# Patient Record
Sex: FEMALE | Race: WHITE | Employment: OTHER | URBAN - METROPOLITAN AREA
[De-identification: names, ages, dates, MRNs, and addresses within clinical notes are randomized per-mention and may not be internally consistent; named-entity substitution may affect disease eponyms.]

---

## 2017-01-12 ENCOUNTER — TRANSFERRED RECORDS (OUTPATIENT)
Dept: HEALTH INFORMATION MANAGEMENT | Facility: CLINIC | Age: 72
End: 2017-01-12

## 2017-01-13 ENCOUNTER — OFFICE VISIT (OUTPATIENT)
Dept: PEDIATRICS | Facility: CLINIC | Age: 72
End: 2017-01-13
Payer: COMMERCIAL

## 2017-01-13 VITALS
SYSTOLIC BLOOD PRESSURE: 132 MMHG | WEIGHT: 129.2 LBS | TEMPERATURE: 97.7 F | DIASTOLIC BLOOD PRESSURE: 78 MMHG | RESPIRATION RATE: 14 BRPM | BODY MASS INDEX: 22.17 KG/M2 | HEART RATE: 62 BPM

## 2017-01-13 DIAGNOSIS — M25.532 LEFT WRIST PAIN: ICD-10-CM

## 2017-01-13 DIAGNOSIS — M25.532 ARTHRALGIA OF WRIST, LEFT: ICD-10-CM

## 2017-01-13 DIAGNOSIS — S43.085D CLOSED DISLOCATION OF GLENOHUMERAL JOINT, LEFT, SUBSEQUENT ENCOUNTER: Primary | ICD-10-CM

## 2017-01-13 PROCEDURE — 99214 OFFICE O/P EST MOD 30 MIN: CPT | Performed by: INTERNAL MEDICINE

## 2017-01-13 NOTE — MR AVS SNAPSHOT
After Visit Summary   1/13/2017    Brionna Rollins    MRN: [id number]           Patient Information     Date Of Birth          1945        Visit Information        Provider Department      1/13/2017 2:40 PM Roe Connolly MD [facility]        Today's Diagnoses     Closed dislocation of glenohumeral joint, left, subsequent encounter    -  1     Left wrist pain         Arthralgia of wrist, left            Follow-ups after your visit        Who to contact     If you have questions or need follow up information about today's clinic visit or your schedule please contact [facility] directly at [phone].  Normal or non-critical lab and imaging results will be communicated to you by Space Monkeyhart, letter or phone within 4 business days after the clinic has received the results. If you do not hear from us within 7 days, please contact the clinic through Space Monkeyhart or phone. If you have a critical or abnormal lab result, we will notify you by phone as soon as possible.  Submit refill requests through [employer] or call your pharmacy and they will forward the refill request to us. Please allow 3 business days for your refill to be completed.          Additional Information About Your Visit        MyChart Information     LocalSort gives you secure access to your electronic health record. If you see a primary care provider, you can also send messages to your care team and make appointments. If you have questions, please call your primary care clinic.  If you do not have a primary care provider, please call [phone] and they will assist you.        Care EveryWhere ID     This is your Care EveryWhere ID. This could be used by other organizations to access your [facility] medical records  [id number]        Your Vitals Were     Pulse Temperature Respirations             62 97.7  F (36.5  C) (Oral) 14          Blood Pressure from Last 3 Encounters:   01/13/17 132/78   10/19/16 136/82    10/18/16 136/82    Weight from Last 3 Encounters:   01/13/17 129 lb 3.2 oz (58.605 kg)   10/19/16 128 lb (58.06 kg)   10/18/16 129 lb 11.2 oz (58.832 kg)              Today, you had the following     No orders found for display         Today's Medication Changes          These changes are accurate as of: 1/13/17  3:30 PM.  If you have any questions, ask your nurse or doctor.               These medicines have changed or have updated prescriptions.        Dose/Directions    acetaminophen-codeine 300-30 MG per tablet   Commonly known as:  TYLENOL #3   This may have changed:  when to take this   Used for:  Left wrist pain, Arthralgia of wrist, left   Changed by:  Roe Connolly MD        Dose:  1-2 tablet   Take 1-2 tablets by mouth 3 times daily as needed for moderate pain Take 1-2 tablets by mouth 2 times daily if needed for pain.   Quantity:  60 tablet   Refills:  0            Where to get your medicines      Some of these will need a paper prescription and others can be bought over the counter.  Ask your nurse if you have questions.     Bring a paper prescription for each of these medications    - acetaminophen-codeine 300-30 MG per tablet             Primary Care Provider Office Phone # Fax #    Roe Connolly [phone]       [facility] [address] [place] [address]        Thank you!     Thank you for choosing [facility]  for your care. Our goal is always to provide you with excellent care. Hearing back from our patients is one way we can continue to improve our services. Please take a few minutes to complete the written survey that you may receive in the mail after your visit with us. Thank you!             Your Updated Medication List - Protect others around you: Learn how to safely use, store and throw away your medicines at www.disposemymeds.org.          This list is accurate as of: 1/13/17  3:30 PM.  Always use your most recent med list.                    Brand Name Dispense Instructions for use    acetaminophen-codeine 300-30 MG per tablet    TYLENOL #3    60 tablet    Take 1-2 tablets by mouth 3 times daily as needed for moderate pain Take 1-2 tablets by mouth 2 times daily if needed for pain.       colchicine 0.6 MG tablet    COLCRYS    30 tablet    2 tabs at the onset of flare, then 1 tab every 2 hrs until pain is better or diarrhea occurs, up to 10 doses. Wait 3 days until taking again       diclofenac 1 % Gel topical gel    VOLTAREN    100 g    Apply 1-2 grams to wrist QID as needed .       fluticasone 50 MCG/ACT spray    FLONASE    16 g    Spray 1-2 sprays into both nostrils daily       IBUPROFEN PO      Take 600 mg by mouth every 6 hours as needed for moderate pain       LACTOBACILLUS RHAMNOSUS (GG) PO      Take 2 capsules by mouth 3 times daily (with meals)       MAPAP 325 MG tablet   Generic drug:  acetaminophen      Take 325-650 mg by mouth every 6 hours as needed for mild pain       naproxen 500 MG tablet    NAPROSYN    60 tablet    Take 1 tablet (500 mg) by mouth 2 times daily as needed for moderate pain

## 2017-01-13 NOTE — PROGRESS NOTES
SUBJECTIVE:                                                    Brionna Rollins is a 71 year old female who presents to clinic today for the following health issues:      ED/UC Followup:    Facility:  [facility]  Date of visit: 01/12/2017  Reason for visit: Lt shoulder spearation  Current Status: non painful     patient here for ER follow-up, doing well. had reduction in the ER under sedation and this went well. is requesting gAPAP3 refill as she and her sig other [facility] [place] then to [place] and wants to have them if flares form the activity, etc. in a sling and this is working well. patient continues to have ongoing wrist pain at times from previous fx, but this is very manageable with ice and NSAIDS. only vey rarely does she take an APAP3 for this after sig overuse.   Problem list and histories reviewed & adjusted, as indicated.  Additional history: as documented    Patient Active Problem List    Diagnosis Date Noted     Atopic rhinitis 02/05/2016     Priority: Medium     Hypertension goal BP (blood pressure) < 140/90 01/07/2016     Priority: Medium     Osteoarthritis of knee 12/18/2015     Priority: Medium     Fracture of distal end of radius 10/22/2015     Priority: Medium     Surgical follow-up care 06/01/2015     Priority: Medium     Overview:   inactive icd-9 diagnosis auto replaced with icd-10, display name retained//marycarmen       Acute posthemorrhagic anemia 04/29/2015     Priority: Medium     Osteoarthritis of hip 04/07/2015     Priority: Medium     Allergy to anti-infective agent 03/26/2014     Priority: Medium     Enteritis 03/26/2014     Priority: Medium     Diverticulitis of sigmoid colon 03/26/2014     Priority: Medium     Advanced directives, counseling/discussion 06/06/2011     Priority: Medium     Advance Directive Problem List Overview:   Name Relationship Phone    Primary Health Care Agent Lisa Rollins daughter [phone]         Alternative Health  "Care Agent Enmanuel Rollins son [phone]       Patient states has Advance Directive and will bring in a copy to clinic. 6/6/2011   Pt document is in epic, dated 1/12/05.  Discussed with pt and she may plan to update this and verbally gave the health care agent information listed. Leigh Naranjo RN           Mixed incontinence urge and stress 06/06/2011     Priority: Medium     CARDIOVASCULAR SCREENING; LDL GOAL LESS THAN 160 02/10/2010     Priority: Medium     Microscopic hematuria 11/21/2008     Priority: Medium     Colon Polyps      Priority: Medium     Estrogen replacement therapy 09/07/2005     Priority: Medium     Internal derangement of knee 04/30/2005     Priority: Medium     L knee x 4-5 wks, no known injury but \"runs through it\" regularly  Problem list name updated by automated process. Provider to review       Osteoporosis 04/18/2003     Priority: Medium     Problem list name updated by automated process. Provider to review       Social History   Substance Use Topics     Smoking status: Never Smoker      Smokeless tobacco: Never Used     Alcohol Use: Yes      Comment: occ with dinner q night     Family History   Problem Relation Age of Onset     Arthritis Father      Rheumatoid     Eye Disorder Father      mac. degeneration femp op bypass     GASTROINTESTINAL DISEASE Mother      colon polyps     Cancer - colorectal Maternal Grandmother      C.A.D. No family hx of      DIABETES Mother      Breast Cancer No family hx of        ROS:  A complete 10 point review of systems was taken and negative except for those noted in the subjective/HPI section(s) above     Problem list, Medication list, Allergies, and Medical/Social/Surgical histories reviewed in [facility] and updated as appropriate.    OBJECTIVE:                                                    /78 mmHg  Pulse 62  Temp(Src) 97.7  F (36.5  C) (Oral)  Resp 14  Wt 129 lb 3.2 oz (58.605 kg)   Constitutional: healthy, alert and no distress  HEENT: " "normocephalic and atrumatic, mucous membranes moist, op clear, mucous membranes moist, neck supple   Musculoskeletal: L arm in sling.  normal muscle tone  Skin: no suspicious lesions or rashes  Psychiatric: mentation appears normal and affect normal/bright           ASSESSMENT/PLAN:                                                    (S42.489D) Closed dislocation of glenohumeral joint, left, subsequent encounter  (primary encounter diagnosis)  Comment: discussed with patient (or patient's parents/caregiver) pathophysiology of condition and treatment options.  successful reduction inER last night and patient has ortho follow-up scheduled. agree to refill APAP3. Medication(s) indication(s), adverse effects, risks and benefits discussed. reviwed ER notes, etc as well today. Also reviewed need to keep medication secure, no early refills if lost, stolen, or misplaced. This medication can be abused and lead to addiction, etc. Patient verbalized understanding and is agreeable to this plan.      (M25.532) Left wrist pain  Comment: Well controlled on current medication(s). reviewed narcotic si fro severe breakthrough pain, not long term management and patient agrees. continue follow-up with ortho  Plan: acetaminophen-codeine (TYLENOL #3) 300-30 MG         per tablet          (M25.532) Arthralgia of wrist, left  Comment: as above  Plan: acetaminophen-codeine (TYLENOL #3) 300-30 MG         per tablet            Estimated body mass index is 22.17 kg/(m^2) as calculated from the following:    Height as of 10/19/16: 5' 4\" (1.626 m).    Weight as of this encounter: 129 lb 3.2 oz (58.605 kg).      Return to clinic as needed or if symptoms persist, change, worsen or if any new symptoms develop.    Total time spent with patient was 30 min, of which greater than 25 minutes of face to face time and/or coordination of care discussing the above issue(s).     Roe Connolly M.D.  Internal Medicine-Pediatrics                "

## 2017-01-13 NOTE — NURSING NOTE
"Chief Complaint   Patient presents with     RECHECK       Initial /78 mmHg  Pulse 76  Temp(Src) 97.7  F (36.5  C) (Oral)  Resp 14  Wt 129 lb 3.2 oz (58.605 kg) Estimated body mass index is 22.17 kg/(m^2) as calculated from the following:    Height as of 10/19/16: 5' 4\" (1.626 m).    Weight as of this encounter: 129 lb 3.2 oz (58.605 kg).  BP completed using cuff size: regular Rt arm  Tia JARRELL, OSMAN,AAMA      "

## 2017-03-27 ENCOUNTER — TRANSFERRED RECORDS (OUTPATIENT)
Dept: HEALTH INFORMATION MANAGEMENT | Facility: CLINIC | Age: 72
End: 2017-03-27

## 2017-04-01 ENCOUNTER — TRANSFERRED RECORDS (OUTPATIENT)
Dept: HEALTH INFORMATION MANAGEMENT | Facility: CLINIC | Age: 72
End: 2017-04-01

## 2017-05-03 ENCOUNTER — TRANSFERRED RECORDS (OUTPATIENT)
Dept: HEALTH INFORMATION MANAGEMENT | Facility: CLINIC | Age: 72
End: 2017-05-03

## 2017-05-12 ENCOUNTER — TRANSFERRED RECORDS (OUTPATIENT)
Dept: HEALTH INFORMATION MANAGEMENT | Facility: CLINIC | Age: 72
End: 2017-05-12

## 2017-06-07 ENCOUNTER — TRANSFERRED RECORDS (OUTPATIENT)
Dept: HEALTH INFORMATION MANAGEMENT | Facility: CLINIC | Age: 72
End: 2017-06-07

## 2017-06-09 ENCOUNTER — TRANSFERRED RECORDS (OUTPATIENT)
Dept: HEALTH INFORMATION MANAGEMENT | Facility: CLINIC | Age: 72
End: 2017-06-09

## 2017-07-01 ENCOUNTER — HEALTH MAINTENANCE LETTER (OUTPATIENT)
Age: 72
End: 2017-07-01

## 2017-07-07 ENCOUNTER — OFFICE VISIT (OUTPATIENT)
Dept: PEDIATRICS | Facility: CLINIC | Age: 72
End: 2017-07-07
Payer: COMMERCIAL

## 2017-07-07 VITALS
SYSTOLIC BLOOD PRESSURE: 134 MMHG | BODY MASS INDEX: 23.07 KG/M2 | HEART RATE: 61 BPM | TEMPERATURE: 97.8 F | WEIGHT: 134.4 LBS | DIASTOLIC BLOOD PRESSURE: 77 MMHG

## 2017-07-07 DIAGNOSIS — M25.532 LEFT WRIST PAIN: ICD-10-CM

## 2017-07-07 DIAGNOSIS — M25.532 ARTHRALGIA OF WRIST, LEFT: ICD-10-CM

## 2017-07-07 DIAGNOSIS — M19.049 ARTHRITIS OF CARPOMETACARPAL JOINT: ICD-10-CM

## 2017-07-07 DIAGNOSIS — L30.4 INTERTRIGO: Primary | ICD-10-CM

## 2017-07-07 PROCEDURE — 99213 OFFICE O/P EST LOW 20 MIN: CPT | Performed by: NURSE PRACTITIONER

## 2017-07-07 RX ORDER — NAPROXEN 500 MG/1
500 TABLET ORAL 2 TIMES DAILY PRN
Qty: 60 TABLET | Refills: 1 | Status: SHIPPED | OUTPATIENT
Start: 2017-07-07 | End: 2018-10-30

## 2017-07-07 RX ORDER — CLOTRIMAZOLE 1 G/ML
SOLUTION TOPICAL 2 TIMES DAILY
Qty: 60 ML | Refills: 3 | Status: SHIPPED | OUTPATIENT
Start: 2017-07-07 | End: 2018-01-16

## 2017-07-07 NOTE — NURSING NOTE
"Chief Complaint   Patient presents with     Rash       Initial /77  Pulse 61  Temp 97.8  F (36.6  C) (Tympanic)  Wt 134 lb 6.4 oz (61 kg)  BMI 23.07 kg/m2 Estimated body mass index is 23.07 kg/(m^2) as calculated from the following:    Height as of 10/19/16: 5' 4\" (1.626 m).    Weight as of this encounter: 134 lb 6.4 oz (61 kg).  Medication Reconciliation: complete    "

## 2017-07-07 NOTE — PROGRESS NOTES
SUBJECTIVE:                                                    Brionna Rollins is a 72 year old female who presents to clinic today for the following health issues    Rash      Duration: one week or so    Description  Location: upper inner thighs  Itching: no    Intensity:  mild    Accompanying signs and symptoms: None    History (similar episodes/previous evaluation): None    Precipitating or alleviating factors:  New exposures:  None  Recent travel: YES- [place] with family     Therapies tried and outcome: hydrocortisone cream -  not effective and Bactracin- not effective    Not itchy or painful. No new soaps or detergents. No new underwear.    ROS: const/derm otherwise negative     OBJECTIVE:  /77  Pulse 61  Temp 97.8  F (36.6  C) (Tympanic)  Wt 134 lb 6.4 oz (61 kg)  BMI 23.07 kg/m2  CONSTITUTIONAL: Alert, well-nourished, well-groomed, NAD  RESP: Lungs CTA. No wheeze, rhonchi, rales.  CV: HRRR S1 S2 No MRG. No peripheral edema  DERM: Several dull pink macules, some slightly raised, all about 4mm in size in groin area and uppermost thighs. No skin breakdown.     ASSESSMENT/PLAN:  (L30.4) Intertrigo  (primary encounter diagnosis)  Comment: Likely candida vs tinea cruris. Clotrimazole should cover both.   Plan: clotrimazole (LOTRIMIN) 1 % solution        Keep area clean and dry. Loose, cotton clothing. Apply on affected area and at least an inch around area. Call if no improvement in 2 weeks.     (M19.049) Arthritis of carpometacarpal joint  Comment: left  Plan: naproxen (NAPROSYN) 500 MG tablet        Refilled Tylenol #3 today, only 10 tabs. Patient sees PCP in 10 days.     (M25.532) Left wrist pain  Plan: acetaminophen-codeine (TYLENOL #3) 300-30 MG         per tablet            (M25.532) Arthralgia of wrist, left  Plan: acetaminophen-codeine (TYLENOL #3) 300-30 MG         per tablet              ANKUR Holley-BROOK.

## 2017-07-07 NOTE — Clinical Note
Please abstract the following data from this visit with this patient into the appropriate field in Epic:  Mammogram done on this date: April 2017 (approximately), by this group: [facility], results were negative for malignancy.

## 2017-07-07 NOTE — MR AVS SNAPSHOT
"              After Visit Summary   7/7/2017    Brionna Rollins    MRN: [id number]           Patient Information     Date Of Birth          1945        Visit Information        Provider Department      7/7/2017 11:20 AM Brianna Draper APRN CNP [facility]        Today's Diagnoses     Intertrigo    -  1    Arthritis of carpometacarpal joint        Left wrist pain        Arthralgia of wrist, left           Follow-ups after your visit        Your next 10 appointments already scheduled     Jul 17, 2017  2:40 PM CDT   [facility] Visit [facility]oe [facility]olly MD   [facility]    [address] [address] [phone]           Bring a current list of meds and any records pertaining to this visit.  For Physicals, please bring immunization records and any forms needing to be filled out.  Please arrive 10 minutes early to complete paperwork.              Who to contact     If you have questions or need follow up information about today's clinic visit or your schedule please contact [facility] directly at [phone].  Normal or non-critical lab and imaging results will be communicated to you by Benhauerhart, letter or phone within 4 business days after the clinic has received the results. If you do not hear from us within 7 days, please contact the clinic through Benhauerhart or phone. If you have a critical or abnormal lab result, we will notify you by phone as soon as possible.  Submit refill requests through [employer] or call your pharmacy and they will forward the refill request to us. Please allow 3 business days for your refill to be completed.          Additional Information About Your Visit        MyChart Information     [employer] lets you send messages to your doctor, view your test results, renew your prescriptions, schedule appointments and more. To sign up, go to [email or web address] . Click on \"Log in\" on the left side of " "the screen, which will take you to the Welcome page. Then click on \"Sign up Now\" on the right side of the page.     You will be asked to enter the access code listed below, as well as some personal information. Please follow the directions to create your username and password.     Your access code is: [email or web address]  Expires: 2017  8:41 AM     Your access code will  in 90 days. If you need help or a new code, please call your [facility] clinic or [phone].        Care EveryWhere ID     This is your Care EveryWhere ID. This could be used by other organizations to access your [facility] medical records  [id number]        Your Vitals Were     Pulse Temperature BMI (Body Mass Index)             61 97.8  F (36.6  C) (Tympanic) 23.07 kg/m2          Blood Pressure from Last 3 Encounters:   17 134/77   17 132/78   10/19/16 136/82    Weight from Last 3 Encounters:   17 134 lb 6.4 oz (61 kg)   17 129 lb 3.2 oz (58.6 kg)   10/19/16 128 lb (58.1 kg)              Today, you had the following     No orders found for display         Today's Medication Changes          These changes are accurate as of: 17 11:49 AM.  If you have any questions, ask your nurse or doctor.               Start taking these medicines.        Dose/Directions    clotrimazole 1 % solution   Commonly known as:  LOTRIMIN   Used for:  Intertrigo   Started by:  Brianna Draper APRN CNP        Apply topically 2 times daily   Quantity:  60 mL   Refills:  3            Where to get your medicines      These medications were sent to [facility] - [address]   [address], [place] [address]     Phone:  [phone]     clotrimazole 1 % solution    naproxen 500 MG tablet         Some of these will need a paper prescription and others can be bought over the counter.  Ask your nurse if you have questions.     Bring a paper prescription for each of these " medications     acetaminophen-codeine 300-30 MG per tablet                Primary Care Provider Office Phone # Fax #    Roe Connolly [phone]       [facility] [address] [place] [address]        Equal Access to Services     ZAINA GOLDSMITHFARIBA : Hadii aad ku hadasho Soomaali, waaxda luqadaha, qaybta kaalmada adeegyada, waxay idiin hayaan adedaly willingham laminechapo amarjit. So [facility] [phone].    ATENCIÓN: Si habla español, tiene a walton disposición servicios gratuitos de asistencia lingüística. Llame al [phone].    We comply with applicable federal civil rights laws and [place] laws. We do not discriminate on the basis of race, color, national origin, age, disability sex, sexual orientation or gender identity.            Thank you!     Thank you for choosing [facility]  for your care. Our goal is always to provide you with excellent care. Hearing back from our patients is one way we can continue to improve our services. Please take a few minutes to complete the written survey that you may receive in the mail after your visit with us. Thank you!             Your Updated Medication List - Protect others around you: Learn how to safely use, store and throw away your medicines at www.disposemymeds.org.          This list is accurate as of: 7/7/17 11:49 AM.  Always use your most recent med list.                   Brand Name Dispense Instructions for use Diagnosis    acetaminophen-codeine 300-30 MG per tablet    TYLENOL #3    10 tablet    Take 1-2 tablets by mouth 3 times daily as needed for moderate pain Take 1-2 tablets by mouth 2 times daily if needed for pain.    Left wrist pain, Arthralgia of wrist, left       clotrimazole 1 % solution    LOTRIMIN    60 mL    Apply topically 2 times daily    Intertrigo       diclofenac 1 % Gel topical gel    VOLTAREN    100 g    Apply 1-2 grams to wrist QID as needed .    Left wrist pain, Arthralgia of wrist, left, Arthritis of left  wrist       fluticasone 50 MCG/ACT spray    FLONASE    16 g    Spray 1-2 sprays into both nostrils daily    Dysfunction of eustachian tube, bilateral, Dizziness       IBUPROFEN PO      Take 600 mg by mouth every 6 hours as needed for moderate pain        LACTOBACILLUS RHAMNOSUS (GG) PO      Take 2 capsules by mouth 3 times daily (with meals)        MAPAP 325 MG tablet   Generic drug:  acetaminophen      Take 325-650 mg by mouth every 6 hours as needed for mild pain        naproxen 500 MG tablet    NAPROSYN    60 tablet    Take 1 tablet (500 mg) by mouth 2 times daily as needed for moderate pain    Arthritis of carpometacarpal joint

## 2017-07-17 ENCOUNTER — OFFICE VISIT (OUTPATIENT)
Dept: FAMILY MEDICINE | Facility: CLINIC | Age: 72
End: 2017-07-17
Payer: COMMERCIAL

## 2017-07-17 VITALS
RESPIRATION RATE: 14 BRPM | HEART RATE: 72 BPM | TEMPERATURE: 98.3 F | BODY MASS INDEX: 23.34 KG/M2 | WEIGHT: 136 LBS | DIASTOLIC BLOOD PRESSURE: 74 MMHG | SYSTOLIC BLOOD PRESSURE: 128 MMHG

## 2017-07-17 DIAGNOSIS — M54.12 RIGHT CERVICAL RADICULOPATHY: Primary | ICD-10-CM

## 2017-07-17 DIAGNOSIS — M25.532 LEFT WRIST PAIN: ICD-10-CM

## 2017-07-17 DIAGNOSIS — M25.532 ARTHRALGIA OF WRIST, LEFT: ICD-10-CM

## 2017-07-17 DIAGNOSIS — M79.641 PAIN OF RIGHT HAND: ICD-10-CM

## 2017-07-17 DIAGNOSIS — R21 RASH AND NONSPECIFIC SKIN ERUPTION: ICD-10-CM

## 2017-07-17 DIAGNOSIS — M79.621 PAIN OF RIGHT UPPER ARM: ICD-10-CM

## 2017-07-17 PROCEDURE — 99214 OFFICE O/P EST MOD 30 MIN: CPT | Performed by: INTERNAL MEDICINE

## 2017-07-17 RX ORDER — BENZOCAINE/MENTHOL 6 MG-10 MG
LOZENGE MUCOUS MEMBRANE 3 TIMES DAILY PRN
COMMUNITY
Start: 2017-07-17 | End: 2019-04-02

## 2017-07-17 NOTE — MR AVS SNAPSHOT
After Visit Summary   7/17/2017    Brionna Rollins    MRN: [id number]           Patient Information     Date Of Birth          1945        Visit Information        Provider Department      7/17/2017 2:40 PM Roe Connolly MD [facility]        Today's Diagnoses     Right cervical radiculopathy    -  1    Pain of right hand        Pain of right upper arm        Left wrist pain        Arthralgia of wrist, left        Rash and nonspecific skin eruption           Follow-ups after your visit        Additional Services     ORTHO  REFERRAL       [facility] is referring you to the Orthopedic  Services at [facility].       The  Representative will assist you in the coordination of your Orthopedic and Musculoskeletal Care as prescribed by your physician.    The  Representative will call you within 1 business day to help schedule your appointment, or you may contact the  Representative at:    All areas ~ [phone]     Type of Referral : Non Surgical       Timeframe requested: Routine - patient would like to schedule the appointment anytime after 8/10, please schedule with Dr. Vergara in [place]    Coverage of these services is subject to the terms and limitations of your health insurance plan.  Please call member services at your health plan with any benefit or coverage questions.      If X-rays, CT or MRI's have been performed, please contact the facility where they were done to arrange for , prior to your scheduled appointment.  Please bring this referral request to your appointment and present it to your specialist.                  Your next 10 appointments already scheduled     Aug 14, 2017  6:20 PM CDT   New Visit with Billy Nunez DO   [facility] ([facility] [place])    [address] [address] [address]   [phone]          "   Sep 11, 2017  2:40 PM CDT   [facility]    [address] [address] [phone]           Bring a current list of meds and any records pertaining to this visit. For Physicals, please bring immunization records and any forms needing to be filled out. Please arrive 10 minutes early to complete paperwork.            Sep 12, 2017  1:00 PM CDT   [facility] Visit with Ke[facility]illo MD   [facility]    [address]  [address]  [facility] [address] [phone]              Who to contact     If you have questions or need follow up information about today's clinic visit or your schedule please contact [facility] directly at [phone].  Normal or non-critical lab and imaging results will be communicated to you by MyChart, letter or phone within 4 business days after the clinic has received the results. If you do not hear from us within 7 days, please contact the clinic through [employer] or phone. If you have a critical or abnormal lab result, we will notify you by phone as soon as possible.  Submit refill requests through Collections or call your pharmacy and they will forward the refill request to us. Please allow 3 business days for your refill to be completed.          Additional Information About Your Visit        MyChart Information     Collections lets you send messages to your doctor, view your test results, renew your prescriptions, schedule appointments and more. To sign up, go to [email or web address] . Click on \"Log in\" on the left side of the screen, which will take you to the Welcome page. Then click on \"Sign up Now\" on the right side of the page.     You will be asked to enter the access code listed below, as well as some personal information. Please follow the directions to create your username and password.     Your access code is: " [email or web address]  Expires: 2017  8:41 AM     Your access code will  in 90 days. If you need help or a new code, please call your [place] clinic or [phone].        Care EveryWhere ID     This is your Care EveryWhere ID. This could be used by other organizations to access your [place] medical records  [id number]        Your Vitals Were     Pulse Temperature Respirations BMI (Body Mass Index)          72 98.3  F (36.8  C) (Oral) 14 23.34 kg/m2         Blood Pressure from Last 3 Encounters:   17 128/74   17 134/77   17 132/78    Weight from Last 3 Encounters:   17 136 lb (61.7 kg)   17 134 lb 6.4 oz (61 kg)   17 129 lb 3.2 oz (58.6 kg)              We Performed the Following     ORTHO  REFERRAL          Today's Medication Changes          These changes are accurate as of: 17 11:59 PM.  If you have any questions, ask your nurse or doctor.               Start taking these medicines.        Dose/Directions    hydrocortisone 1 % cream   Commonly known as:  CORTAID   Used for:  Rash and nonspecific skin eruption   Started by:  Roe Connolly MD        Apply topically 3 times daily as needed for rash   Refills:  0         These medicines have changed or have updated prescriptions.        Dose/Directions    acetaminophen-codeine 300-30 MG per tablet   Commonly known as:  TYLENOL #3   This may have changed:  when to take this   Used for:  Left wrist pain, Arthralgia of wrist, left   Changed by:  Roe Connolly MD        Dose:  1-2 tablet   Take 1-2 tablets by mouth nightly as needed for moderate pain Take 1-2 tablets by mouth 2 times daily if needed for pain.   Quantity:  30 tablet   Refills:  0            Where to get your medicines      Some of these will need a paper prescription and others can be bought over the counter.  Ask your nurse if you have questions.     Bring a paper prescription for each of these medications     acetaminophen-codeine 300-30  MG per tablet       You don't need a prescription for these medications     hydrocortisone 1 % cream                Primary Care Provider Office Phone # Fax #    Roe Connolly [phone]       [facility] [address] [place] [address]        Equal Access to Services     [facility] AD : Hadii aad ku hadasho Soomaali, waaxda luqadaha, qaybta kaalmada adeegyada, waxay donnain haypachecon zora sussybrendan ocasio. So [facility] [phone].    ATENCIÓN: Si habla español, tiene a walton disposición servicios gratuitos de asistencia lingüística. Llame al [phone].    We comply with applicable federal civil rights laws and [place] laws. We do not discriminate on the basis of race, color, national origin, age, disability sex, sexual orientation or gender identity.            Thank you!     Thank you for choosing [facility]  for your care. Our goal is always to provide you with excellent care. Hearing back from our patients is one way we can continue to improve our services. Please take a few minutes to complete the written survey that you may receive in the mail after your visit with us. Thank you!             Your Updated Medication List - Protect others around you: Learn how to safely use, store and throw away your medicines at www.disposemymeds.org.          This list is accurate as of: 7/17/17 11:59 PM.  Always use your most recent med list.                   Brand Name Dispense Instructions for use Diagnosis    acetaminophen-codeine 300-30 MG per tablet    TYLENOL #3    30 tablet    Take 1-2 tablets by mouth nightly as needed for moderate pain Take 1-2 tablets by mouth 2 times daily if needed for pain.    Left wrist pain, Arthralgia of wrist, left       clotrimazole 1 % solution    LOTRIMIN    60 mL    Apply topically 2 times daily    Intertrigo       diclofenac 1 % Gel topical gel    VOLTAREN    100 g    Apply 1-2 grams to wrist QID as needed .    Left wrist pain,  Arthralgia of wrist, left, Arthritis of left wrist       fluticasone 50 MCG/ACT spray    FLONASE    16 g    Spray 1-2 sprays into both nostrils daily    Dysfunction of eustachian tube, bilateral, Dizziness       hydrocortisone 1 % cream    CORTAID     Apply topically 3 times daily as needed for rash    Rash and nonspecific skin eruption       IBUPROFEN PO      Take 600 mg by mouth every 6 hours as needed for moderate pain        LACTOBACILLUS RHAMNOSUS (GG) PO      Take 2 capsules by mouth 3 times daily (with meals)        MAPAP 325 MG tablet   Generic drug:  acetaminophen      Take 325-650 mg by mouth every 6 hours as needed for mild pain        naproxen 500 MG tablet    NAPROSYN    60 tablet    Take 1 tablet (500 mg) by mouth 2 times daily as needed for moderate pain    Arthritis of carpometacarpal joint

## 2017-07-17 NOTE — NURSING NOTE
"Chief Complaint   Patient presents with     Refill Request     RECHECK       Initial /74  Pulse 72  Temp 98.3  F (36.8  C) (Oral)  Resp 14  Wt 136 lb (61.7 kg)  BMI 23.34 kg/m2 Estimated body mass index is 23.34 kg/(m^2) as calculated from the following:    Height as of 10/19/16: 5' 4\" (1.626 m).    Weight as of this encounter: 136 lb (61.7 kg).  Medication Reconciliation: complete   Tia JARRELL, OSMAN,AAMA      "

## 2017-07-17 NOTE — PROGRESS NOTES
SUBJECTIVE:                                                    Brionna Rollins is a 72 year old female who presents to clinic today for the following health issues:    Medication Followup of  Pain medication    Taking Medication as prescribed: yes    Side Effects:  None    Medication Helping Symptoms:  Yes    patient has history of ongoign pain on the R, usues apap3 and this does help. wants to discuss this ttoday. at times get tingling and numbness too, sometimes goes up into the shoulder and back too    Rash fu      Duration: 2 weeks     Description  Location: upper bilateral thighs  Itching: no    Intensity:  mild to moderate    Accompanying signs and symptoms: Just not going away    History (similar episodes/previous evaluation): Pt denies the use of new fragrance, travel and soap    Precipitating or alleviating factors:  New exposures:  None  Recent travel: no      Therapies tried and outcome: hydrocortisone cream -  effective    Other concerns to discuss in clinic today are as follows:  Pt would like to discuss pain in general, would liek to review options for ongoing pain med management. No other concerns or complaints today.     Problem list and histories reviewed & adjusted, as indicated.  Additional history: as documented    Patient Active Problem List    Diagnosis Date Noted     Atopic rhinitis 02/05/2016     Priority: Medium     Hypertension goal BP (blood pressure) < 140/90 01/07/2016     Priority: Medium     Osteoarthritis of knee 12/18/2015     Priority: Medium     Fracture of distal end of radius 10/22/2015     Priority: Medium     Surgical follow-up care 06/01/2015     Priority: Medium     Overview:   inactive icd-9 diagnosis auto replaced with icd-10, display name retained//jonnyrz       Acute posthemorrhagic anemia 04/29/2015     Priority: Medium     Osteoarthritis of hip 04/07/2015     Priority: Medium     Allergy to anti-infective agent 03/26/2014     Priority: Medium     Enteritis 03/26/2014     " Priority: Medium     Diverticulitis of sigmoid colon 03/26/2014     Priority: Medium     Advanced directives, counseling/discussion 06/06/2011     Advance Directive Problem List Overview:   Name Relationship Phone    Primary Health Care Agent Lisa Rollins daughter [phone]         Alternative Health Care Agent Enmanuel Rollins son [phone]       Patient states has Advance Directive and will bring in a copy to clinic. 6/6/2011   Pt document is in epic, dated 1/12/05.  Discussed with pt and she may plan to update this and verbally gave the health care agent information listed. Leigh Naranjo RN           Mixed incontinence urge and stress 06/06/2011     CARDIOVASCULAR SCREENING; LDL GOAL LESS THAN 160 02/10/2010     Microscopic hematuria 11/21/2008     Colon Polyps      Estrogen replacement therapy 09/07/2005     Internal derangement of knee 04/30/2005     L knee x 4-5 wks, no known injury but \"runs through it\" regularly  Problem list name updated by automated process. Provider to review       Osteoporosis 04/18/2003     Problem list name updated by automated process. Provider to review       Social History   Substance Use Topics     Smoking status: Never Smoker     Smokeless tobacco: Never Used     Alcohol use Yes      Comment: occ with dinner q night     Family History   Problem Relation Age of Onset     GASTROINTESTINAL DISEASE Mother      colon polyps     DIABETES Mother      Arthritis Father      Rheumatoid     Eye Disorder Father      mac. degeneration femp op bypass     Cancer - colorectal Maternal Grandmother      C.A.D. No family hx of      Breast Cancer No family hx of        ROS:  A complete 10 point review of systems was taken and negative except for those noted in the subjective/HPI section(s) above     BP Readings from Last 3 Encounters:   07/17/17 128/74   07/07/17 134/77   01/13/17 132/78    Wt Readings from Last 3 Encounters:   07/17/17 136 lb (61.7 kg)   07/07/17 134 lb 6.4 oz (61 kg) "   01/13/17 129 lb 3.2 oz (58.6 kg)                      OBJECTIVE:                                                    /74  Pulse 72  Temp 98.3  F (36.8  C) (Oral)  Resp 14  Wt 136 lb (61.7 kg)  BMI 23.34 kg/m2   Constitutional: healthy, alert and no distress  Head: Normocephalic. No masses, lesions, tenderness or abnormalities  Neck: Neck supple. No adenopathy. full range of motion   ENT: ENT exam normal, no neck nodes or sinus tenderness tympanic membranes within normal limits bilaterally, no tonsillar hypertrophy or exudates  Musculoskeletal: extremities normal- no gross deformities noted, gait normal and normal muscle tone; normal strength  Skin: + mild erythemtous rash on antiro legs, blanching and no vesciles or macules  Neurologic: Gait normal. Reflexes normal and symmetric. Sensation grossly WNL. strength intact and symmetric  Psychiatric: mentation appears normal and affect normal/bright           ASSESSMENT/PLAN:                                                    (M54.12) Right cervical radiculopathy  (primary encounter diagnosis)  Comment: discussed with patient (or patient's parents/caregiver) pathophysiology of condition and treatment options.  given history, will refer to [facility] for evaluation and consideration of imaging. Patient verbalized understanding and is agreeable to this plan.   Plan: ORTHO  REFERRAL      (M79.641) Pain of right hand  Comment: as above   Plan: ORTHO  REFERRAL          (M79.621) Pain of right upper arm  Comment: as abov  Plan: ORTHO  REFERRAL          (M25.532) Left wrist pain  Comment: discussed with patient (or patient's parents/caregiver) pathophysiology of condition and treatment options.  ad long talk with patient, pain s/p fx and patient takes at most 1 APAP3 QHS. Reviwed risks/benefits of chronic narcotics fro chronic pain. patient aware. agree to consider 30 tabs Qmonth and patient will try this,  "follow-up at next office visit and if working wodl sign CS agreement, etc. reviwed with patient would need pain clinic evaluation if would ever want increased sig or dose and she understands and is in agreement. New medication(s) indication(s), adverse effects, risks and benefits discussed. Medication(s) indication(s), adverse effects, risks and benefits discussed. Also reviewed need to keep medication secure, no early refills if lost, stolen, or misplaced. This medication can be abused and lead to addiction, etc. Patient verbalized understanding and is agreeable to this plan.    Plan: acetaminophen-codeine (TYLENOL #3) 300-30 MG         per tablet          (M25.532) Arthralgia of wrist, left  Comment: as above  Plan: acetaminophen-codeine (TYLENOL #3) 300-30 MG         per tablet          (R21) Rash and nonspecific skin eruption  Comment: Well controlled on current medication(s). ssuepct related to heat or contact deramtitis, not consistant with yeast at this time. if worse would refer to Dr. miranda for evaluation. Patient verbalized understanding and is agreeable to this plan.   Plan: hydrocortisone (CORTAID) 1 % cream              Estimated body mass index is 23.07 kg/(m^2) as calculated from the following:    Height as of 10/19/16: 5' 4\" (1.626 m).    Weight as of 7/7/17: 134 lb 6.4 oz (61 kg).        Return to clinic as needed or if symptoms persist, change, worsen or if any new symptoms develop.    Roe Connolly M.D.  Internal Medicine-Pediatrics            "

## 2017-08-16 DIAGNOSIS — M25.532 ARTHRALGIA OF WRIST, LEFT: ICD-10-CM

## 2017-08-16 DIAGNOSIS — M25.532 LEFT WRIST PAIN: ICD-10-CM

## 2017-08-16 NOTE — TELEPHONE ENCOUNTER
Acetaminophen-codeine 300-30mg      Last Written Prescription Date: 7/17/17  Last Fill Quantity: 30,  # refills: 0   Last Office Visit with [facility] or [facility] prescribing provider: 7/17/17                                         Next 5 appointments (look out 90 days)     Sep 11, 2017  2:40 PM CDT   Office Visit with Roe Connolly MD   [facility] ([address] [address]   [phone]                    [facility]

## 2017-08-17 NOTE — TELEPHONE ENCOUNTER
"Huddled with  - keep \"Take 1-2 tablets by mouth 2 times daily if needed for pain\", Dr. Connolly will be following up to update Problem List.     Called pharmacy to update. Also updated sig on medication list to prevent this on next refill.     "

## 2017-08-17 NOTE — TELEPHONE ENCOUNTER
Script printed, signed, and in station out basket.    FYI to PCP to consider adding to problem list

## 2017-08-17 NOTE — TELEPHONE ENCOUNTER
[facility] calling, there are 2 sig's on rx. Will need to clarify directions with Dr. De La Cruz then follow up with pharmacy.

## 2017-09-11 ENCOUNTER — OFFICE VISIT (OUTPATIENT)
Dept: FAMILY MEDICINE | Facility: CLINIC | Age: 72
End: 2017-09-11
Payer: COMMERCIAL

## 2017-09-11 VITALS
DIASTOLIC BLOOD PRESSURE: 74 MMHG | BODY MASS INDEX: 23.34 KG/M2 | WEIGHT: 136 LBS | TEMPERATURE: 99 F | SYSTOLIC BLOOD PRESSURE: 130 MMHG | HEART RATE: 80 BPM | RESPIRATION RATE: 20 BRPM

## 2017-09-11 DIAGNOSIS — G89.29 OTHER CHRONIC PAIN: Primary | ICD-10-CM

## 2017-09-11 DIAGNOSIS — M16.9 OSTEOARTHRITIS OF HIP, UNSPECIFIED LATERALITY, UNSPECIFIED OSTEOARTHRITIS TYPE: ICD-10-CM

## 2017-09-11 DIAGNOSIS — M17.9 OSTEOARTHRITIS OF KNEE, UNSPECIFIED LATERALITY, UNSPECIFIED OSTEOARTHRITIS TYPE: ICD-10-CM

## 2017-09-11 PROCEDURE — 99214 OFFICE O/P EST MOD 30 MIN: CPT | Performed by: INTERNAL MEDICINE

## 2017-09-11 NOTE — PROGRESS NOTES
Order for consent states left hip revision, diaphyseal femoral stem but case posting states left total hip arthroplasty conversion. Spoke to Narendra Grissom in [place] to see if the same equipment is used. Per Narendra Grissom it should be the same equipment.   DOS:  1/30/2017 SUBJECTIVE:                                                    Brionna Rollins is a 72 year old female who presents to clinic today for the following health issues:    Follow Up   Pt missed appt with pain clinic she would like to discuss her option    Duration: 4-5 years    Description (location/character/radiation): muscle pain    Intensity:  moderate    Accompanying signs and symptoms: better to worse at times than others    History (similar episodes/previous evaluation): Arthralagia and dislocated glenohumral    Precipitating or alleviating factors: P    Therapies tried and outcome: accupunture and chiropractor has been effective   patient here for follow-up pain, overall doing okay erika given has brother in law in town dying of cancer, but overal lshe is manage pain will with our previous plan (1 APAP 3 QHS as needed) and notes sh ehasn't needed more than that ever and tries to not take it unless needed. no side effects of medications noted. is wokring out, eating well, and going about her own life too. mood is good all things considered. No other concerns or complaints today.     Problem list and histories reviewed & adjusted, as indicated.  Additional history: as documented  Patient Active Problem List    Diagnosis Date Noted     Atopic rhinitis 02/05/2016     Priority: Medium     Hypertension goal BP (blood pressure) < 140/90 01/07/2016     Priority: Medium     Osteoarthritis of knee 12/18/2015     Priority: Medium     Fracture of distal end of radius 10/22/2015     Priority: Medium     Surgical follow-up care 06/01/2015     Priority: Medium     Overview:   inactive icd-9 diagnosis auto replaced with icd-10, display name retained//mporz       Acute posthemorrhagic anemia 04/29/2015     Priority: Medium     Osteoarthritis of hip 04/07/2015     Priority: Medium     Allergy to anti-infective agent 03/26/2014     Priority: Medium     Enteritis 03/26/2014     Priority: Medium     Diverticulitis of sigmoid colon  "03/26/2014     Priority: Medium     Advanced directives, counseling/discussion 06/06/2011     Priority: Medium     Advance Directive Problem List Overview:   Name Relationship Phone    Primary Health Care Agent Lisa Rollins daughter [phone]         Alternative Health Care Agent Enmanuel Rollins son [phone]       Patient states has Advance Directive and will bring in a copy to clinic. 6/6/2011   Pt document is in epic, dated 1/12/05.  Discussed with pt and she may plan to update this and verbally gave the health care agent information listed. Leigh Naranjo RN           Mixed incontinence urge and stress 06/06/2011     Priority: Medium     CARDIOVASCULAR SCREENING; LDL GOAL LESS THAN 160 02/10/2010     Priority: Medium     Microscopic hematuria 11/21/2008     Priority: Medium     Colon Polyps      Priority: Medium     Estrogen replacement therapy 09/07/2005     Priority: Medium     Internal derangement of knee 04/30/2005     Priority: Medium     L knee x 4-5 wks, no known injury but \"runs through it\" regularly  Problem list name updated by automated process. Provider to review       Osteoporosis 04/18/2003     Priority: Medium     Problem list name updated by automated process. Provider to review       Social History   Substance Use Topics     Smoking status: Never Smoker     Smokeless tobacco: Never Used     Alcohol use Yes      Comment: occ with dinner q night     Family History   Problem Relation Age of Onset     GASTROINTESTINAL DISEASE Mother      colon polyps     DIABETES Mother      Arthritis Father      Rheumatoid     Eye Disorder Father      mac. degeneration femp op bypass     Cancer - colorectal Maternal Grandmother      C.A.D. No family hx of      Breast Cancer No family hx of        ROS:  A complete 10 point review of systems was taken and negative except for those noted in the subjective/HPI section(s) above     BP Readings from Last 3 Encounters:   09/11/17 130/74   07/17/17 128/74   07/07/17 " 134/77    Wt Readings from Last 3 Encounters:   09/11/17 136 lb (61.7 kg)   07/17/17 136 lb (61.7 kg)   07/07/17 134 lb 6.4 oz (61 kg)                   OBJECTIVE:                                                    /74  Pulse 80  Temp 99  F (37.2  C) (Oral)  Resp 20  Wt 136 lb (61.7 kg)  BMI 23.34 kg/m2  Constitutional: healthy, alert and no distress  Psychiatric: mentation appears normal and affect normal/bright  Presentation- normal:Abstract reasoning  Attention and concentration  Coherency and relevance of thought  Dress, grooming, personal hygiene  Facial expression  Information  Judgment  Memory  Mood  Orientation  Perceptions  Posture and motor behavoir  Speech  Thought content  Vocabulary,abnormal:none    Results for orders placed or performed in visit on 04/01/17   Mammogram - HIM Scan    Narrative    Please abstract the following data from this visit with this patient into the appropriate field in Epic:     Mammogram done on this date: April 2017 (approximately), by this group: [facility], results were negative for malignancy.      No flowsheet data found.           ASSESSMENT/PLAN:                                                        ICD-10-CM    1. Other chronic pain G89.29    2. Osteoarthritis of hip, unspecified laterality, unspecified osteoarthritis type M16.9    3. Osteoarthritis of knee, unspecified laterality, unspecified osteoarthritis type M17.10    discussed with patient (or patient's parents/caregiver) pathophysiology of condition and treatment options.  reviwed our care plan, working well and patient and I signed controlled substance agreement today. plan to cotnieun 30 APAP 3 per month. New medication(s) indication(s), adverse effects, risks and benefits discussed. Medication(s) indication(s), adverse effects, risks and benefits discussed. Also reviewed need to keep medication secure, no early refills if lost, stolen, or misplaced. This medication can be abused and lead to addiction,  "etc. reviwed controlled substance agreement in detail today too. Patient verbalized understanding and is agreeable to this plan.        Estimated body mass index is 23.34 kg/(m^2) as calculated from the following:    Height as of 10/19/16: 5' 4\" (1.626 m).    Weight as of 7/17/17: 136 lb (61.7 kg).        Return to clinic as needed or if symptoms persist, change, worsen or if any new symptoms develop.    Total time spent with patient was 30 min, of which greater than 25 minutes of face to face time and/or coordination of care discussing the above issue(s).     Roe Connolly M.D.  Internal Medicine-Pediatrics            "

## 2017-09-11 NOTE — LETTER
[facility]    09/11/17    Patient: Brionna Rollins  YOB: 1945  Medical Record Number: [id number]                                                                  Controlled Substance Agreement  I understand that my care provider has prescribed controlled substances (narcotics, tranquilizers, and/or stimulants) to help manage my condition(s).  I am taking this medicine to help me function or work.  I know that this is strong medicine.  It could have serious side effects and even cause a dependency on the drug.  If I stop these medicines suddenly, I could have severe withdrawal symptoms.    The risks, benefits, and side effects of these medication(s) were explained to me.  I agree that:  1. I will take part in other treatments as advised by my provider.  This may be psychiatry or counseling, physical therapy, behavioral therapy, group treatment, or a referral to a pain clinic.  I will reduce or stop my medicine when my provider tells me to do so.   2. I will take my medicines as prescribed.  I will not change the dose or schedule unless my provider tells me to.  There will be no refills if I  run out early.   I may be contacted at any time without warning and asked to complete a drug test or pill count.   3. I will keep all my appointments at the clinic.  If I miss appointments or fail to follow instructions, my provider may stop my medicine.  4. I will not ask other providers to prescribe controlled substances. And I will not accept controlled substances from other people. If I need another prescribed controlled substance for a new reason, I will notify my provider within one business day.  5. If I enroll in the [place] Medical Marijuana program, I will tell my provider.  I will also sign an agreement to share my medical records with my provider.  6. I will use one pharmacy to fill all of my controlled substance prescriptions.  If my prescription is mailed to my pharmacy, it may take  5 to 7 days for my medicine to be ready.  7. I understand that my provider, clinic care team, and pharmacy can track controlled substance prescriptions from other providers through a central database (prescription monitoring program).  8. I will bring in my list of medications (or my medicine bottles) each time I come to the clinic.  REV- 04/2016                                                                                                                                            Page 1 of 2      [facility] [place]    09/11/17    Patient: Brionna Rollins  YOB: 1945  Medical Record Number: [id number]    9. Refills of controlled substances will be made only during office hours.  It is up to me to make sure that I do not run out of my medicines on weekends or holidays.    10. I am responsible for my prescriptions.  If the medicine is lost or stolen, it will not be replaced.   I also agree not to share these medicines with anyone.  11. I agree to not use ANY illegal or recreational drugs.  This includes marijuana, cocaine, bath salts or other drugs.  I agree not to use alcohol unless my provider says I may.  I agree to give urine samples whenever asked.  If I fail to give a urine sample, the provider may stop my medicine.     12. I will tell my nurse or provider right away if I become pregnant or have a new medical problem treated outside of [facility].  13. I understand that this medicine can affect my thinking and judgment.  It may be unsafe for me to drive, use machinery and do dangerous tasks.  I will not do any of these things until I know how the medicine affects me.  If my dose changes, I will wait to see how it affects me.  I will contact my provider if I have concerns about medicine side effects.  I understand that if I do not follow any of the conditions above, my prescriptions or treatment may be stopped.    I agree that my provider, clinic care team, and pharmacy may work with  any city, state or federal law enforcement agency that investigates the misuse, sale, or other diversion of my controlled medicine. I will allow my provider to discuss my care with or share a copy of this agreement with any other treating provider, pharmacy or emergency room where I receive care.  I agree to give up (waive) any right of privacy or confidentiality with respect to these authorizations.   I have read this agreement and have asked questions about anything I did not understand.   ___________________________________    ___________________________  Patient Signature                                                           Date and Time  ___________________________________     ____________________________  Witness                                                                            Date and Time  ___________________________________  Roe Connolly MD  REV-  04/2016                                                                                                                                                                 Page 2 of 2  Opioid Pain Medicines (also known as Narcotics)  What You Need to Know      What are opioids?   Opioids are pain medicines that must be prescribed by a doctor. Examples are:     morphine (MS Contin, Albertina)    oxycodone (Oxycontin)    oxycodone and acetaminophen (Percocet)    hydrocodone and acetaminophen (Vicodin, Norco)     fentanyl patch (Duragesic)     hydromorphone (Dilaudid)     methadone     What do opioids do well?   Opioids are best for short-term pain after a surgery or injury. They also work well for cancer pain. Unlike other pain medicines, they do not cause liver or kidney failure or ulcers. They may help some people with long-lasting (chronic) pain.     What do opioids NOT do well?   Opioids never get rid of pain entirely, and they do not work well for most patients with chronic pain. Opioids do not reduce swelling, one of the causes of pain. They also  don t work well for nerve pain.     Side effects  Talk to your doctor before you start or decide to keep taking one of these medicines. Side effects include:    Lowers your breathing rate enough that it could cause death    Death due to taking more than the prescribed dose    Serious lifelong opioid use      Dependence is not the same as addiction. Addiction is when people keep using a substance that harms their body, their mind or their relations with others. If you have a history of drug or alcohol abuse, taking opioids can cause a relapse.  Over time, opioids don t work as well. Most people will need higher and higher doses. The higher the dose, the more serious the side effects. We don t know the long-term effects of opioids.   People who have used opioids for a long time have a lower quality of life, worse depression, higher levels of pain and more visits to doctors.  Overdose from prescription drugs is the second leading cause of death in the [place] The risk of overdose rises when opioids are taken with other drugs such as:    Medicines used for anxiety and panic attacks (such as lorazepam, alprazolam, clonazepam    Other sedatives    Alcohol    Illegal drugs such as heroin  Never share your opioids with others. Be sure to store opioids in a secure place, locked if possible.Young children can easily swallow them and overdose.     Are there other ways to manage pain?  Ways to help reduce pain:    Exercise every day.    Treat health problems that may be causing pain.    Treat mental health problems like depression and anxiety.     Worse depression symptoms; Less pleasure in things you usually enjoy    Feeling tired or sluggish    Slower thoughts or cloudy thinking    Being more sensitive to pain over time; Pain is harder to control.    Trouble sleeping or restless sleep    Changes in hormone levels (for example, less testosterone).     Changes in sex drive or ability to have sex    Long lasting nausea and  constipation    Trouble breathing while asleep; This is worse with lung problems like COPD or sleep apnea.    Unsafe driving    Getting sick more often    Itching    Feeling dizzy    Dry mouth    Sweating    Trouble emptying the bladder (peeing). This is worse if you have an enlarged prostate or get urinary tract infections (UTIs).    What else should I know about opioids?  When someone takes opioids for too long or too often, they become dependent. This means that if you stop or reduce the medicine too quickly, you will have withdrawal symptoms.          Practice good sleep habits.  Try to go to bed and get up at the same time every day.    Stop smoking.  Tobacco use can make pain worse.    Do things that you enjoy.    Find a way to work through pain without drugs.  Try deep breathing, meditation, visual imagery and aromatherapy.    Ask your doctor to help you create a plan to manage your pain.

## 2017-09-11 NOTE — PATIENT INSTRUCTIONS
Copyright   2016 [facility]. All rights reserved. [employer] 892048 - Rev 08/16.  Opioid Pain Medicines (Narcotics)  What You Need to Know  What are opioids?   Opioids are pain medicines that must be prescribed by a doctor. They are also known as narcotics. Examples are:     morphine (MS Contin, Albertina)    oxycodone (Oxycontin)    oxycodone and acetaminophen (Percocet)    hydrocodone and acetaminophen (Vicodin, Norco)    fentanyl patch (Duragesic)    hydromorphone (Dilaudid)    methadone  What do opioids do well?   Opioids are best for short-term pain after a surgery or injury. They also work well for cancer pain. Unlike other pain medicines, they do not cause liver or kidney failure or ulcers. They may help some people with long-lasting (chronic) pain.   What do opioids NOT do well?   Opioids never get rid of pain entirely, and they do not work well for most patients with chronic pain. Opioids do not reduce swelling, one of the causes of pain. They also don't work well for nerve pain.   Side effects  Talk to your doctor before you start or decide to keep taking one of these medicines. Side effects include:    Lowering your breathing rate enough to cause death    Death due to taking more than the prescribed dose    Serious lifelong opioid use    Worse depression symptoms; Less pleasure in things you usually enjoy    Feeling tired or sluggish    Slower thoughts or cloudy thinking    Being more sensitive to pain over time; Pain is harder to control.    Trouble sleeping or restless sleep    Changes in hormone levels (for example, less testosterone).    Changes in sex drive or ability to have sex    Long lasting nausea and constipation    Trouble breathing while asleep; This is worse with lung problems like COPD or sleep apnea.    Unsafe driving    Getting sick more often    Itching    Feeling dizzy    Dry mouth    Sweating    Trouble emptying the bladder (peeing). This is worse if you have an enlarged  prostate or get urinary tract infections (UTIs).  What else should I know about opioids?  When someone takes opioids for too long or too often, they become dependent. This means that if you stop or reduce the medicine too quickly, you will have withdrawal symptoms.  Dependence is not the same as addiction. Addiction is when people keep using a substance that harms their body, their mind or their relations with others. If you have a history of drug or alcohol abuse, taking opioids can cause a relapse.  Over time, opioids don't work as well. Most people will need higher and higher doses. The higher the dose, the more serious the side effects. We don't know the long-term effects of opioids.   People who have used opioids for a long time have a lower quality of life, worse depression, higher levels of pain and more visits to doctors.  Overdose from prescription drugs is the second leading cause of death in the [place] The risk of overdose rises when opioids are taken with other drugs such as:    Medicines used for anxiety and panic attacks (such as lorazepam, alprazolam, clonazepam)    Other sedatives    Alcohol    Illegal drugs such as heroin.  Never share your opioids with others. Be sure to store opioids in a secure place, locked if possible.Young children can easily swallow them and overdose.   Are there other ways to manage pain?  Ways to help reduce pain:    Exercise every day.    Treat health problems that may be causing pain.    Treat mental health problems like depression and anxiety.    Practice good sleep habits. Try to go to bed and get up at the same time every day.    Stop smoking. Tobacco use can make pain worse.    Do things that you enjoy.    Find ways to work through pain without drugs: Try deep breathing, meditation, visual imagery and aromatherapy.    Meet with your doctor on a regular basis to look at other ways (drug and non-drug) to manage your pain.  For informational purposes only. Not to replace  the advice of your health care provider.     Chronic Pain  Pain serves an important role. It lets you know something is wrong that needs your attention. When the body heals, pain normally goes away.  When pain lasts longer than six months, it is called  chronic  pain. This is pain that is present even after the body has healed. Chronic pain can cause mood problems and get in the way of your relationships and your daily life.  A number of conditions can cause chronic pain. Some of the more common include:    Previous surgery    An old injury    Infection    Diseases such as diabetes    Nerve damage    Back injury    Arthritis    Migraine or other headaches    Fibromyalgia    Cancer  Depression and stress can make chronic pain symptoms worse. In some cases, a cause for the pain cannot be found.   Treatment  Treatment can greatly reduce pain. In many cases, pain can become less severe, occur less often, and interfere less with your daily life. Chronic pain is often treated with a combination of medicines, therapies, and lifestyle changes. You will work closely with your healthcare provider to find a treatment plan that works best for you.    Ask your healthcare provider for a referral to a pain management specialty center. These can provide the most recent and proven pain management strategies, along with emotional support and comprehensive services.    Several different types of medicines may be prescribed for chronic pain. Work with your healthcare provider to develop a medicine plan that helps manage your pain.    Physical therapy can be very effective in helping reduce certain types of chronic pain.    Occupational therapy teaches you how to do routine tasks of daily living in ways that lessen your discomfort.    Psychological therapy can help you cope better with stress and pain.    Other therapies such as meditation, yoga, biofeedback, massage, and acupuncture can also help manage chronic pain.    Changing  certain habits can help reduce chronic pain. They include:    Eating healthy    Developing an exercise routine    Getting enough sleep at night    Stopping smoking and limiting alcohol use    Losing excess weight  Follow-up care  Follow up with your healthcare provider as advised. Let your healthcare provider know if your current treatment plan is working or if changes are needed.  Resources  For more information, contact:    [place] for [employer] Society www.achenet.org    American Chronic Pain Association www.theacpa.org 343-246-5899  Date Last Reviewed: 7/26/2015 2000-2017 [employer]. [address] [place] [address]. All rights reserved. This information is not intended as a substitute for professional medical care. Always follow your healthcare professional's instructions.

## 2017-09-11 NOTE — NURSING NOTE
"Chief Complaint   Patient presents with     RECHECK       Initial /74  Pulse 80  Temp 99  F (37.2  C) (Oral)  Resp 20  Wt 136 lb (61.7 kg)  BMI 23.34 kg/m2 Estimated body mass index is 23.34 kg/(m^2) as calculated from the following:    Height as of 10/19/16: 5' 4\" (1.626 m).    Weight as of this encounter: 136 lb (61.7 kg).  Medication Reconciliation: complete   Tia JARRELL, OSMAN,AAMA      "

## 2017-09-11 NOTE — MR AVS SNAPSHOT
After Visit Summary   9/11/2017    Brionna Rollins    MRN: [id number]           Patient Information     Date Of Birth          1945        Visit Information        Provider Department      9/11/2017 2:40 PM Roe Connolly MD [facility]        Today's Diagnoses     Other chronic pain    -  1    Osteoarthritis of hip, unspecified laterality, unspecified osteoarthritis type        Osteoarthritis of knee, unspecified laterality, unspecified osteoarthritis type          Care Instructions    Copyright   2016 [facility]. All rights reserved. [employer] 806172 - Rev 08/16.  Opioid Pain Medicines (Narcotics)  What You Need to Know  What are opioids?   Opioids are pain medicines that must be prescribed by a doctor. They are also known as narcotics. Examples are:     morphine (MS Contin, Albertina)    oxycodone (Oxycontin)    oxycodone and acetaminophen (Percocet)    hydrocodone and acetaminophen (Vicodin, Norco)    fentanyl patch (Duragesic)    hydromorphone (Dilaudid)    methadone  What do opioids do well?   Opioids are best for short-term pain after a surgery or injury. They also work well for cancer pain. Unlike other pain medicines, they do not cause liver or kidney failure or ulcers. They may help some people with long-lasting (chronic) pain.   What do opioids NOT do well?   Opioids never get rid of pain entirely, and they do not work well for most patients with chronic pain. Opioids do not reduce swelling, one of the causes of pain. They also don't work well for nerve pain.   Side effects  Talk to your doctor before you start or decide to keep taking one of these medicines. Side effects include:    Lowering your breathing rate enough to cause death    Death due to taking more than the prescribed dose    Serious lifelong opioid use    Worse depression symptoms; Less pleasure in things you usually enjoy    Feeling tired or sluggish    Slower thoughts or cloudy  thinking    Being more sensitive to pain over time; Pain is harder to control.    Trouble sleeping or restless sleep    Changes in hormone levels (for example, less testosterone).    Changes in sex drive or ability to have sex    Long lasting nausea and constipation    Trouble breathing while asleep; This is worse with lung problems like COPD or sleep apnea.    Unsafe driving    Getting sick more often    Itching    Feeling dizzy    Dry mouth    Sweating    Trouble emptying the bladder (peeing). This is worse if you have an enlarged prostate or get urinary tract infections (UTIs).  What else should I know about opioids?  When someone takes opioids for too long or too often, they become dependent. This means that if you stop or reduce the medicine too quickly, you will have withdrawal symptoms.  Dependence is not the same as addiction. Addiction is when people keep using a substance that harms their body, their mind or their relations with others. If you have a history of drug or alcohol abuse, taking opioids can cause a relapse.  Over time, opioids don't work as well. Most people will need higher and higher doses. The higher the dose, the more serious the side effects. We don't know the long-term effects of opioids.   People who have used opioids for a long time have a lower quality of life, worse depression, higher levels of pain and more visits to doctors.  Overdose from prescription drugs is the second leading cause of death in the [place] The risk of overdose rises when opioids are taken with other drugs such as:    Medicines used for anxiety and panic attacks (such as lorazepam, alprazolam, clonazepam)    Other sedatives    Alcohol    Illegal drugs such as heroin.  Never share your opioids with others. Be sure to store opioids in a secure place, locked if possible.Young children can easily swallow them and overdose.   Are there other ways to manage pain?  Ways to help reduce pain:    Exercise every day.    Treat  health problems that may be causing pain.    Treat mental health problems like depression and anxiety.    Practice good sleep habits. Try to go to bed and get up at the same time every day.    Stop smoking. Tobacco use can make pain worse.    Do things that you enjoy.    Find ways to work through pain without drugs: Try deep breathing, meditation, visual imagery and aromatherapy.    Meet with your doctor on a regular basis to look at other ways (drug and non-drug) to manage your pain.  For informational purposes only. Not to replace the advice of your health care provider.     Chronic Pain  Pain serves an important role. It lets you know something is wrong that needs your attention. When the body heals, pain normally goes away.  When pain lasts longer than six months, it is called  chronic  pain. This is pain that is present even after the body has healed. Chronic pain can cause mood problems and get in the way of your relationships and your daily life.  A number of conditions can cause chronic pain. Some of the more common include:    Previous surgery    An old injury    Infection    Diseases such as diabetes    Nerve damage    Back injury    Arthritis    Migraine or other headaches    Fibromyalgia    Cancer  Depression and stress can make chronic pain symptoms worse. In some cases, a cause for the pain cannot be found.   Treatment  Treatment can greatly reduce pain. In many cases, pain can become less severe, occur less often, and interfere less with your daily life. Chronic pain is often treated with a combination of medicines, therapies, and lifestyle changes. You will work closely with your healthcare provider to find a treatment plan that works best for you.    Ask your healthcare provider for a referral to a pain management specialty center. These can provide the most recent and proven pain management strategies, along with emotional support and comprehensive services.    Several different types of medicines  may be prescribed for chronic pain. Work with your healthcare provider to develop a medicine plan that helps manage your pain.    Physical therapy can be very effective in helping reduce certain types of chronic pain.    Occupational therapy teaches you how to do routine tasks of daily living in ways that lessen your discomfort.    Psychological therapy can help you cope better with stress and pain.    Other therapies such as meditation, yoga, biofeedback, massage, and acupuncture can also help manage chronic pain.    Changing certain habits can help reduce chronic pain. They include:    Eating healthy    Developing an exercise routine    Getting enough sleep at night    Stopping smoking and limiting alcohol use    Losing excess weight  Follow-up care  Follow up with your healthcare provider as advised. Let your healthcare provider know if your current treatment plan is working or if changes are needed.  Resources  For more information, contact:    [employer] [place] for Head[employer] Society www.achenet.org    American Chronic Pain Association www.theacpa.org 418-678-3603  Date Last Reviewed: 7/26/2015 2000-2017 [employer]. [address] [place] [address]. All rights reserved. This information is not intended as a substitute for professional medical care. Always follow your healthcare professional's instructions.                Follow-ups after your visit        Your next 10 appointments already scheduled     Sep 12, 2017  1:00 PM CDT   New Visit with Keisha Carrillo MD   [facility] ([facility]    [address] [place] [address] [phone]              Who to contact     If you have questions or need follow up information about today's clinic visit or your schedule please contact [facility] directly at [phone].  Normal or non-critical lab and imaging results will be communicated to you by MyChart, letter or  "phone within 4 business days after the clinic has received the results. If you do not hear from us within 7 days, please contact the clinic through [employer] or phone. If you have a critical or abnormal lab result, we will notify you by phone as soon as possible.  Submit refill requests through [employer] or call your pharmacy and they will forward the refill request to us. Please allow 3 business days for your refill to be completed.          Additional Information About Your Visit        [employer] Information     [employer] lets you send messages to your doctor, view your test results, renew your prescriptions, schedule appointments and more. To sign up, go to [email or web address] . Click on \"Log in\" on the left side of the screen, which will take you to the Welcome page. Then click on \"Sign up Now\" on the right side of the page.     You will be asked to enter the access code listed below, as well as some personal information. Please follow the directions to create your username and password.     Your access code is: [email or web address]  Expires: 12/10/2017  3:20 PM     Your access code will  in 90 days. If you need help or a new code, please call your [place] clinic or [phone].        Care EveryWhere ID     This is your Care EveryWhere ID. This could be used by other organizations to access your [place] medical records  [id number]        Your Vitals Were     Pulse Temperature Respirations BMI (Body Mass Index)          80 99  F (37.2  C) (Oral) 20 23.34 kg/m2         Blood Pressure from Last 3 Encounters:   17 130/74   17 128/74   17 134/77    Weight from Last 3 Encounters:   17 136 lb (61.7 kg)   17 136 lb (61.7 kg)   17 134 lb 6.4 oz (61 kg)              Today, you had the following     No orders found for display       Primary Care Provider Office Phone # Fax #    Roe Connolly [phone] [address] [place] [address]        Equal " Access to Services     [facility]: Hadii ever valente marisol Somelina, waaxda luqadaha, qaybta kaalmada tariqzeldaagustina, waxvivian wilbert minormacybrendan ocasio. So [facility] [phone].    ATENCIÓN: Si alexla jeremie, tiene a walton disposición servicios gratuitos de asistencia lingüística. Llame al [phone].    We comply with applicable federal civil rights laws and [place] laws. We do not discriminate on the basis of race, color, national origin, age, disability sex, sexual orientation or gender identity.            Thank you!     Thank you for choosing [facility]  for your care. Our goal is always to provide you with excellent care. Hearing back from our patients is one way we can continue to improve our services. Please take a few minutes to complete the written survey that you may receive in the mail after your visit with us. Thank you!             Your Updated Medication List - Protect others around you: Learn how to safely use, store and throw away your medicines at www.disposemymeds.org.          This list is accurate as of: 9/11/17  3:20 PM.  Always use your most recent med list.                   Brand Name Dispense Instructions for use Diagnosis    acetaminophen-codeine 300-30 MG per tablet    TYLENOL #3    30 tablet    Take 1-2 tablets by mouth 2 times daily as needed for moderate pain or pain    Left wrist pain, Arthralgia of wrist, left       clotrimazole 1 % solution    LOTRIMIN    60 mL    Apply topically 2 times daily    Intertrigo       diclofenac 1 % Gel topical gel    VOLTAREN    100 g    Apply 1-2 grams to wrist QID as needed .    Left wrist pain, Arthralgia of wrist, left, Arthritis of left wrist       fluticasone 50 MCG/ACT spray    FLONASE    16 g    Spray 1-2 sprays into both nostrils daily    Dysfunction of eustachian tube, bilateral, Dizziness       hydrocortisone 1 % cream    CORTAID     Apply topically 3 times daily as needed for rash    Rash and nonspecific skin eruption        IBUPROFEN PO      Take 600 mg by mouth every 6 hours as needed for moderate pain        LACTOBACILLUS RHAMNOSUS (GG) PO      Take 2 capsules by mouth 3 times daily (with meals)        MAPAP 325 MG tablet   Generic drug:  acetaminophen      Take 325-650 mg by mouth every 6 hours as needed for mild pain        naproxen 500 MG tablet    NAPROSYN    60 tablet    Take 1 tablet (500 mg) by mouth 2 times daily as needed for moderate pain    Arthritis of carpometacarpal joint

## 2017-09-12 ENCOUNTER — OFFICE VISIT (OUTPATIENT)
Dept: DERMATOLOGY | Facility: CLINIC | Age: 72
End: 2017-09-12
Payer: COMMERCIAL

## 2017-09-12 DIAGNOSIS — D18.01 CHERRY ANGIOMA: ICD-10-CM

## 2017-09-12 DIAGNOSIS — L82.1 SK (SEBORRHEIC KERATOSIS): Primary | ICD-10-CM

## 2017-09-12 DIAGNOSIS — L82.0 INFLAMED SEBORRHEIC KERATOSIS: ICD-10-CM

## 2017-09-12 PROCEDURE — 99203 OFFICE O/P NEW LOW 30 MIN: CPT | Mod: 25 | Performed by: DERMATOLOGY

## 2017-09-12 PROCEDURE — 17110 DESTRUCTION B9 LES UP TO 14: CPT | Performed by: DERMATOLOGY

## 2017-09-12 NOTE — NURSING NOTE
"Chief Complaint   Patient presents with     Consult     new pt, ref by Dr. Connolly, skin check upper body, skin tags and brown spots along upper chest and neck area, along bra line and back        Initial There were no vitals taken for this visit. Estimated body mass index is 23.34 kg/(m^2) as calculated from the following:    Height as of 10/19/16: 5' 4\" (162.6 cm).    Weight as of 9/11/17: 136 lb (61.7 kg).  Medication Reconciliation: complete   Emma Coates MA      "

## 2017-09-12 NOTE — LETTER
9/12/2017      RE: Brionna Rollins  [address] [place] [place] [address]       DERMATOLOGY CLINIC VISIT NOTE      CHIEF COMPLAINT:  Irritated growths on skin.      DERMATOLOGY PROBLEM LIST:   1.  Seborrheic keratoses.   2.  Irritated seborrheic keratoses.   3.  Cherry angiomas.      HISTORY OF PRESENT ILLNESS:  Ms. Rollins is a 72-year-old female seen in Dermatology Clinic today at the request of Dr. Grant Connolly for initial evaluation of itching papules along the bra line.  The patient states that these are very bothersome.  Clothing rubs on them.  Occasionally, they will peel off, but more tend to grow back.  She has no history of skin cancer.  No bleeding areas.  No history of dysplastic nevi.      Patient Active Problem List   Diagnosis     Osteoporosis     Internal derangement of knee     Estrogen replacement therapy     Colon Polyps     Microscopic hematuria     CARDIOVASCULAR SCREENING; LDL GOAL LESS THAN 160     Advanced directives, counseling/discussion     Mixed incontinence urge and stress     Fracture of distal end of radius     Hypertension goal BP (blood pressure) < 140/90     Atopic rhinitis     Allergy to anti-infective agent     Enteritis     Osteoarthritis of hip     Osteoarthritis of knee     Acute posthemorrhagic anemia     Surgical follow-up care     Diverticulitis of sigmoid colon       Allergies   Allergen Reactions     Ciprofloxacin Swelling     Stiff and swollen neck     Amoxicillin-Pot Clavulanate [Augmentin]      Cefprozil Rash     Contrast Dye      gets cold and feels terrible     Doxycycline Hyclate Rash     Erythromycin Swelling     Flagyl [Metronidazole Hcl]      Meperidine Hcl      low blood pressure     Oxycodone      Prochlorperazine      agitated     Sulfa Drugs Rash     Tetracycline Rash         Current Outpatient Prescriptions   Medication     acetaminophen-codeine (TYLENOL #3) 300-30 MG per tablet     hydrocortisone (CORTAID) 1 % cream     naproxen (NAPROSYN) 500 MG tablet      diclofenac (VOLTAREN) 1 % GEL     fluticasone (FLONASE) 50 MCG/ACT nasal spray     LACTOBACILLUS RHAMNOSUS, GG, PO     acetaminophen (MAPAP) 325 MG tablet     IBUPROFEN PO     clotrimazole (LOTRIMIN) 1 % solution     No current facility-administered medications for this visit.         Family History   Problem Relation Age of Onset     GASTROINTESTINAL DISEASE Mother      colon polyps     DIABETES Mother      Arthritis Father      Rheumatoid     Eye Disorder Father      mac. degeneration femp op bypass     Cancer - colorectal Maternal Grandmother      C.A.D. No family hx of      Breast Cancer No family hx of           SOCIAL HISTORY:  The patient is .  She lives in [place].  She has a daughter in the area.      REVIEW OF SYSTEMS:  Feels well without additional skin concerns.      PHYSICAL EXAMINATION:   GENERAL:  The patient is a healthy-appearing, 72-year-old female in no distress.   HEENT:  Conjunctivae are clear.   PULMONARY:  Breathing comfortably on room air.   ABDOMEN:  No abdominal distention.   SKIN:  Exam today includes the face, neck, chest, abdomen, back, arms and hands.  Skin exam is normal except for as follows:   - Examination of the back shows extensive, greater than 100, slightly raised, tan papules and plaques in a blaschkoid pattern extending along the flanks.   - Similar waxy papules and plaques on the central chest and inferior to the bilateral breasts.   - Cherry-red papules on chest, abdomen and back.   - Waxy papule on right mid cheek.      ASSESSMENT AND PLAN:   1.  Seborrheic keratoses:  Benign hyperkeratotic papules.  No treatment advised.   2.  Irritated seborrheic keratoses along the bra line:  A total of 10 lesions were treated with 10 second freeze-thaw cycle with liquid nitrogen.  Wound care info provided.   3.  Cherry angiomas:  Benign vascular papules.  No treatment advised.      The patient is to return to Dermatology Clinic as needed.        Thank you for this consultation.      Keisha Carrillo MD  Dermatology Staff       cc:   Roe Connolly MD   [address] [address]            D: 2017 16:51   T: 2017 08:30   MT: tony      Name:     GABI BONILLA   MRN:      [id number]        Account:      [id number]   :      1945           Service Date: 2017      Document: [id number]

## 2017-09-13 NOTE — PROGRESS NOTES
DERMATOLOGY CLINIC VISIT NOTE      CHIEF COMPLAINT:  Irritated growths on skin.      DERMATOLOGY PROBLEM LIST:   1.  Seborrheic keratoses.   2.  Irritated seborrheic keratoses.   3.  Cherry angiomas.      HISTORY OF PRESENT ILLNESS:  Ms. Rollins is a 72-year-old female seen in Dermatology Clinic today at the request of Dr. Grant Connolly for initial evaluation of itching papules along the bra line.  The patient states that these are very bothersome.  Clothing rubs on them.  Occasionally, they will peel off, but more tend to grow back.  She has no history of skin cancer.  No bleeding areas.  No history of dysplastic nevi.      Patient Active Problem List   Diagnosis     Osteoporosis     Internal derangement of knee     Estrogen replacement therapy     Colon Polyps     Microscopic hematuria     CARDIOVASCULAR SCREENING; LDL GOAL LESS THAN 160     Advanced directives, counseling/discussion     Mixed incontinence urge and stress     Fracture of distal end of radius     Hypertension goal BP (blood pressure) < 140/90     Atopic rhinitis     Allergy to anti-infective agent     Enteritis     Osteoarthritis of hip     Osteoarthritis of knee     Acute posthemorrhagic anemia     Surgical follow-up care     Diverticulitis of sigmoid colon       Allergies   Allergen Reactions     Ciprofloxacin Swelling     Stiff and swollen neck     Amoxicillin-Pot Clavulanate [Augmentin]      Cefprozil Rash     Contrast Dye      gets cold and feels terrible     Doxycycline Hyclate Rash     Erythromycin Swelling     Flagyl [Metronidazole Hcl]      Meperidine Hcl      low blood pressure     Oxycodone      Prochlorperazine      agitated     Sulfa Drugs Rash     Tetracycline Rash         Current Outpatient Prescriptions   Medication     acetaminophen-codeine (TYLENOL #3) 300-30 MG per tablet     hydrocortisone (CORTAID) 1 % cream     naproxen (NAPROSYN) 500 MG tablet     diclofenac (VOLTAREN) 1 % GEL     fluticasone (FLONASE) 50 MCG/ACT nasal spray      LACTOBACILLUS RHAMNOSUS, GG, PO     acetaminophen (MAPAP) 325 MG tablet     IBUPROFEN PO     clotrimazole (LOTRIMIN) 1 % solution     No current facility-administered medications for this visit.         Family History   Problem Relation Age of Onset     GASTROINTESTINAL DISEASE Mother      colon polyps     DIABETES Mother      Arthritis Father      Rheumatoid     Eye Disorder Father      mac. degeneration femp op bypass     Cancer - colorectal Maternal Grandmother      C.A.D. No family hx of      Breast Cancer No family hx of           SOCIAL HISTORY:  The patient is .  She lives in [place].  She has a daughter in the area.      REVIEW OF SYSTEMS:  Feels well without additional skin concerns.      PHYSICAL EXAMINATION:   GENERAL:  The patient is a healthy-appearing, 72-year-old female in no distress.   HEENT:  Conjunctivae are clear.   PULMONARY:  Breathing comfortably on room air.   ABDOMEN:  No abdominal distention.   SKIN:  Exam today includes the face, neck, chest, abdomen, back, arms and hands.  Skin exam is normal except for as follows:   - Examination of the back shows extensive, greater than 100, slightly raised, tan papules and plaques in a blaschkoid pattern extending along the flanks.   - Similar waxy papules and plaques on the central chest and inferior to the bilateral breasts.   - Cherry-red papules on chest, abdomen and back.   - Waxy papule on right mid cheek.      ASSESSMENT AND PLAN:   1.  Seborrheic keratoses:  Benign hyperkeratotic papules.  No treatment advised.   2.  Irritated seborrheic keratoses along the bra line:  A total of 10 lesions were treated with 10 second freeze-thaw cycle with liquid nitrogen.  Wound care info provided.   3.  Cherry angiomas:  Benign vascular papules.  No treatment advised.      The patient is to return to Dermatology Clinic as needed.        Thank you for this consultation.     Keisha Carrillo MD  Dermatology Staff       cc:   Roe Connolly MD    [place] [id number] [place] [address]         JOCELYN ALEXANDER MD             D: 2017 16:51   T: 2017 08:30   MT: tony      Name:     GABI BONILLA   MRN:      [id number]        Account:      [id number]   :      1945           Service Date: 2017      Document: [id number]

## 2017-09-18 DIAGNOSIS — M25.532 ARTHRALGIA OF WRIST, LEFT: ICD-10-CM

## 2017-09-18 DIAGNOSIS — M25.532 LEFT WRIST PAIN: ICD-10-CM

## 2017-09-18 NOTE — TELEPHONE ENCOUNTER
Tylenol #3      Last Written Prescription Date:  8/17/2017  Last Fill Quantity: 30,   # refills: 0  Last Office Visit with [facility], [facility] or [facility] prescribing provider: 9/11/2017  Future Office visit:    Next 5 appointments (look out 90 days)     Oct 31, 2017  1:15 PM CDT   [facility]illo [facility]   [facility]    [address] [place] [id number] [phone]                   Routing refill request to provider for review/approval because:  Drug not on the [facility], [facility] or [facility] refill protocol or controlled substance    Thank You~  Rita Schneider CPhT  [facility]

## 2017-09-28 ENCOUNTER — OFFICE VISIT (OUTPATIENT)
Dept: PEDIATRICS | Facility: CLINIC | Age: 72
End: 2017-09-28
Payer: COMMERCIAL

## 2017-09-28 VITALS
WEIGHT: 135.6 LBS | HEART RATE: 63 BPM | BODY MASS INDEX: 23.28 KG/M2 | DIASTOLIC BLOOD PRESSURE: 70 MMHG | OXYGEN SATURATION: 99 % | SYSTOLIC BLOOD PRESSURE: 146 MMHG | TEMPERATURE: 98.1 F

## 2017-09-28 DIAGNOSIS — G89.29 CHRONIC LEFT-SIDED LOW BACK PAIN WITH LEFT-SIDED SCIATICA: Primary | ICD-10-CM

## 2017-09-28 DIAGNOSIS — K21.9 GASTROESOPHAGEAL REFLUX DISEASE WITHOUT ESOPHAGITIS: ICD-10-CM

## 2017-09-28 DIAGNOSIS — M54.42 CHRONIC LEFT-SIDED LOW BACK PAIN WITH LEFT-SIDED SCIATICA: Primary | ICD-10-CM

## 2017-09-28 LAB
BASOPHILS # BLD AUTO: 0 10E9/L (ref 0–0.2)
BASOPHILS NFR BLD AUTO: 0.2 %
DIFFERENTIAL METHOD BLD: ABNORMAL
EOSINOPHIL # BLD AUTO: 0.2 10E9/L (ref 0–0.7)
EOSINOPHIL NFR BLD AUTO: 1.6 %
ERYTHROCYTE [DISTWIDTH] IN BLOOD BY AUTOMATED COUNT: 14.1 % (ref 10–15)
HCT VFR BLD AUTO: 37.9 % (ref 35–47)
HGB BLD-MCNC: 13.1 G/DL (ref 11.7–15.7)
LYMPHOCYTES # BLD AUTO: 3.9 10E9/L (ref 0.8–5.3)
LYMPHOCYTES NFR BLD AUTO: 28.7 %
MCH RBC QN AUTO: 28.2 PG (ref 26.5–33)
MCHC RBC AUTO-ENTMCNC: 34.6 G/DL (ref 31.5–36.5)
MCV RBC AUTO: 82 FL (ref 78–100)
MONOCYTES # BLD AUTO: 1.2 10E9/L (ref 0–1.3)
MONOCYTES NFR BLD AUTO: 8.6 %
NEUTROPHILS # BLD AUTO: 8.3 10E9/L (ref 1.6–8.3)
NEUTROPHILS NFR BLD AUTO: 60.9 %
PLATELET # BLD AUTO: 263 10E9/L (ref 150–450)
RBC # BLD AUTO: 4.65 10E12/L (ref 3.8–5.2)
WBC # BLD AUTO: 13.7 10E9/L (ref 4–11)

## 2017-09-28 PROCEDURE — 85025 COMPLETE CBC W/AUTO DIFF WBC: CPT | Performed by: INTERNAL MEDICINE

## 2017-09-28 PROCEDURE — 36415 COLL VENOUS BLD VENIPUNCTURE: CPT | Performed by: INTERNAL MEDICINE

## 2017-09-28 PROCEDURE — 80053 COMPREHEN METABOLIC PANEL: CPT | Performed by: INTERNAL MEDICINE

## 2017-09-28 PROCEDURE — 99213 OFFICE O/P EST LOW 20 MIN: CPT | Mod: GE | Performed by: STUDENT IN AN ORGANIZED HEALTH CARE EDUCATION/TRAINING PROGRAM

## 2017-09-28 RX ORDER — CYCLOBENZAPRINE HCL 5 MG
2.5 TABLET ORAL
Qty: 14 TABLET | Refills: 0 | Status: SHIPPED | OUTPATIENT
Start: 2017-09-28 | End: 2018-10-30

## 2017-09-28 NOTE — NURSING NOTE
"Chief Complaint   Patient presents with     Musculoskeletal Problem     leg pain and swelling      Abdominal Pain       Initial /70 (BP Location: Right arm, Patient Position: Chair, Cuff Size: Adult Regular)  Pulse 63  Temp 98.1  F (36.7  C) (Oral)  Wt 135 lb 9.6 oz (61.5 kg)  SpO2 99%  BMI 23.28 kg/m2 Estimated body mass index is 23.28 kg/(m^2) as calculated from the following:    Height as of 10/19/16: 5' 4\" (1.626 m).    Weight as of this encounter: 135 lb 9.6 oz (61.5 kg).  Medication Reconciliation: complete   Emma Coates MA      "

## 2017-09-28 NOTE — PATIENT INSTRUCTIONS
- please continue doing ice pack and naproxen as you have; please take naproxen with food  - flexeril has been prescribed; please take 1/2 tab at night as needed  - change to taking tylenol only since flexeril is already sedating  - ranitidine has been prescribed as well for stomach protection; please take this twice daily  - follow-up with back specialist on Tuesday as previously scheduled  - will get some blood labs for abdominal pain; will call you with the results  - if abdominal pain not improved in 2-3 weeks or starts to notice matias red blood in stool or dark, tarry stool, please return to clinic for re-evaluation

## 2017-09-28 NOTE — PROGRESS NOTES
SUBJECTIVE:   Brionna Rollins is a 72 year old female who presents to clinic today for the following health issues:      Musculoskeletal problem/pain      Duration: off and on for 2 weeks     Description  Location: left leg    Intensity:  Between moderate and severe    Accompanying signs and symptoms: radiation of pain to behind the knee, swelling and pain    History  Previous similar problem: no   Previous evaluation:  none    Precipitating or alleviating factors:  Trauma or overuse: no   Aggravating factors include: none    Therapies tried and outcome: Chiropractor, ice, Tylenol 3 and Naproxen      Abdominal Pain      Duration: 1 week or so    Description (location/character/radiation): midepigastric       Associated flank pain: None    Intensity:  Moderate-severe    Accompanying signs and symptoms:        Fever/Chills: YES- doesn't know about fever but slight chills        Gas/Bloating: no        Nausea/vomitting: little nausea       Diarrhea: no        Dysuria or Hematuria: no     History (previous similar pain/trauma/previous testing): no    Precipitating or alleviating factors:       Pain worse with eating/BM/urination: little relief with eating        Pain relieved by BM: no     Therapies tried and outcome: Tums     LMP:  not applicable    Ms. Rollins is a 71 yo F w/ hx of osteoporosis and diverticulitis s/p colonic resection who presents to clinic today with low back and left leg pain as well as abdominal pain. For low back/leg pain, she has had this issue chronically but worsened past 2 weeks. Pain comes intermittently and has tried chiropractor, ice pack, tylenol 3, and naproxen with some success. Denies numbness, weakness, fever, weight loss, urinary or stool incontinence. For her abdominal pain, she describes it as dull pain located in midepigastrium with nausea. Has not had vomiting. Also denies vomiting, diarrhea, melena, hematochezia, or hematuria. Abdominal pain is better with food. No previous  episode like this. +sick contact with granddaughter who had nausea, vomiting, and abdominal pain; however, her pain started before her granddaughter.     Problem list and histories reviewed & adjusted, as indicated.  Additional history: as documented    Patient Active Problem List   Diagnosis     Osteoporosis     Internal derangement of knee     Estrogen replacement therapy     Colon Polyps     Microscopic hematuria     CARDIOVASCULAR SCREENING; LDL GOAL LESS THAN 160     Advanced directives, counseling/discussion     Mixed incontinence urge and stress     Fracture of distal end of radius     Hypertension goal BP (blood pressure) < 140/90     Atopic rhinitis     Allergy to anti-infective agent     Enteritis     Osteoarthritis of hip     Osteoarthritis of knee     Acute posthemorrhagic anemia     Surgical follow-up care     Diverticulitis of sigmoid colon     Past Surgical History:   Procedure Laterality Date     C ROTATOR CUFF STRAP       COLONOSCOPY  11/26/2013    Dr. Cardenas [facility]     COLONOSCOPY  11/26/2013    Procedure: COMBINED COLONOSCOPY, SINGLE BIOPSY/POLYPECTOMY BY BIOPSY;;  Surgeon: Jovanni Cardenas MD;  Location: [facility]     HYSTERECTOMY, [facility]  1986       Social History   Substance Use Topics     Smoking status: Never Smoker     Smokeless tobacco: Never Used     Alcohol use Yes      Comment: occ with dinner q night     Family History   Problem Relation Age of Onset     GASTROINTESTINAL DISEASE Mother      colon polyps     DIABETES Mother      Arthritis Father      Rheumatoid     Eye Disorder Father      mac. degeneration femp op bypass     Cancer - colorectal Maternal Grandmother      C.A.D. No family hx of      Breast Cancer No family hx of          Current Outpatient Prescriptions   Medication Sig Dispense Refill     cyclobenzaprine (FLEXERIL) 5 MG tablet Take 0.5 tablets (2.5 mg) by mouth nightly as needed for muscle spasms 14 tablet 0     ranitidine (ZANTAC) 150 MG tablet Take 1 tablet (150 mg) by mouth  2 times daily 60 tablet 1     acetaminophen-codeine (TYLENOL #3) 300-30 MG per tablet Take 1-2 tablets by mouth 2 times daily as needed for moderate pain or pain 30 tablet 0     hydrocortisone (CORTAID) 1 % cream Apply topically 3 times daily as needed for rash       naproxen (NAPROSYN) 500 MG tablet Take 1 tablet (500 mg) by mouth 2 times daily as needed for moderate pain 60 tablet 1     clotrimazole (LOTRIMIN) 1 % solution Apply topically 2 times daily 60 mL 3     diclofenac (VOLTAREN) 1 % GEL Apply 1-2 grams to wrist QID as needed . 100 g 1     fluticasone (FLONASE) 50 MCG/ACT nasal spray Spray 1-2 sprays into both nostrils daily 16 g 11     LACTOBACILLUS RHAMNOSUS, GG, PO Take 2 capsules by mouth 3 times daily (with meals)       acetaminophen (MAPAP) 325 MG tablet Take 325-650 mg by mouth every 6 hours as needed for mild pain       IBUPROFEN PO Take 600 mg by mouth every 6 hours as needed for moderate pain       Allergies   Allergen Reactions     Ciprofloxacin Swelling     Stiff and swollen neck     Amoxicillin-Pot Clavulanate [Augmentin]      Cefprozil Rash     Contrast Dye      gets cold and feels terrible     Doxycycline Hyclate Rash     Erythromycin Swelling     Flagyl [Metronidazole Hcl]      Meperidine Hcl      low blood pressure     Oxycodone      Prochlorperazine      agitated     Sulfa Drugs Rash     Tetracycline Rash     Reviewed and updated as needed this visit by clinical staffTobacco  Allergies  Meds  Problems  Med Hx  Surg Hx  Fam Hx  Soc Hx        Reviewed and updated as needed this visit by Provider  Allergies  Meds  Problems         ROS:  - please refer to HPI for complete ROS    OBJECTIVE:     /70 (BP Location: Right arm, Patient Position: Chair, Cuff Size: Adult Regular)  Pulse 63  Temp 98.1  F (36.7  C) (Oral)  Wt 135 lb 9.6 oz (61.5 kg)  SpO2 99%  BMI 23.28 kg/m2  Body mass index is 23.28 kg/(m^2).     GENERAL: healthy, alert and no distress  EYES: Eyes grossly normal to  inspection, conjunctivae and sclerae normal  HENT: nose and mouth without ulcers or lesions  NECK: no adenopathy, no asymmetry, masses  RESP: lungs clear to auscultation - no rales, rhonchi or wheezes  CV: regular rate and rhythm, normal S1 S2, no S3 or S4, no murmur, click or rub, no peripheral edema and peripheral pulses strong  ABDOMEN: soft, nontender, no hepatosplenomegaly, no masses and bowel sounds normal  MS: no spinal or paraspinal tenderness upon palpation, +straight leg rise test on left, no gross musculoskeletal defects noted, no edema  SKIN: no suspicious lesions or rashes  NEURO: Alert, normal sensation, 4/5 strength in left proximal lower extremity and 5/5 in all other extremities, mentation intact and speech normal    Diagnostic Test Results:  CBC: pending  CMP: pending    ASSESSMENT/PLAN:   1. Chronic left-sided low back pain with left-sided sciatica  Likely related to her low back pain and reported hx of lumbar disc issue. Has seen back specialist in the past and has an appointment for next Tuesday. Physical exam is positive for straight leg rise test on left but this is a non-speicifc finding. Also, decreased strength in left proximal lower extremity. Other differentials include osteoarthritis of left hip, gluteal muscle weakness, paraspinal muscle strain, etc. She is already on NSAIDs and using ice pack which would be beneficial for decreasing inflammation. Combination of NSAIDs and muscle relaxant could be beneficial at this point. Since she has a close follow-up with specialist, imaging can be done at that visit rather than setting up now  - continue with naproxen and ice pack  - cyclobenzaprine (FLEXERIL) 5 MG tablet; Take 0.5 tablets (2.5 mg) by mouth nightly as needed for muscle spasms  Dispense: 14 tablet; Refill: 0  - change from tylenol 3 to tylenol only  - follow-up with orthopedics as previously scheduled    2. GERD  Epigastric pain that is better with food. Associated nausea but no  vomiting, diarrhea, hematochezia, or melena. Physical exam is unremarkable. Differentials include NSAIDs induced gastritis, PUD, gastroenteritis, pancreatitis, cholelithiasis, appendicitis, tumor, etc. No alarming features so unlikely tumor. No infante sign and physical exam/history consistent with gallbladder etiology. Also not consistent with appendicitis and not complaining of fever or other systemic symptoms. PUD cannot be ruled out but is usually diagnosed with direct visualization or biopsy. With consistent use of naproxen BID for ~1 month, it is likely gastritis from medication. Not on H2 blocker or PPI which would be beneficial since she will continue to take naproxen for back pain  - ranitidine (ZANTAC) 150 MG tablet; Take 1 tablet (150 mg) by mouth 2 times daily  Dispense: 60 tablet; Refill: 1  - CBC with platelets and differential  - Comprehensive metabolic panel (BMP + Alb, Alk Phos, ALT, AST, Total. Bili, TP)    FUTURE APPOINTMENTS:       - Follow-up visit in 2-3 weeks if not improving or new hematochezia or melena    Patient was discussed with Dr. Muñoz, who agrees with the plan above    Paul Saba MD  [facility] [place]    ===========  STAFF NOTE:  Patient discussed with resident physician today.  We discussed artaega portions of the visit and I participated in the evaluation and management of the patient today.     Sd Muñoz MD

## 2017-09-28 NOTE — MR AVS SNAPSHOT
After Visit Summary   9/28/2017    Brionna Rollins    MRN: [id number]           Patient Information     Date Of Birth          1945        Visit Information        Provider Department      9/28/2017 10:45 AM Paul Saba MD [facility] [place]        Today's Diagnoses     Chronic left-sided low back pain with left-sided sciatica    -  1    Other acute gastritis          Care Instructions    - please continue doing ice pack and naproxen as you have; please take naproxen with food  - flexeril has been prescribed; please take 1/2 tab at night as needed  - change to taking tylenol only since flexeril is already sedating  - ranitidine has been prescribed as well for stomach protection; please take this twice daily  - follow-up with back specialist on Tuesday as previously scheduled  - will get some blood labs for abdominal pain; will call you with the results  - if abdominal pain not improved in 2-3 weeks or starts to notice matias red blood in stool or dark, tarry stool, please return to clinic for re-evaluation          Follow-ups after your visit        Follow-up notes from your care team     Return in about 3 weeks (around 10/19/2017), or if symptoms worsen or fail to improve.      Your next 10 appointments already scheduled     Oct 09, 2017 11:40 AM CDT   [facility] MD   [facility]    [address] [address] [phone]           Bring a current list of meds and any records pertaining to this visit. For Physicals, please bring immunization records and any forms needing to be filled out. Please arrive 10 minutes early to complete paperwork.            Oct 31, 2017  1:15 PM CDT   Return Visit with Keisha Carrillo MD   [facility] [place] ([facility]    [address]  [address]  [place] [address] [phone]              Who to contact     If you have  "questions or need follow up information about today's clinic visit or your schedule please contact [facility] [place] directly at [phone].  Normal or non-critical lab and imaging results will be communicated to you by MyChart, letter or phone within 4 business days after the clinic has received the results. If you do not hear from us within 7 days, please contact the clinic through [employer] or phone. If you have a critical or abnormal lab result, we will notify you by phone as soon as possible.  Submit refill requests through [employer] or call your pharmacy and they will forward the refill request to us. Please allow 3 business days for your refill to be completed.          Additional Information About Your Visit        [employer]ill Information     [employer] lets you send messages to your doctor, view your test results, renew your prescriptions, schedule appointments and more. To sign up, go to [email or web address] . Click on \"Log in\" on the left side of the screen, which will take you to the Welcome page. Then click on \"Sign up Now\" on the right side of the page.     You will be asked to enter the access code listed below, as well as some personal information. Please follow the directions to create your username and password.     Your access code is: [email or web address]  Expires: 12/10/2017  3:20 PM     Your access code will  in 90 days. If you need help or a new code, please call your [facility] clinic or [phone].        Care EveryWhere ID     This is your Care EveryWhere ID. This could be used by other organizations to access your [facility] medical records  [id number]        Your Vitals Were     Pulse Temperature Pulse Oximetry BMI (Body Mass Index)          63 98.1  F (36.7  C) (Oral) 99% 23.28 kg/m2         Blood Pressure from Last 3 Encounters:   17 146/70   17 130/74   17 128/74    Weight from Last 3 Encounters:   17 135 lb 9.6 oz (61.5 kg)   17 136 lb (61.7 kg)   17 " 136 lb (61.7 kg)              We Performed the Following     CBC with platelets and differential     Comprehensive metabolic panel (BMP + Alb, Alk Phos, ALT, AST, Total. Bili, TP)          Today's Medication Changes          These changes are accurate as of: 9/28/17 11:37 AM.  If you have any questions, ask your nurse or doctor.               Start taking these medicines.        Dose/Directions    cyclobenzaprine 5 MG tablet   Commonly known as:  FLEXERIL   Used for:  Chronic left-sided low back pain with left-sided sciatica   Started by:  Paul Saba MD        Dose:  2.5 mg   Take 0.5 tablets (2.5 mg) by mouth nightly as needed for muscle spasms   Quantity:  14 tablet   Refills:  0       ranitidine 150 MG tablet   Commonly known as:  ZANTAC   Used for:  Other acute gastritis   Started by:  Paul Saba MD        Dose:  150 mg   Take 1 tablet (150 mg) by mouth 2 times daily   Quantity:  60 tablet   Refills:  1            Where to get your medicines      These medications were sent to [facility] - [address]   [address], [place] [address]     Phone:  [phone]     cyclobenzaprine 5 MG tablet    ranitidine 150 MG tablet                Primary Care Provider Office Phone # Fax #    Roe Connolly [phone]       [facility] [place] [address]        Equal Access to Services     [facility] AH: Hadii aad ku hadasho Soomaali, waaxda luqadaha, qaybta kaalmada adeegyada, ca ocasio. So [facility] [phone].    ATENCIÓN: Si habla español, tiene a walton disposición servicios gratuitos de asistencia lingüística. Llame al [phone].    We comply with applicable federal civil rights laws and [place] laws. We do not discriminate on the basis of race, color, national origin, age, disability sex, sexual orientation or gender identity.            Thank you!     Thank you for choosing  [facility] [place]  for your care. Our goal is always to provide you with excellent care. Hearing back from our patients is one way we can continue to improve our services. Please take a few minutes to complete the written survey that you may receive in the mail after your visit with us. Thank you!             Your Updated Medication List - Protect others around you: Learn how to safely use, store and throw away your medicines at www.disposemymeds.org.          This list is accurate as of: 9/28/17 11:37 AM.  Always use your most recent med list.                   Brand Name Dispense Instructions for use Diagnosis    acetaminophen-codeine 300-30 MG per tablet    TYLENOL #3    30 tablet    Take 1-2 tablets by mouth 2 times daily as needed for moderate pain or pain    Left wrist pain, Arthralgia of wrist, left       clotrimazole 1 % solution    LOTRIMIN    60 mL    Apply topically 2 times daily    Intertrigo       cyclobenzaprine 5 MG tablet    FLEXERIL    14 tablet    Take 0.5 tablets (2.5 mg) by mouth nightly as needed for muscle spasms    Chronic left-sided low back pain with left-sided sciatica       diclofenac 1 % Gel topical gel    VOLTAREN    100 g    Apply 1-2 grams to wrist QID as needed .    Left wrist pain, Arthralgia of wrist, left, Arthritis of left wrist       fluticasone 50 MCG/ACT spray    FLONASE    16 g    Spray 1-2 sprays into both nostrils daily    Dysfunction of eustachian tube, bilateral, Dizziness       hydrocortisone 1 % cream    CORTAID     Apply topically 3 times daily as needed for rash    Rash and nonspecific skin eruption       IBUPROFEN PO      Take 600 mg by mouth every 6 hours as needed for moderate pain        LACTOBACILLUS RHAMNOSUS (GG) PO      Take 2 capsules by mouth 3 times daily (with meals)        MAPAP 325 MG tablet   Generic drug:  acetaminophen      Take 325-650 mg by mouth every 6 hours as needed for mild pain        naproxen 500 MG tablet    NAPROSYN    60 tablet    Take  1 tablet (500 mg) by mouth 2 times daily as needed for moderate pain    Arthritis of carpometacarpal joint       ranitidine 150 MG tablet    ZANTAC    60 tablet    Take 1 tablet (150 mg) by mouth 2 times daily    Other acute gastritis

## 2017-09-29 LAB
ALBUMIN SERPL-MCNC: 4.1 G/DL (ref 3.4–5)
ALP SERPL-CCNC: 69 U/L (ref 40–150)
ALT SERPL W P-5'-P-CCNC: 27 U/L (ref 0–50)
ANION GAP SERPL CALCULATED.3IONS-SCNC: 6 MMOL/L (ref 3–14)
AST SERPL W P-5'-P-CCNC: 21 U/L (ref 0–45)
BILIRUB SERPL-MCNC: 0.8 MG/DL (ref 0.2–1.3)
BUN SERPL-MCNC: 16 MG/DL (ref 7–30)
CALCIUM SERPL-MCNC: 9.1 MG/DL (ref 8.5–10.1)
CHLORIDE SERPL-SCNC: 106 MMOL/L (ref 94–109)
CO2 SERPL-SCNC: 29 MMOL/L (ref 20–32)
CREAT SERPL-MCNC: 0.65 MG/DL (ref 0.52–1.04)
GFR SERPL CREATININE-BSD FRML MDRD: 90 ML/MIN/1.7M2
GLUCOSE SERPL-MCNC: 86 MG/DL (ref 70–99)
POTASSIUM SERPL-SCNC: 4 MMOL/L (ref 3.4–5.3)
PROT SERPL-MCNC: 7.1 G/DL (ref 6.8–8.8)
SODIUM SERPL-SCNC: 141 MMOL/L (ref 133–144)

## 2017-10-04 ENCOUNTER — TELEPHONE (OUTPATIENT)
Dept: PEDIATRICS | Facility: CLINIC | Age: 72
End: 2017-10-04

## 2017-10-04 NOTE — TELEPHONE ENCOUNTER
I talked with Brionna about CBC and CMP results. CMP was normal. CBC showed mildly elevated WBC; however, since she does not have any infectious symptoms, it likely could be from non-specific inflammation or stress response. Her abdominal pain is also improving with ranitidine which also would go against infection. Informed her to contact clinic in 2-3 weeks if abdominal pain not improving.    Paul Saba  Med-Peds PGY2  Pager #: [phone]

## 2017-10-09 ENCOUNTER — OFFICE VISIT (OUTPATIENT)
Dept: FAMILY MEDICINE | Facility: CLINIC | Age: 72
End: 2017-10-09
Payer: COMMERCIAL

## 2017-10-09 VITALS
HEART RATE: 68 BPM | BODY MASS INDEX: 23.82 KG/M2 | RESPIRATION RATE: 16 BRPM | DIASTOLIC BLOOD PRESSURE: 74 MMHG | WEIGHT: 138.8 LBS | SYSTOLIC BLOOD PRESSURE: 126 MMHG | TEMPERATURE: 98.2 F

## 2017-10-09 DIAGNOSIS — M25.532 LEFT WRIST PAIN: ICD-10-CM

## 2017-10-09 DIAGNOSIS — R10.32 LEFT LOWER QUADRANT PAIN: Primary | ICD-10-CM

## 2017-10-09 DIAGNOSIS — R10.32 GROIN PAIN, LEFT: ICD-10-CM

## 2017-10-09 DIAGNOSIS — Z23 NEED FOR PROPHYLACTIC VACCINATION AND INOCULATION AGAINST INFLUENZA: ICD-10-CM

## 2017-10-09 DIAGNOSIS — M25.532 ARTHRALGIA OF WRIST, LEFT: ICD-10-CM

## 2017-10-09 PROCEDURE — G0008 ADMIN INFLUENZA VIRUS VAC: HCPCS | Performed by: INTERNAL MEDICINE

## 2017-10-09 PROCEDURE — 99214 OFFICE O/P EST MOD 30 MIN: CPT | Mod: 25 | Performed by: INTERNAL MEDICINE

## 2017-10-09 PROCEDURE — 90662 IIV NO PRSV INCREASED AG IM: CPT | Performed by: INTERNAL MEDICINE

## 2017-10-09 NOTE — NURSING NOTE
"Chief Complaint   Patient presents with     RECHECK       Initial /72  Pulse 68  Temp 98.2  F (36.8  C) (Oral)  Resp 16  Wt 138 lb 12.8 oz (63 kg)  BMI 23.82 kg/m2 Estimated body mass index is 23.82 kg/(m^2) as calculated from the following:    Height as of 10/19/16: 5' 4\" (1.626 m).    Weight as of this encounter: 138 lb 12.8 oz (63 kg).  Medication Reconciliation: complete   Tia JARRELL, CMA,AAMA      "

## 2017-10-09 NOTE — PROGRESS NOTES
SUBJECTIVE:                                                    Brionna Rollins is a 72 year old female who presents to clinic today for the following health issues:    Follow Up      Duration: 09/28/2017    Description (location/character/radiation): upper Lt leg pain    Intensity:  moderate, 6/10    Accompanying signs and symptoms: moves down leg when walking sometimes    History (similar episodes/previous evaluation):No    Precipitating or alleviating factors: None    Therapies tried and outcome: Naproxen not effective, tylenol #3 effective for hs     patient has appointment tomorrow for an MRI ([facility], [facility]) patient is now ready to do MRI as is sick of doing the injections. patient hoping this will help sdelinite what is causing her groin and back pain, patient also heard that one of her cousins has an autoimmune issue and also some cancer. is takign a few asa a day and this really helps the pain. was outside yestrd[place], trying to stay active. has colonoscopy scheduled for later in the week. no fevers. no leg pain or swelling. no trauma. No other concerns or complaints today.         Problem list and histories reviewed & adjusted, as indicated.  Additional history: as documented  Patient Active Problem List    Diagnosis Date Noted     Atopic rhinitis 02/05/2016     Priority: Medium     Hypertension goal BP (blood pressure) < 140/90 01/07/2016     Priority: Medium     Osteoarthritis of knee 12/18/2015     Priority: Medium     Fracture of distal end of radius 10/22/2015     Priority: Medium     Surgical follow-up care 06/01/2015     Priority: Medium     Overview:   inactive icd-9 diagnosis auto replaced with icd-10, display name retained//mporz       Acute posthemorrhagic anemia 04/29/2015     Priority: Medium     Osteoarthritis of hip 04/07/2015     Priority: Medium     Allergy to anti-infective agent 03/26/2014     Priority: Medium     Enteritis 03/26/2014     Priority: Medium     Diverticulitis  "of sigmoid colon 03/26/2014     Priority: Medium     Advanced directives, counseling/discussion 06/06/2011     Priority: Medium     Advance Directive Problem List Overview:   Name Relationship Phone    Primary Health Care Agent Lisa Rollins daughter [phone]         Alternative Health Care Agent Enmanuel Rollins son [phone]       Patient states has Advance Directive and will bring in a copy to clinic. 6/6/2011   Pt document is in epic, dated 1/12/05.  Discussed with pt and she may plan to update this and verbally gave the health care agent information listed. Leigh Naranjo RN           Mixed incontinence urge and stress 06/06/2011     Priority: Medium     CARDIOVASCULAR SCREENING; LDL GOAL LESS THAN 160 02/10/2010     Priority: Medium     Microscopic hematuria 11/21/2008     Priority: Medium     Colon Polyps      Priority: Medium     Estrogen replacement therapy 09/07/2005     Priority: Medium     Internal derangement of knee 04/30/2005     Priority: Medium     L knee x 4-5 wks, no known injury but \"runs through it\" regularly  Problem list name updated by automated process. Provider to review       Osteoporosis 04/18/2003     Priority: Medium     Problem list name updated by automated process. Provider to review       Social History   Substance Use Topics     Smoking status: Never Smoker     Smokeless tobacco: Never Used     Alcohol use Yes      Comment: occ with dinner q night     Family History   Problem Relation Age of Onset     GASTROINTESTINAL DISEASE Mother      colon polyps     DIABETES Mother      Arthritis Father      Rheumatoid     Eye Disorder Father      mac. degeneration femp op bypass     Cancer - colorectal Maternal Grandmother      C.A.D. No family hx of      Breast Cancer No family hx of        ROS:  A complete 10 point review of systems was taken and negative except for those noted in the subjective/HPI section(s) above     BP Readings from Last 3 Encounters:   10/09/17 126/74   09/28/17 " 146/70   09/11/17 130/74    Wt Readings from Last 3 Encounters:   10/09/17 138 lb 12.8 oz (63 kg)   09/28/17 135 lb 9.6 oz (61.5 kg)   09/11/17 136 lb (61.7 kg)                   OBJECTIVE:                                                    /74  Pulse 68  Temp 98.2  F (36.8  C) (Oral)  Resp 16  Wt 138 lb 12.8 oz (63 kg)  BMI 23.82 kg/m2   Constitutional: healthy, alert and no distress  HEENT: normocephalic and atrumatic, mucous membranes moist, op clear, mucous membranes moist, neck supple   Cardiovascular: regular rate and rhythm no rubs, gallops or murmurs normal S1/S2; no S3 or S4  Respiratory: clear to auscultation bilaterally in all lung fields, normal insp/exp effort. Good air movement  Gastrointestinal: Abdomen soft,  BS normal. No masses, organomegaly; no masses. + mild discomfort with deep palpation of both lower quadrants otherwise nontender  Musculoskeletal: extremities normal- no gross deformities noted, gait normal and normal muscle tone  Skin: no suspicious lesions or rashes  Neurologic: Gait normal. Reflexes normal and symmetric. Sensation grossly WNL.  Psychiatric: mentation appears normal and affect normal/bright           ASSESSMENT/PLAN:                                                    (R10.32) Left lower quadrant pain  (primary encounter diagnosis)  Comment: discussed with patient (or patient's parents/caregiver) pathophysiology of condition and treatment options.  history makes other causes apart from spine and low bck a possibility, and before we get lumbar MRI and further spine/neurosurg work-up prefer to get imagine gof LLQ and pelvis to evaluation for other potential causes and patient agrees. plan for follow-up pending results. Patient verbalized understanding and is agreeable to this plan.   Plan: CT Abdomen Pelvis w Contrast          (R10.32) Groin pain, left  Comment: as above.   Plan: CT Abdomen Pelvis w Contrast          (M25.532) Left wrist pain  Comment: Well controlled  "on current medication(s). Medication(s) indication(s), adverse effects, risks and benefits discussed. reviwed risk of constipation. Also reviewed need to keep medication secure, no early refills if lost, stolen, or misplaced. This medication can be abused and lead to addiction, etc. Patient verbalized understanding and is agreeable to this plan.    Plan: acetaminophen-codeine (TYLENOL #3) 300-30 MG         per tablet          (M25.532) Arthralgia of wrist, left  Comment: as above  Plan: acetaminophen-codeine (TYLENOL #3) 300-30 MG         per tablet          (Z23) Need for prophylactic vaccination and inoculation against influenza  Plan: FLU VACCINE, INCREASED ANTIGEN, PRESV FREE, AGE        65+ [68889], Vaccine Administration, Initial         [31077]          Estimated body mass index is 23.28 kg/(m^2) as calculated from the following:    Height as of 10/19/16: 5' 4\" (1.626 m).    Weight as of 9/28/17: 135 lb 9.6 oz (61.5 kg).        Return to clinic as needed or if symptoms persist, change, worsen or if any new symptoms develop.      Reo Connolly M.D.  Internal Medicine-Pediatrics            "

## 2017-10-10 ENCOUNTER — TRANSFERRED RECORDS (OUTPATIENT)
Dept: HEALTH INFORMATION MANAGEMENT | Facility: CLINIC | Age: 72
End: 2017-10-10

## 2017-10-11 ENCOUNTER — HOSPITAL ENCOUNTER (OUTPATIENT)
Dept: CT IMAGING | Facility: CLINIC | Age: 72
Discharge: HOME | End: 2017-10-11
Attending: INTERNAL MEDICINE | Admitting: INTERNAL MEDICINE
Payer: MEDICARE

## 2017-10-11 DIAGNOSIS — R10.32 GROIN PAIN, LEFT: ICD-10-CM

## 2017-10-11 DIAGNOSIS — R10.32 LEFT LOWER QUADRANT PAIN: ICD-10-CM

## 2017-10-11 PROCEDURE — 25000128 H RX IP 250 OP 636: Performed by: RADIOLOGY

## 2017-10-11 PROCEDURE — 74177 CT ABD & PELVIS W/CONTRAST: CPT

## 2017-10-11 RX ORDER — IOPAMIDOL 755 MG/ML
500 INJECTION, SOLUTION INTRAVASCULAR ONCE
Status: COMPLETED | OUTPATIENT
Start: 2017-10-11 | End: 2017-10-11

## 2017-10-11 RX ADMIN — SODIUM CHLORIDE 47 ML: 9 INJECTION, SOLUTION INTRAVENOUS at 08:51

## 2017-10-11 RX ADMIN — IOPAMIDOL 70 ML: 755 INJECTION, SOLUTION INTRAVENOUS at 08:51

## 2017-10-12 DIAGNOSIS — I87.1 NUTCRACKER PHENOMENON OF RENAL VEIN: ICD-10-CM

## 2017-10-12 DIAGNOSIS — Q27.9 VASCULAR ANOMALY: Primary | ICD-10-CM

## 2017-10-13 ENCOUNTER — TRANSFERRED RECORDS (OUTPATIENT)
Dept: HEALTH INFORMATION MANAGEMENT | Facility: CLINIC | Age: 72
End: 2017-10-13

## 2017-10-13 ENCOUNTER — TELEPHONE (OUTPATIENT)
Dept: OTHER | Facility: CLINIC | Age: 72
End: 2017-10-13

## 2017-10-13 NOTE — TELEPHONE ENCOUNTER
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     CT ABDOMEN AND PELVIS WITH CONTRAST  10/11/2017 Dr. Condon notes:  \"HISTORY: Left lower quadrant pain. Note, patient has distant contrast  \"allergy\".     TECHNIQUE:  70 mL Isovue-370. CT images of the abdomen and pelvis  following oral and nonionic intravenous contrast. Radiation dose for  this scan was reduced using automated exposure control, adjustment of  the mA and/or kV according to patient size, or iterative  reconstruction technique.     COMPARISON: None.     FINDINGS: The liver, gallbladder, spleen, pancreas, and adrenal glands  are unremarkable. There is no hydronephrosis or solid renal mass.  There is mild aortic atherosclerosis but no abdominal aortic aneurysm.  No bowel obstruction, free air, or ascites. No evidence of abdominal  abscess. No abdominal, retroperitoneal, or pelvic adenopathy.  Prominent collateral veins are noted along the left side of the aorta,  and there are cross pelvic venous collaterals anterior to the sacrum.  This could be related to compression of the left renal vein between  the aorta and SMA and could also be related to compression of the left  common iliac vein between the right common iliac artery and the spine.  Patient has a right hip replacement.      IMPRESSION:  1. Prominent venous collaterals in the left retroperitoneum and in the  pelvis as described above. This could be related to nutcracker  phenomenon or May Thurner syndrome.  2. No other cause for abdominal pain demonstrated.\"    Discussed with Dr. Ortiz.      Pt needs to be scheduled for consult with vascular surgery.  Will route to scheduling to coordinate an appointment at next available.    Sherin Nolasco RN BSN  "

## 2017-10-24 ENCOUNTER — OFFICE VISIT (OUTPATIENT)
Dept: OTHER | Facility: CLINIC | Age: 72
End: 2017-10-24
Attending: SURGERY
Payer: COMMERCIAL

## 2017-10-24 VITALS — DIASTOLIC BLOOD PRESSURE: 79 MMHG | HEART RATE: 64 BPM | SYSTOLIC BLOOD PRESSURE: 142 MMHG

## 2017-10-24 DIAGNOSIS — S76.912A MUSCLE STRAIN OF THIGH, LEFT, INITIAL ENCOUNTER: Primary | ICD-10-CM

## 2017-10-24 PROCEDURE — 99203 OFFICE O/P NEW LOW 30 MIN: CPT | Mod: ZP | Performed by: SURGERY

## 2017-10-24 PROCEDURE — 99211 OFF/OP EST MAY X REQ PHY/QHP: CPT

## 2017-10-24 NOTE — MR AVS SNAPSHOT
"              After Visit Summary   10/24/2017    Brionna Rollins    MRN: [id number]           Patient Information     Date Of Birth          1945        Visit Information        Provider Department      10/24/2017 2:30 PM Kerwin Mcmahon MD [facility]      Today's Diagnoses     Muscle strain of thigh, left, initial encounter    -  1       Follow-ups after your visit        Follow-up notes from your care team     Return if symptoms worsen or fail to improve.      Who to contact     If you have questions or need follow up information about today's clinic visit or your schedule please contact [facility] directly at [phone].  Normal or non-critical lab and imaging results will be communicated to you by MyChart, letter or phone within 4 business days after the clinic has received the results. If you do not hear from us within 7 days, please contact the clinic through MyChart or phone. If you have a critical or abnormal lab result, we will notify you by phone as soon as possible.  Submit refill requests through [employer] or call your pharmacy and they will forward the refill request to us. Please allow 3 business days for your refill to be completed.          Additional Information About Your Visit        MyChart Information     [employer] lets you send messages to your doctor, view your test results, renew your prescriptions, schedule appointments and more. To sign up, go to [email or web address] . Click on \"Log in\" on the left side of the screen, which will take you to the Welcome page. Then click on \"Sign up Now\" on the right side of the page.     You will be asked to enter the access code listed below, as well as some personal information. Please follow the directions to create your username and password.     Your access code is: [email or web address]  Expires: 3/14/2018  3:24 PM     Your access code will  in 90 days. " If you need help or a new code, please call your [facility] clinic or [phone].        Care EveryWhere ID     This is your Care EveryWhere ID. This could be used by other organizations to access your [facility] medical records  [id number]        Your Vitals Were     Pulse Breastfeeding?                64 No           Blood Pressure from Last 3 Encounters:   10/31/17 132/74   10/24/17 142/79   10/09/17 126/74    Weight from Last 3 Encounters:   10/09/17 138 lb 12.8 oz (63 kg)   09/28/17 135 lb 9.6 oz (61.5 kg)   09/11/17 136 lb (61.7 kg)              Today, you had the following     No orders found for display       Primary Care Provider Office Phone # Fax #    Roe Connolly [phone] [address] [place] [address]        Equal Access to Services     [facility]: Hadii aad ku hadasho Soomaali, waaxda luqadaha, qaybta kaalmada adeegyada, waxay donnain hayaan zora chiu . So [facility] [phone].    ATENCIÓN: Si habla español, tiene a walton disposición servicios gratuitos de asistencia lingüística. Llame al [phone].    We comply with applicable federal civil rights laws and [place] laws. We do not discriminate on the basis of race, color, national origin, age, disability, sex, sexual orientation, or gender identity.            Thank you!     Thank you for choosing [facility] [place]  for your care. Our goal is always to provide you with excellent care. Hearing back from our patients is one way we can continue to improve our services. Please take a few minutes to complete the written survey that you may receive in the mail after your visit with us. Thank you!             Your Updated Medication List - Protect others around you: Learn how to safely use, store and throw away your medicines at www.disposemymeds.org.          This list is accurate as of: 10/24/17 11:59 PM.  Always use your most recent med list.                   Brand Name Dispense  Instructions for use Diagnosis    ASPIRIN PO      Take by mouth 2 times daily as needed for moderate pain        clotrimazole 1 % solution    LOTRIMIN    60 mL    Apply topically 2 times daily    Intertrigo       cyclobenzaprine 5 MG tablet    FLEXERIL    14 tablet    Take 0.5 tablets (2.5 mg) by mouth nightly as needed for muscle spasms    Chronic left-sided low back pain with left-sided sciatica       diclofenac 1 % Gel topical gel    VOLTAREN    100 g    Apply 1-2 grams to wrist QID as needed .    Left wrist pain, Arthralgia of wrist, left, Arthritis of left wrist       fluticasone 50 MCG/ACT spray    FLONASE    16 g    Spray 1-2 sprays into both nostrils daily    Dysfunction of Eustachian tube, bilateral, Dizziness       hydrocortisone 1 % cream    CORTAID     Apply topically 3 times daily as needed for rash    Rash and nonspecific skin eruption       IBUPROFEN PO      Take 600 mg by mouth every 6 hours as needed for moderate pain        LACTOBACILLUS RHAMNOSUS (GG) PO      Take 2 capsules by mouth 3 times daily (with meals)        MAPAP 325 MG tablet   Generic drug:  acetaminophen      Take 325-650 mg by mouth every 6 hours as needed for mild pain        naproxen 500 MG tablet    NAPROSYN    60 tablet    Take 1 tablet (500 mg) by mouth 2 times daily as needed for moderate pain    Arthritis of carpometacarpal joint       ranitidine 150 MG tablet    ZANTAC    60 tablet    Take 1 tablet (150 mg) by mouth 2 times daily    Gastroesophageal reflux disease without esophagitis

## 2017-10-24 NOTE — LETTER
"[facility]  [address]  [place] [address]  Phone: [phone]  Fax: [phone]    2017    Re: Brionna Rollins, : 1945    HPI :  Brionna Rollins is a very healthy 72-year-old female who developed left groin and upper thigh pain approximately 6 weeks ago.  There was no clear-cut initiating event.  Her symptoms have persisted but are perhaps slowly improving.  Her pain seems most related to activities such as hip flexion.  She underwent a CT scan of the chest and abdomen as part of her workup.  That study performed on 10/11/17 showed some compression of the left renal vein and some prominent venous collaterals in the left retroperitoneum and in the pelvis anterior to the sacrum.  She is referred to me today to discuss whether her symptoms could be related to a left renal vein compression or so-called \"nutcracker syndrome\"  She denies hematuria, flank pain, or pelvic congestion symptoms (pelvic pain, dyspareunia, labial varices etc).  She is status post a right hip replacement.  Past medical history is also notable for a prior colon resection secondary to diverticulitis performed at the [facility] in .  Since then she has had a normal colonoscopy.     Review of Systems   Constitutional: Negative.    HENT: Negative.    Eyes: Negative.    Respiratory: Negative.    Cardiovascular: Negative.    Gastrointestinal: Negative.    Genitourinary: Negative for dysuria and hematuria.   Musculoskeletal: Positive for joint pain and myalgias.   Skin: Negative.    Neurological: Negative.    Endo/Heme/Allergies: Negative.    Psychiatric/Behavioral: Negative.       Physical Exam   Nursing note and vitals reviewed.  Constitutional: She appears well-developed and well-nourished.   Eyes: EOM are normal. Pupils are equal, round, and reactive to light. No scleral icterus.   HENT:   Head: Normocephalic and atraumatic.   Neck: Normal range of motion. No JVD present. " "  Cardiovascular: Intact distal pulses.    Abdominal: Soft. She exhibits no mass. There is no tenderness.   Musculoskeletal: Normal range of motion. She exhibits no edema.   Neurological: She is alert and oriented to person, place, and time.   Skin: Skin is warm and dry.   Psychiatric/Behavioral: mood and affect normal, normal behavior, normal thought content and normal judgment      Imaging:     CT ABDOMEN AND PELVIS WITH CONTRAST  10/11/2017 8:59 AM       HISTORY: Left lower quadrant pain. Note, patient has distant contrast  \"allergy\".      TECHNIQUE:  70 mL Isovue-370. CT images of the abdomen and pelvis  following oral and nonionic intravenous contrast. Radiation dose for  this scan was reduced using automated exposure control, adjustment of  the mA and/or kV according to patient size, or iterative  reconstruction technique.      COMPARISON: None.      FINDINGS: The liver, gallbladder, spleen, pancreas, and adrenal glands  are unremarkable. There is no hydronephrosis or solid renal mass.  There is mild aortic atherosclerosis but no abdominal aortic aneurysm.  No bowel obstruction, free air, or ascites. No evidence of abdominal  abscess. No abdominal, retroperitoneal, or pelvic adenopathy.  Prominent collateral veins are noted along the left side of the aorta,  and there are cross pelvic venous collaterals anterior to the sacrum.  This could be related to compression of the left renal vein between  the aorta and SMA and could also be related to compression of the left  common iliac vein between the right common iliac artery and the spine.  Patient has a right hip replacement.      IMPRESSION:  1. Prominent venous collaterals in the left retroperitoneum and in the  pelvis as described above. This could be related to nutcracker  phenomenon or May Thurner syndrome.  2. No other cause for abdominal pain demonstrated.      ESTHELA RIVAS MD     ASSESSMENT:   1.  Left groin and upper thigh pain likely secondary " either to arthritic changes of the left hip or a left thigh muscle strain.     2.  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  She does have retroperitoneal and pelvic venous collaterals but I do not believe this is associated with her left leg complaints.     RECOMMENDATION:  I had a lengthy discussion with Brionna regarding all the above.  At least 50% of the general population has some degree of left renal vein compression.  Her constellation of symptoms and her radiographic findings in my opinion do not support the diagnosis of a nutcracker syndrome. If she were to develop more classic findings of hematuria, flank pain or pelvic congestion the next logical step would be a left renal venogram with measurement of pressure gradients. Presently I do not feel this is indicated.     Brionna was very reassured by our discussion.  She has already had a right hip replacement. She will likely speak with her primary care physician about pursuing additional orthopedic workup for her left leg complaints.  Follow-up can be with me on an as-needed basis.      Manuel Mcmahon M.D.

## 2017-10-31 ENCOUNTER — OFFICE VISIT (OUTPATIENT)
Dept: DERMATOLOGY | Facility: CLINIC | Age: 72
End: 2017-10-31
Payer: COMMERCIAL

## 2017-10-31 ENCOUNTER — OFFICE VISIT (OUTPATIENT)
Dept: PEDIATRICS | Facility: CLINIC | Age: 72
End: 2017-10-31
Payer: COMMERCIAL

## 2017-10-31 VITALS
OXYGEN SATURATION: 100 % | RESPIRATION RATE: 20 BRPM | HEART RATE: 80 BPM | DIASTOLIC BLOOD PRESSURE: 74 MMHG | TEMPERATURE: 97.7 F | SYSTOLIC BLOOD PRESSURE: 132 MMHG

## 2017-10-31 DIAGNOSIS — R10.2 PELVIC PAIN IN FEMALE: Primary | ICD-10-CM

## 2017-10-31 DIAGNOSIS — L82.0 INFLAMED SEBORRHEIC KERATOSIS: Primary | ICD-10-CM

## 2017-10-31 DIAGNOSIS — D18.01 CHERRY ANGIOMA: ICD-10-CM

## 2017-10-31 DIAGNOSIS — M16.10 ARTHRITIS OF HIP: ICD-10-CM

## 2017-10-31 DIAGNOSIS — S76.012D MUSCLE STRAIN OF LEFT HIP, SUBSEQUENT ENCOUNTER: ICD-10-CM

## 2017-10-31 DIAGNOSIS — L82.1 SK (SEBORRHEIC KERATOSIS): ICD-10-CM

## 2017-10-31 PROCEDURE — 17110 DESTRUCTION B9 LES UP TO 14: CPT | Performed by: DERMATOLOGY

## 2017-10-31 PROCEDURE — 99214 OFFICE O/P EST MOD 30 MIN: CPT | Performed by: INTERNAL MEDICINE

## 2017-10-31 PROCEDURE — 99212 OFFICE O/P EST SF 10 MIN: CPT | Mod: 25 | Performed by: DERMATOLOGY

## 2017-10-31 NOTE — LETTER
10/31/2017      RE: Brionna Rollins  [address] [address]           DERMATOLOGY PROBLEM LIST:   1.  Seborrheic keratoses.   2.  Irritated seborrheic keratoses.   3.  Cherry angiomas.     DERMATOLOGY CLINIC VISIT NOTE        DERMATOLOGY PROBLEM LIST:   1.  Seborrheic keratoses.   2.  Irritated seborrheic keratoses.   3.  Cherry angiomas.     CHIEF COMPLAINT:  Followup irritated seborrheic keratoses.      HISTORY OF PRESENT ILLNESS:  Ms. Rollins a 72-year-old female returning to Dermatology Clinic for ongoing evaluation of irritated seborrheic keratoses.  The patient has history of extensive, hundreds, seborrheic keratoses.  Occasionally, several of them become irritated and require treatment.  She was last seen in clinic on 09/12/2017.      Patient Active Problem List   Diagnosis     Osteoporosis     Internal derangement of knee     Estrogen replacement therapy     Colon Polyps     Microscopic hematuria     CARDIOVASCULAR SCREENING; LDL GOAL LESS THAN 160     Advanced directives, counseling/discussion     Mixed incontinence urge and stress     Fracture of distal end of radius     Hypertension goal BP (blood pressure) < 140/90     Atopic rhinitis     Allergy to anti-infective agent     Enteritis     Osteoarthritis of hip     Osteoarthritis of knee     Acute posthemorrhagic anemia     Surgical follow-up care     Diverticulitis of sigmoid colon       Allergies   Allergen Reactions     Ciprofloxacin Swelling     Stiff and swollen neck     Amoxicillin-Pot Clavulanate [Augmentin]      Cefprozil Rash     Contrast Dye      gets cold and feels terrible  Pt was ok with Isovue-370 and no pre-meds     Doxycycline Hyclate Rash     Erythromycin Swelling     Flagyl [Metronidazole Hcl]      Meperidine Hcl      low blood pressure     Oxycodone      Prochlorperazine      agitated     Sulfa Drugs Rash     Tetracycline Rash         Current Outpatient Prescriptions   Medication     ASPIRIN PO     acetaminophen-codeine  (TYLENOL #3) 300-30 MG per tablet     cyclobenzaprine (FLEXERIL) 5 MG tablet     ranitidine (ZANTAC) 150 MG tablet     hydrocortisone (CORTAID) 1 % cream     naproxen (NAPROSYN) 500 MG tablet     clotrimazole (LOTRIMIN) 1 % solution     diclofenac (VOLTAREN) 1 % GEL     fluticasone (FLONASE) 50 MCG/ACT nasal spray     LACTOBACILLUS RHAMNOSUS, GG, PO     acetaminophen (MAPAP) 325 MG tablet     IBUPROFEN PO     No current facility-administered medications for this visit.           SOCIAL HISTORY:  The patient is .  She lives in [place].  She has a daughter in the area.      REVIEW OF SYSTEMS:  Feels well without additional skin concerns.      PHYSICAL EXAMINATION:   GENERAL:  The patient is a healthy-appearing 72-year-old female in no distress.   HEENT:  Conjunctivae are clear.   PULMONARY:  Breathing comfortably on room air.   ABDOMEN:  No abdominal distention.   SKIN:  Examination today included the face and neck.  Skin exam was normal except for as follows:   - Examination of the forehead, lateral cheeks and infraorbital rims with scattered-appearing waxy, 2-3 mm papules.      ASSESSMENT AND PLAN:   1.  Seborrheic keratoses; benign keratotic papules.  No treatment advised.   2.  Irritated seborrheic keratoses around the eyes.  A total of 8 lesions were treated with 10-second freeze-thaw cycle with liquid nitrogen via a cotton-tipped applicator.  Wound care instruction provided.      The patient to return to Dermatology Clinic as needed.     Keisha Carrillo MD  Dermatology Staff       cc:   [place] [address]             D: 10/31/2017 16:56   T: 10/31/2017 20:13   MT: sacha      Name:     GABI BONILLA   MRN:      [id number]        Account:      [id number]   :      1945           Service Date: 10/31/2017      Document: [id number]

## 2017-10-31 NOTE — PROGRESS NOTES
DERMATOLOGY PROBLEM LIST:   1.  Seborrheic keratoses.   2.  Irritated seborrheic keratoses.   3.  Cherry angiomas.

## 2017-10-31 NOTE — MR AVS SNAPSHOT
"              After Visit Summary   10/31/2017    Brionna Rollins    MRN: [id number]           Patient Information     Date Of Birth          1945        Visit Information        Provider Department      10/31/2017 6:40 PM Roe Connolly MD [facility] [place]        Today's Diagnoses     Pelvic pain in female    -  1    Arthritis of hip        Muscle strain of left hip, subsequent encounter           Follow-ups after your visit        Who to contact     If you have questions or need follow up information about today's clinic visit or your schedule please contact [facility] directly at [phone].  Normal or non-critical lab and imaging results will be communicated to you by Fanwardshart, letter or phone within 4 business days after the clinic has received the results. If you do not hear from us within 7 days, please contact the clinic through Fanwardshart or phone. If you have a critical or abnormal lab result, we will notify you by phone as soon as possible.  Submit refill requests through "[employer] or call your pharmacy and they will forward the refill request to us. Please allow 3 business days for your refill to be completed.          Additional Information About Your Visit        MyChart Information     "[employer] lets you send messages to your doctor, view your test results, renew your prescriptions, schedule appointments and more. To sign up, go to [email or web address] . Click on \"Log in\" on the left side of the screen, which will take you to the Welcome page. Then click on \"Sign up Now\" on the right side of the page.     You will be asked to enter the access code listed below, as well as some personal information. Please follow the directions to create your username and password.     Your access code is: [email or web address]  Expires: 12/10/2017  3:20 PM     Your access code will  in 90 days. If you need help or a new code, please call your [facility] or [phone].        Care " EveryWhere ID     This is your Care EveryWhere ID. This could be used by other organizations to access your [facility] medical records  [id number]        Your Vitals Were     Pulse Temperature Respirations Pulse Oximetry          80 97.7  F (36.5  C) (Oral) 20 100%         Blood Pressure from Last 3 Encounters:   10/31/17 132/74   10/24/17 142/79   10/09/17 126/74    Weight from Last 3 Encounters:   10/09/17 138 lb 12.8 oz (63 kg)   09/28/17 135 lb 9.6 oz (61.5 kg)   09/11/17 136 lb (61.7 kg)              Today, you had the following     No orders found for display       Primary Care Provider Office Phone # Fax #    Roe Connolly [phone] [address] [place] [address]        Equal Access to Services     [facility]: Hadii aad ku hadasho Soomaali, waaxda luqadaha, qaybta kaalmada adeegyada, ca thompsonin hayaan zora chiu . So [facility] [phone].    ATENCIÓN: Si habla español, tiene a walton disposición servicios gratuitos de asistencia lingüística. Llame al [phone].    We comply with applicable federal civil rights laws and [place] laws. We do not discriminate on the basis of race, color, national origin, age, disability, sex, sexual orientation, or gender identity.            Thank you!     Thank you for choosing [facility]  for your care. Our goal is always to provide you with excellent care. Hearing back from our patients is one way we can continue to improve our services. Please take a few minutes to complete the written survey that you may receive in the mail after your visit with us. Thank you!             Your Updated Medication List - Protect others around you: Learn how to safely use, store and throw away your medicines at www.disposemymeds.org.          This list is accurate as of: 10/31/17  6:52 PM.  Always use your most recent med list.                   Brand Name Dispense Instructions for use Diagnosis    acetaminophen-codeine 300-30  MG per tablet    TYLENOL #3    30 tablet    Take 1-2 tablets by mouth 2 times daily as needed for moderate pain or pain    Left wrist pain, Arthralgia of wrist, left       ASPIRIN PO      Take by mouth 2 times daily as needed for moderate pain        clotrimazole 1 % solution    LOTRIMIN    60 mL    Apply topically 2 times daily    Intertrigo       cyclobenzaprine 5 MG tablet    FLEXERIL    14 tablet    Take 0.5 tablets (2.5 mg) by mouth nightly as needed for muscle spasms    Chronic left-sided low back pain with left-sided sciatica       diclofenac 1 % Gel topical gel    VOLTAREN    100 g    Apply 1-2 grams to wrist QID as needed .    Left wrist pain, Arthralgia of wrist, left, Arthritis of left wrist       fluticasone 50 MCG/ACT spray    FLONASE    16 g    Spray 1-2 sprays into both nostrils daily    Dysfunction of Eustachian tube, bilateral, Dizziness       hydrocortisone 1 % cream    CORTAID     Apply topically 3 times daily as needed for rash    Rash and nonspecific skin eruption       IBUPROFEN PO      Take 600 mg by mouth every 6 hours as needed for moderate pain        LACTOBACILLUS RHAMNOSUS (GG) PO      Take 2 capsules by mouth 3 times daily (with meals)        MAPAP 325 MG tablet   Generic drug:  acetaminophen      Take 325-650 mg by mouth every 6 hours as needed for mild pain        naproxen 500 MG tablet    NAPROSYN    60 tablet    Take 1 tablet (500 mg) by mouth 2 times daily as needed for moderate pain    Arthritis of carpometacarpal joint       ranitidine 150 MG tablet    ZANTAC    60 tablet    Take 1 tablet (150 mg) by mouth 2 times daily    Gastroesophageal reflux disease without esophagitis

## 2017-10-31 NOTE — MR AVS SNAPSHOT
"              After Visit Summary   10/31/2017    Brionna Rollins    MRN: [id number]           Patient Information     Date Of Birth          1945        Visit Information        Provider Department      10/31/2017 1:15 PM Keisha Carrillo MD [facility] [place]        Today's Diagnoses     Inflamed seborrheic keratosis    -  1    SK (seborrheic keratosis)        Cherry angioma           Follow-ups after your visit        Who to contact     If you have questions or need follow up information about today's clinic visit or your schedule please contact [facility] directly at [phone].  Normal or non-critical lab and imaging results will be communicated to you by Roadmunkhart, letter or phone within 4 business days after the clinic has received the results. If you do not hear from us within 7 days, please contact the clinic through [employer] or phone. If you have a critical or abnormal lab result, we will notify you by phone as soon as possible.  Submit refill requests through [employer] or call your pharmacy and they will forward the refill request to us. Please allow 3 business days for your refill to be completed.          Additional Information About Your Visit        MyChart Information     [employer] lets you send messages to your doctor, view your test results, renew your prescriptions, schedule appointments and more. To sign up, go to [email or web address] . Click on \"Log in\" on the left side of the screen, which will take you to the Welcome page. Then click on \"Sign up Now\" on the right side of the page.     You will be asked to enter the access code listed below, as well as some personal information. Please follow the directions to create your username and password.     Your access code is: [email or web address]  Expires: 12/10/2017  3:20 PM     Your access code will  in 90 days. If you need help or a new code, please call your [facility] or [phone].        Care EveryWhere ID     " This is your Care EveryWhere ID. This could be used by other organizations to access your [facility] medical records  [id number]         Blood Pressure from Last 3 Encounters:   10/31/17 132/74   10/24/17 142/79   10/09/17 126/74    Weight from Last 3 Encounters:   10/09/17 63 kg (138 lb 12.8 oz)   09/28/17 61.5 kg (135 lb 9.6 oz)   09/11/17 61.7 kg (136 lb)              We Performed the Following     DESTRUCT BENIGN LESION, UP TO 14        Primary Care Provider Office Phone # Fax #    Roe Connolly [phone] [address] [place] [address]        Equal Access to Services     [facility]: Hadii aad ku hadasho Soomaali, waaxda luqadaha, qaybta kaalmada adeegyada, waxvivian chiu . So [facility] [phone].    ATENCIÓN: Si habla español, tiene a walton disposición servicios gratuitos de asistencia lingüística. [facility] [phone].    We comply with applicable federal civil rights laws and [place] laws. We do not discriminate on the basis of race, color, national origin, age, disability, sex, sexual orientation, or gender identity.            Thank you!     Thank you for choosing [facility]  for your care. Our goal is always to provide you with excellent care. Hearing back from our patients is one way we can continue to improve our services. Please take a few minutes to complete the written survey that you may receive in the mail after your visit with us. Thank you!             Your Updated Medication List - Protect others around you: Learn how to safely use, store and throw away your medicines at www.disposemymeds.org.          This list is accurate as of: 10/31/17 11:59 PM.  Always use your most recent med list.                   Brand Name Dispense Instructions for use Diagnosis    acetaminophen-codeine 300-30 MG per tablet    TYLENOL #3    30 tablet    Take 1-2 tablets by mouth 2 times daily as needed for moderate pain or pain    Left  wrist pain, Arthralgia of wrist, left       ASPIRIN PO      Take by mouth 2 times daily as needed for moderate pain        clotrimazole 1 % solution    LOTRIMIN    60 mL    Apply topically 2 times daily    Intertrigo       cyclobenzaprine 5 MG tablet    FLEXERIL    14 tablet    Take 0.5 tablets (2.5 mg) by mouth nightly as needed for muscle spasms    Chronic left-sided low back pain with left-sided sciatica       diclofenac 1 % Gel topical gel    VOLTAREN    100 g    Apply 1-2 grams to wrist QID as needed .    Left wrist pain, Arthralgia of wrist, left, Arthritis of left wrist       fluticasone 50 MCG/ACT spray    FLONASE    16 g    Spray 1-2 sprays into both nostrils daily    Dysfunction of Eustachian tube, bilateral, Dizziness       hydrocortisone 1 % cream    CORTAID     Apply topically 3 times daily as needed for rash    Rash and nonspecific skin eruption       IBUPROFEN PO      Take 600 mg by mouth every 6 hours as needed for moderate pain        LACTOBACILLUS RHAMNOSUS (GG) PO      Take 2 capsules by mouth 3 times daily (with meals)        MAPAP 325 MG tablet   Generic drug:  acetaminophen      Take 325-650 mg by mouth every 6 hours as needed for mild pain        naproxen 500 MG tablet    NAPROSYN    60 tablet    Take 1 tablet (500 mg) by mouth 2 times daily as needed for moderate pain    Arthritis of carpometacarpal joint       ranitidine 150 MG tablet    ZANTAC    60 tablet    Take 1 tablet (150 mg) by mouth 2 times daily    Gastroesophageal reflux disease without esophagitis

## 2017-10-31 NOTE — PROGRESS NOTES
SUBJECTIVE:                                                    Brionna Rollins is a 72 year old female who presents to clinic today for the following health issues:    [facility] vascular      Duration: 10/24/2017    Description (location/character/radiation): Discuss visit     Intensity:  mild    Accompanying signs and symptoms: CT results    History (similar episodes/previous evaluation): chronic pain    Precipitating or alleviating factors: None    Therapies tried and outcome:  See medication list     patient here for follow-up vascular visit, is doing well, happy that the vascular team does NOT think the ct scan findings are significant nor is any intervention needed. had appointment with dr miranda that went well today too, pain is well managed with our current plan.     Problem list and histories reviewed & adjusted, as indicated.  Additional history: as documented  Patient Active Problem List    Diagnosis Date Noted     Atopic rhinitis 02/05/2016     Priority: Medium     Hypertension goal BP (blood pressure) < 140/90 01/07/2016     Priority: Medium     Osteoarthritis of knee 12/18/2015     Priority: Medium     Fracture of distal end of radius 10/22/2015     Priority: Medium     Surgical follow-up care 06/01/2015     Priority: Medium     Overview:   inactive icd-9 diagnosis auto replaced with icd-10, display name retained//mporz       Acute posthemorrhagic anemia 04/29/2015     Priority: Medium     Osteoarthritis of hip 04/07/2015     Priority: Medium     Allergy to anti-infective agent 03/26/2014     Priority: Medium     Enteritis 03/26/2014     Priority: Medium     Diverticulitis of sigmoid colon 03/26/2014     Priority: Medium     Advanced directives, counseling/discussion 06/06/2011     Priority: Medium     Advance Directive Problem List Overview:   Name Relationship Phone    Primary Health Care Agent Lisa Rollins daughter [phone]         Alternative Health Care Agent Enmanuel Rollins son [phone]  "      Patient states has Advance Directive and will bring in a copy to clinic. 6/6/2011   Pt document is in epic, dated 1/12/05.  Discussed with pt and she may plan to update this and verbally gave the health care agent information listed. Leigh Naranjo RN           Mixed incontinence urge and stress 06/06/2011     Priority: Medium     CARDIOVASCULAR SCREENING; LDL GOAL LESS THAN 160 02/10/2010     Priority: Medium     Microscopic hematuria 11/21/2008     Priority: Medium     Colon Polyps      Priority: Medium     Estrogen replacement therapy 09/07/2005     Priority: Medium     Internal derangement of knee 04/30/2005     Priority: Medium     L knee x 4-5 wks, no known injury but \"runs through it\" regularly  Problem list name updated by automated process. Provider to review       Osteoporosis 04/18/2003     Priority: Medium     Problem list name updated by automated process. Provider to review       Social History   Substance Use Topics     Smoking status: Never Smoker     Smokeless tobacco: Never Used     Alcohol use Yes      Comment: occ with dinner q night     Family History   Problem Relation Age of Onset     GASTROINTESTINAL DISEASE Mother      colon polyps     DIABETES Mother      Arthritis Father      Rheumatoid     Eye Disorder Father      mac. degeneration femp op bypass     Cancer - colorectal Maternal Grandmother      C.A.D. No family hx of      Breast Cancer No family hx of        ROS:  A complete 10 point review of systems was taken and negative except for those noted in the subjective/HPI section(s) above     BP Readings from Last 3 Encounters:   10/31/17 132/74   10/24/17 142/79   10/09/17 126/74    Wt Readings from Last 3 Encounters:   10/09/17 138 lb 12.8 oz (63 kg)   09/28/17 135 lb 9.6 oz (61.5 kg)   09/11/17 136 lb (61.7 kg)                OBJECTIVE:                                                    /74  Pulse 80  Temp 97.7  F (36.5  C) (Oral)  Resp 20  SpO2 100%   Constitutional: " "healthy, alert and no distress  Psychiatric: mentation appears normal and affect normal/bright  Presentation- normal:Abstract reasoning  Attention and concentration  Coherency and relevance of thought  Dress, grooming, personal hygiene  Facial expression  Information  Judgment  Memory  Mood  Orientation  Perceptions  Posture and motor behavoir  Speech  Thought content  Vocabulary,abnormal:none      Results for orders placed or performed during the hospital encounter of 10/11/17   CT Abdomen Pelvis w Contrast    Narrative    CT ABDOMEN AND PELVIS WITH CONTRAST  10/11/2017 8:59 AM     HISTORY: Left lower quadrant pain. Note, patient has distant contrast  \"allergy\".    TECHNIQUE:  70 mL Isovue-370. CT images of the abdomen and pelvis  following oral and nonionic intravenous contrast. Radiation dose for  this scan was reduced using automated exposure control, adjustment of  the mA and/or kV according to patient size, or iterative  reconstruction technique.    COMPARISON: None.    FINDINGS: The liver, gallbladder, spleen, pancreas, and adrenal glands  are unremarkable. There is no hydronephrosis or solid renal mass.  There is mild aortic atherosclerosis but no abdominal aortic aneurysm.  No bowel obstruction, free air, or ascites. No evidence of abdominal  abscess. No abdominal, retroperitoneal, or pelvic adenopathy.  Prominent collateral veins are noted along the left side of the aorta,  and there are cross pelvic venous collaterals anterior to the sacrum.  This could be related to compression of the left renal vein between  the aorta and SMA and could also be related to compression of the left  common iliac vein between the right common iliac artery and the spine.  Patient has a right hip replacement.      Impression    IMPRESSION:  1. Prominent venous collaterals in the left retroperitoneum and in the  pelvis as described above. This could be related to nutcracker  phenomenon or May Thurner syndrome.  2. No other " "cause for abdominal pain demonstrated.    ESTHELA RIVAS MD          ASSESSMENT/PLAN:                                                        ICD-10-CM    1. Pelvic pain in female R10.2    2. Arthritis of hip M16.10    3. Muscle strain of left hip, subsequent encounter S76.012D    discussed with patient (or patient's parents/caregiver) pathophysiology of condition and treatment options.  reviwed vasculars' reccs. Reviwed imagine as well. Vascular feels thatthis is NOT nutcracker syndrome and patient relived. plan to to continue cares and plan, follow-up with ortho as scheduled. Patient verbalized understanding and is agreeable to this plan.       Estimated body mass index is 23.82 kg/(m^2) as calculated from the following:    Height as of 10/19/16: 5' 4\" (1.626 m).    Weight as of 10/9/17: 138 lb 12.8 oz (63 kg).      Return to clinic as needed or if symptoms persist, change, worsen or if any new symptoms develop.    Total time spent with patient was 30 min, of which greater than 25 minutes of face to face time and/or coordination of care discussing the above issue(s).     Roe Connolly M.D.  Internal Medicine-Pediatrics            "

## 2017-10-31 NOTE — NURSING NOTE
"Chief Complaint   Patient presents with     RECHECK       Initial There were no vitals taken for this visit. Estimated body mass index is 23.82 kg/(m^2) as calculated from the following:    Height as of 10/19/16: 5' 4\" (1.626 m).    Weight as of 10/9/17: 138 lb 12.8 oz (63 kg).  Medication Reconciliation: complete  "

## 2017-10-31 NOTE — NURSING NOTE
"Chief Complaint   Patient presents with     RECHECK       Initial /80  Pulse 80  Temp 97.7  F (36.5  C) (Oral)  Resp 20  SpO2 100% Estimated body mass index is 23.82 kg/(m^2) as calculated from the following:    Height as of 10/19/16: 5' 4\" (1.626 m).    Weight as of 10/9/17: 138 lb 12.8 oz (63 kg).  Medication Reconciliation: complete   Tia J, CMA,AAMA      "

## 2017-11-01 NOTE — PROGRESS NOTES
DERMATOLOGY CLINIC VISIT NOTE        DERMATOLOGY PROBLEM LIST:   1.  Seborrheic keratoses.   2.  Irritated seborrheic keratoses.   3.  Cherry angiomas.     CHIEF COMPLAINT:  Followup irritated seborrheic keratoses.      HISTORY OF PRESENT ILLNESS:  Ms. Rollins a 72-year-old female returning to Dermatology Clinic for ongoing evaluation of irritated seborrheic keratoses.  The patient has history of extensive, hundreds, seborrheic keratoses.  Occasionally, several of them become irritated and require treatment.  She was last seen in clinic on 09/12/2017.      Patient Active Problem List   Diagnosis     Osteoporosis     Internal derangement of knee     Estrogen replacement therapy     Colon Polyps     Microscopic hematuria     CARDIOVASCULAR SCREENING; LDL GOAL LESS THAN 160     Advanced directives, counseling/discussion     Mixed incontinence urge and stress     Fracture of distal end of radius     Hypertension goal BP (blood pressure) < 140/90     Atopic rhinitis     Allergy to anti-infective agent     Enteritis     Osteoarthritis of hip     Osteoarthritis of knee     Acute posthemorrhagic anemia     Surgical follow-up care     Diverticulitis of sigmoid colon       Allergies   Allergen Reactions     Ciprofloxacin Swelling     Stiff and swollen neck     Amoxicillin-Pot Clavulanate [Augmentin]      Cefprozil Rash     Contrast Dye      gets cold and feels terrible  Pt was ok with Isovue-370 and no pre-meds     Doxycycline Hyclate Rash     Erythromycin Swelling     Flagyl [Metronidazole Hcl]      Meperidine Hcl      low blood pressure     Oxycodone      Prochlorperazine      agitated     Sulfa Drugs Rash     Tetracycline Rash         Current Outpatient Prescriptions   Medication     ASPIRIN PO     acetaminophen-codeine (TYLENOL #3) 300-30 MG per tablet     cyclobenzaprine (FLEXERIL) 5 MG tablet     ranitidine (ZANTAC) 150 MG tablet     hydrocortisone (CORTAID) 1 % cream     naproxen (NAPROSYN) 500 MG tablet      clotrimazole (LOTRIMIN) 1 % solution     diclofenac (VOLTAREN) 1 % GEL     fluticasone (FLONASE) 50 MCG/ACT nasal spray     LACTOBACILLUS RHAMNOSUS, GG, PO     acetaminophen (MAPAP) 325 MG tablet     IBUPROFEN PO     No current facility-administered medications for this visit.           SOCIAL HISTORY:  The patient is .  She lives in [place].  She has a daughter in the area.      REVIEW OF SYSTEMS:  Feels well without additional skin concerns.      PHYSICAL EXAMINATION:   GENERAL:  The patient is a healthy-appearing 72-year-old female in no distress.   HEENT:  Conjunctivae are clear.   PULMONARY:  Breathing comfortably on room air.   ABDOMEN:  No abdominal distention.   SKIN:  Examination today included the face and neck.  Skin exam was normal except for as follows:   - Examination of the forehead, lateral cheeks and infraorbital rims with scattered-appearing waxy, 2-3 mm papules.      ASSESSMENT AND PLAN:   1.  Seborrheic keratoses; benign keratotic papules.  No treatment advised.   2.  Irritated seborrheic keratoses around the eyes.  A total of 8 lesions were treated with 10-second freeze-thaw cycle with liquid nitrogen via a cotton-tipped applicator.  Wound care instruction provided.      The patient to return to Dermatology Clinic as needed.     Keisha Alexander MD  Dermatology Staff       cc:   [place] [address]         KEISHA ALEXANDER MD             D: 10/31/2017 16:56   T: 10/31/2017 20:13   MT: sacha      Name:     GABI BONILLA   MRN:      [id number]        Account:      [id number]   :      1945           Service Date: 10/31/2017      Document: [id number]

## 2017-11-02 PROBLEM — L82.0 INFLAMED SEBORRHEIC KERATOSIS: Status: ACTIVE | Noted: 2017-11-02

## 2017-11-02 PROBLEM — L82.1 SK (SEBORRHEIC KERATOSIS): Status: ACTIVE | Noted: 2017-11-02

## 2017-11-02 PROBLEM — D18.01 CHERRY ANGIOMA: Status: ACTIVE | Noted: 2017-11-02

## 2017-11-08 ENCOUNTER — DOCUMENTATION ONLY (OUTPATIENT)
Dept: OTHER | Facility: CLINIC | Age: 72
End: 2017-11-08

## 2017-11-08 DIAGNOSIS — Z71.89 ACP (ADVANCE CARE PLANNING): Chronic | ICD-10-CM

## 2017-11-22 ENCOUNTER — TRANSFERRED RECORDS (OUTPATIENT)
Dept: HEALTH INFORMATION MANAGEMENT | Facility: CLINIC | Age: 72
End: 2017-11-22

## 2017-12-08 ENCOUNTER — VIRTUAL VISIT (OUTPATIENT)
Dept: PEDIATRICS | Facility: CLINIC | Age: 72
End: 2017-12-08
Payer: COMMERCIAL

## 2017-12-08 DIAGNOSIS — R30.0 DYSURIA: Primary | ICD-10-CM

## 2017-12-08 DIAGNOSIS — R30.0 DYSURIA: ICD-10-CM

## 2017-12-08 LAB
ALBUMIN UR-MCNC: NEGATIVE MG/DL
APPEARANCE UR: CLEAR
BILIRUB UR QL STRIP: NEGATIVE
COLOR UR AUTO: YELLOW
GLUCOSE UR STRIP-MCNC: NEGATIVE MG/DL
HGB UR QL STRIP: ABNORMAL
KETONES UR STRIP-MCNC: NEGATIVE MG/DL
LEUKOCYTE ESTERASE UR QL STRIP: ABNORMAL
NITRATE UR QL: NEGATIVE
PH UR STRIP: 7 PH (ref 5–7)
RBC #/AREA URNS AUTO: NORMAL /HPF
SOURCE: ABNORMAL
SP GR UR STRIP: 1.01 (ref 1–1.03)
UROBILINOGEN UR STRIP-ACNC: 0.2 EU/DL (ref 0.2–1)
WBC #/AREA URNS AUTO: NORMAL /HPF

## 2017-12-08 PROCEDURE — 81001 URINALYSIS AUTO W/SCOPE: CPT | Performed by: INTERNAL MEDICINE

## 2017-12-08 PROCEDURE — 99441 ZZC PHYSICIAN TELEPHONE EVALUATION 5-10 MIN: CPT | Performed by: INTERNAL MEDICINE

## 2017-12-08 NOTE — MR AVS SNAPSHOT
"              After Visit Summary   2017    Brionna Rollins    MRN: [id number]           Patient Information     Date Of Birth          1945        Visit Information        Provider Department      2017 3:00 PM Roe Connolly MD [facility] [place]        Today's Diagnoses     Dysuria    -  1       Follow-ups after your visit        Who to contact     If you have questions or need follow up information about today's clinic visit or your schedule please contact [facility] directly at [phone].  Normal or non-critical lab and imaging results will be communicated to you by MyChart, letter or phone within 4 business days after the clinic has received the results. If you do not hear from us within 7 days, please contact the clinic through [employer] or phone. If you have a critical or abnormal lab result, we will notify you by phone as soon as possible.  Submit refill requests through [employer] or call your pharmacy and they will forward the refill request to us. Please allow 3 business days for your refill to be completed.          Additional Information About Your Visit        MyChart Information     Sh[employer]Em lets you send messages to your doctor, view your test results, renew your prescriptions, schedule appointments and more. To sign up, go to [email or web address] . Click on \"Log in\" on the left side of the screen, which will take you to the Welcome page. Then click on \"Sign up Now\" on the right side of the page.     You will be asked to enter the access code listed below, as well as some personal information. Please follow the directions to create your username and password.     Your access code is: [email or web address]  Expires: 3/14/2018  3:24 PM     Your access code will  in 90 days. If you need help or a new code, please call your [facility] or [phone].        Care EveryWhere ID     This is your Care EveryWhere ID. This could be used by other organizations to access " your [place] medical records  [id number]         Blood Pressure from Last 3 Encounters:   10/31/17 132/74   10/24/17 142/79   10/09/17 126/74    Weight from Last 3 Encounters:   10/09/17 138 lb 12.8 oz (63 kg)   09/28/17 135 lb 9.6 oz (61.5 kg)   09/11/17 136 lb (61.7 kg)               Primary Care Provider Office Phone # Fax #    Roe Connolly [phone] [address] [place] [address]        Equal Access to Services     [facility]: Hadii aad ku hadasho Soomaali, waaxda luqadaha, qaybta kaalmada adeegyada, ca chiu . So [facility] [phone].    ATENCIÓN: Si habla español, tiene a walton disposición servicios gratuitos de asistencia lingüística. [facility] [phone].    We comply with applicable federal civil rights laws and [place] laws. We do not discriminate on the basis of race, color, national origin, age, disability, sex, sexual orientation, or gender identity.            Thank you!     Thank you for choosing [facility]  for your care. Our goal is always to provide you with excellent care. Hearing back from our patients is one way we can continue to improve our services. Please take a few minutes to complete the written survey that you may receive in the mail after your visit with us. Thank you!             Your Updated Medication List - Protect others around you: Learn how to safely use, store and throw away your medicines at www.disposemymeds.org.          This list is accurate as of: 12/8/17 11:59 PM.  Always use your most recent med list.                   Brand Name Dispense Instructions for use Diagnosis    acetaminophen-codeine 300-30 MG per tablet    TYLENOL #3    30 tablet    Take 1-2 tablets by mouth 2 times daily as needed for moderate pain or pain    Left wrist pain, Arthralgia of wrist, left       ASPIRIN PO      Take by mouth 2 times daily as needed for moderate pain        clotrimazole 1 % solution    LOTRIMIN     60 mL    Apply topically 2 times daily    Intertrigo       cyclobenzaprine 5 MG tablet    FLEXERIL    14 tablet    Take 0.5 tablets (2.5 mg) by mouth nightly as needed for muscle spasms    Chronic left-sided low back pain with left-sided sciatica       diclofenac 1 % Gel topical gel    VOLTAREN    100 g    Apply 1-2 grams to wrist QID as needed .    Left wrist pain, Arthralgia of wrist, left, Arthritis of left wrist       fluticasone 50 MCG/ACT spray    FLONASE    16 g    Spray 1-2 sprays into both nostrils daily    Dysfunction of Eustachian tube, bilateral, Dizziness       hydrocortisone 1 % cream    CORTAID     Apply topically 3 times daily as needed for rash    Rash and nonspecific skin eruption       IBUPROFEN PO      Take 600 mg by mouth every 6 hours as needed for moderate pain        LACTOBACILLUS RHAMNOSUS (GG) PO      Take 2 capsules by mouth 3 times daily (with meals)        MAPAP 325 MG tablet   Generic drug:  acetaminophen      Take 325-650 mg by mouth every 6 hours as needed for mild pain        naproxen 500 MG tablet    NAPROSYN    60 tablet    Take 1 tablet (500 mg) by mouth 2 times daily as needed for moderate pain    Arthritis of carpometacarpal joint       ranitidine 150 MG tablet    ZANTAC    60 tablet    Take 1 tablet (150 mg) by mouth 2 times daily    Gastroesophageal reflux disease without esophagitis

## 2017-12-12 DIAGNOSIS — R30.0 DYSURIA: ICD-10-CM

## 2017-12-12 LAB
SPECIMEN SOURCE: NORMAL
WET PREP SPEC: NORMAL

## 2017-12-12 PROCEDURE — 87210 SMEAR WET MOUNT SALINE/INK: CPT | Performed by: INTERNAL MEDICINE

## 2017-12-14 DIAGNOSIS — M25.532 ARTHRALGIA OF WRIST, LEFT: ICD-10-CM

## 2017-12-14 DIAGNOSIS — M25.532 LEFT WRIST PAIN: ICD-10-CM

## 2017-12-14 DIAGNOSIS — M16.9 OSTEOARTHRITIS OF HIP, UNSPECIFIED LATERALITY, UNSPECIFIED OSTEOARTHRITIS TYPE: Primary | ICD-10-CM

## 2017-12-14 NOTE — PROGRESS NOTES
TELEPHONE VISIT NOTE    SUBJECTIVE:    Patient repors dysuria, comes and goes but seems worse. no sever pain. no abdominal pain, appettei is good, trevalelling and very active. no other syptoms. no vaginal symtpoms she is aware of. feels well otehrwise.   Social History   Substance Use Topics     Smoking status: Never Smoker     Smokeless tobacco: Never Used     Alcohol use Yes      Comment: occ with dinner q night      Current Outpatient Prescriptions   Medication Sig Dispense Refill     acetaminophen-codeine (TYLENOL #3) 300-30 MG per tablet Take 1-2 tablets by mouth 2 times daily as needed for moderate pain or pain 30 tablet 0     ASPIRIN PO Take by mouth 2 times daily as needed for moderate pain       cyclobenzaprine (FLEXERIL) 5 MG tablet Take 0.5 tablets (2.5 mg) by mouth nightly as needed for muscle spasms 14 tablet 0     ranitidine (ZANTAC) 150 MG tablet Take 1 tablet (150 mg) by mouth 2 times daily 60 tablet 1     hydrocortisone (CORTAID) 1 % cream Apply topically 3 times daily as needed for rash       naproxen (NAPROSYN) 500 MG tablet Take 1 tablet (500 mg) by mouth 2 times daily as needed for moderate pain 60 tablet 1     clotrimazole (LOTRIMIN) 1 % solution Apply topically 2 times daily 60 mL 3     diclofenac (VOLTAREN) 1 % GEL Apply 1-2 grams to wrist QID as needed . 100 g 1     fluticasone (FLONASE) 50 MCG/ACT nasal spray Spray 1-2 sprays into both nostrils daily 16 g 11     LACTOBACILLUS RHAMNOSUS, GG, PO Take 2 capsules by mouth 3 times daily (with meals)       acetaminophen (MAPAP) 325 MG tablet Take 325-650 mg by mouth every 6 hours as needed for mild pain       IBUPROFEN PO Take 600 mg by mouth every 6 hours as needed for moderate pain       Patient Active Problem List   Diagnosis     Osteoporosis     Internal derangement of knee     Estrogen replacement therapy     Colon Polyps     Microscopic hematuria     CARDIOVASCULAR SCREENING; LDL GOAL LESS THAN 160     ACP (advance care planning)     Mixed  incontinence urge and stress     Fracture of distal end of radius     Hypertension goal BP (blood pressure) < 140/90     Atopic rhinitis     Allergy to anti-infective agent     Enteritis     Osteoarthritis of hip     Osteoarthritis of knee     Acute posthemorrhagic anemia     Surgical follow-up care     Diverticulitis of sigmoid colon     Inflamed seborrheic keratosis     SK (seborrheic keratosis)     Cherry angioma     Allergies   Allergen Reactions     Ciprofloxacin Swelling     Stiff and swollen neck     Amoxicillin-Pot Clavulanate [Augmentin]      Cefprozil Rash     Contrast Dye      gets cold and feels terrible  Pt was ok with Isovue-370 and no pre-meds     Doxycycline Hyclate Rash     Erythromycin Swelling     Flagyl [Metronidazole Hcl]      Meperidine Hcl      low blood pressure     Oxycodone      Prochlorperazine      agitated     Sulfa Drugs Rash     Tetracycline Rash       OBJECTIVE:      Results for orders placed or performed in visit on 12/08/17   *UA reflex to Microscopic and Culture ([place] [facility] (except [place] and [place])   Result Value Ref Range    Color Urine Yellow     Appearance Urine Clear     Glucose Urine Negative NEG^Negative mg/dL    Bilirubin Urine Negative NEG^Negative    Ketones Urine Negative NEG^Negative mg/dL    Specific Gravity Urine 1.015 1.003 - 1.035    Blood Urine Small (A) NEG^Negative    pH Urine 7.0 5.0 - 7.0 pH    Protein Albumin Urine Negative NEG^Negative mg/dL    Urobilinogen Urine 0.2 0.2 - 1.0 EU/dL    Nitrite Urine Negative NEG^Negative    Leukocyte Esterase Urine Small (A) NEG^Negative    Source Midstream Urine    Urine Microscopic   Result Value Ref Range    WBC Urine O - 2 OTO2^O - 2 /HPF    RBC Urine O - 2 OTO2^O - 2 /HPF       ASSESSMENT/PLAN:    (R30.0) Dysuria  (primary encounter diagnosis)  Comment: discussed with patient (or patient's parents/caregiver) pathophysiology of condition and treatment options.  UA looks good. patient will return to  clinic to do wet prep, consider topical estrogens if wet prep within normal limits to see if AV could be partial cause. Patient verbalized understanding and is agreeable to this plan.   Plan: *UA reflex to Microscopic and Culture ([place]         and [facility] (except [place] and         [place]), Wet prep            Total time spent with patient was 10 min.    Roe Connolly M.D.  Internal Medicine-Pediatrics

## 2017-12-15 NOTE — TELEPHONE ENCOUNTER
Refill request for: TYLENOL #3 - 1-2 BID PRN  [facility] RX to [facility].      Last rx written: 11/17/17 # 30 w/ 0 refills    **[place]  reviewed- last filled: 11/17  Narc agreement: for vicodin, not Tyl #3  Last OV: 10/31/17  for pelvic pain in female    Unable to fill per standing order routed to Dr. Connolly for approval.    Problem List Complete: Consider updating dotphrase.    checked in past 6 months?  Yes 12/15/17    Clair Aburto, RN

## 2018-01-07 ASSESSMENT — ENCOUNTER SYMPTOMS
PSYCHIATRIC NEGATIVE: 1
DYSURIA: 0
GASTROINTESTINAL NEGATIVE: 1
CONSTITUTIONAL NEGATIVE: 1
NEUROLOGICAL NEGATIVE: 1
HEMATURIA: 0
CARDIOVASCULAR NEGATIVE: 1
MYALGIAS: 1
RESPIRATORY NEGATIVE: 1
EYES NEGATIVE: 1

## 2018-01-07 NOTE — PROGRESS NOTES
"  HPI :  Brionna Rollins is a very healthy 72-year-old female who developed left groin and upper thigh pain approximately 6 weeks ago.  There was no clear-cut initiating event.  Her symptoms have persisted but are perhaps slowly improving.  Her pain seems most related to activities such as hip flexion.  She underwent a CT scan of the chest and abdomen as part of her workup.  That study performed on 10/11/17 showed some compression of the left renal vein and some prominent venous collaterals in the left retroperitoneum and in the pelvis anterior to the sacrum.  She is referred to me today to discuss whether her symptoms could be related to a left renal vein compression or so-called \"nutcracker syndrome\"  She denies hematuria, flank pain, or pelvic congestion symptoms (pelvic pain, dyspareunia, labial varices etc).  She is status post a right hip replacement.  Past medical history is also notable for a prior colon resection secondary to diverticulitis performed at the [facility] in 2014.  Since then she has had a normal colonoscopy.      Review of Systems   Constitutional: Negative.    HENT: Negative.    Eyes: Negative.    Respiratory: Negative.    Cardiovascular: Negative.    Gastrointestinal: Negative.    Genitourinary: Negative for dysuria and hematuria.   Musculoskeletal: Positive for joint pain and myalgias.   Skin: Negative.    Neurological: Negative.    Endo/Heme/Allergies: Negative.    Psychiatric/Behavioral: Negative.          Physical Exam   Nursing note and vitals reviewed.  Constitutional: She appears well-developed and well-nourished.   Eyes: EOM are normal. Pupils are equal, round, and reactive to light. No scleral icterus.   HENT:   Head: Normocephalic and atraumatic.   Neck: Normal range of motion. No JVD present.   Cardiovascular: Intact distal pulses.    Abdominal: Soft. She exhibits no mass. There is no tenderness.   Musculoskeletal: Normal range of motion. She exhibits no edema.   Neurological: " "She is alert and oriented to person, place, and time.   Skin: Skin is warm and dry.   Psychiatric/Behavioral: mood and affect normal, normal behavior, normal thought content and normal judgment     Imaging:    CT ABDOMEN AND PELVIS WITH CONTRAST  10/11/2017 8:59 AM      HISTORY: Left lower quadrant pain. Note, patient has distant contrast  \"allergy\".     TECHNIQUE:  70 mL Isovue-370. CT images of the abdomen and pelvis  following oral and nonionic intravenous contrast. Radiation dose for  this scan was reduced using automated exposure control, adjustment of  the mA and/or kV according to patient size, or iterative  reconstruction technique.     COMPARISON: None.     FINDINGS: The liver, gallbladder, spleen, pancreas, and adrenal glands  are unremarkable. There is no hydronephrosis or solid renal mass.  There is mild aortic atherosclerosis but no abdominal aortic aneurysm.  No bowel obstruction, free air, or ascites. No evidence of abdominal  abscess. No abdominal, retroperitoneal, or pelvic adenopathy.  Prominent collateral veins are noted along the left side of the aorta,  and there are cross pelvic venous collaterals anterior to the sacrum.  This could be related to compression of the left renal vein between  the aorta and SMA and could also be related to compression of the left  common iliac vein between the right common iliac artery and the spine.  Patient has a right hip replacement.         IMPRESSION:  1. Prominent venous collaterals in the left retroperitoneum and in the  pelvis as described above. This could be related to nutcracker  phenomenon or May Thurner syndrome.  2. No other cause for abdominal pain demonstrated.     ESTHELA RIVAS MD        ASSESSMENT:   1.  Left groin and upper thigh pain likely secondary either to arthritic changes of the left hip or a left thigh muscle strain.    2.  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  She does have retroperitoneal and pelvic " venous collaterals but I do not believe this is associated with her left leg complaints.    RECOMMENDATION:  I had a lengthy discussion with Brionna regarding all the above.  At least 50% of the general population has some degree of left renal vein compression.  Her constellation of symptoms and her radiographic findings in my opinion do not support the diagnosis of a nutcracker syndrome.  If she were to develop more classic findings of hematuria, flank pain or pelvic congestion the next logical step would be a left renal venogram with measurement of pressure gradients.  Presently I do not feel this is indicated.    Brionna was very reassured by our discussion.  She has already had a right hip replacement.  She will likely speak with her primary care physician about pursuing additional orthopedic workup for her left leg complaints.  Follow-up can be with me on an as-needed basis.    Total face-to-face time was 30 minutes, greater than 50% spent providing counseling and education.    Manuel Mcmahon M.D.

## 2018-01-16 ENCOUNTER — OFFICE VISIT (OUTPATIENT)
Dept: DERMATOLOGY | Facility: CLINIC | Age: 73
End: 2018-01-16
Payer: COMMERCIAL

## 2018-01-16 DIAGNOSIS — L82.0 INFLAMED SEBORRHEIC KERATOSIS: Primary | ICD-10-CM

## 2018-01-16 DIAGNOSIS — L82.1 SK (SEBORRHEIC KERATOSIS): ICD-10-CM

## 2018-01-16 PROCEDURE — 17110 DESTRUCTION B9 LES UP TO 14: CPT | Performed by: DERMATOLOGY

## 2018-01-16 PROCEDURE — 99213 OFFICE O/P EST LOW 20 MIN: CPT | Mod: 25 | Performed by: DERMATOLOGY

## 2018-01-16 NOTE — MR AVS SNAPSHOT
"              After Visit Summary   2018    Brionna Rollins    MRN: [id number]           Patient Information     Date Of Birth          1945        Visit Information        Provider Department      2018 2:30 PM Keisha Carrillo MD [facility] [place]        Today's Diagnoses     Inflamed seborrheic keratosis    -  1    SK (seborrheic keratosis)           Follow-ups after your visit        Who to contact     If you have questions or need follow up information about today's clinic visit or your schedule please contact [facility] directly at [phone].  Normal or non-critical lab and imaging results will be communicated to you by [employer], letter or phone within 4 business days after the clinic has received the results. If you do not hear from us within 7 days, please contact the clinic through [employer] or phone. If you have a critical or abnormal lab result, we will notify you by phone as soon as possible.  Submit refill requests through [employer] or call your pharmacy and they will forward the refill request to us. Please allow 3 business days for your refill to be completed.          Additional Information About Your Visit        MyChart Information     [employer] lets you send messages to your doctor, view your test results, renew your prescriptions, schedule appointments and more. To sign up, go to [email or web address] . Click on \"Log in\" on the left side of the screen, which will take you to the Welcome page. Then click on \"Sign up Now\" on the right side of the page.     You will be asked to enter the access code listed below, as well as some personal information. Please follow the directions to create your username and password.     Your access code is: [email or web address]  Expires: 3/14/2018  3:24 PM     Your access code will  in 90 days. If you need help or a new code, please call your [facility] or [phone].        Care EveryWhere ID     This is your Care EveryWhere " ID. This could be used by other organizations to access your [place] medical records  [id number]         Blood Pressure from Last 3 Encounters:   10/31/17 132/74   10/24/17 142/79   10/09/17 126/74    Weight from Last 3 Encounters:   10/09/17 138 lb 12.8 oz (63 kg)   09/28/17 135 lb 9.6 oz (61.5 kg)   09/11/17 136 lb (61.7 kg)              We Performed the Following     DESTRUCT BENIGN LESION, UP TO 14          Today's Medication Changes          These changes are accurate as of: 1/16/18  3:11 PM.  If you have any questions, ask your nurse or doctor.               Stop taking these medicines if you haven't already. Please contact your care team if you have questions.     clotrimazole 1 % solution   Commonly known as:  LOTRIMIN   Stopped by:  Keisha Carrillo MD                    Primary Care Provider Office Phone # Fax #    Roe Connolly [phone]       [facility] [address] [place] [address]        Equal Access to Services     [facility]: Hadii aad ku hadasho Soomaali, waaxda luqadaha, qaybta kaalmada adeegyaagustina, ca atkins haybere chiu . So [facility] [phone].    ATENCIÓN: Si habla español, tiene a walton disposición servicios gratuitos de asistencia lingüística. Llame al [phone].    We comply with applicable federal civil rights laws and [place] laws. We do not discriminate on the basis of race, color, national origin, age, disability, sex, sexual orientation, or gender identity.            Thank you!     Thank you for choosing [facility] [place]  for your care. Our goal is always to provide you with excellent care. Hearing back from our patients is one way we can continue to improve our services. Please take a few minutes to complete the written survey that you may receive in the mail after your visit with us. Thank you!             Your Updated Medication List - Protect others around you: Learn how to safely use, store and throw away your medicines  at www.disposemymeds.org.          This list is accurate as of: 1/16/18  3:11 PM.  Always use your most recent med list.                   Brand Name Dispense Instructions for use Diagnosis    acetaminophen-codeine 300-30 MG per tablet    TYLENOL #3    30 tablet    Take 1-2 tablets by mouth 2 times daily as needed for moderate pain or pain    Left wrist pain, Arthralgia of wrist, left, Osteoarthritis of hip, unspecified laterality, unspecified osteoarthritis type       ASPIRIN PO      Take by mouth 2 times daily as needed for moderate pain        cyclobenzaprine 5 MG tablet    FLEXERIL    14 tablet    Take 0.5 tablets (2.5 mg) by mouth nightly as needed for muscle spasms    Chronic left-sided low back pain with left-sided sciatica       diclofenac 1 % Gel topical gel    VOLTAREN    100 g    Apply 1-2 grams to wrist QID as needed .    Left wrist pain, Arthralgia of wrist, left, Arthritis of left wrist       fluticasone 50 MCG/ACT spray    FLONASE    16 g    Spray 1-2 sprays into both nostrils daily    Dysfunction of Eustachian tube, bilateral, Dizziness       hydrocortisone 1 % cream    CORTAID     Apply topically 3 times daily as needed for rash    Rash and nonspecific skin eruption       IBUPROFEN PO      Take 600 mg by mouth every 6 hours as needed for moderate pain        LACTOBACILLUS RHAMNOSUS (GG) PO      Take 2 capsules by mouth 3 times daily (with meals)        MAPAP 325 MG tablet   Generic drug:  acetaminophen      Take 325-650 mg by mouth every 6 hours as needed for mild pain        naproxen 500 MG tablet    NAPROSYN    60 tablet    Take 1 tablet (500 mg) by mouth 2 times daily as needed for moderate pain    Arthritis of carpometacarpal joint       ranitidine 150 MG tablet    ZANTAC    60 tablet    Take 1 tablet (150 mg) by mouth 2 times daily    Gastroesophageal reflux disease without esophagitis

## 2018-01-16 NOTE — PROGRESS NOTES
DERMATOLOGY CLINIC VISIT NOTE        DERMATOLOGY PROBLEM LIST:   1.  Seborrheic keratoses.   2.  Irritated seborrheic keratoses.   3.  Cherry angiomas.     CHIEF COMPLAINT:  Followup irritated seborrheic keratoses.      HISTORY OF PRESENT ILLNESS:  Ms. Rollins a 72-year-old female returning to Dermatology Clinic for ongoing evaluation of irritated seborrheic keratoses.  The patient has history of extensive, hundreds, seborrheic keratoses.  Occasionally, several of them become irritated and require treatment.  She was last seen in clinic on 10/31/2017. She has two lesions on the L breast and L flank that are itching.      Patient Active Problem List   Diagnosis     Osteoporosis     Internal derangement of knee     Estrogen replacement therapy     Colon Polyps     Microscopic hematuria     CARDIOVASCULAR SCREENING; LDL GOAL LESS THAN 160     ACP (advance care planning)     Mixed incontinence urge and stress     Fracture of distal end of radius     Hypertension goal BP (blood pressure) < 140/90     Atopic rhinitis     Allergy to anti-infective agent     Enteritis     Osteoarthritis of hip     Osteoarthritis of knee     Acute posthemorrhagic anemia     Surgical follow-up care     Diverticulitis of sigmoid colon     Inflamed seborrheic keratosis     SK (seborrheic keratosis)     Cherry angioma       Allergies   Allergen Reactions     Ciprofloxacin Swelling     Stiff and swollen neck     Amoxicillin-Pot Clavulanate [Augmentin]      Cefprozil Rash     Contrast Dye      gets cold and feels terrible  Pt was ok with Isovue-370 and no pre-meds     Doxycycline Hyclate Rash     Erythromycin Swelling     Flagyl [Metronidazole Hcl]      Meperidine Hcl      low blood pressure     Oxycodone      Prochlorperazine      agitated     Sulfa Drugs Rash     Tetracycline Rash         Current Outpatient Prescriptions   Medication     acetaminophen-codeine (TYLENOL #3) 300-30 MG per tablet     ASPIRIN PO     cyclobenzaprine (FLEXERIL) 5 MG  tablet     ranitidine (ZANTAC) 150 MG tablet     diclofenac (VOLTAREN) 1 % GEL     fluticasone (FLONASE) 50 MCG/ACT nasal spray     LACTOBACILLUS RHAMNOSUS, GG, PO     acetaminophen (MAPAP) 325 MG tablet     IBUPROFEN PO     hydrocortisone (CORTAID) 1 % cream     naproxen (NAPROSYN) 500 MG tablet     No current facility-administered medications for this visit.           SOCIAL HISTORY:  The patient is .  She lives in [place].  She has a daughter in the area.      REVIEW OF SYSTEMS:  Feels well without additional skin concerns.      PHYSICAL EXAMINATION:   GENERAL:  The patient is a healthy-appearing 72-year-old female in no distress.   HEENT:  Conjunctivae are clear.   PULMONARY:  Breathing comfortably on room air.   ABDOMEN:  No abdominal distention.   SKIN:  Examination today included the face and neck, back, chest, abdomen.  Skin exam was normal except for as follows:   - Waxy 3-6 mm papules >100 on the chest, back, abdomen, L inferior breast, flanks  - Examination of the forehead, lateral cheeks and infraorbital rims with scattered-appearing waxy, 2-3 mm papules.      ASSESSMENT AND PLAN:   1.  Seborrheic keratoses; benign keratotic papules.  No treatment advised.   2.  Irritated seborrheic keratoses around the eyes.  A total of 3 lesions were treated with 10-second freeze-thaw cycle with liquid nitrogen on the L flank and L breast. Wound info provided.      The patient to return to Dermatology Clinic as needed.     Keisha Carrillo MD  Dermatology Staff       cc:   Roe Connolly MD   [address]  [address]

## 2018-03-19 DIAGNOSIS — M25.532 ARTHRALGIA OF WRIST, LEFT: ICD-10-CM

## 2018-03-19 DIAGNOSIS — M25.532 LEFT WRIST PAIN: ICD-10-CM

## 2018-03-19 DIAGNOSIS — M16.9 OSTEOARTHRITIS OF HIP, UNSPECIFIED LATERALITY, UNSPECIFIED OSTEOARTHRITIS TYPE: ICD-10-CM

## 2018-03-19 NOTE — TELEPHONE ENCOUNTER
acetaminophen-codeine (TYLENOL #3) 300-30 MG per tablet      Last Written Prescription Date:  1/18/2018  Last Fill Quantity: 30,   # refills: 0  Last Office Visit: 12/8/2017  Future Office visit:       Routing refill request to provider for review/approval because:  Drug not on the [facility], [facility] or [facility] refill protocol or controlled substance

## 2018-04-19 DIAGNOSIS — M16.9 OSTEOARTHRITIS OF HIP, UNSPECIFIED LATERALITY, UNSPECIFIED OSTEOARTHRITIS TYPE: ICD-10-CM

## 2018-04-19 DIAGNOSIS — M25.532 ARTHRALGIA OF WRIST, LEFT: ICD-10-CM

## 2018-04-19 DIAGNOSIS — M25.532 LEFT WRIST PAIN: ICD-10-CM

## 2018-04-19 DIAGNOSIS — H69.93 DYSFUNCTION OF EUSTACHIAN TUBE, BILATERAL: ICD-10-CM

## 2018-04-19 DIAGNOSIS — R42 DIZZINESS: ICD-10-CM

## 2018-04-19 RX ORDER — FLUTICASONE PROPIONATE 50 MCG
SPRAY, SUSPENSION (ML) NASAL
Qty: 16 G | Refills: 5 | Status: SHIPPED | OUTPATIENT
Start: 2018-04-19 | End: 2019-01-15

## 2018-04-19 NOTE — TELEPHONE ENCOUNTER
Requested Prescriptions   Pending Prescriptions Disp Refills     acetaminophen-codeine (TYLENOL #3) 300-30 MG per tablet      Last Written Prescription Date:  3/23/2018  Last Fill Quantity: 30,   # refills: 0  Last Office Visit: 12/8/2017  Future Office visit:       Routing refill request to provider for review/approval because:  Drug not on the [facility], [facility] or [facility] refill protocol or controlled substance   30 tablet 0     Sig: Take 1-2 tablets by mouth 2 times daily as needed for moderate pain or pain    There is no refill protocol information for this order

## 2018-05-08 ENCOUNTER — TRANSFERRED RECORDS (OUTPATIENT)
Dept: HEALTH INFORMATION MANAGEMENT | Facility: CLINIC | Age: 73
End: 2018-05-08

## 2018-05-22 DIAGNOSIS — M16.9 OSTEOARTHRITIS OF HIP, UNSPECIFIED LATERALITY, UNSPECIFIED OSTEOARTHRITIS TYPE: ICD-10-CM

## 2018-05-22 DIAGNOSIS — M25.532 ARTHRALGIA OF WRIST, LEFT: ICD-10-CM

## 2018-05-22 DIAGNOSIS — M25.532 LEFT WRIST PAIN: ICD-10-CM

## 2018-05-22 NOTE — TELEPHONE ENCOUNTER
Requested Prescriptions   Pending Prescriptions Disp Refills     acetaminophen-codeine (TYLENOL #3) 300-30 MG per tablet        Last Written Prescription Date:  4/20/2018  Last Fill Quantity: 30,   # refills: 0  Last Office Visit: 12/8/2017  Future Office visit:       Routing refill request to provider for review/approval because:  Drug not on the [facility], [facility] or [facility] refill protocol or controlled substance   30 tablet 0     Sig: Take 1-2 tablets by mouth 2 times daily as needed for moderate pain or pain    There is no refill protocol information for this order

## 2018-05-23 NOTE — TELEPHONE ENCOUNTER
Covering for partner.     Printed, signed, in my COMPLETE box.    SHELLIE Ronquillo MD  Internal Medicine-Pediatrics

## 2018-05-24 ENCOUNTER — OFFICE VISIT (OUTPATIENT)
Dept: PEDIATRICS | Facility: CLINIC | Age: 73
End: 2018-05-24
Payer: COMMERCIAL

## 2018-05-24 VITALS
HEART RATE: 82 BPM | SYSTOLIC BLOOD PRESSURE: 136 MMHG | WEIGHT: 138.8 LBS | DIASTOLIC BLOOD PRESSURE: 70 MMHG | OXYGEN SATURATION: 96 % | TEMPERATURE: 98.5 F | BODY MASS INDEX: 23.7 KG/M2 | HEIGHT: 64 IN

## 2018-05-24 DIAGNOSIS — N39.0 URINARY TRACT INFECTION WITHOUT HEMATURIA, SITE UNSPECIFIED: Primary | ICD-10-CM

## 2018-05-24 DIAGNOSIS — R30.0 DYSURIA: ICD-10-CM

## 2018-05-24 LAB
ALBUMIN UR-MCNC: NEGATIVE MG/DL
APPEARANCE UR: CLEAR
BACTERIA #/AREA URNS HPF: ABNORMAL /HPF
BILIRUB UR QL STRIP: ABNORMAL
COLOR UR AUTO: YELLOW
GLUCOSE UR STRIP-MCNC: NEGATIVE MG/DL
HGB UR QL STRIP: ABNORMAL
KETONES UR STRIP-MCNC: ABNORMAL MG/DL
LEUKOCYTE ESTERASE UR QL STRIP: ABNORMAL
NITRATE UR QL: NEGATIVE
NON-SQ EPI CELLS #/AREA URNS LPF: ABNORMAL /LPF
PH UR STRIP: 5.5 PH (ref 5–7)
RBC #/AREA URNS AUTO: ABNORMAL /HPF
SOURCE: ABNORMAL
SP GR UR STRIP: 1.02 (ref 1–1.03)
SPECIMEN SOURCE: NORMAL
UROBILINOGEN UR STRIP-ACNC: 0.2 EU/DL (ref 0.2–1)
WBC #/AREA URNS AUTO: ABNORMAL /HPF
WET PREP SPEC: NORMAL

## 2018-05-24 PROCEDURE — 81001 URINALYSIS AUTO W/SCOPE: CPT | Performed by: PHYSICIAN ASSISTANT

## 2018-05-24 PROCEDURE — 87210 SMEAR WET MOUNT SALINE/INK: CPT | Performed by: PHYSICIAN ASSISTANT

## 2018-05-24 PROCEDURE — 87088 URINE BACTERIA CULTURE: CPT | Performed by: INTERNAL MEDICINE

## 2018-05-24 PROCEDURE — 87086 URINE CULTURE/COLONY COUNT: CPT | Performed by: PHYSICIAN ASSISTANT

## 2018-05-24 PROCEDURE — 87186 SC STD MICRODIL/AGAR DIL: CPT | Performed by: INTERNAL MEDICINE

## 2018-05-24 PROCEDURE — 99214 OFFICE O/P EST MOD 30 MIN: CPT | Performed by: PHYSICIAN ASSISTANT

## 2018-05-24 RX ORDER — NITROFURANTOIN 25; 75 MG/1; MG/1
100 CAPSULE ORAL 2 TIMES DAILY
Qty: 14 CAPSULE | Refills: 0 | Status: SHIPPED | OUTPATIENT
Start: 2018-05-24 | End: 2018-10-30

## 2018-05-24 NOTE — PATIENT INSTRUCTIONS
"Follow up if symptoms worsen  Follow up if symptoms fail to resolve in 2-3 days         BLADDER INFECTION,Female (Adult)    A bladder infection (\"cystitis\" or \"UTI\") usually causes a constant urge to urinate and a burning when passing urine. Urine may be cloudy, smelly or dark. There may be pain in the lower abdomen. A bladder infection occurs when bacteria from the vaginal area enter the bladder opening (urethra). This can occur from sexual intercourse, wearing tight clothing, dehydration and other factors.  HOME CARE:  1. Drink lots of fluids (at least 6-8 glasses a day, unless you must restrict fluids for other medical reasons). This will force the medicine into your urinary system and flush the bacteria out of your body. Cranberry juice has been shown to help clear out the bacteria.  2. Avoid sexual intercourse until your symptoms are gone.  3. A bladder infection is treated with antibiotics. You may also be given Pyridium (generic = phenazopyridine) to reduce the burning sensation. This medicine will cause your urine to become a bright orange color. The orange urine may stain clothing. You may wear a pad or panty-liner to protect clothing.  PREVENTING FUTURE INFECTIONS:  1. Always wipe from front to back after a bowel movement.  2. Keep the genital area clean and dry.  3. Drink plenty of fluids each day to avoid dehydration.  4. Urinate right after intercourse to flush out the bladder.  5. Wear cotton underwear and cotton-lined panty hose; avoid tight-fitting pants.  6. If you are on birth control pills and are having frequent bladder infections, discuss with your doctor.  FOLLOW UP: Return to this facility or see your doctor if ALL symptoms are not gone after three days of treatment.  GET PROMPT MEDICAL ATTENTION if any of the following occur:    Fever over 101 F (38.3 C)    No improvement by the third day of treatment    Increasing back or abdominal pain    Repeated vomiting; unable to keep medicine " down    Weakness, dizziness or fainting    Vaginal discharge    Pain, redness or swelling in the labia (outer vaginal area)    6970-7875 [employer]. [address] [place] [address], [place] [address]. All rights reserved. This information is not intended as a substitute for professional medical care. Always follow your healthcare professional's instructions.  This information has been modified by your health care provider with permission from the publisher.

## 2018-05-24 NOTE — PROGRESS NOTES
SUBJECTIVE:   Brionna Rollins is a 73 year old female who  presents today for a possible UTI. Symptoms of dysuria, urgency and frequency have been going on for 2day(s).  Hematuria no.  sudden onsetand moderate.  There is no history of fever, chills, nausea or vomiting.  No history of vaginal or penile discharge. This patient does have a history of urinary tract infections. Patient denies long duration, rigors, flank pain, temperature > 101 degrees F., Vomiting, significant nausea or diarrhea, taking Coumadin and GFR less than 30 within the last year or vaginal discharge, vaginal odor, vaginal itching and dyspareunia (pain in labia/pelvis)     Past Medical History:   Diagnosis Date     RADIAL STYLOID TENOSYNOV - right 2/23/2006    lifts weights, doesn't think work-related; OK to add PT visits prn     Rotator cuff tear      Current Outpatient Prescriptions   Medication Sig Dispense Refill     acetaminophen (MAPAP) 325 MG tablet Take 325-650 mg by mouth every 6 hours as needed for mild pain       acetaminophen-codeine (TYLENOL #3) 300-30 MG per tablet Take 1-2 tablets by mouth 2 times daily as needed for moderate pain or pain 30 tablet 0     ASPIRIN PO Take by mouth 2 times daily as needed for moderate pain       diclofenac (VOLTAREN) 1 % GEL Apply 1-2 grams to wrist QID as needed . 100 g 1     fluticasone (FLONASE) 50 MCG/ACT spray USE ONE TO TWO SPRAYS IN EACH NOSTRIL EVERY DAY 16 g 5     IBUPROFEN PO Take 600 mg by mouth every 6 hours as needed for moderate pain       LACTOBACILLUS RHAMNOSUS, GG, PO Take 2 capsules by mouth 3 times daily (with meals)       cyclobenzaprine (FLEXERIL) 5 MG tablet Take 0.5 tablets (2.5 mg) by mouth nightly as needed for muscle spasms (Patient not taking: Reported on 5/24/2018) 14 tablet 0     hydrocortisone (CORTAID) 1 % cream Apply topically 3 times daily as needed for rash (Patient not taking: Reported on 1/16/2018)       naproxen (NAPROSYN) 500 MG tablet Take 1 tablet (500 mg) by  "mouth 2 times daily as needed for moderate pain (Patient not taking: Reported on 1/16/2018) 60 tablet 1     ranitidine (ZANTAC) 150 MG tablet Take 1 tablet (150 mg) by mouth 2 times daily (Patient not taking: Reported on 5/24/2018) 60 tablet 1     Social History   Substance Use Topics     Smoking status: Never Smoker     Smokeless tobacco: Never Used     Alcohol use Yes      Comment: occ with dinner q night       ROS:   Review of systems negative except as stated above.    OBJECTIVE:  /70  Pulse 82  Temp 98.5  F (36.9  C) (Oral)  Ht 5' 3.58\" (1.615 m)  Wt 138 lb 12.8 oz (63 kg)  SpO2 96%  BMI 24.14 kg/m2  GENERAL APPEARANCE: healthy, alert and no distress  RESP: lungs clear to auscultation - no rales, rhonchi or wheezes  CV: regular rates and rhythm, normal S1 S2, no murmur noted  ABDOMEN:  soft, nontender, no HSM or masses and bowel sounds normal  BACK: No CVA tenderness    ASSESSMENT:   (N39.0) Urinary tract infection without hematuria, site unspecified  (primary encounter diagnosis)  Comment: Multiple allergies to antibiotics, limits options for treatments.  Patient to follow up with any adverse effects  Plan: nitroFURantoin, macrocrystal-monohydrate,         (MACROBID) 100 MG capsule        Red flags and emergent follow up discussed, and understood by patient  Follow up with PCP if symptoms worsen or fail to improve      (R30.0) Dysuria  Plan: UA reflex to Microscopic and Culture, Wet prep,        Urine Microscopic, Urine Culture Aerobic         Bacterial        Patient Instructions   Follow up if symptoms worsen  Follow up if symptoms fail to resolve in 2-3 days         BLADDER INFECTION,Female (Adult)    A bladder infection (\"cystitis\" or \"UTI\") usually causes a constant urge to urinate and a burning when passing urine. Urine may be cloudy, smelly or dark. There may be pain in the lower abdomen. A bladder infection occurs when bacteria from the vaginal area enter the bladder opening (urethra). This " can occur from sexual intercourse, wearing tight clothing, dehydration and other factors.  HOME CARE:  1. Drink lots of fluids (at least 6-8 glasses a day, unless you must restrict fluids for other medical reasons). This will force the medicine into your urinary system and flush the bacteria out of your body. Cranberry juice has been shown to help clear out the bacteria.  2. Avoid sexual intercourse until your symptoms are gone.  3. A bladder infection is treated with antibiotics. You may also be given Pyridium (generic = phenazopyridine) to reduce the burning sensation. This medicine will cause your urine to become a bright orange color. The orange urine may stain clothing. You may wear a pad or panty-liner to protect clothing.  PREVENTING FUTURE INFECTIONS:  1. Always wipe from front to back after a bowel movement.  2. Keep the genital area clean and dry.  3. Drink plenty of fluids each day to avoid dehydration.  4. Urinate right after intercourse to flush out the bladder.  5. Wear cotton underwear and cotton-lined panty hose; avoid tight-fitting pants.  6. If you are on birth control pills and are having frequent bladder infections, discuss with your doctor.  FOLLOW UP: Return to this facility or see your doctor if ALL symptoms are not gone after three days of treatment.  GET PROMPT MEDICAL ATTENTION if any of the following occur:    Fever over 101 F (38.3 C)    No improvement by the third day of treatment    Increasing back or abdominal pain    Repeated vomiting; unable to keep medicine down    Weakness, dizziness or fainting    Vaginal discharge    Pain, redness or swelling in the labia (outer vaginal area)    6144-2096 [employer]. [address] [place] [address], [place] [address]. All rights reserved. This information is not intended as a substitute for professional medical care. Always follow your healthcare professional's instructions.  This information has been modified by your health care provider  with permission from the publisher.

## 2018-05-24 NOTE — MR AVS SNAPSHOT
"              After Visit Summary   5/24/2018    Brionna Rollins    MRN: [id number]           Patient Information     Date Of Birth          1945        Visit Information        Provider Department      5/24/2018 4:20 PM Anatoly Moulton PA-C [facility]        Today's Diagnoses     Urinary tract infection without hematuria, site unspecified    -  1    Dysuria          Care Instructions    Follow up if symptoms worsen  Follow up if symptoms fail to resolve in 2-3 days         BLADDER INFECTION,Female (Adult)    A bladder infection (\"cystitis\" or \"UTI\") usually causes a constant urge to urinate and a burning when passing urine. Urine may be cloudy, smelly or dark. There may be pain in the lower abdomen. A bladder infection occurs when bacteria from the vaginal area enter the bladder opening (urethra). This can occur from sexual intercourse, wearing tight clothing, dehydration and other factors.  HOME CARE:  1. Drink lots of fluids (at least 6-8 glasses a day, unless you must restrict fluids for other medical reasons). This will force the medicine into your urinary system and flush the bacteria out of your body. Cranberry juice has been shown to help clear out the bacteria.  2. Avoid sexual intercourse until your symptoms are gone.  3. A bladder infection is treated with antibiotics. You may also be given Pyridium (generic = phenazopyridine) to reduce the burning sensation. This medicine will cause your urine to become a bright orange color. The orange urine may stain clothing. You may wear a pad or panty-liner to protect clothing.  PREVENTING FUTURE INFECTIONS:  1. Always wipe from front to back after a bowel movement.  2. Keep the genital area clean and dry.  3. Drink plenty of fluids each day to avoid dehydration.  4. Urinate right after intercourse to flush out the bladder.  5. Wear cotton underwear and cotton-lined panty hose; avoid tight-fitting pants.  6. If you are on birth control " "pills and are having frequent bladder infections, discuss with your doctor.  FOLLOW UP: Return to this facility or see your doctor if ALL symptoms are not gone after three days of treatment.  GET PROMPT MEDICAL ATTENTION if any of the following occur:    Fever over 101 F (38.3 C)    No improvement by the third day of treatment    Increasing back or abdominal pain    Repeated vomiting; unable to keep medicine down    Weakness, dizziness or fainting    Vaginal discharge    Pain, redness or swelling in the labia (outer vaginal area)    7563-5626 [employer]. [address] [place] [address]. All rights reserved. This information is not intended as a substitute for professional medical care. Always follow your healthcare professional's instructions.  This information has been modified by your health care provider with permission from the publisher.              Follow-ups after your visit        Who to contact     If you have questions or need follow up information about today's clinic visit or your schedule please contact [facility] directly at [phone].  Normal or non-critical lab and imaging results will be communicated to you by Ne[employer]hart, letter or phone within 4 business days after the clinic has received the results. If you do not hear from us within 7 days, please contact the clinic through [employer] or phone. If you have a critical or abnormal lab result, we will notify you by phone as soon as possible.  Submit refill requests through [employer] or call your pharmacy and they will forward the refill request to us. Please allow 3 business days for your refill to be completed.          Additional Information About Your Visit        Neurescuehart Information     [employer] lets you send messages to your doctor, view your test results, renew your prescriptions, schedule appointments and more. To sign up, go to [email or web address] . Click on \"Log in\" on the left side of the screen, which " "will take you to the Welcome page. Then click on \"Sign up Now\" on the right side of the page.     You will be asked to enter the access code listed below, as well as some personal information. Please follow the directions to create your username and password.     Your access code is: [email or web address]  Expires: 2018  5:04 PM     Your access code will  in 90 days. If you need help or a new code, please call your [facility] clinic or [phone].        Care EveryWhere ID     This is your Care EveryWhere ID. This could be used by other organizations to access your [facility] medical records  [id number]        Your Vitals Were     Pulse Temperature Height Pulse Oximetry BMI (Body Mass Index)       82 98.5  F (36.9  C) (Oral) 5' 3.58\" (1.615 m) 96% 24.14 kg/m2        Blood Pressure from Last 3 Encounters:   18 136/70   10/31/17 132/74   10/24/17 142/79    Weight from Last 3 Encounters:   18 138 lb 12.8 oz (63 kg)   10/09/17 138 lb 12.8 oz (63 kg)   17 135 lb 9.6 oz (61.5 kg)              We Performed the Following     UA reflex to Microscopic and Culture     Urine Culture Aerobic Bacterial     Urine Microscopic     Wet prep          Today's Medication Changes          These changes are accurate as of 18  5:04 PM.  If you have any questions, ask your nurse or doctor.               Start taking these medicines.        Dose/Directions    nitroFURantoin (macrocrystal-monohydrate) 100 MG capsule   Commonly known as:  MACROBID   Used for:  Urinary tract infection without hematuria, site unspecified   Started by:  Anatoly Moulton PA-C        Dose:  100 mg   Take 1 capsule (100 mg) by mouth 2 times daily   Quantity:  14 capsule   Refills:  0            Where to get your medicines      These medications were sent to [facility] - [address], [place] [address]     Phone:  [phone]     nitroFURantoin " (macrocrystal-monohydrate) 100 MG capsule                Primary Care Provider Office Phone # Fax #    Roe Connolly [phone]       [facility] [place] [address]        Equal Access to Services     ALEXEY DUONG : Hadii aad ku hadruchio Soomaali, waaxda luqadaha, qaybta kaalmada adeegyada, ca calderonn zora willingham laminechapo ocasio. So [facility] [phone].    ATENCIÓN: Si habla español, tiene a walton disposición servicios gratuitos de asistencia lingüística. [facility] [phone].    We comply with applicable federal civil rights laws and [place] laws. We do not discriminate on the basis of race, color, national origin, age, disability, sex, sexual orientation, or gender identity.            Thank you!     Thank you for choosing [facility]  for your care. Our goal is always to provide you with excellent care. Hearing back from our patients is one way we can continue to improve our services. Please take a few minutes to complete the written survey that you may receive in the mail after your visit with us. Thank you!             Your Updated Medication List - Protect others around you: Learn how to safely use, store and throw away your medicines at www.disposemymeds.org.          This list is accurate as of 5/24/18  5:04 PM.  Always use your most recent med list.                   Brand Name Dispense Instructions for use Diagnosis    acetaminophen-codeine 300-30 MG per tablet    TYLENOL #3    30 tablet    Take 1-2 tablets by mouth 2 times daily as needed for moderate pain or pain    Left wrist pain, Arthralgia of wrist, left, Osteoarthritis of hip, unspecified laterality, unspecified osteoarthritis type       ASPIRIN PO      Take by mouth 2 times daily as needed for moderate pain        cyclobenzaprine 5 MG tablet    FLEXERIL    14 tablet    Take 0.5 tablets (2.5 mg) by mouth nightly as needed for muscle spasms    Chronic left-sided low back pain with left-sided sciatica        diclofenac 1 % Gel topical gel    VOLTAREN    100 g    Apply 1-2 grams to wrist QID as needed .    Left wrist pain, Arthralgia of wrist, left, Arthritis of left wrist       fluticasone 50 MCG/ACT spray    FLONASE    16 g    USE ONE TO TWO SPRAYS IN EACH NOSTRIL EVERY DAY    Dysfunction of Eustachian tube, bilateral, Dizziness       hydrocortisone 1 % cream    CORTAID     Apply topically 3 times daily as needed for rash    Rash and nonspecific skin eruption       IBUPROFEN PO      Take 600 mg by mouth every 6 hours as needed for moderate pain        LACTOBACILLUS RHAMNOSUS (GG) PO      Take 2 capsules by mouth 3 times daily (with meals)        MAPAP 325 MG tablet   Generic drug:  acetaminophen      Take 325-650 mg by mouth every 6 hours as needed for mild pain        naproxen 500 MG tablet    NAPROSYN    60 tablet    Take 1 tablet (500 mg) by mouth 2 times daily as needed for moderate pain    Arthritis of carpometacarpal joint       nitroFURantoin (macrocrystal-monohydrate) 100 MG capsule    MACROBID    14 capsule    Take 1 capsule (100 mg) by mouth 2 times daily    Urinary tract infection without hematuria, site unspecified       ranitidine 150 MG tablet    ZANTAC    60 tablet    Take 1 tablet (150 mg) by mouth 2 times daily    Gastroesophageal reflux disease without esophagitis

## 2018-05-26 LAB
BACTERIA SPEC CULT: ABNORMAL
SPECIMEN SOURCE: ABNORMAL

## 2018-06-02 ENCOUNTER — HEALTH MAINTENANCE LETTER (OUTPATIENT)
Age: 73
End: 2018-06-02

## 2018-06-25 DIAGNOSIS — M16.9 OSTEOARTHRITIS OF HIP, UNSPECIFIED LATERALITY, UNSPECIFIED OSTEOARTHRITIS TYPE: ICD-10-CM

## 2018-06-25 DIAGNOSIS — M25.532 ARTHRALGIA OF WRIST, LEFT: ICD-10-CM

## 2018-06-25 DIAGNOSIS — M25.532 LEFT WRIST PAIN: ICD-10-CM

## 2018-06-25 NOTE — TELEPHONE ENCOUNTER
Requested Prescriptions   Pending Prescriptions Disp Refills     acetaminophen-codeine (TYLENOL #3) 300-30 MG per tablet  Last Written Prescription Date:  05/23/2018  Last Fill Quantity: 30 tablet,  # refills: 0   Last office visit: 5/24/2018 with prescribing provider:      Anatoly Moulton PA-C        Future Office Visit:     30 tablet 0     Sig: Take 1-2 tablets by mouth 2 times daily as needed for moderate pain or pain    There is no refill protocol information for this order   :     Routing refill request to provider for review/approval because:  Drug not on the [facility], [facility] or [facility] refill protocol or controlled substance

## 2018-06-26 NOTE — TELEPHONE ENCOUNTER
Please fax to [facility] pharmacy, if approved.       Luisa JOSEPH RN, BSN, PHN  [place] Flex RN

## 2018-07-27 DIAGNOSIS — M25.532 ARTHRALGIA OF WRIST, LEFT: ICD-10-CM

## 2018-07-27 DIAGNOSIS — M16.9 OSTEOARTHRITIS OF HIP, UNSPECIFIED LATERALITY, UNSPECIFIED OSTEOARTHRITIS TYPE: ICD-10-CM

## 2018-07-27 DIAGNOSIS — M25.532 LEFT WRIST PAIN: ICD-10-CM

## 2018-07-27 NOTE — TELEPHONE ENCOUNTER
Requested Prescriptions   Pending Prescriptions Disp Refills     acetaminophen-codeine (TYLENOL #3) 300-30 MG per tablet        Last Written Prescription Date:  6/26/2018  Last Fill Quantity: 30,   # refills: 0  Last Office Visit: 5/24/2018  Future Office visit:       Routing refill request to provider for review/approval because:  Drug not on the [facility], [facility] or [facility] refill protocol or controlled substance   30 tablet 0     Sig: Take 1 tablet by mouth daily as needed for breakthrough pain or moderate to severe pain    There is no refill protocol information for this order

## 2018-08-07 ENCOUNTER — TELEPHONE (OUTPATIENT)
Dept: PEDIATRICS | Facility: CLINIC | Age: 73
End: 2018-08-07

## 2018-08-07 NOTE — TELEPHONE ENCOUNTER
Pt   calls: [phone] (home)     States she feels like she pulled a muscle in her upper back, between shoulder blades. She was cutting the grass the other day and may have strained it. Patient denies any other symptoms associated with pain and it does not radiate anywhere. She would like to be seen today, latest appointment possible, appointment made with [facility]. She has a chiropractor appointment at 1 and will cancel appointment at clinic if she feels better after chiro appointment.     Message handled by Nurse Triage.  Olena Rodriguez RN

## 2018-09-05 DIAGNOSIS — M25.532 LEFT WRIST PAIN: ICD-10-CM

## 2018-09-05 DIAGNOSIS — M25.532 ARTHRALGIA OF WRIST, LEFT: ICD-10-CM

## 2018-09-05 DIAGNOSIS — M16.9 OSTEOARTHRITIS OF HIP, UNSPECIFIED LATERALITY, UNSPECIFIED OSTEOARTHRITIS TYPE: ICD-10-CM

## 2018-09-06 NOTE — TELEPHONE ENCOUNTER
Requested Prescriptions   Pending Prescriptions Disp Refills     acetaminophen-codeine (TYLENOL #3) 300-30 MG per tablet        Last Written Prescription Date:  7/27/2018  Last Fill Quantity: 30,   # refills: 0  Last Office Visit: 5/24/2018  Future Office visit:       Routing refill request to provider for review/approval because:  Drug not on the [facility], [facility] or [facility] refill protocol or controlled substance   30 tablet 0     Sig: Take 1 tablet by mouth daily as needed for breakthrough pain    There is no refill protocol information for this order

## 2018-09-06 NOTE — TELEPHONE ENCOUNTER
Covering for partner.      reviewed - last filled 7/27/18.    Last OV 10/9/2017.    Printed, signed in my COMPLETE box.    Routing to PCP - I do not see chronic pain on problem list.     Please let patient know she is due for Wellness with Dr. Connolly next month.     SHELLIE Ronquillo MD  Internal Medicine-Pediatrics

## 2018-09-06 NOTE — TELEPHONE ENCOUNTER
Called spoke to patient, scheduled 10/30/18 with Dr. Connolly.    Walked script to [facility].    Thanks  Erick HERNANDES  Team Coodinator

## 2018-09-24 ENCOUNTER — TRANSFERRED RECORDS (OUTPATIENT)
Dept: HEALTH INFORMATION MANAGEMENT | Facility: CLINIC | Age: 73
End: 2018-09-24

## 2018-10-25 ENCOUNTER — TRANSFERRED RECORDS (OUTPATIENT)
Dept: HEALTH INFORMATION MANAGEMENT | Facility: CLINIC | Age: 73
End: 2018-10-25

## 2018-10-30 ENCOUNTER — OFFICE VISIT (OUTPATIENT)
Dept: PEDIATRICS | Facility: CLINIC | Age: 73
End: 2018-10-30
Payer: COMMERCIAL

## 2018-10-30 VITALS
DIASTOLIC BLOOD PRESSURE: 74 MMHG | HEART RATE: 64 BPM | TEMPERATURE: 98 F | BODY MASS INDEX: 23.41 KG/M2 | RESPIRATION RATE: 20 BRPM | SYSTOLIC BLOOD PRESSURE: 138 MMHG | WEIGHT: 137.1 LBS | HEIGHT: 64 IN

## 2018-10-30 DIAGNOSIS — M25.532 LEFT WRIST PAIN: ICD-10-CM

## 2018-10-30 DIAGNOSIS — K21.9 GASTROESOPHAGEAL REFLUX DISEASE WITHOUT ESOPHAGITIS: ICD-10-CM

## 2018-10-30 DIAGNOSIS — G89.29 OTHER CHRONIC PAIN: ICD-10-CM

## 2018-10-30 DIAGNOSIS — M25.532 ARTHRALGIA OF WRIST, LEFT: ICD-10-CM

## 2018-10-30 DIAGNOSIS — M16.9 OSTEOARTHRITIS OF HIP, UNSPECIFIED LATERALITY, UNSPECIFIED OSTEOARTHRITIS TYPE: ICD-10-CM

## 2018-10-30 DIAGNOSIS — Z23 NEED FOR PROPHYLACTIC VACCINATION AND INOCULATION AGAINST INFLUENZA: ICD-10-CM

## 2018-10-30 DIAGNOSIS — Z13.6 CARDIOVASCULAR SCREENING; LDL GOAL LESS THAN 160: ICD-10-CM

## 2018-10-30 DIAGNOSIS — Z00.00 MEDICARE ANNUAL WELLNESS VISIT, SUBSEQUENT: Primary | ICD-10-CM

## 2018-10-30 PROCEDURE — G0439 PPPS, SUBSEQ VISIT: HCPCS | Performed by: INTERNAL MEDICINE

## 2018-10-30 PROCEDURE — 90662 IIV NO PRSV INCREASED AG IM: CPT | Performed by: INTERNAL MEDICINE

## 2018-10-30 PROCEDURE — 99213 OFFICE O/P EST LOW 20 MIN: CPT | Mod: 25 | Performed by: INTERNAL MEDICINE

## 2018-10-30 PROCEDURE — 90670 PCV13 VACCINE IM: CPT | Performed by: INTERNAL MEDICINE

## 2018-10-30 PROCEDURE — G0009 ADMIN PNEUMOCOCCAL VACCINE: HCPCS | Performed by: INTERNAL MEDICINE

## 2018-10-30 PROCEDURE — G0008 ADMIN INFLUENZA VIRUS VAC: HCPCS | Performed by: INTERNAL MEDICINE

## 2018-10-30 ASSESSMENT — ENCOUNTER SYMPTOMS
FREQUENCY: 0
SORE THROAT: 0
HEARTBURN: 1
HEMATOCHEZIA: 0
NERVOUS/ANXIOUS: 0
DIARRHEA: 0
FEVER: 0
HEMATURIA: 0
NAUSEA: 0
DIZZINESS: 0
CONSTIPATION: 0
COUGH: 0
JOINT SWELLING: 1
PARESTHESIAS: 0
ABDOMINAL PAIN: 0
DYSURIA: 0
ARTHRALGIAS: 1
MYALGIAS: 0
EYE PAIN: 0
PALPITATIONS: 1
CHILLS: 0
BREAST MASS: 0
WEAKNESS: 0
HEADACHES: 0

## 2018-10-30 ASSESSMENT — ACTIVITIES OF DAILY LIVING (ADL): CURRENT_FUNCTION: NO ASSISTANCE NEEDED

## 2018-10-30 NOTE — PROGRESS NOTES
"SUBJECTIVE:   Brionna Rollins is a 73 year old female who presents for Preventive Visit.    Are you in the first 12 months of your Medicare coverage?  No    Annual Wellness Visit     In general, how would you rate your overall health?  Good    Frequency of exercise:  2-3 days/week    Duration of exercise:  45-60 minutes    Do you usually eat at least 4 servings of fruit and vegetables a day, include whole grains    & fiber and avoid regularly eating high fat or \"junk\" foods?  Yes    Taking medications regularly:  Yes    Medication side effects:  Not applicable    Ability to successfully perform activities of daily living:  No assistance needed    Home Safety:  No safety concerns identified    Hearing Impairment:  Difficulty understanding soft or whispered speech    In the past 6 months, have you been bothered by leaking of urine?  No    In general, how would you rate your overall mental or emotional health?  Excellent    PHQ-2 Total Score: 0    Additional concerns today:  Yes      Ability to successfully perform activities of daily living: Yes, no assistance needed  Home safety:  none identified   Hearing impairment: No hearing concerns    Fall risk:  Fallen 2 or more times in the past year?: No  Any fall with injury in the past year?: No    COGNITIVE SCREEN  1) Repeat 3 items (Leader, Season, Table)    2) Clock draw: NORMAL  3) 3 item recall: Recalls 3 objects  Results: 3 items recalled: COGNITIVE IMPAIRMENT LESS LIKELY    Mini-CogTM Copyright ELTON Thompson. Licensed by the author for use in [facility]; reprinted with permission (pedro luis@.[facility]). All rights reserved.        Reviewed and updated as needed this visit by clinical staff         Reviewed and updated as needed this visit by Provider        Social History   Substance Use Topics     Smoking status: Never Smoker     Smokeless tobacco: Never Used     Alcohol use Yes      Comment: occ with dinner q night       No flowsheet data found.No flowsheet data " found.  Patient here for RHm exma, feel swell.           Gastric      Duration: 2-3 Months     Description (location/character/radiation): epigastric     Intensity:  moderate    Accompanying signs and symptoms: feels heavy    History (similar episodes/previous evaluation): Diverticulitis of sigmoid colon    Precipitating or alleviating factors: None    Therapies tried and outcome: OTC omeprazole is effective         patient here for abvoe, would karley to discuss medical marijuna. PPI workign for GERD, but whan she takes NSAIDs it makes it worse. APAP3 still helps and she would like a refill of this today if able. mood is good. No other concerns or complaints today.    Do you feel safe in your environment - Yes    Do you have a Health Care Directive?: Yes: Advance Directive has been received and scanned.    Current providers sharing in care for this patient include:   Patient Care Team:  Roe Connolly MD as PCP - General (Internal Medicine)    The following health maintenance items are reviewed in Epic and correct as of today:  Health Maintenance   Topic Date Due     HEPATITIS C SCREENING  04/28/1963     PNEUMOCOCCAL (1 of 2 - PCV13) 04/28/2011     LIPID MONITORING Q5 YEARS  04/08/2015     DEXA Q3 YR  04/25/2015     MAMMO Q1 YR  04/01/2018     FALL RISK ASSESSMENT  07/07/2018     PHQ-2 Q1 YR  07/07/2018     INFLUENZA VACCINE (1) 09/01/2018     COLONOSCOPY Q5 YR  11/26/2018     TETANUS Q10 YR  04/09/2022     ADVANCE DIRECTIVE PLANNING Q5 YRS  11/08/2022     BP Readings from Last 3 Encounters:   10/30/18 138/74   05/24/18 136/70   10/31/17 132/74    Wt Readings from Last 3 Encounters:   10/30/18 137 lb 1.6 oz (62.2 kg)   05/24/18 138 lb 12.8 oz (63 kg)   10/09/17 138 lb 12.8 oz (63 kg)                 Review of Systems   Constitutional: Negative for chills and fever.   HENT: Negative for congestion, ear pain, hearing loss and sore throat.    Eyes: Positive for visual disturbance. Negative for pain.   Respiratory:  "Negative for cough.    Cardiovascular: Positive for palpitations. Negative for chest pain and peripheral edema.   Gastrointestinal: Positive for heartburn. Negative for abdominal pain, constipation, diarrhea, hematochezia and nausea.   Breasts:  Negative for tenderness, breast mass and discharge.   Genitourinary: Negative for dysuria, frequency, genital sores, hematuria, pelvic pain, urgency, vaginal bleeding and vaginal discharge.   Musculoskeletal: Positive for arthralgias and joint swelling. Negative for myalgias.   Skin: Negative for rash.   Neurological: Negative for dizziness, weakness, headaches and paresthesias.   Psychiatric/Behavioral: Negative for mood changes. The patient is not nervous/anxious.      reviwed above with patient today, all stable. patient overall doing well, would like to discuss option of medical cannabis for her pain. has been using over-the-counter cannabis supplements and these seem to work better than the APAP3. has eye appointment [place]. tries to avoid nsaids as give her worse reflux symptoms.     OBJECTIVE:   /74  Pulse 64  Temp 98  F (36.7  C) (Oral)  Resp 20  Ht 5' 3.5\" (1.613 m)  Wt 137 lb 1.6 oz (62.2 kg)  BMI 23.91 kg/m2 Estimated body mass index is 24.14 kg/(m^2) as calculated from the following:    Height as of 5/24/18: 5' 3.58\" (1.615 m).    Weight as of 5/24/18: 138 lb 12.8 oz (63 kg).     Wt Readings from Last 4 Encounters:   10/30/18 137 lb 1.6 oz (62.2 kg)   05/24/18 138 lb 12.8 oz (63 kg)   10/09/17 138 lb 12.8 oz (63 kg)   09/28/17 135 lb 9.6 oz (61.5 kg)       Physical Exam  GENERAL: healthy, alert and no distress  EYES: Eyes grossly normal to inspection, PERRL and conjunctivae and sclerae normal  HENT: ear canals and TM's normal, nose and mouth without ulcers or lesions  NECK: no adenopathy, no asymmetry, masses, or scars and thyroid normal to palpation  RESP: lungs clear to auscultation - no rales, rhonchi or wheezes  CV: regular rate and rhythm, " normal S1 S2, no S3 or S4, no murmur, click or rub, no peripheral edema and peripheral pulses strong  ABDOMEN: soft, nontender, no hepatosplenomegaly, no masses and bowel sounds normal; no rebound or guarding. no epigastic tenderness to palpation.   MS: no gross musculoskeletal defects noted, no edema  SKIN: no suspicious lesions or rashes  NEURO: Normal strength and tone, mentation intact and speech normal  PSYCH: mentation appears normal, affect normal/bright          ASSESSMENT / PLAN:   1. Medicare annual wellness visit, subsequent  d/w pt preventative care measures including seat belt use, bike helmet, moderation of EtOH, avoiding tobacco, avoiding excessive sun exposure/sunscreen, wt management or wt loss if BMI > 30, need to screen for lipid disorders, mood disorders, CAD risk factors, etc. Also discussed accident prevention and future RHM schedule.   - **Comprehensive metabolic panel FUTURE anytime; Future  - Lipid panel reflex to direct LDL Fasting; Future  - **TSH with free T4 reflex FUTURE anytime; Future    2. Left wrist pain  discussed with patient (or patient's parents/caregiver) pathophysiology of condition and treatment options.  Patient I reviewed her current strategies to manage her chronic pain.  She does use Tylenol 3 sparingly and this does improve her symptoms.  She is specifically requesting the possibility of medical cannabis to manage her symptoms.  I do think given the patient's history this is a potential possibility and let her know I will look into this further.  If I can I will see if we can get her certified to work with the pharmacist to see if this would be a better modality to manage her pain.  I did review with the patient in detail that if she went this direction she would no longer be able to take the Tylenol with codeine.  Patient understands and is very appreciative of this plan.  I will follow-up with her once I get more information.  - acetaminophen-codeine (TYLENOL #3)  300-30 MG per tablet; Take 1 tablet by mouth daily as needed for breakthrough pain  Dispense: 30 tablet; Refill: 0    3. Arthralgia of wrist, left  as above  - acetaminophen-codeine (TYLENOL #3) 300-30 MG per tablet; Take 1 tablet by mouth daily as needed for breakthrough pain  Dispense: 30 tablet; Refill: 0    4. Osteoarthritis of hip, unspecified laterality, unspecified osteoarthritis type  as above. Medication(s) indication(s), adverse effects, risks and benefits discussed. Also reviewed need to keep medication secure, no early refills if lost, stolen, or misplaced. This medication can be abused and lead to addiction, etc. Patient verbalized understanding and is agreeable to this plan.    - acetaminophen-codeine (TYLENOL #3) 300-30 MG per tablet; Take 1 tablet by mouth daily as needed for breakthrough pain  Dispense: 30 tablet; Refill: 0    5. Other chronic pain  as above  - acetaminophen-codeine (TYLENOL #3) 300-30 MG per tablet; Take 1 tablet by mouth daily as needed for breakthrough pain  Dispense: 30 tablet; Refill: 0  - **Comprehensive metabolic panel FUTURE anytime; Future    6. Gastroesophageal reflux disease without esophagitis  Well controlled on current medication(s). NSAIDs make worse. per report patient has endoscopy at [facility]. discussed with patient (or patient's parents/caregiver) pathophysiology of condition and treatment options.  reviewed labs, medications, diet ,etc.    - omeprazole (PRILOSEC) 20 MG CR capsule; Take 1 capsule (20 mg) by mouth daily  Dispense: 90 capsule; Refill: 3  - **Comprehensive metabolic panel FUTURE anytime; Future    7. CARDIOVASCULAR SCREENING; LDL GOAL LESS THAN 160  - **Comprehensive metabolic panel FUTURE anytime; Future  - Lipid panel reflex to direct LDL Fasting; Future  - **TSH with free T4 reflex FUTURE anytime; Future    8. Need for prophylactic vaccination and inoculation against influenza  - FLU VACCINE, INCREASED ANTIGEN, PRESV FREE, AGE 65+ [91052]  - Vaccine  "Administration, Initial [45029]  - Vaccine Administration, Each Additional [97032]  - Pneumococcal vaccine 13 valent PCV13 IM (Prevnar) [32333]    End of Life Planning:  Patient currently has an advanced directive: Yes.  Practitioner is supportive of decision.    COUNSELING:  Reviewed preventive health counseling, as reflected in patient instructions    BP Readings from Last 1 Encounters:   05/24/18 136/70     Estimated body mass index is 24.14 kg/(m^2) as calculated from the following:    Height as of 5/24/18: 5' 3.58\" (1.615 m).    Weight as of 5/24/18: 138 lb 12.8 oz (63 kg).           reports that she has never smoked. She has never used smokeless tobacco.      Appropriate preventive services were discussed with this patient, including applicable screening as appropriate for cardiovascular disease, diabetes, osteopenia/osteoporosis, and glaucoma.  As appropriate for age/gender, discussed screening for colorectal cancer, prostate cancer, breast cancer, and cervical cancer. Checklist reviewing preventive services available has been given to the patient.    Reviewed patients plan of care and provided an AVS. The Basic Care Plan (routine screening as documented in Health Maintenance) for Brionna meets the Care Plan requirement. This Care Plan has been established and reviewed with the Patient.    Counseling Resources:  ATP IV Guidelines  Pooled Cohorts Equation Calculator  Breast Cancer Risk Calculator  FRAX Risk Assessment  ICSI Preventive Guidelines  Dietary Guidelines for Americans, 2010  USDA's MyPlate  ASA Prophylaxis  Lung CA Screening    I have discussed with patient the risks, benefits, medications, treatment options and modalities.   I have instructed the patient to call or schedule a follow-up appointment if any problems or failure to improve.         Roe Connolly MD  [facility]  "

## 2018-10-30 NOTE — PATIENT INSTRUCTIONS
Preventive Health Recommendations    See your health care provider every year to    Review health changes.     Discuss preventive care.      Review your medicines if your doctor has prescribed any.      You no longer need a yearly Pap test unless you've had an abnormal Pap test in the past 10 years. If you have vaginal symptoms, such as bleeding or discharge, be sure to talk with your provider about a Pap test.      Every 1 to 2 years, have a mammogram.  If you are over 69, talk with your health care provider about whether or not you want to continue having screening mammograms.      Every 10 years, have a colonoscopy. Or, have a yearly FIT test (stool test). These exams will check for colon cancer.       Have a cholesterol test every 5 years, or more often if your doctor advises it.       Have a diabetes test (fasting glucose) every three years. If you are at risk for diabetes, you should have this test more often.       At age 65, have a bone density scan (DEXA) to check for osteoporosis (brittle bone disease).    Shots:    Get a flu shot each year.    Get a tetanus shot every 10 years.    Talk to your doctor about your pneumonia vaccines. There are now two you should receive - Pneumovax (PPSV 23) and Prevnar (PCV 13).    Talk to your pharmacist about the shingles vaccine.    Talk to your doctor about the hepatitis B vaccine.    Nutrition:     Eat at least 5 servings of fruits and vegetables each day.      Eat whole-grain bread, whole-wheat pasta and brown rice instead of white grains and rice.      Get adequate Calcium and Vitamin D.     Lifestyle    Exercise at least 150 minutes a week (30 minutes a day, 5 days a week). This will help you control your weight and prevent disease.      Limit alcohol to one drink per day.      No smoking.       Wear sunscreen to prevent skin cancer.       See your dentist twice a year for an exam and cleaning.      See your eye doctor every 1 to 2 years to screen for conditions  such as glaucoma, macular degeneration and cataracts.    Personalized Prevention Plan  You are due for the preventive services outlined below.  Your care team is available to assist you in scheduling these services.  If you have already completed any of these items, please share that information with your care team to update in your medical record.  Health Maintenance Due   Topic Date Due     Hepatitis C Screening  04/28/1963     Pneumococcal Vaccine (1 of 2 - PCV13) 04/28/2011     Cholesterol Lab - every 5 years  04/08/2015     Osteoporosis Screening (Dexa) - every 3 years   04/25/2015     Mammogram - yearly  04/01/2018     FALL RISK ASSESSMENT  07/07/2018     Depression Assessment 2 - yearly  07/07/2018     Flu Vaccine (1) 09/01/2018     Colonoscopy - every 5 years  11/26/2018

## 2018-10-30 NOTE — MR AVS SNAPSHOT
After Visit Summary   10/30/2018    Brionna Rollins    MRN: [id number]           Patient Information     Date Of Birth          1945        Visit Information        Provider Department      10/30/2018 1:20 PM Roe Connolly MD [facility]        Today's Diagnoses     Medicare annual wellness visit, subsequent    -  1    Left wrist pain        Arthralgia of wrist, left        Osteoarthritis of hip, unspecified laterality, unspecified osteoarthritis type        Other chronic pain        Gastroesophageal reflux disease without esophagitis        CARDIOVASCULAR SCREENING; LDL GOAL LESS THAN 160          Care Instructions      Preventive Health Recommendations    See your health care provider every year to    Review health changes.     Discuss preventive care.      Review your medicines if your doctor has prescribed any.      You no longer need a yearly Pap test unless you've had an abnormal Pap test in the past 10 years. If you have vaginal symptoms, such as bleeding or discharge, be sure to talk with your provider about a Pap test.      Every 1 to 2 years, have a mammogram.  If you are over 69, talk with your health care provider about whether or not you want to continue having screening mammograms.      Every 10 years, have a colonoscopy. Or, have a yearly FIT test (stool test). These exams will check for colon cancer.       Have a cholesterol test every 5 years, or more often if your doctor advises it.       Have a diabetes test (fasting glucose) every three years. If you are at risk for diabetes, you should have this test more often.       At age 65, have a bone density scan (DEXA) to check for osteoporosis (brittle bone disease).    Shots:    Get a flu shot each year.    Get a tetanus shot every 10 years.    Talk to your doctor about your pneumonia vaccines. There are now two you should receive - Pneumovax (PPSV 23) and Prevnar (PCV 13).    Talk to your pharmacist about the  shingles vaccine.    Talk to your doctor about the hepatitis B vaccine.    Nutrition:     Eat at least 5 servings of fruits and vegetables each day.      Eat whole-grain bread, whole-wheat pasta and brown rice instead of white grains and rice.      Get adequate Calcium and Vitamin D.     Lifestyle    Exercise at least 150 minutes a week (30 minutes a day, 5 days a week). This will help you control your weight and prevent disease.      Limit alcohol to one drink per day.      No smoking.       Wear sunscreen to prevent skin cancer.       See your dentist twice a year for an exam and cleaning.      See your eye doctor every 1 to 2 years to screen for conditions such as glaucoma, macular degeneration and cataracts.    Personalized Prevention Plan  You are due for the preventive services outlined below.  Your care team is available to assist you in scheduling these services.  If you have already completed any of these items, please share that information with your care team to update in your medical record.  Health Maintenance Due   Topic Date Due     Hepatitis C Screening  04/28/1963     Pneumococcal Vaccine (1 of 2 - PCV13) 04/28/2011     Cholesterol Lab - every 5 years  04/08/2015     Osteoporosis Screening (Dexa) - every 3 years   04/25/2015     Mammogram - yearly  04/01/2018     FALL RISK ASSESSMENT  07/07/2018     Depression Assessment 2 - yearly  07/07/2018     Flu Vaccine (1) 09/01/2018     Colonoscopy - every 5 years  11/26/2018             Follow-ups after your visit        Follow-up notes from your care team     Return in about 1 year (around 10/30/2019) for Routine Visit, or sooner if symptoms persist or worsen.      Future tests that were ordered for you today     Open Future Orders        Priority Expected Expires Ordered    **Comprehensive metabolic panel FUTURE anytime Routine 10/30/2018 10/30/2019 10/30/2018    Lipid panel reflex to direct LDL Fasting Routine 10/30/2018 10/30/2019 10/30/2018    **TSH  "with free T4 reflex FUTURE anytime Routine 10/30/2018 10/30/2019 10/30/2018            Who to contact     If you have questions or need follow up information about today's clinic visit or your schedule please contact [facility] directly at [phone].  Normal or non-critical lab and imaging results will be communicated to you by MyChart, letter or phone within 4 business days after the clinic has received the results. If you do not hear from us within 7 days, please contact the clinic through [facility] or phone. If you have a critical or abnormal lab result, we will notify you by phone as soon as possible.  Submit refill requests through [employer] or call your pharmacy and they will forward the refill request to us. Please allow 3 business days for your refill to be completed.          Additional Information About Your Visit        MyChart Information     [employer] lets you send messages to your doctor, view your test results, renew your prescriptions, schedule appointments and more. To sign up, go to [email or web address] . Click on \"Log in\" on the left side of the screen, which will take you to the Welcome page. Then click on \"Sign up Now\" on the right side of the page.     You will be asked to enter the access code listed below, as well as some personal information. Please follow the directions to create your username and password.     Your access code is: [email or web address]  Expires: 2019  1:47 PM     Your access code will  in 90 days. If you need help or a new code, please call your [place] clinic or [phone].        Care EveryWhere ID     This is your Care EveryWhere ID. This could be used by other organizations to access your [place] medical records  [id number]        Your Vitals Were     Pulse Temperature Respirations Height BMI (Body Mass Index)       64 98  F (36.7  C) (Oral) 20 5' 3.5\" (1.613 m) 23.91 kg/m2        Blood Pressure from Last 3 Encounters:   10/30/18 138/74   18 " 136/70   10/31/17 132/74    Weight from Last 3 Encounters:   10/30/18 137 lb 1.6 oz (62.2 kg)   05/24/18 138 lb 12.8 oz (63 kg)   10/09/17 138 lb 12.8 oz (63 kg)                 Today's Medication Changes          These changes are accurate as of 10/30/18  1:47 PM.  If you have any questions, ask your nurse or doctor.               Start taking these medicines.        Dose/Directions    omeprazole 20 MG CR capsule   Commonly known as:  priLOSEC   Used for:  Gastroesophageal reflux disease without esophagitis   Started by:  Roe Connolly MD        Dose:  20 mg   Take 1 capsule (20 mg) by mouth daily   Quantity:  90 capsule   Refills:  3            Where to get your medicines      These medications were sent to [facility] - [address]   [address]  [address], [place] [address]     Phone:  [phone]     omeprazole 20 MG CR capsule         Some of these will need a paper prescription and others can be bought over the counter.  Ask your nurse if you have questions.     Bring a paper prescription for each of these medications     acetaminophen-codeine 300-30 MG per tablet               Information about OPIOIDS     PRESCRIPTION OPIOIDS: WHAT YOU NEED TO KNOW   We gave you an opioid (narcotic) pain medicine. It is important to manage your pain, but opioids are not always the best choice. You should first try all the other options your care team gave you. Take this medicine for as short a time (and as few doses) as possible.    Some activities can increase your pain, such as bandage changes or therapy sessions. It may help to take your pain medicine 30 to 60 minutes before these activities. Reduce your stress by getting enough sleep, working on hobbies you enjoy and practicing relaxation or meditation. Talk to your care team about ways to manage your pain beyond prescription opioids.    These medicines have risks:    DO NOT drive when on new or higher doses of  pain medicine. These medicines can affect your alertness and reaction times, and you could be arrested for driving under the influence (DUI). If you need to use opioids long-term, talk to your care team about driving.    DO NOT operate heavy machinery    DO NOT do any other dangerous activities while taking these medicines.    DO NOT drink any alcohol while taking these medicines.     If the opioid prescribed includes acetaminophen, DO NOT take with any other medicines that contain acetaminophen. Read all labels carefully. Look for the word  acetaminophen  or  Tylenol.  Ask your pharmacist if you have questions or are unsure.    You can get addicted to pain medicines, especially if you have a history of addiction (chemical, alcohol or substance dependence). Talk to your care team about ways to reduce this risk.    All opioids tend to cause constipation. Drink plenty of water and eat foods that have a lot of fiber, such as fruits, vegetables, prune juice, apple juice and high-fiber cereal. Take a laxative (Miralax, milk of magnesia, Colace, Senna) if you don t move your bowels at least every other day. Other side effects include upset stomach, sleepiness, dizziness, throwing up, tolerance (needing more of the medicine to have the same effect), physical dependence and slowed breathing.    Store your pills in a secure place, locked if possible. We will not replace any lost or stolen medicine. If you don t finish your medicine, please throw away (dispose) as directed by your pharmacist. The [place] [employer]ution [employer] has more information about safe disposal: [email or web address]-medications         Primary Care Provider Office Phone # Fax #    Roe Connolly [phone] [address] [place] [address]        Equal Access to Services     ALEXEY DUONG : joanna Zheng qaybta kaalmada adeegyada, waxay idiin  terell minoranalia laSumaaachapo ah. So [facility] [place] [phone].    ATENCIÓN: Si slime chao, tiene a walton disposición servicios gratuitos de asistencia lingüística. Yolanda al [phone].    We comply with applicable federal civil rights laws and [place] laws. We do not discriminate on the basis of race, color, national origin, age, disability, sex, sexual orientation, or gender identity.            Thank you!     Thank you for choosing [facility]  for your care. Our goal is always to provide you with excellent care. Hearing back from our patients is one way we can continue to improve our services. Please take a few minutes to complete the written survey that you may receive in the mail after your visit with us. Thank you!             Your Updated Medication List - Protect others around you: Learn how to safely use, store and throw away your medicines at www.disposemymeds.org.          This list is accurate as of 10/30/18  1:47 PM.  Always use your most recent med list.                   Brand Name Dispense Instructions for use Diagnosis    acetaminophen-codeine 300-30 MG per tablet    TYLENOL #3    30 tablet    Take 1 tablet by mouth daily as needed for breakthrough pain    Left wrist pain, Arthralgia of wrist, left, Osteoarthritis of hip, unspecified laterality, unspecified osteoarthritis type, Other chronic pain       ASPIRIN PO      Take by mouth 2 times daily as needed for moderate pain        diclofenac 1 % Gel topical gel    VOLTAREN    100 g    Apply 1-2 grams to wrist QID as needed .    Left wrist pain, Arthralgia of wrist, left, Arthritis of left wrist       fluticasone 50 MCG/ACT spray    FLONASE    16 g    USE ONE TO TWO SPRAYS IN EACH NOSTRIL EVERY DAY    Dysfunction of Eustachian tube, bilateral, Dizziness       hydrocortisone 1 % cream    CORTAID     Apply topically 3 times daily as needed for rash    Rash and nonspecific skin eruption       IBUPROFEN PO      Take 600 mg by mouth every 6 hours as  needed for moderate pain        LACTOBACILLUS RHAMNOSUS (GG) PO      Take 2 capsules by mouth 3 times daily (with meals)        MAPAP 325 MG tablet   Generic drug:  acetaminophen      Take 325-650 mg by mouth every 6 hours as needed for mild pain        omeprazole 20 MG CR capsule    priLOSEC    90 capsule    Take 1 capsule (20 mg) by mouth daily    Gastroesophageal reflux disease without esophagitis       ranitidine 150 MG tablet    ZANTAC    60 tablet    Take 1 tablet (150 mg) by mouth 2 times daily    Gastroesophageal reflux disease without esophagitis

## 2018-11-01 DIAGNOSIS — Z00.00 MEDICARE ANNUAL WELLNESS VISIT, SUBSEQUENT: ICD-10-CM

## 2018-11-01 DIAGNOSIS — G89.29 OTHER CHRONIC PAIN: ICD-10-CM

## 2018-11-01 DIAGNOSIS — K21.9 GASTROESOPHAGEAL REFLUX DISEASE WITHOUT ESOPHAGITIS: ICD-10-CM

## 2018-11-01 DIAGNOSIS — Z13.6 CARDIOVASCULAR SCREENING; LDL GOAL LESS THAN 160: ICD-10-CM

## 2018-11-01 LAB
ALBUMIN SERPL-MCNC: 3.7 G/DL (ref 3.4–5)
ALP SERPL-CCNC: 65 U/L (ref 40–150)
ALT SERPL W P-5'-P-CCNC: 23 U/L (ref 0–50)
ANION GAP SERPL CALCULATED.3IONS-SCNC: 9 MMOL/L (ref 3–14)
AST SERPL W P-5'-P-CCNC: 18 U/L (ref 0–45)
BILIRUB SERPL-MCNC: 1 MG/DL (ref 0.2–1.3)
BUN SERPL-MCNC: 11 MG/DL (ref 7–30)
CALCIUM SERPL-MCNC: 8.9 MG/DL (ref 8.5–10.1)
CHLORIDE SERPL-SCNC: 108 MMOL/L (ref 94–109)
CHOLEST SERPL-MCNC: 203 MG/DL
CO2 SERPL-SCNC: 26 MMOL/L (ref 20–32)
CREAT SERPL-MCNC: 0.7 MG/DL (ref 0.52–1.04)
GFR SERPL CREATININE-BSD FRML MDRD: 83 ML/MIN/1.7M2
GLUCOSE SERPL-MCNC: 91 MG/DL (ref 70–99)
HDLC SERPL-MCNC: 106 MG/DL
LDLC SERPL CALC-MCNC: 81 MG/DL
NONHDLC SERPL-MCNC: 97 MG/DL
POTASSIUM SERPL-SCNC: 3.9 MMOL/L (ref 3.4–5.3)
PROT SERPL-MCNC: 6.9 G/DL (ref 6.8–8.8)
SODIUM SERPL-SCNC: 143 MMOL/L (ref 133–144)
TRIGL SERPL-MCNC: 78 MG/DL
TSH SERPL DL<=0.005 MIU/L-ACNC: 1.86 MU/L (ref 0.4–4)

## 2018-11-01 PROCEDURE — 36415 COLL VENOUS BLD VENIPUNCTURE: CPT | Performed by: INTERNAL MEDICINE

## 2018-11-01 PROCEDURE — 80053 COMPREHEN METABOLIC PANEL: CPT | Performed by: INTERNAL MEDICINE

## 2018-11-01 PROCEDURE — 84443 ASSAY THYROID STIM HORMONE: CPT | Performed by: INTERNAL MEDICINE

## 2018-11-01 PROCEDURE — 80061 LIPID PANEL: CPT | Performed by: INTERNAL MEDICINE

## 2018-11-13 ENCOUNTER — MYC MEDICAL ADVICE (OUTPATIENT)
Dept: PEDIATRICS | Facility: CLINIC | Age: 73
End: 2018-11-13

## 2018-11-14 NOTE — TELEPHONE ENCOUNTER
The form is likely a pre-op form. I am in the office on 11/27 and would be fine double booking patient on that day for preop (on chart review slot) if she is okay waiting until the day before. if not she irina need to see another provider for the preop.

## 2018-11-14 NOTE — TELEPHONE ENCOUNTER
Called the Pt and left a detailed message regarding below.     Advised either an OV with Dr. Connolly on 11/27/18 in a chart review slot, OR will need to schedule with another provider for pre-op visit. Advised callback if she has questions.     Mary Barry RN -- [facility]

## 2018-11-14 NOTE — TELEPHONE ENCOUNTER
Dr. Connolly:    I called the Pt to get a little more information regarding below.   She is having cataract surgery on 11/28/18. She states she was given a form that can take the place of a pre-op visit.   She was seen for an OV with you on 10/30/18.     Advised her you are out of the office until 11/27/18. Ok for another provider to fill out? Do you want her to have a pre-op visit?    Mary Barry RN -- [facility]

## 2019-01-15 ENCOUNTER — OFFICE VISIT (OUTPATIENT)
Dept: PEDIATRICS | Facility: CLINIC | Age: 74
End: 2019-01-15
Payer: MEDICARE

## 2019-01-15 VITALS
HEART RATE: 64 BPM | TEMPERATURE: 97.3 F | DIASTOLIC BLOOD PRESSURE: 72 MMHG | SYSTOLIC BLOOD PRESSURE: 134 MMHG | BODY MASS INDEX: 23.05 KG/M2 | OXYGEN SATURATION: 97 % | HEIGHT: 64 IN | WEIGHT: 135 LBS

## 2019-01-15 DIAGNOSIS — R42 DIZZINESS: ICD-10-CM

## 2019-01-15 DIAGNOSIS — L01.00 IMPETIGO: Primary | ICD-10-CM

## 2019-01-15 DIAGNOSIS — H69.93 DYSFUNCTION OF EUSTACHIAN TUBE, BILATERAL: ICD-10-CM

## 2019-01-15 DIAGNOSIS — G89.4 CHRONIC PAIN SYNDROME: ICD-10-CM

## 2019-01-15 PROCEDURE — 99214 OFFICE O/P EST MOD 30 MIN: CPT | Performed by: INTERNAL MEDICINE

## 2019-01-15 RX ORDER — FLUTICASONE PROPIONATE 50 MCG
SPRAY, SUSPENSION (ML) NASAL
Qty: 16 G | Refills: 5 | Status: SHIPPED | OUTPATIENT
Start: 2019-01-15 | End: 2021-02-05

## 2019-01-15 RX ORDER — MUPIROCIN 20 MG/G
OINTMENT TOPICAL 3 TIMES DAILY
Qty: 30 G | Refills: 1 | Status: SHIPPED | OUTPATIENT
Start: 2019-01-15 | End: 2019-01-20

## 2019-01-15 ASSESSMENT — MIFFLIN-ST. JEOR: SCORE: 1099.42

## 2019-01-15 NOTE — PROGRESS NOTES
"  SUBJECTIVE:   Brionna Rollins is a 73 year old female who presents to clinic today for the following health issues:      Medication Followup of     Taking Medication as prescribed: yes    Side Effects:  None    Medication Helping Symptoms:  yes       Patient comes today for follow-up of chronic pain.  She is still interested in pursuing medical cannabis.  However she has been getting CBD D oil over-the-counter and this is greatly improved her symptoms.  She is not sure about the cost of medical cannabis that she has looked into this which would like to continue forward certification.  She otherwise is doing quite well.  Mood is good.  She would like a refill of her fluticasone she is having some intermittent ear symptoms again as well as would like a spot on her face near her nose that bothers her at times.  No other concerns or complaints today.           1. What number best describes your pain on average in the past week, on a scale from 0 to 10 where 0 means \"no pain\" and 10 is \"pain as bad as you can imagine\"?   6  The following two questions ask you to describe how, during the past week, pain has interfered with your life on a 0 to 10 scale, where 0 is \"does not interfere at all\" and 10 is \"completely interferes\"  2: What number best describes how, during the past week, pain has interfered with your enjoyment of life? 6  3. What number best describes how, during the past week, pain has interfered with your general activity?  7    Average (round to nearest whole number): 6        Patient Active Problem List    Diagnosis Date Noted     Inflamed seborrheic keratosis 11/02/2017     Priority: Medium     SK (seborrheic keratosis) 11/02/2017     Priority: Medium     Cherry angioma 11/02/2017     Priority: Medium     Atopic rhinitis 02/05/2016     Priority: Medium     Hypertension goal BP (blood pressure) < 140/90 01/07/2016     Priority: Medium     Osteoarthritis of knee 12/18/2015     Priority: Medium     " "Fracture of distal end of radius 10/22/2015     Priority: Medium     Surgical follow-up care 06/01/2015     Priority: Medium     Overview:   inactive icd-9 diagnosis auto replaced with icd-10, display name retained//marycarmen       Acute posthemorrhagic anemia 04/29/2015     Priority: Medium     Osteoarthritis of hip 04/07/2015     Priority: Medium     Allergy to anti-infective agent 03/26/2014     Priority: Medium     Enteritis 03/26/2014     Priority: Medium     Diverticulitis of sigmoid colon 03/26/2014     Priority: Medium     ACP (advance care planning) 06/06/2011     Priority: Medium     Patient states has Advance Directive and will bring in a copy to clinic. 6/6/2011   Pt document is in epic, dated 1/12/05.  Discussed with pt and she may plan to update this and verbally gave the health care agent information listed. Leigh Naranjo RN           Mixed incontinence urge and stress 06/06/2011     Priority: Medium     CARDIOVASCULAR SCREENING; LDL GOAL LESS THAN 160 02/10/2010     Priority: Medium     Microscopic hematuria 11/21/2008     Priority: Medium     Colon Polyps      Priority: Medium     Estrogen replacement therapy 09/07/2005     Priority: Medium     Internal derangement of knee 04/30/2005     Priority: Medium     L knee x 4-5 wks, no known injury but \"runs through it\" regularly  Problem list name updated by automated process. Provider to review       Back pain 04/18/2003     Priority: Medium       Patient is followed by [facility] for ongoing prescription of narcotic pain medicine.  Med: vicodin.   Maximum use per month: 30  Expected duration: indefinite  Narcotic agreement on file: NO  Clinic visit recommended: Q 3 months         Osteoporosis 04/18/2003     Priority: Medium     Problem list name updated by automated process. Provider to review       Social History     Tobacco Use     Smoking status: Never Smoker     Smokeless tobacco: Never Used   Substance Use Topics     Alcohol use: Yes     " "Comment: occ with dinner q night     Drug use: No     Family History   Problem Relation Age of Onset     Gastrointestinal Disease Mother         colon polyps     Diabetes Mother      Arthritis Father         Rheumatoid     Eye Disorder Father         mac. degeneration femp op bypass     Cancer - colorectal Maternal Grandmother      C.A.D. No family hx of      Breast Cancer No family hx of        ROS:  A complete 10 point review of systems was taken and negative except for those noted in the subjective/HPI section(s) above     BP Readings from Last 3 Encounters:   01/15/19 134/72   10/30/18 138/74   05/24/18 136/70    Wt Readings from Last 3 Encounters:   01/15/19 61.2 kg (135 lb)   10/30/18 62.2 kg (137 lb 1.6 oz)   05/24/18 63 kg (138 lb 12.8 oz)                    OBJECTIVE:                                                    /72 (BP Location: Right arm, Cuff Size: Adult Regular)   Pulse 64   Temp 97.3  F (36.3  C) (Oral)   Ht 1.613 m (5' 3.5\")   Wt 61.2 kg (135 lb)   SpO2 97%   BMI 23.54 kg/m     Constitutional: healthy, alert and no distress  HEENT: normocephalic and atrumatic, mucous membranes moist, op clear, mucous membranes moist, neck supple   Skin: patient has small < 5 mm are on face with honey-crusting and mild erythema near nares consistant with impetigo. no satellite lesions   Psychiatric: mentation appears normal and affect normal/bright         ASSESSMENT/PLAN:                                                    (L01.00) Impetigo  (primary encounter diagnosis)  Comment: discussed with patient (or patient's parents/caregiver) pathophysiology of condition and treatment options.  d/w pt pathophysiology of lesion(s) and that crusted lesions can be washed gently. Handwashing is important for reducing spread. Patient verbalized understanding and is agreeable to this plan.  let me know if not beter with topcials and would order oral antibiotic. Patient verbalized understanding and is agreeable to " "this plan.   Plan: mupirocin (BACTROBAN) 2 % external ointment      (H69.83) Dysfunction of Eustachian tube, bilateral  Comment: Well controlled on current medication(s).   Plan: fluticasone (FLONASE) 50 MCG/ACT nasal spray          (R42) Dizziness  Comment: due to ETD.     (G89.4) Chronic pain syndrome  Comment: we completed the pain scale today. patient will consider and let me know how she wants to proceed. Reviwed certification process again today. Questions answered.       Estimated body mass index is 23.54 kg/m  as calculated from the following:    Height as of this encounter: 1.613 m (5' 3.5\").    Weight as of this encounter: 61.2 kg (135 lb).      Return to clinic as needed or if symptoms persist, change, worsen or if any new symptoms develop.      Roe Connolly M.D.  Internal Medicine-Pediatrics                "

## 2019-01-31 ENCOUNTER — MYC MEDICAL ADVICE (OUTPATIENT)
Dept: PEDIATRICS | Facility: CLINIC | Age: 74
End: 2019-01-31

## 2019-01-31 DIAGNOSIS — H69.90 DYSFUNCTION OF EUSTACHIAN TUBE, UNSPECIFIED LATERALITY: Primary | ICD-10-CM

## 2019-01-31 DIAGNOSIS — R42 DIZZINESS: ICD-10-CM

## 2019-02-01 RX ORDER — MECLIZINE HYDROCHLORIDE 25 MG/1
25 TABLET ORAL 3 TIMES DAILY PRN
Qty: 30 TABLET | Refills: 0 | Status: SHIPPED | OUTPATIENT
Start: 2019-02-01 | End: 2019-10-01

## 2019-02-01 NOTE — TELEPHONE ENCOUNTER
Pt sent a mychart on an update regarding her ear symptoms,(plugged ears and equilibrium problems).  Using her Flonase- nasal spray, with not much improvement.  Pt last seen for this reason on 1/15/19.    Please advise-    Paris Schmidt RN

## 2019-02-01 NOTE — TELEPHONE ENCOUNTER
Recommned chaning the flonase to nasonex or nasocort (all are over-the-counter) to see if works better, use twice a day. Another option is an over-the-counter decongestant, but I tend to prefer patient NOT use this as they can have unwanted cardiovascular side effects (e.g. fast heart rate, jiterriness, etc). Another option is to use meclizine (rx t'd up, okay to send it patient would like to try) which will help with any dizziness. If not better, would want her seen for an office visit.

## 2019-02-01 NOTE — TELEPHONE ENCOUNTER
Spoke with the pt.  Informed the pt of 's response.  Pt will try OTC nasal spray first.  If no improvement, she will  rx for meclizine.  If no improvement in symptoms after that, pt will schedule an office visit.    Paris Schmidt RN

## 2019-04-02 ENCOUNTER — OFFICE VISIT (OUTPATIENT)
Dept: PEDIATRICS | Facility: CLINIC | Age: 74
End: 2019-04-02
Payer: MEDICARE

## 2019-04-02 VITALS
HEART RATE: 64 BPM | OXYGEN SATURATION: 100 % | SYSTOLIC BLOOD PRESSURE: 135 MMHG | TEMPERATURE: 97.6 F | HEIGHT: 64 IN | DIASTOLIC BLOOD PRESSURE: 78 MMHG | BODY MASS INDEX: 22.36 KG/M2 | WEIGHT: 131 LBS | RESPIRATION RATE: 16 BRPM

## 2019-04-02 DIAGNOSIS — K21.9 CHRONIC GASTROESOPHAGEAL REFLUX DISEASE: ICD-10-CM

## 2019-04-02 DIAGNOSIS — H65.491 CHRONIC MIDDLE EAR EFFUSION, RIGHT: ICD-10-CM

## 2019-04-02 DIAGNOSIS — R19.8 UNCOMFORTABLE FULLNESS AFTER MEALS: ICD-10-CM

## 2019-04-02 DIAGNOSIS — H69.91 DYSFUNCTION OF RIGHT EUSTACHIAN TUBE: Primary | ICD-10-CM

## 2019-04-02 PROCEDURE — 99214 OFFICE O/P EST MOD 30 MIN: CPT | Performed by: INTERNAL MEDICINE

## 2019-04-02 ASSESSMENT — MIFFLIN-ST. JEOR: SCORE: 1081.27

## 2019-04-02 NOTE — PROGRESS NOTES
SUBJECTIVE:                                                    Brionna Rollins is a 73 year old female who presents to clinic today for the following health issues:    Follow up      Duration: Three months    Description (location/character/radiation): Rt ear pain    Intensity:  moderate    Accompanying signs and symptoms: Feels better, but still plugged    History (similar episodes/previous evaluation): allergies    Precipitating or alleviating factors: None    Therapies tried and outcome: nasal gtts have not been effective     Patient here for schedule follow-up, still using the CBD oil and this helps greatly with her pain as well as sleep. No side effects noted. still having some R ear fulness, has had for > 3 months, suing flonase daily most of the time and not helping, can't take decongestants. this is a nusance, no pain but gets in the way of her ability to hear things at times and would like to discuss with other options we have to look into this.    Patient also has had GERD for years and notice more fullness after meals, adjusts her diet and this helps a lot. no fevers or weight loss. no vomiting. no abdominal pain per se, but epigastric discomfot and fullness related to meals. PPI does help, but seems to be getting worse.     mood has been good. No other concerns or complaints today.       Problem list and histories reviewed & adjusted, as indicated.  Additional history: as documented    Patient Active Problem List    Diagnosis Date Noted     Inflamed seborrheic keratosis 11/02/2017     Priority: Medium     SK (seborrheic keratosis) 11/02/2017     Priority: Medium     Cherry angioma 11/02/2017     Priority: Medium     Atopic rhinitis 02/05/2016     Priority: Medium     Hypertension goal BP (blood pressure) < 140/90 01/07/2016     Priority: Medium     Osteoarthritis of knee 12/18/2015     Priority: Medium     Fracture of distal end of radius 10/22/2015     Priority: Medium     Surgical follow-up care  "06/01/2015     Priority: Medium     Overview:   inactive icd-9 diagnosis auto replaced with icd-10, display name retained//mporz       Acute posthemorrhagic anemia 04/29/2015     Priority: Medium     Osteoarthritis of hip 04/07/2015     Priority: Medium     Allergy to anti-infective agent 03/26/2014     Priority: Medium     Enteritis 03/26/2014     Priority: Medium     Diverticulitis of sigmoid colon 03/26/2014     Priority: Medium     ACP (advance care planning) 06/06/2011     Priority: Medium     Patient states has Advance Directive and will bring in a copy to clinic. 6/6/2011   Pt document is in epic, dated 1/12/05.  Discussed with pt and she may plan to update this and verbally gave the health care agent information listed. Leigh Naranjo RN           Mixed incontinence urge and stress 06/06/2011     Priority: Medium     CARDIOVASCULAR SCREENING; LDL GOAL LESS THAN 160 02/10/2010     Priority: Medium     Microscopic hematuria 11/21/2008     Priority: Medium     Colon Polyps      Priority: Medium     Estrogen replacement therapy 09/07/2005     Priority: Medium     Internal derangement of knee 04/30/2005     Priority: Medium     L knee x 4-5 wks, no known injury but \"runs through it\" regularly  Problem list name updated by automated process. Provider to review       Back pain 04/18/2003     Priority: Medium       Patient is followed by [facility] for ongoing prescription of narcotic pain medicine.  Med: vicodin.   Maximum use per month: 30  Expected duration: indefinite  Narcotic agreement on file: NO  Clinic visit recommended: Q 3 months         Osteoporosis 04/18/2003     Priority: Medium     Problem list name updated by automated process. Provider to review       Social History     Tobacco Use     Smoking status: Never Smoker     Smokeless tobacco: Never Used   Substance Use Topics     Alcohol use: Yes     Comment: occ with dinner q night     Drug use: No     Family History   Problem Relation Age of " "Onset     Gastrointestinal Disease Mother         colon polyps     Diabetes Mother      Arthritis Father         Rheumatoid     Eye Disorder Father         mac. degeneration femp op bypass     Cancer - colorectal Maternal Grandmother      C.A.D. No family hx of      Breast Cancer No family hx of        ROS:  A complete 10 point review of systems was taken and negative except for those noted in the subjective/HPI section(s) above     BP Readings from Last 3 Encounters:   04/02/19 135/78   01/15/19 134/72   10/30/18 138/74    Wt Readings from Last 3 Encounters:   04/02/19 59.4 kg (131 lb)   01/15/19 61.2 kg (135 lb)   10/30/18 62.2 kg (137 lb 1.6 oz)                    OBJECTIVE:                                                    /78   Pulse 64   Temp 97.6  F (36.4  C) (Oral)   Resp 16   Ht 1.613 m (5' 3.5\")   Wt 59.4 kg (131 lb)   SpO2 100%   BMI 22.84 kg/m     Constitutional: healthy, alert and no distress  Head: Normocephalic. No masses, lesions, tenderness or abnormalities  Neck: Neck supple. No adenopathy. Thyroid symmetric, normal size,, Carotids without bruits.  ENT: + clear middle ear effusion on the R with moderate retraction of tm, no erythema; L  tympanic membranes within normal limits bilaterally, no neck nodes or sinus tenderness tympanic membranes within normal limits bilaterally, no tonsillar hypertrophy or exudates  Cardiovascular: regular rate and rhythm no rubs, gallops or murmurs normal S1/S2; no S3 or S4  Respiratory: clear to auscultation bilaterally in all lung fields, normal insp/exp effort. Good air movement  Gastrointestinal: Abdomen soft, non-tender. BS normal. No masses, organomegaly; negative infante's sign.   Musculoskeletal: extremities normal- no gross deformities noted, gait normal and normal muscle tone  Psychiatric: mentation appears normal and affect normal/bright             ASSESSMENT/PLAN:                                                    (H69.81) Dysfunction of right " "eustachian tube  (primary encounter diagnosis)  Comment: discussed with patient (or patient's parents/caregiver) pathophysiology of condition and treatment options.  persistant despite nasal steroid x 2-3 months. will [place] to ENT for evaluation, may need tube or further evaluation. Patient verbalized understanding and is agreeable to this plan.   Plan: OTOLARYNGOLOGY REFERRAL        (H65.491) Chronic middle ear effusion, right  Comment: as above  Plan: OTOLARYNGOLOGY REFERRAL            (R19.8) Uncomfortable fullness after meals  Comment: discussed with patient (or patient's parents/caregiver) pathophysiology of condition and treatment options.  patient has history of ongoing gerd, on PPI, symptoms getting worse and given history will refer to EGD, further evaluation pending those results.   Plan: GASTROENTEROLOGY ADULT REF PROCEDURE ONLY           (K21.9) Chronic gastroesophageal reflux disease  Comment: as above. discussed with patient (or patient's parents/caregiver) pathophysiology of condition and treatment options.  reviewed labs, medications, diet ,etc.    Plan: GASTROENTEROLOGY ADULT REF PROCEDURE ONLY                Estimated body mass index is 23.54 kg/m  as calculated from the following:    Height as of 1/15/19: 1.613 m (5' 3.5\").    Weight as of 1/15/19: 61.2 kg (135 lb).      Return to clinic as needed or if symptoms persist, change, worsen or if any new symptoms develop.      Roe Connolly M.D.  Internal Medicine-Pediatrics              "

## 2019-04-12 ENCOUNTER — TRANSFERRED RECORDS (OUTPATIENT)
Dept: HEALTH INFORMATION MANAGEMENT | Facility: CLINIC | Age: 74
End: 2019-04-12

## 2019-04-12 ENCOUNTER — OFFICE VISIT (OUTPATIENT)
Dept: PEDIATRICS | Facility: CLINIC | Age: 74
End: 2019-04-12
Payer: MEDICARE

## 2019-04-12 VITALS
DIASTOLIC BLOOD PRESSURE: 76 MMHG | TEMPERATURE: 97.2 F | HEIGHT: 64 IN | HEART RATE: 63 BPM | OXYGEN SATURATION: 97 % | BODY MASS INDEX: 22.71 KG/M2 | WEIGHT: 133 LBS | SYSTOLIC BLOOD PRESSURE: 122 MMHG

## 2019-04-12 DIAGNOSIS — R39.9 SYMPTOMS INVOLVING URINARY SYSTEM: Primary | ICD-10-CM

## 2019-04-12 LAB
ALBUMIN UR-MCNC: NEGATIVE MG/DL
APPEARANCE UR: CLEAR
BACTERIA #/AREA URNS HPF: ABNORMAL /HPF
BILIRUB UR QL STRIP: NEGATIVE
COLOR UR AUTO: YELLOW
GLUCOSE UR STRIP-MCNC: NEGATIVE MG/DL
HGB UR QL STRIP: ABNORMAL
KETONES UR STRIP-MCNC: 15 MG/DL
LEUKOCYTE ESTERASE UR QL STRIP: ABNORMAL
NITRATE UR QL: NEGATIVE
NON-SQ EPI CELLS #/AREA URNS LPF: ABNORMAL /LPF
PH UR STRIP: 6.5 PH (ref 5–7)
RBC #/AREA URNS AUTO: ABNORMAL /HPF
SOURCE: ABNORMAL
SP GR UR STRIP: 1.01 (ref 1–1.03)
UROBILINOGEN UR STRIP-ACNC: 0.2 EU/DL (ref 0.2–1)
WBC #/AREA URNS AUTO: ABNORMAL /HPF

## 2019-04-12 PROCEDURE — 87086 URINE CULTURE/COLONY COUNT: CPT | Performed by: PHYSICIAN ASSISTANT

## 2019-04-12 PROCEDURE — 99213 OFFICE O/P EST LOW 20 MIN: CPT | Performed by: PHYSICIAN ASSISTANT

## 2019-04-12 PROCEDURE — 81001 URINALYSIS AUTO W/SCOPE: CPT | Performed by: PHYSICIAN ASSISTANT

## 2019-04-12 RX ORDER — NITROFURANTOIN 25; 75 MG/1; MG/1
100 CAPSULE ORAL 2 TIMES DAILY
Qty: 14 CAPSULE | Refills: 0 | Status: SHIPPED | OUTPATIENT
Start: 2019-04-12 | End: 2019-04-19

## 2019-04-12 ASSESSMENT — ENCOUNTER SYMPTOMS
HEMATURIA: 0
DYSURIA: 0
FLANK PAIN: 0
NAUSEA: 0
SHORTNESS OF BREATH: 0
ABDOMINAL PAIN: 0
CHILLS: 0
FREQUENCY: 1
MYALGIAS: 0
FEVER: 0
VOMITING: 0
DIARRHEA: 0

## 2019-04-12 ASSESSMENT — MIFFLIN-ST. JEOR: SCORE: 1090.34

## 2019-04-12 NOTE — PROGRESS NOTES
"  SUBJECTIVE:   Brionna Rollins is a 73 year old female who presents to clinic today for the following   health issues:      URINARY TRACT SYMPTOMS      Duration: 3 days    Description  frequency, urgency and hesitancy    Intensity:  moderate    Accompanying signs and symptoms:  Fever/chills: no   Flank pain no   Nausea and vomiting: no   Vaginal symptoms: none  Abdominal/Pelvic Pain: no     History  History of frequent UTI's: no   History of kidney stones: no   Sexually Active: YES  Possibility of pregnancy: No    Precipitating or alleviating factors: None    Therapies tried and outcome: Ibuprofen          Additional history: {NONE - AS DOCUMENTED:895381::\"as documented\"}    Reviewed  and updated as needed this visit by clinical staff         Reviewed and updated as needed this visit by Provider         {HIST REVIEW/ LINKS 2:155129}    {PROVIDER CHARTING PREFERENCE:478332}      "

## 2019-04-12 NOTE — PROGRESS NOTES
HPI    SUBJECTIVE:   Brionna Rollins is a 73 year old female who presents to clinic today for the following   health issues:      URINARY TRACT SYMPTOMS      Duration: 3 days    Description  frequency, urgency     Intensity:  moderate    Accompanying signs and symptoms:  Fever/chills: no   Flank pain no   Nausea and vomiting: no   Vaginal symptoms: none  Abdominal/Pelvic Pain: no     History  History of frequent UTI's: no   History of kidney stones: no   Sexually Active: YES  Possibility of pregnancy: No    Precipitating or alleviating factors: None    Therapies tried and outcome: Ibuprofen    Pt has hx of mixed stress/urge incontinence.   Denies dysuria.      Chart Review:  No flowsheet data found.  No flowsheet data found.    Patient Active Problem List   Diagnosis     Back pain     Osteoporosis     Internal derangement of knee     Estrogen replacement therapy     Colon Polyps     Microscopic hematuria     CARDIOVASCULAR SCREENING; LDL GOAL LESS THAN 160     ACP (advance care planning)     Mixed incontinence urge and stress     Fracture of distal end of radius     Hypertension goal BP (blood pressure) < 140/90     Atopic rhinitis     Allergy to anti-infective agent     Enteritis     Osteoarthritis of hip     Osteoarthritis of knee     Acute posthemorrhagic anemia     Surgical follow-up care     Diverticulitis of sigmoid colon     Inflamed seborrheic keratosis     SK (seborrheic keratosis)     Cherry angioma     Past Surgical History:   Procedure Laterality Date     C ROTATOR CUFF STRAP       COLONOSCOPY  11/26/2013    Dr. Cardenas [facility]     COLONOSCOPY  11/26/2013    Procedure: COMBINED COLONOSCOPY, SINGLE BIOPSY/POLYPECTOMY BY BIOPSY;;  Surgeon: Jovanni Cardenas MD;  Location:  GI     HYSTERECTOMY, [facility]  1986     Family History   Problem Relation Age of Onset     Gastrointestinal Disease Mother         colon polyps     Diabetes Mother      Arthritis Father         Rheumatoid     Eye Disorder Father          "mac. degeneration femp op bypass     Cancer - colorectal Maternal Grandmother      C.AMadinaCECILIO. No family hx of      Breast Cancer No family hx of       Social History     Tobacco Use     Smoking status: Never Smoker     Smokeless tobacco: Never Used   Substance Use Topics     Alcohol use: Yes     Comment: occ with dinner q night        Problem list, Medication list, Allergies, Medical/Social/Surg hx reviewed in [facility], updated as appropriate.      Review of Systems   Constitutional: Negative for chills and fever.   Respiratory: Negative for shortness of breath.    Cardiovascular: Negative for chest pain.   Gastrointestinal: Negative for abdominal pain, diarrhea, nausea and vomiting.   Genitourinary: Positive for frequency and urgency. Negative for dysuria, flank pain and hematuria.   Musculoskeletal: Negative for myalgias.   All other systems reviewed and are negative.        Physical Exam   Constitutional: She is oriented to person, place, and time.   Cardiovascular: Normal rate and regular rhythm.   Pulmonary/Chest: Effort normal and breath sounds normal.   Abdominal: Soft. Normal appearance. There is no tenderness. There is no CVA tenderness.   Neurological: She is oriented to person, place, and time.   Skin: Skin is warm, dry and intact.   Nursing note and vitals reviewed.    Vital Signs  /76 (BP Location: Right arm, Patient Position: Sitting, Cuff Size: Adult Regular)   Pulse 63   Temp 97.2  F (36.2  C) (Tympanic)   Ht 1.613 m (5' 3.5\")   Wt 60.3 kg (133 lb)   SpO2 97%   BMI 23.19 kg/m     Body mass index is 23.19 kg/m .    Diagnostic Test Results:  Results for orders placed or performed in visit on 04/12/19 (from the past 24 hour(s))   UA reflex to Microscopic and Culture   Result Value Ref Range    Color Urine Yellow     Appearance Urine Clear     Glucose Urine Negative NEG^Negative mg/dL    Bilirubin Urine Negative NEG^Negative    Ketones Urine 15 (A) NEG^Negative mg/dL    Specific Gravity Urine 1.010 " 1.003 - 1.035    Blood Urine Small (A) NEG^Negative    pH Urine 6.5 5.0 - 7.0 pH    Protein Albumin Urine Negative NEG^Negative mg/dL    Urobilinogen Urine 0.2 0.2 - 1.0 EU/dL    Nitrite Urine Negative NEG^Negative    Leukocyte Esterase Urine Small (A) NEG^Negative    Source Midstream Urine    Urine Microscopic   Result Value Ref Range    WBC Urine 0 - 5 OTO5^0 - 5 /HPF    RBC Urine O - 2 OTO2^O - 2 /HPF    Squamous Epithelial /LPF Urine Few FEW^Few /LPF    Bacteria Urine Few (A) NEG^Negative /HPF       ASSESSMENT/PLAN:                                                        ICD-10-CM    1. Symptoms involving urinary system R39.9 UA reflex to Microscopic and Culture     Urine Microscopic     nitroFURantoin macrocrystal-monohydrate (MACROBID) 100 MG capsule     Urine Culture Aerobic Bacterial   Small leuk est, no other signs of UTI. Informed pt not convincing for UTI, would like to wait for urine culture results. Pt requests Rx for antibiotic to have on hand in case culture comes back positive as she is leaving town soon. Rx Macrobid.    I have discussed any lab or imaging results, the patient's diagnosis, and my plan of treatment with the patient and/or family. Patient is aware to come back in if with worsening symptoms or if no relief despite treatment plan.  Patient voiced understanding and had no further questions.       Follow Up: Return in about 2 weeks (around 4/26/2019) for Follow up w/ PCP if not better.    PERLA CurryA, PA-C  [facility] [place]

## 2019-04-14 LAB
BACTERIA SPEC CULT: NORMAL
SPECIMEN SOURCE: NORMAL

## 2019-04-15 ENCOUNTER — TRANSFERRED RECORDS (OUTPATIENT)
Dept: HEALTH INFORMATION MANAGEMENT | Facility: CLINIC | Age: 74
End: 2019-04-15

## 2019-05-07 ENCOUNTER — OFFICE VISIT (OUTPATIENT)
Dept: PEDIATRICS | Facility: CLINIC | Age: 74
End: 2019-05-07
Payer: MEDICARE

## 2019-05-07 VITALS
TEMPERATURE: 98.6 F | SYSTOLIC BLOOD PRESSURE: 128 MMHG | BODY MASS INDEX: 22.14 KG/M2 | HEART RATE: 68 BPM | WEIGHT: 127 LBS | RESPIRATION RATE: 20 BRPM | DIASTOLIC BLOOD PRESSURE: 70 MMHG

## 2019-05-07 DIAGNOSIS — K21.9 GASTROESOPHAGEAL REFLUX DISEASE WITHOUT ESOPHAGITIS: Primary | ICD-10-CM

## 2019-05-07 DIAGNOSIS — H93.8X3 EAR FULLNESS, BILATERAL: ICD-10-CM

## 2019-05-07 PROCEDURE — 99214 OFFICE O/P EST MOD 30 MIN: CPT | Performed by: INTERNAL MEDICINE

## 2019-05-07 NOTE — PROGRESS NOTES
SUBJECTIVE:                                                    Brionna Rollins is a 74 year old female who presents to clinic today for the following health issues:    Follow up      Duration: One month    Description (location/character/radiation): Follow-up gastro    Intensity:  moderate    Accompanying signs and symptoms: tender in upper gastric area    History (similar episodes/previous evaluation): endoscopy    Precipitating or alleviating factors: None    Therapies tried and outcome: ranitidine and omeprazole     patient here for follow-up, overall doing very well. notes her abdominal symptoms have resovled the EGD seems to be therapeutic even though they didn't find anything. mood is good. appetite is good too. Plans [place] go [place] later in the year when her spouce does, due for follow-up there with some specialists. no new symptoms. Saw ENT and doesn't plan to follow-up as her symptoms have resolve and they really didn't do anything. thinks allergies may be playgn a role and the flonase does seem to take care of it. No other concerns or complaints today.       Problem list and histories reviewed & adjusted, as indicated.  Additional history: as documented    Patient Active Problem List    Diagnosis Date Noted     Inflamed seborrheic keratosis 11/02/2017     Priority: Medium     SK (seborrheic keratosis) 11/02/2017     Priority: Medium     Cherry angioma 11/02/2017     Priority: Medium     Atopic rhinitis 02/05/2016     Priority: Medium     Hypertension goal BP (blood pressure) < 140/90 01/07/2016     Priority: Medium     Osteoarthritis of knee 12/18/2015     Priority: Medium     Fracture of distal end of radius 10/22/2015     Priority: Medium     Surgical follow-up care 06/01/2015     Priority: Medium     Overview:   inactive icd-9 diagnosis auto replaced with icd-10, display name retained//marycarmen       Acute posthemorrhagic anemia 04/29/2015     Priority: Medium     Osteoarthritis of hip 04/07/2015      "Priority: Medium     Allergy to anti-infective agent 03/26/2014     Priority: Medium     Enteritis 03/26/2014     Priority: Medium     Diverticulitis of sigmoid colon 03/26/2014     Priority: Medium     ACP (advance care planning) 06/06/2011     Priority: Medium     Patient states has Advance Directive and will bring in a copy to clinic. 6/6/2011   Pt document is in epic, dated 1/12/05.  Discussed with pt and she may plan to update this and verbally gave the health care agent information listed. Leigh Naranjo RN           Mixed incontinence urge and stress 06/06/2011     Priority: Medium     CARDIOVASCULAR SCREENING; LDL GOAL LESS THAN 160 02/10/2010     Priority: Medium     Microscopic hematuria 11/21/2008     Priority: Medium     Colon Polyps      Priority: Medium     Estrogen replacement therapy 09/07/2005     Priority: Medium     Internal derangement of knee 04/30/2005     Priority: Medium     L knee x 4-5 wks, no known injury but \"runs through it\" regularly  Problem list name updated by automated process. Provider to review       Back pain 04/18/2003     Priority: Medium       Patient is followed by [facility] for ongoing prescription of narcotic pain medicine.  Med: vicodin.   Maximum use per month: 30  Expected duration: indefinite  Narcotic agreement on file: NO  Clinic visit recommended: Q 3 months         Osteoporosis 04/18/2003     Priority: Medium     Problem list name updated by automated process. Provider to review       Social History     Tobacco Use     Smoking status: Never Smoker     Smokeless tobacco: Never Used   Substance Use Topics     Alcohol use: Yes     Comment: occ with dinner q night     Drug use: No     Family History   Problem Relation Age of Onset     Gastrointestinal Disease Mother         colon polyps     Diabetes Mother      Arthritis Father         Rheumatoid     Eye Disorder Father         mac. degeneration femp op bypass     Cancer - colorectal Maternal Grandmother      " "C.A.D. No family hx of      Breast Cancer No family hx of        ROS:  A complete 10 point review of systems was taken and negative except for those noted in the subjective/HPI section(s) above     BP Readings from Last 3 Encounters:   05/07/19 128/70   04/12/19 122/76   04/02/19 135/78    Wt Readings from Last 3 Encounters:   05/07/19 57.6 kg (127 lb)   04/12/19 60.3 kg (133 lb)   04/02/19 59.4 kg (131 lb)                    OBJECTIVE:                                                    /70   Pulse 68   Temp 98.6  F (37  C) (Oral)   Resp 20   Wt 57.6 kg (127 lb)   BMI 22.14 kg/m     Constitutional: healthy, alert and no distress  ENT: tympanic membranes within normal limits bilaterally   Gastrointestinal: Abdomen soft, non-tender. BS normal. No masses, organomegaly; no rebound or guarding  Psychiatric: mentation appears normal and affect normal/bright             ASSESSMENT/PLAN:                                                    (K21.9) Gastroesophageal reflux disease without esophagitis  (primary encounter diagnosis)  Comment: discussed with patient (or patient's parents/caregiver) pathophysiology of condition and treatment options.  EGD results within acceptable limits, but patient feel better, ? if related to structural issue as better with EGD despite no intervention. recommend cont current plan as better, okay to change to H2 blocker as needed per patient preference and see how things go. may need further GI intervention if still persists. Patient verbalized understanding and is agreeable to this plan.     (H93.8X3) Ear fullness, bilateral  Comment: better with the Flonase, saw ENT and no further reccs. continue cares and plan. Patient verbalized understanding and is agreeable to this plan.       Estimated body mass index is 23.19 kg/m  as calculated from the following:    Height as of 4/12/19: 1.613 m (5' 3.5\").    Weight as of 4/12/19: 60.3 kg (133 lb).      Return to clinic as needed or if " symptoms persist, change, worsen or if any new symptoms develop.    Total time spent with patient was 30 min, of which greater than 25 minutes of face to face time and/or coordination of care discussing the above issue(s).     Roe Connolly M.D.  Internal Medicine-Pediatrics

## 2019-05-07 NOTE — PATIENT INSTRUCTIONS
Keep using the Flonase as needed for ear and nose symptoms    The upper endoscopy results were within normal limits, if symptoms return we may want to do some gallbladder and other tests

## 2019-05-28 ENCOUNTER — OFFICE VISIT (OUTPATIENT)
Dept: DERMATOLOGY | Facility: CLINIC | Age: 74
End: 2019-05-28
Payer: MEDICARE

## 2019-05-28 DIAGNOSIS — L30.9 DERMATITIS: Primary | ICD-10-CM

## 2019-05-28 DIAGNOSIS — L82.0 INFLAMED SEBORRHEIC KERATOSIS: ICD-10-CM

## 2019-05-28 DIAGNOSIS — T14.8XXA BLISTERED SKIN: ICD-10-CM

## 2019-05-28 PROCEDURE — 99214 OFFICE O/P EST MOD 30 MIN: CPT | Mod: 25 | Performed by: DERMATOLOGY

## 2019-05-28 PROCEDURE — 87529 HSV DNA AMP PROBE: CPT | Mod: 59 | Performed by: DERMATOLOGY

## 2019-05-28 PROCEDURE — 87529 HSV DNA AMP PROBE: CPT | Performed by: DERMATOLOGY

## 2019-05-28 PROCEDURE — 17110 DESTRUCTION B9 LES UP TO 14: CPT | Performed by: DERMATOLOGY

## 2019-05-28 RX ORDER — TRIAMCINOLONE ACETONIDE 1 MG/G
OINTMENT TOPICAL
Qty: 30 G | Refills: 0 | Status: SHIPPED | OUTPATIENT
Start: 2019-05-28 | End: 2022-12-14

## 2019-05-28 NOTE — PROGRESS NOTES
DERMATOLOGY CLINIC VISIT NOTE        DERMATOLOGY PROBLEM LIST:   1.  Seborrheic keratoses.   2.  Irritated seborrheic keratoses.   3.  Cherry angiomas.   4.  Blistering eruption on the mons pubis    CHIEF COMPLAINT:  Followup irritated seborrheic keratoses.      HISTORY OF PRESENT ILLNESS:  Ms. Rollins a 74-year-old female returning to Dermatology Clinic for ongoing evaluation of irritated seborrheic keratoses.  The patient has history of extensive, hundreds, of seborrheic keratoses.  Today she notes that she has an irritated lesion on the L sidewall under the glasses on on the L dorsal hand. For the last 6 days she has had a blistering eruption on the L mons pubis. It is currently itchy. Using bactroban to the area. No known history of genital hsv.      Patient Active Problem List   Diagnosis     Back pain     Osteoporosis     Internal derangement of knee     Estrogen replacement therapy     Colon Polyps     Microscopic hematuria     CARDIOVASCULAR SCREENING; LDL GOAL LESS THAN 160     ACP (advance care planning)     Mixed incontinence urge and stress     Fracture of distal end of radius     Hypertension goal BP (blood pressure) < 140/90     Atopic rhinitis     Allergy to anti-infective agent     Enteritis     Osteoarthritis of hip     Osteoarthritis of knee     Acute posthemorrhagic anemia     Surgical follow-up care     Diverticulitis of sigmoid colon     Inflamed seborrheic keratosis     SK (seborrheic keratosis)     Cherry angioma       Allergies   Allergen Reactions     Ciprofloxacin Swelling     Stiff and swollen neck     Amoxicillin-Pot Clavulanate [Augmentin]      Contrast Dye      gets cold and feels terrible  Pt was ok with Isovue-370 and no pre-meds     Doxycycline Hyclate Rash     Erythromycin Swelling     Flagyl [Metronidazole Hcl]      Meperidine Hcl      low blood pressure     Oxycodone      Prochlorperazine      agitated     Sulfa Drugs Rash     Tetracycline Rash     Cefprozil Rash         Current  Outpatient Medications   Medication     acetaminophen (MAPAP) 325 MG tablet     ASPIRIN PO     fluticasone (FLONASE) 50 MCG/ACT nasal spray     IBUPROFEN PO     LACTOBACILLUS RHAMNOSUS, GG, PO     meclizine (ANTIVERT) 25 MG tablet     omeprazole (PRILOSEC) 20 MG CR capsule     triamcinolone (KENALOG) 0.1 % external ointment     No current facility-administered medications for this visit.           SOCIAL HISTORY:  The patient is .  She lives in [place].  She has a daughter in the area. In monogamous relationship for the last 7 years.      REVIEW OF SYSTEMS:  Feels well without additional skin concerns.      PHYSICAL EXAMINATION:   There were no vitals taken for this visit.    GENERAL:  The patient is a healthy-appearing 72-year-old female in no distress.   HEENT:  Conjunctivae are clear.   PULMONARY:  Breathing comfortably on room air.   ABDOMEN:  No abdominal distention.   SKIN:  Examination today included the face and genital area and hands  -Waxy 3 mm papule on the dorsal L hand  -Waxy 1 cm plaque on the L nasal sidewall  -Linear array of circular 2 mm erosions on the L mons pubis with RBC crust  -Scattered waxy papules on the neck      ASSESSMENT AND PLAN:   1.  Seborrheic keratoses; benign keratotic papules.  No treatment advised.   2.  Irritated seborrheic keratoses around the eyes.  A total of 2 lesions were treated with 10-second freeze-thaw cycle with liquid nitrogen on the L sidewall and L hand/  Wound info provided.   3. Erosions on the mons pubis. Differential diagnosis of HSV vs zoster. Swab for HSV 1/2 collected. Advised triamcinolone 0.1% BID for itch/ irritation.      The patient to return to Dermatology Clinic as needed.     Keisha Carrillo MD  Dermatology Staff       cc:   Roe Connolly MD   [facility]   [address] [place] [address]  [id number]

## 2019-05-28 NOTE — LETTER
5/28/2019      RE: Brionna Rlolins  [address] [place] [address]       DERMATOLOGY CLINIC VISIT NOTE        DERMATOLOGY PROBLEM LIST:   1.  Seborrheic keratoses.   2.  Irritated seborrheic keratoses.   3.  Cherry angiomas.   4.  Blistering eruption on the mons pubis    CHIEF COMPLAINT:  Followup irritated seborrheic keratoses.      HISTORY OF PRESENT ILLNESS:  Ms. Rollins a 74-year-old female returning to Dermatology Clinic for ongoing evaluation of irritated seborrheic keratoses.  The patient has history of extensive, hundreds, of seborrheic keratoses.  Today she notes that she has an irritated lesion on the L sidewall under the glasses on on the L dorsal hand. For the last 6 days she has had a blistering eruption on the L mons pubis. It is currently itchy. Using bactroban to the area. No known history of genital hsv.      Patient Active Problem List   Diagnosis     Back pain     Osteoporosis     Internal derangement of knee     Estrogen replacement therapy     Colon Polyps     Microscopic hematuria     CARDIOVASCULAR SCREENING; LDL GOAL LESS THAN 160     ACP (advance care planning)     Mixed incontinence urge and stress     Fracture of distal end of radius     Hypertension goal BP (blood pressure) < 140/90     Atopic rhinitis     Allergy to anti-infective agent     Enteritis     Osteoarthritis of hip     Osteoarthritis of knee     Acute posthemorrhagic anemia     Surgical follow-up care     Diverticulitis of sigmoid colon     Inflamed seborrheic keratosis     SK (seborrheic keratosis)     Cherry angioma       Allergies   Allergen Reactions     Ciprofloxacin Swelling     Stiff and swollen neck     Amoxicillin-Pot Clavulanate [Augmentin]      Contrast Dye      gets cold and feels terrible  Pt was ok with Isovue-370 and no pre-meds     Doxycycline Hyclate Rash     Erythromycin Swelling     Flagyl [Metronidazole Hcl]      Meperidine Hcl      low blood pressure     Oxycodone      Prochlorperazine       agitated     Sulfa Drugs Rash     Tetracycline Rash     Cefprozil Rash         Current Outpatient Medications   Medication     acetaminophen (MAPAP) 325 MG tablet     ASPIRIN PO     fluticasone (FLONASE) 50 MCG/ACT nasal spray     IBUPROFEN PO     LACTOBACILLUS RHAMNOSUS, GG, PO     meclizine (ANTIVERT) 25 MG tablet     omeprazole (PRILOSEC) 20 MG CR capsule     triamcinolone (KENALOG) 0.1 % external ointment     No current facility-administered medications for this visit.           SOCIAL HISTORY:  The patient is .  She lives in [place].  She has a daughter in the area. In monogamous relationship for the last 7 years.      REVIEW OF SYSTEMS:  Feels well without additional skin concerns.      PHYSICAL EXAMINATION:   There were no vitals taken for this visit.    GENERAL:  The patient is a healthy-appearing 72-year-old female in no distress.   HEENT:  Conjunctivae are clear.   PULMONARY:  Breathing comfortably on room air.   ABDOMEN:  No abdominal distention.   SKIN:  Examination today included the face and genital area and hands  -Waxy 3 mm papule on the dorsal L hand  -Waxy 1 cm plaque on the L nasal sidewall  -Linear array of circular 2 mm erosions on the L mons pubis with RBC crust  -Scattered waxy papules on the neck      ASSESSMENT AND PLAN:   1.  Seborrheic keratoses; benign keratotic papules.  No treatment advised.   2.  Irritated seborrheic keratoses around the eyes.  A total of 2 lesions were treated with 10-second freeze-thaw cycle with liquid nitrogen on the L sidewall and L hand/  Wound info provided.   3. Erosions on the mons pubis. Differential diagnosis of HSV vs zoster. Swab for HSV 1/2 collected. Advised triamcinolone 0.1% BID for itch/ irritation.      The patient to return to Dermatology Clinic as needed.     Keisha Carrillo MD  Dermatology Staff       cc:   Roe Connolly MD   [address]  [id number]

## 2019-05-29 LAB
HSV1 DNA SPEC QL NAA+PROBE: POSITIVE
HSV2 DNA SPEC QL NAA+PROBE: NEGATIVE
SPECIMEN SOURCE: ABNORMAL

## 2019-05-30 ENCOUNTER — TELEPHONE (OUTPATIENT)
Dept: PEDIATRICS | Facility: CLINIC | Age: 74
End: 2019-05-30

## 2019-05-30 DIAGNOSIS — B00.9 HSV (HERPES SIMPLEX VIRUS) INFECTION: Primary | ICD-10-CM

## 2019-05-30 RX ORDER — VALACYCLOVIR HYDROCHLORIDE 1 G/1
2000 TABLET, FILM COATED ORAL 2 TIMES DAILY
Qty: 4 TABLET | Refills: 0 | Status: SHIPPED | OUTPATIENT
Start: 2019-05-30 | End: 2019-06-07

## 2019-05-30 NOTE — TELEPHONE ENCOUNTER
Dr. Carrillo please review skin results patient wondering if ok to be around those with weakened immune system    Luisa RECINOS RN, BSN, PHN

## 2019-05-30 NOTE — TELEPHONE ENCOUNTER
Called with results showing HSV1. Noted oral or sexual transmission. Sent prescription for valtrex. Discussed transmission precautions.

## 2019-05-30 NOTE — TELEPHONE ENCOUNTER
Pt called wanting to speak with a nurse for advice.  She saw Dr. Carrillo to have a rash tested for shingles.  She hasn't received results yet and she was given a cream after appointment.  She said [place] and will be around a friend who has cancer.  She said their immune system is very weak and she didn't think (if she actually does have shingles) that she should be around her friend.  She wanted some advice.  I did see results were posted but I don't believe they have been reviewed and I did not mention to the patient results looked like they were in.      She will be planning to leave early morning and would like a call back to advise as soon as possible today.  [phone]  Can leave a message

## 2019-06-07 ENCOUNTER — OFFICE VISIT (OUTPATIENT)
Dept: PEDIATRICS | Facility: CLINIC | Age: 74
End: 2019-06-07
Payer: MEDICARE

## 2019-06-07 VITALS
HEIGHT: 64 IN | HEART RATE: 71 BPM | DIASTOLIC BLOOD PRESSURE: 72 MMHG | SYSTOLIC BLOOD PRESSURE: 122 MMHG | BODY MASS INDEX: 21.13 KG/M2 | TEMPERATURE: 98.4 F | OXYGEN SATURATION: 98 % | WEIGHT: 123.8 LBS

## 2019-06-07 DIAGNOSIS — B00.9 HSV (HERPES SIMPLEX VIRUS) INFECTION: ICD-10-CM

## 2019-06-07 PROCEDURE — 99213 OFFICE O/P EST LOW 20 MIN: CPT | Performed by: INTERNAL MEDICINE

## 2019-06-07 RX ORDER — VALACYCLOVIR HYDROCHLORIDE 1 G/1
2000 TABLET, FILM COATED ORAL 2 TIMES DAILY
Qty: 4 TABLET | Refills: 1 | Status: SHIPPED | OUTPATIENT
Start: 2019-06-07 | End: 2019-09-16

## 2019-06-07 ASSESSMENT — MIFFLIN-ST. JEOR: SCORE: 1043.61

## 2019-06-07 NOTE — PROGRESS NOTES
Subjective     Brionna Rollins is a 74 year old female who presents to clinic today for the following health issues:    HPI   Pt is here to follow up on lab results.  Wants to discuss the implications of having a positive HSV result.  Otherwise feels quite well.  She reports that the lesion that she had has dried up and is no longer bothersome.  She would like another course of the medication however as she would like to be [place] sure that this is resolved prior to upcoming vacation trip up [place].  She reports her mood is good.  No other concerns or complaints today.ROS COMP: Constitutional, HEENT, cardiovascular, pulmonary, gi and gu systems are negative, except as otherwise noted.             Review of Systems   ROS COMP: Constitutional, HEENT, cardiovascular, pulmonary, gi and gu systems are negative, except as otherwise noted.      Objective    There were no vitals taken for this visit.  There is no height or weight on file to calculate BMI.  Physical Exam   GENERAL: healthy, alert and no distress  PSYCH: mentation appears normal, affect normal/bright    Results for orders placed or performed in visit on 05/28/19   HSV 1 and 2 DNA by PCR   Result Value Ref Range    HSV Specimen Type MONS PUBIS     HSV Type 1 PCR Positive (A) NEG^Negative    HSV Type 2 PCR Negative NEG^Negative               ASSESSMENT/PLAN:                                                    (B00.9) HSV (herpes simplex virus) infection  Comment: discussed with patient (or patient's parents/caregiver) pathophysiology of condition and treatment options.  The patient's positive HSV 1 PCR today.  The patient has a history of cold sores and this outbreak is likely from autoinoculation or exposure in the distant past.  She has been monogamous with one sexual partner for the last several years and has no concerns about other electric transmitted diseases.  I agreed to refill the patient's acyclovir per her request and did review in the future that if  "this became a recurrent problem we could try silver daily for suppression.  Patient was very happy with the results of our discussion today she feels better informed and has no other concerns or complaints.  Plan: valACYclovir (VALTREX) 1000 mg tablet            Estimated body mass index is 21.59 kg/m  as calculated from the following:    Height as of this encounter: 1.613 m (5' 3.5\").    Weight as of this encounter: 56.2 kg (123 lb 12.8 oz).      Return to clinic as needed or if symptoms persist, change, worsen or if any new symptoms develop.        Roe Connolly M.D.  Internal Medicine-Pediatrics      "

## 2019-06-07 NOTE — PATIENT INSTRUCTIONS
Patient Education     Living with Herpes  To speed healing, take care of open herpes sores. To reduce outbreaks, take care of your health. And to keep from infecting others, learn how to avoid spreading the virus.     To ease symptoms    Start episodic treatment at the first sign of symptoms, such as itching or tingling.    Take ibuprofen or acetaminophen to limit any pain.    Sit in a warm or cool bath or use a moist compress to lessen the itching of sores. For some women, genital outbreaks cause burning during urination. In such cases, urinating in a tub of warm water helps reduce burning.    Wear white cotton underwear and loose clothing during outbreaks. Don t wear nylon underwear or tight clothes. They can prevent sores from healing.       To speed healing    Wash sores with mild soap and water. Pat (don't rub) the sores completely dry.    Always wash your hands after touching a sore.    Don t bandage sores. Air helps them heal.    Avoid using any ointment unless it is prescribed. Applying the wrong jelly or cream may hold in moisture and slow healing.    Don t pick at the sores. This can slow healing, and might cause a sore to become infected.    If you wear contacts, wash your hands well before putting them in.       To reduce outbreaks    Eat a balanced diet. Your health care provider may suggest taking supplements. These help ensure that you get all the nutrients you need.    Get plenty of sleep. This helps your immune system work its best.    Limit stress and tension. Both can weaken the body s defenses.    Limit exposure to sun, wind, and extreme heat or cold. Wear sunscreen and lip balm to help prevent outbreaks.       To protect others    Tell your current sex partner and any future partners that you have herpes. If you don t know what to say, ask your healthcare provider for help.    Use a latex condom that covers the affected areas each time you have sex. This reduces the risk of passing herpes to  your partner.    Avoid kissing when you have an oral sore.    Do not have intercourse when genital sores are present. Also keep in mind, herpes can be passed during oral sex and with anal contact.    Don t share towels, toothbrushes, lip balm, or lipstick when you have a sore.    If you have very frequent outbreaks, taking daily antiviral medicines can help reduce the likelihood of transmission to your partner.    Date Last Reviewed: 1/1/2017 2000-2018 [employer]. [address] [place] [address], [place] [id number]. All rights reserved. This information is not intended as a substitute for professional medical care. Always follow your healthcare professional's instructions.

## 2019-09-16 DIAGNOSIS — B00.9 HSV (HERPES SIMPLEX VIRUS) INFECTION: ICD-10-CM

## 2019-09-16 NOTE — TELEPHONE ENCOUNTER
"Requested Prescriptions   Pending Prescriptions Disp Refills     valACYclovir (VALTREX) 1000 mg tablet [Pharmacy Med Name: VALACYCLOVIR HCL 1GM TABS]    Last Written Prescription Date:  6/7/2019  Last Fill Quantity: 4,  # refills: 1   Last office visit: 6/7/2019 with prescribing provider:  Roe Connolly     Future Office Visit:     4 tablet 1     Sig: TAKE TWO TABLETS BY MOUTH TWICE A DAY       Antivirals for Herpes Protocol Passed - 9/16/2019 12:47 PM        Passed - Patient is age 12 or older        Passed - Recent (12 mo) or future (30 days) visit within the authorizing provider's specialty     Patient had office visit in the last 12 months or has a visit in the next 30 days with authorizing provider or within the authorizing provider's specialty.  See \"Patient Info\" tab in inbasket, or \"Choose Columns\" in Meds & Orders section of the refill encounter.              Passed - Medication is active on med list        Passed - Normal serum creatinine on file in past 12 months     Recent Labs   Lab Test 11/01/18  0946   CR 0.70               "

## 2019-09-17 RX ORDER — VALACYCLOVIR HYDROCHLORIDE 1 G/1
TABLET, FILM COATED ORAL
Qty: 4 TABLET | Refills: 1 | Status: SHIPPED | OUTPATIENT
Start: 2019-09-17 | End: 2021-08-11

## 2019-09-17 NOTE — TELEPHONE ENCOUNTER
Prescription approved per [facility] Refill Protocol. Will need Creat in November.  Lisa Vargas RN

## 2019-09-19 ENCOUNTER — NURSE TRIAGE (OUTPATIENT)
Dept: NURSING | Facility: CLINIC | Age: 74
End: 2019-09-19

## 2019-09-19 NOTE — TELEPHONE ENCOUNTER
"Two day history of abdominal pain described as \"constant\" but also \"cramping\" along with diarrhea.  2 stools in the past 24 hours.  Triaged to be seen today, transferred to scheduling, 1 appt available this afternoon but did review UC/ED options as well.          Additional Information    Negative: SEVERE abdominal pain (e.g., excruciating)    Negative: Vomiting red blood or black (coffee ground) material    Negative: Bloody, black, or tarry bowel movements    Constant abdominal pain lasting > 2 hours    Protocols used: ABDOMINAL PAIN - FEMALE-A-OH      "

## 2019-09-20 ENCOUNTER — TELEPHONE (OUTPATIENT)
Dept: PEDIATRICS | Facility: CLINIC | Age: 74
End: 2019-09-20

## 2019-09-20 NOTE — TELEPHONE ENCOUNTER
Patient would like a call back from Tia AJRRELL PCP nurse    Didn't state the reason for the call just wants a call back from Tia

## 2019-09-26 ENCOUNTER — PRE VISIT (OUTPATIENT)
Dept: PEDIATRICS | Facility: CLINIC | Age: 74
End: 2019-09-26

## 2019-09-26 NOTE — TELEPHONE ENCOUNTER
Pre-Visit Planning     Future Appointments   Date Time Provider Department Center   10/1/2019 11:00 AM Roe Connolly MD [facility]     Appointment Notes for this encounter:   Data Unavailable    Patient preferred phone number: [phone]    Patient contacted.   Unable to reach. Left voicemail.

## 2019-10-01 ENCOUNTER — OFFICE VISIT (OUTPATIENT)
Dept: PEDIATRICS | Facility: CLINIC | Age: 74
End: 2019-10-01
Payer: MEDICARE

## 2019-10-01 VITALS
RESPIRATION RATE: 20 BRPM | HEIGHT: 64 IN | SYSTOLIC BLOOD PRESSURE: 122 MMHG | WEIGHT: 122.3 LBS | HEART RATE: 80 BPM | TEMPERATURE: 98.6 F | DIASTOLIC BLOOD PRESSURE: 62 MMHG | BODY MASS INDEX: 20.88 KG/M2

## 2019-10-01 DIAGNOSIS — B00.9 HSV (HERPES SIMPLEX VIRUS) INFECTION: Primary | ICD-10-CM

## 2019-10-01 DIAGNOSIS — Z23 NEEDS FLU SHOT: ICD-10-CM

## 2019-10-01 PROCEDURE — 99213 OFFICE O/P EST LOW 20 MIN: CPT | Mod: 25 | Performed by: INTERNAL MEDICINE

## 2019-10-01 PROCEDURE — 90662 IIV NO PRSV INCREASED AG IM: CPT | Performed by: INTERNAL MEDICINE

## 2019-10-01 PROCEDURE — G0008 ADMIN INFLUENZA VIRUS VAC: HCPCS | Performed by: INTERNAL MEDICINE

## 2019-10-01 RX ORDER — VALACYCLOVIR HYDROCHLORIDE 500 MG/1
500 TABLET, FILM COATED ORAL DAILY
Qty: 90 TABLET | Refills: 3 | Status: SHIPPED | OUTPATIENT
Start: 2019-10-01 | End: 2019-12-04

## 2019-10-01 ASSESSMENT — MIFFLIN-ST. JEOR: SCORE: 1036.81

## 2019-10-01 NOTE — PROGRESS NOTES
SUBJECTIVE:                                                    Brionna Rollins is a 74 year old female who presents to clinic today for the following health issues:    Follow up      Duration: 4.5 months    Description (location/character/radiation): Follow-up vides labs and new Dx    Intensity:  moderate    Accompanying signs and symptoms: NA    History (similar episodes/previous evaluation): HSV    Precipitating or alleviating factors: None    Therapies tried and outcome: VALTREX. Pt is unsure if effective or not. Will like to discuss     patient here to discuss recent diagnosis of HSV1, Feels well, would like to discuss the valtex as well, does need follow-up appointment with Dr. Carrillo scheduled as well. feels well otherwise, mood is good. still using the CBD supplements and help her sleep at night. no side effects. No other concerns or complaints today.       Problem list and histories reviewed & adjusted, as indicated.  Additional history: as documented    Patient Active Problem List    Diagnosis Date Noted     Inflamed seborrheic keratosis 11/02/2017     Priority: Medium     SK (seborrheic keratosis) 11/02/2017     Priority: Medium     Cherry angioma 11/02/2017     Priority: Medium     Atopic rhinitis 02/05/2016     Priority: Medium     Hypertension goal BP (blood pressure) < 140/90 01/07/2016     Priority: Medium     Osteoarthritis of knee 12/18/2015     Priority: Medium     Fracture of distal end of radius 10/22/2015     Priority: Medium     Surgical follow-up care 06/01/2015     Priority: Medium     Overview:   inactive icd-9 diagnosis auto replaced with icd-10, display name retained//mporz       Acute posthemorrhagic anemia 04/29/2015     Priority: Medium     Osteoarthritis of hip 04/07/2015     Priority: Medium     Allergy to anti-infective agent 03/26/2014     Priority: Medium     Enteritis 03/26/2014     Priority: Medium     Diverticulitis of sigmoid colon 03/26/2014     Priority: Medium     ACP  "(advance care planning) 06/06/2011     Priority: Medium     Patient states has Advance Directive and will bring in a copy to clinic. 6/6/2011   Pt document is in epic, dated 1/12/05.  Discussed with pt and she may plan to update this and verbally gave the health care agent information listed. Leigh Naranjo RN           Mixed incontinence urge and stress 06/06/2011     Priority: Medium     CARDIOVASCULAR SCREENING; LDL GOAL LESS THAN 160 02/10/2010     Priority: Medium     Microscopic hematuria 11/21/2008     Priority: Medium     Colon Polyps      Priority: Medium     Estrogen replacement therapy 09/07/2005     Priority: Medium     Internal derangement of knee 04/30/2005     Priority: Medium     L knee x 4-5 wks, no known injury but \"runs through it\" regularly  Problem list name updated by automated process. Provider to review       Back pain 04/18/2003     Priority: Medium       Patient is followed by [facility] for ongoing prescription of narcotic pain medicine.  Med: vicodin.   Maximum use per month: 30  Expected duration: indefinite  Narcotic agreement on file: NO  Clinic visit recommended: Q 3 months         Osteoporosis 04/18/2003     Priority: Medium     Problem list name updated by automated process. Provider to review       Social History     Tobacco Use     Smoking status: Never Smoker     Smokeless tobacco: Never Used   Substance Use Topics     Alcohol use: Yes     Comment: occ with dinner q night     Drug use: No     Family History   Problem Relation Age of Onset     Gastrointestinal Disease Mother         colon polyps     Diabetes Mother      Arthritis Father         Rheumatoid     Eye Disorder Father         mac. degeneration femp op bypass     Cancer - colorectal Maternal Grandmother      C.A.D. No family hx of      Breast Cancer No family hx of        ROS:  A complete 10 point review of systems was taken and negative except for those noted in the subjective/HPI section(s) above     BP Readings " "from Last 3 Encounters:   10/01/19 122/62   06/07/19 122/72   05/07/19 128/70    Wt Readings from Last 3 Encounters:   10/01/19 55.5 kg (122 lb 4.8 oz)   06/07/19 56.2 kg (123 lb 12.8 oz)   05/07/19 57.6 kg (127 lb)                    OBJECTIVE:                                                    /62   Pulse 80   Temp 98.6  F (37  C) (Oral)   Resp 20   Ht 1.613 m (5' 3.5\")   Wt 55.5 kg (122 lb 4.8 oz)   BMI 21.32 kg/m     Constitutional: healthy, alert and no distress  Psychiatric: mentation appears normal and affect normal/bright      Results for orders placed or performed in visit on 05/28/19   HSV 1 and 2 DNA by PCR   Result Value Ref Range    HSV Specimen Type MONS PUBIS     HSV Type 1 PCR Positive (A) NEG^Negative    HSV Type 2 PCR Negative NEG^Negative            ASSESSMENT/PLAN:                                                    (B00.9) HSV (herpes simplex virus) infection  (primary encounter diagnosis)  Comment: discussed with patient (or patient's parents/caregiver) pathophysiology of condition and treatment options.  Patient had several questions and concerns around her recent HSV test.  We discussed in detail the pathophysiology of her B simplex virus including exposure potential intubation and a cinematic window as well as causes for flareups of the lesions.  She does think that the Valtrex is helping and I did encourage her to continue to take this on an as-needed basis.  Did review that this is a virus that goal should be symptom suppression to avoid flareups as well as potentially transmitting the virus.  All the patient's questions were answered.  She will continue the Valtrex and we did schedule a follow-up visit with the patient with Dr. Cox as well as she has several other skin issues she would like to discuss in follow-up.  Plan: valACYclovir (VALTREX) 500 MG tablet            (Z23) Needs flu shot  Plan:  FLU VAC PRESRV FREE QUAD SPLIT VIR         3+YRS IM          Estimated body mass " "index is 21.59 kg/m  as calculated from the following:    Height as of 6/7/19: 1.613 m (5' 3.5\").    Weight as of 6/7/19: 56.2 kg (123 lb 12.8 oz).      Return to clinic as needed or if symptoms persist, change, worsen or if any new symptoms develop.    Total time spent with patient was 20 min, of which greater than 15 minutes of face to face time and/or coordination of care discussing the above issue(s).     Roe Connolly M.D.  Internal Medicine-Pediatrics              "

## 2019-10-01 NOTE — PATIENT INSTRUCTIONS
Patient Education     Valacyclovir Hydrochloride Oral tablet  What is this medicine?  VALACYCLOVIR (elizabeth ay ROSENDA ramirez veer) is an antiviral medicine. It is used to treat or prevent infections caused by certain kinds of viruses. Examples of these infections include herpes and shingles. This medicine will not cure herpes.  This medicine may be used for other purposes; ask your health care provider or pharmacist if you have questions.  What should I tell my health care provider before I take this medicine?  They need to know if you have any of these conditions:    acquired immunodeficiency syndrome (AIDS)    any other condition that may weaken the immune system    bone marrow or kidney transplant    kidney disease    an unusual or allergic reaction to valacyclovir, acyclovir, ganciclovir, valganciclovir, other medicines, foods, dyes, or preservatives    pregnant or trying to get pregnant    breast-feeding  How should I use this medicine?  Take this medicine by mouth with a glass of water. Follow the directions on the prescription label. You can take this medicine with or without food. Take your doses at regular intervals. Do not take your medicine more often than directed. Finish the full course prescribed by your doctor or health care professional even if you think your condition is better. Do not stop taking except on the advice of your doctor or health care professional.  Talk to your pediatrician regarding the use of this medicine in children. While this drug may be prescribed for children as young as 2 years for selected conditions, precautions do apply.  Overdosage: If you think you have taken too much of this medicine contact a poison control center or emergency room at once.  NOTE: This medicine is only for you. Do not share this medicine with others.  What if I miss a dose?  If you miss a dose, take it as soon as you can. If it is almost time for your next dose, take only that dose. Do not take double or extra  doses.  What may interact with this medicine?    cimetidine    probenecid  This list may not describe all possible interactions. Give your health care provider a list of all the medicines, herbs, non-prescription drugs, or dietary supplements you use. Also tell them if you smoke, drink alcohol, or use illegal drugs. Some items may interact with your medicine.  What should I watch for while using this medicine?  Tell your doctor or health care professional if your symptoms do not start to get better after 1 week.  This medicine works best when taken early in the course of an infection, within the first 72 hours. Begin treatment as soon as possible after the first signs of infection like tingling, itching, or pain in the affected area.  It is possible that genital herpes may still be spread even when you are not having symptoms. Always use safer sex practices like condoms made of latex or polyurethane whenever you have sexual contact.  You should stay well hydrated while taking this medicine. Drink plenty of fluids.  What side effects may I notice from receiving this medicine?  Side effects that you should report to your doctor or health care professional as soon as possible:    allergic reactions like skin rash, itching or hives, swelling of the face, lips, or tongue    aggressive behavior    confusion    hallucinations    problems with balance, talking, walking    stomach pain    tremor    trouble passing urine or change in the amount of urine  Side effects that usually do not require medical attention (report to your doctor or health care professional if they continue or are bothersome):    dizziness    headache    nausea, vomiting  This list may not describe all possible side effects. Call your doctor for medical advice about side effects. You may report side effects to FDA at 1-353-YIQ-0482.  Where should I keep my medicine?  Keep out of the reach of children.  Store at room temperature between 15 and 25 degrees  C (59 and 77 degrees F). Keep container tightly closed. Throw away any unused medicine after the expiration date.  NOTE:This sheet is a summary. It may not cover all possible information. If you have questions about this medicine, talk to your doctor, pharmacist, or health care provider. Copyright  2016 Gold Standard

## 2019-12-02 ENCOUNTER — PRE VISIT (OUTPATIENT)
Dept: PEDIATRICS | Facility: CLINIC | Age: 74
End: 2019-12-02

## 2019-12-02 NOTE — TELEPHONE ENCOUNTER
Pre-Visit Planning     Future Appointments   Date Time Provider Department Center   12/3/2019 11:30 AM Keisha Carrillo MD [facility]   12/4/2019 11:50 AM [facility]     Arrival Time for this Appointment:    Appointment Notes for this encounter:   Data Unavailable    Questionnaires Reviewed/Assigned  No additional questionnaires are needed       Patient preferred phone number: [phone]    Unable to reach. Left voicemail.

## 2019-12-03 ENCOUNTER — OFFICE VISIT (OUTPATIENT)
Dept: DERMATOLOGY | Facility: CLINIC | Age: 74
End: 2019-12-03
Payer: MEDICARE

## 2019-12-03 DIAGNOSIS — D48.5 NEOPLASM OF UNCERTAIN BEHAVIOR OF SKIN: Primary | ICD-10-CM

## 2019-12-03 DIAGNOSIS — L82.0 INFLAMED SEBORRHEIC KERATOSIS: ICD-10-CM

## 2019-12-03 PROCEDURE — 17110 DESTRUCTION B9 LES UP TO 14: CPT | Performed by: DERMATOLOGY

## 2019-12-03 PROCEDURE — 88305 TISSUE EXAM BY PATHOLOGIST: CPT | Mod: TC | Performed by: DERMATOLOGY

## 2019-12-03 PROCEDURE — 99214 OFFICE O/P EST MOD 30 MIN: CPT | Mod: 25 | Performed by: DERMATOLOGY

## 2019-12-03 PROCEDURE — 11102 TANGNTL BX SKIN SINGLE LES: CPT | Mod: 59 | Performed by: DERMATOLOGY

## 2019-12-03 RX ORDER — LIDOCAINE HYDROCHLORIDE AND EPINEPHRINE 10; 10 MG/ML; UG/ML
3 INJECTION, SOLUTION INFILTRATION; PERINEURAL ONCE
Status: ACTIVE | OUTPATIENT
Start: 2019-12-03

## 2019-12-03 NOTE — LETTER
12/3/2019      RE: Brionna Rollins  [address] [facility]  [place] [address]       DERMATOLOGY CLINIC VISIT NOTE        DERMATOLOGY PROBLEM LIST:   1.  Seborrheic keratoses.   2.  Irritated seborrheic keratoses.   3.  Cherry angiomas.   4.  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 Neoplasm of uncertain behavior on the R buttock, biopsy on 12/3/19    CHIEF COMPLAINT:  Followup irritated seborrheic keratoses.      HISTORY OF PRESENT ILLNESS:  Ms. Rollins a 74-year-old female returning to Dermatology Clinic for ongoing evaluation of irritated seborrheic keratoses.  The patient has history of extensive, hundreds, of seborrheic keratoses.  Today she notes that she has an irritated lesions under the breasts. She also has a bump on the R buttock that rubs on her pants and gets sore. Present many years.      Patient Active Problem List   Diagnosis     Back pain     Osteoporosis     Internal derangement of knee     Estrogen replacement therapy     Colon Polyps     Microscopic hematuria     CARDIOVASCULAR SCREENING; LDL GOAL LESS THAN 160     ACP (advance care planning)     Mixed incontinence urge and stress     Fracture of distal end of radius     Hypertension goal BP (blood pressure) < 140/90     Atopic rhinitis     Allergy to anti-infective agent     Enteritis     Osteoarthritis of hip     Osteoarthritis of knee     Acute posthemorrhagic anemia     Surgical follow-up care     Diverticulitis of sigmoid colon     Inflamed seborrheic keratosis     SK (seborrheic keratosis)     Cherry angioma       Allergies   Allergen Reactions     Ciprofloxacin Swelling     Stiff and swollen neck     Amoxicillin-Pot Clavulanate [Augmentin]      Contrast Dye      gets cold and feels terrible  Pt was ok with Isovue-370 and no pre-meds     Doxycycline Hyclate Rash     Erythromycin Swelling     Flagyl [Metronidazole Hcl]      Meperidine Hcl      low blood pressure     Oxycodone      Prochlorperazine      agitated     Sulfa Drugs Rash     Tetracycline Rash      Cefprozil Rash         Current Outpatient Medications   Medication     acetaminophen (MAPAP) 325 MG tablet     ASPIRIN PO     fluticasone (FLONASE) 50 MCG/ACT nasal spray     IBUPROFEN PO     LACTOBACILLUS RHAMNOSUS, GG, PO     omeprazole (PRILOSEC) 20 MG CR capsule     triamcinolone (KENALOG) 0.1 % external ointment     valACYclovir (VALTREX) 1000 mg tablet     valACYclovir (VALTREX) 500 MG tablet     Current Facility-Administered Medications   Medication     lidocaine 1% with EPINEPHrine 1:100,000 injection 3 mL          SOCIAL HISTORY:  The patient is .  She lives in [place].  She has a daughter in the area. In monogamous relationship for the last 7 years.      REVIEW OF SYSTEMS:  Feels well without additional skin concerns.      PHYSICAL EXAMINATION:   There were no vitals taken for this visit.    GENERAL:  The patient is a healthy-appearing  female in no distress.   HEENT:  Conjunctivae are clear.   PULMONARY:  Breathing comfortably on room air.   ABDOMEN:  No abdominal distention.   SKIN:  Examination today included the back, chest, neck, buttocks  -Extensive tan and brown waxy papules diffusely on the chest, back, abdomen  -Soft approx 8 mm papule, flesh colored, on the R inferior buttocks    Shave biopsy:  After discussion of benefits and risks including but not limited to bleeding/bruising, pain/swelling, infection, scar, incomplete removal, nerve damage/numbness, recurrence, and non-diagnostic biopsy, written consent, verbal consent and photographs were obtained. Time-out was performed. The area was cleaned with isopropyl alcohol. 1 mL of 1% lidocaine with epinephrine was injected to obtain adequate anesthesia of the lesion on the R buttocks. A shave biopsy was performed. Hemostasis was achieved with aluminium chloride. Vaseline and a sterile dressing were applied. The patient tolerated the procedure and no complications were noted. The patient was provided with verbal and written post care  instructions.        ASSESSMENT AND PLAN:   1.  Seborrheic keratoses; benign keratotic papules.  No treatment advised.   2.  Irritated seborrheic keratoses around the eyes.  A total of 12 lesions were treated with 10-second freeze-thaw cycle with liquid nitrogen on inframammary chest.   3. Neoplasm of uncertain behavior on the R buttocks- Neurofibroma vs dermal nevus, biopsy today. I will contact patient with results.      The patient to return to Dermatology Clinic as needed.     Keisha Carrillo MD  Dermatology Staff       cc:   Roe Connolly MD   [address]  [address]

## 2019-12-03 NOTE — PROGRESS NOTES
DERMATOLOGY CLINIC VISIT NOTE        DERMATOLOGY PROBLEM LIST:   1.  Seborrheic keratoses.   2.  Irritated seborrheic keratoses.   3.  Cherry angiomas.   4.  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 Neoplasm of uncertain behavior on the R buttock, biopsy on 12/3/19    CHIEF COMPLAINT:  Followup irritated seborrheic keratoses.      HISTORY OF PRESENT ILLNESS:  Ms. Rollins a 74-year-old female returning to Dermatology Clinic for ongoing evaluation of irritated seborrheic keratoses.  The patient has history of extensive, hundreds, of seborrheic keratoses.  Today she notes that she has an irritated lesions under the breasts. She also has a bump on the R buttock that rubs on her pants and gets sore. Present many years.      Patient Active Problem List   Diagnosis     Back pain     Osteoporosis     Internal derangement of knee     Estrogen replacement therapy     Colon Polyps     Microscopic hematuria     CARDIOVASCULAR SCREENING; LDL GOAL LESS THAN 160     ACP (advance care planning)     Mixed incontinence urge and stress     Fracture of distal end of radius     Hypertension goal BP (blood pressure) < 140/90     Atopic rhinitis     Allergy to anti-infective agent     Enteritis     Osteoarthritis of hip     Osteoarthritis of knee     Acute posthemorrhagic anemia     Surgical follow-up care     Diverticulitis of sigmoid colon     Inflamed seborrheic keratosis     SK (seborrheic keratosis)     Cherry angioma       Allergies   Allergen Reactions     Ciprofloxacin Swelling     Stiff and swollen neck     Amoxicillin-Pot Clavulanate [Augmentin]      Contrast Dye      gets cold and feels terrible  Pt was ok with Isovue-370 and no pre-meds     Doxycycline Hyclate Rash     Erythromycin Swelling     Flagyl [Metronidazole Hcl]      Meperidine Hcl      low blood pressure     Oxycodone      Prochlorperazine      agitated     Sulfa Drugs Rash     Tetracycline Rash     Cefprozil Rash         Current Outpatient Medications   Medication     acetaminophen  (MAPAP) 325 MG tablet     ASPIRIN PO     fluticasone (FLONASE) 50 MCG/ACT nasal spray     IBUPROFEN PO     LACTOBACILLUS RHAMNOSUS, GG, PO     omeprazole (PRILOSEC) 20 MG CR capsule     triamcinolone (KENALOG) 0.1 % external ointment     valACYclovir (VALTREX) 1000 mg tablet     valACYclovir (VALTREX) 500 MG tablet     Current Facility-Administered Medications   Medication     lidocaine 1% with EPINEPHrine 1:100,000 injection 3 mL          SOCIAL HISTORY:  The patient is .  She lives in [place].  She has a daughter in the area. In monogamous relationship for the last 7 years.      REVIEW OF SYSTEMS:  Feels well without additional skin concerns.      PHYSICAL EXAMINATION:   There were no vitals taken for this visit.    GENERAL:  The patient is a healthy-appearing  female in no distress.   HEENT:  Conjunctivae are clear.   PULMONARY:  Breathing comfortably on room air.   ABDOMEN:  No abdominal distention.   SKIN:  Examination today included the back, chest, neck, buttocks  -Extensive tan and brown waxy papules diffusely on the chest, back, abdomen  -Soft approx 8 mm papule, flesh colored, on the R inferior buttocks    Shave biopsy:  After discussion of benefits and risks including but not limited to bleeding/bruising, pain/swelling, infection, scar, incomplete removal, nerve damage/numbness, recurrence, and non-diagnostic biopsy, written consent, verbal consent and photographs were obtained. Time-out was performed. The area was cleaned with isopropyl alcohol. 1 mL of 1% lidocaine with epinephrine was injected to obtain adequate anesthesia of the lesion on the R buttocks. A shave biopsy was performed. Hemostasis was achieved with aluminium chloride. Vaseline and a sterile dressing were applied. The patient tolerated the procedure and no complications were noted. The patient was provided with verbal and written post care instructions.        ASSESSMENT AND PLAN:   1.  Seborrheic keratoses; benign keratotic papules.   No treatment advised.   2.  Irritated seborrheic keratoses around the eyes.  A total of 12 lesions were treated with 10-second freeze-thaw cycle with liquid nitrogen on inframammary chest.   3. Neoplasm of uncertain behavior on the R buttocks- Neurofibroma vs dermal nevus, biopsy today. I will contact patient with results.      The patient to return to Dermatology Clinic as needed.     Keisha Carrillo MD  Dermatology Staff       cc:   Roe Connolly MD   [address]  [address]

## 2019-12-04 ENCOUNTER — OFFICE VISIT (OUTPATIENT)
Dept: PEDIATRICS | Facility: CLINIC | Age: 74
End: 2019-12-04
Payer: MEDICARE

## 2019-12-04 VITALS
TEMPERATURE: 97.8 F | RESPIRATION RATE: 16 BRPM | HEART RATE: 72 BPM | SYSTOLIC BLOOD PRESSURE: 128 MMHG | DIASTOLIC BLOOD PRESSURE: 70 MMHG | BODY MASS INDEX: 20.92 KG/M2 | WEIGHT: 120 LBS

## 2019-12-04 DIAGNOSIS — B00.9 HSV (HERPES SIMPLEX VIRUS) INFECTION: ICD-10-CM

## 2019-12-04 LAB
ERYTHROCYTE [DISTWIDTH] IN BLOOD BY AUTOMATED COUNT: 13.3 % (ref 10–15)
HCT VFR BLD AUTO: 40 % (ref 35–47)
HGB BLD-MCNC: 13.4 G/DL (ref 11.7–15.7)
MCH RBC QN AUTO: 31.7 PG (ref 26.5–33)
MCHC RBC AUTO-ENTMCNC: 33.5 G/DL (ref 31.5–36.5)
MCV RBC AUTO: 95 FL (ref 78–100)
PLATELET # BLD AUTO: 190 10E9/L (ref 150–450)
RBC # BLD AUTO: 4.23 10E12/L (ref 3.8–5.2)
WBC # BLD AUTO: 4.4 10E9/L (ref 4–11)

## 2019-12-04 PROCEDURE — 80053 COMPREHEN METABOLIC PANEL: CPT | Performed by: INTERNAL MEDICINE

## 2019-12-04 PROCEDURE — 36415 COLL VENOUS BLD VENIPUNCTURE: CPT | Performed by: INTERNAL MEDICINE

## 2019-12-04 PROCEDURE — 85027 COMPLETE CBC AUTOMATED: CPT | Performed by: INTERNAL MEDICINE

## 2019-12-04 PROCEDURE — 99213 OFFICE O/P EST LOW 20 MIN: CPT | Performed by: INTERNAL MEDICINE

## 2019-12-04 RX ORDER — VALACYCLOVIR HYDROCHLORIDE 500 MG/1
500 TABLET, FILM COATED ORAL DAILY
Qty: 90 TABLET | Refills: 3 | Status: SHIPPED | OUTPATIENT
Start: 2019-12-04 | End: 2020-10-07

## 2019-12-04 RX ORDER — VALACYCLOVIR HYDROCHLORIDE 1 G/1
TABLET, FILM COATED ORAL
Qty: 4 TABLET | Refills: 1 | Status: CANCELLED | OUTPATIENT
Start: 2019-12-04

## 2019-12-04 NOTE — PROGRESS NOTES
"SUBJECTIVE:                                                    Brionna Rollins is a 74 year old female who presents to clinic today for the following health issues:    Family History   Problem Relation Age of Onset     Gastrointestinal Disease Mother         colon polyps     Diabetes Mother      Arthritis Father         Rheumatoid     Eye Disorder Father         mac. degeneration femp op bypass     Cancer - colorectal Maternal Grandmother      YASMIN No family hx of      Breast Cancer No family hx of        ROS:  A complete 10 point review of systems was taken and negative except for those noted in the subjective/HPI section(s) above     {CHRONICPROBDATA:534949}    OBJECTIVE:                                                    There were no vitals taken for this visit.   Constitutional: {GENERAL APPEARANCE:339643::\"healthy\",\"alert\",\"no distress\"}  Head: {HEAD EXAM:301::\"Normocephalic. No masses, lesions, tenderness or abnormalities\"}  Neck: {NECK EXAM:304::\"Neck supple. No adenopathy. Thyroid symmetric, normal size,\",\"Carotids without bruits.\"}  ENT: {ENT EXAM:5032::\"ENT exam normal, no neck nodes or sinus tenderness\"} tympanic membranes within normal limits bilaterally, no tonsillar hypertrophy or exudates  Cardiovascular: regular rate and rhythm no rubs, gallops or murmurs normal S1/S2; no S3 or S4  Respiratory: clear to auscultation bilaterally in all lung fields, normal insp/exp effort. Good air movement  Gastrointestinal: {ABDOMEN EXAM:601::\"Abdomen soft, non-tender. BS normal. No masses, organomegaly\"}  : { Normal Exam Male or Female:259395::\"Deferred\"}  Musculoskeletal: {BEAR EXAM MS/JOINT:018499::\"extremities normal- no gross deformities noted\",\"gait normal\",\"normal muscle tone\"}  Skin: {BEAR SKIN EXAM:182124::\"no suspicious lesions or rashes\"}  Neurologic: {NEURO EXAM:901::\"Gait normal. Reflexes normal and symmetric. Sensation grossly WNL.\"}  Psychiatric: {BEAR PATIENT PSYCH APPEARANCE:011809::\"mentation " "appears normal\",\"affect normal/bright\"}  Hematologic/Lymphatic/Immunologic: {BEAR EXAM LYMPH:927700::\"normal ant/post cervical, axillary, supraclavicular and inguinal nodes\"}    {Diagnostic Test Results:877732}       ASSESSMENT/PLAN:                                                    No diagnosis found.  {Tobacco Cessation needed for ACO -- Delete if patient is a non-smoker:322417}  Estimated body mass index is 21.32 kg/m  as calculated from the following:    Height as of 10/1/19: 1.613 m (5' 3.5\").    Weight as of 10/1/19: 55.5 kg (122 lb 4.8 oz).  {Weight Management Plan needed for ACO:225828}    Return to clinic as needed or if symptoms persist, change, worsen or if any new symptoms develop.    Total time spent with patient was *** min, of which greater than *** minutes of face to face time and/or coordination of care discussing the above issue(s).     Roe Connolly M.D.  Internal Medicine-Pediatrics                        "

## 2019-12-04 NOTE — PROGRESS NOTES
SUBJECTIVE:                                                    Brionna Rollins is a 74 year old female who presents to clinic today for the following health issues:    Medication Followup of Vatrex 1000 mg    Taking Medication as prescribed: yes    Side Effects:  None    Medication Helping Symptoms:  yes     patient her for follow-up, overall doing well. would like get new prescription to take valtrex daily, no side effects and thinks really helping prevent outbreaks. mood is good. using CBD products to help with sleep, helps with pain that prevents sleep well without side effects. No other concerns or complaints today.       Problem list and histories reviewed & adjusted, as indicated.  Additional history: as documented    Patient Active Problem List    Diagnosis Date Noted     Inflamed seborrheic keratosis 11/02/2017     Priority: Medium     SK (seborrheic keratosis) 11/02/2017     Priority: Medium     Cherry angioma 11/02/2017     Priority: Medium     Atopic rhinitis 02/05/2016     Priority: Medium     Hypertension goal BP (blood pressure) < 140/90 01/07/2016     Priority: Medium     Osteoarthritis of knee 12/18/2015     Priority: Medium     Fracture of distal end of radius 10/22/2015     Priority: Medium     Surgical follow-up care 06/01/2015     Priority: Medium     Overview:   inactive icd-9 diagnosis auto replaced with icd-10, display name retained//mporz       Acute posthemorrhagic anemia 04/29/2015     Priority: Medium     Osteoarthritis of hip 04/07/2015     Priority: Medium     Allergy to anti-infective agent 03/26/2014     Priority: Medium     Enteritis 03/26/2014     Priority: Medium     Diverticulitis of sigmoid colon 03/26/2014     Priority: Medium     ACP (advance care planning) 06/06/2011     Priority: Medium     Patient states has Advance Directive and will bring in a copy to clinic. 6/6/2011   Pt document is in epic, dated 1/12/05.  Discussed with pt and she may plan to update this and verbally  "gave the health care agent information listed. Leigh Naranjo RN           Mixed incontinence urge and stress 06/06/2011     Priority: Medium     CARDIOVASCULAR SCREENING; LDL GOAL LESS THAN 160 02/10/2010     Priority: Medium     Microscopic hematuria 11/21/2008     Priority: Medium     Colon Polyps      Priority: Medium     Estrogen replacement therapy 09/07/2005     Priority: Medium     Internal derangement of knee 04/30/2005     Priority: Medium     L knee x 4-5 wks, no known injury but \"runs through it\" regularly  Problem list name updated by automated process. Provider to review       Back pain 04/18/2003     Priority: Medium       Patient is followed by [facility] for ongoing prescription of narcotic pain medicine.  Med: vicodin.   Maximum use per month: 30  Expected duration: indefinite  Narcotic agreement on file: NO  Clinic visit recommended: Q 3 months         Osteoporosis 04/18/2003     Priority: Medium     Problem list name updated by automated process. Provider to review       Social History     Tobacco Use     Smoking status: Never Smoker     Smokeless tobacco: Never Used   Substance Use Topics     Alcohol use: Yes     Comment: occ with dinner q night     Drug use: No     Family History   Problem Relation Age of Onset     Gastrointestinal Disease Mother         colon polyps     Diabetes Mother      Arthritis Father         Rheumatoid     Eye Disorder Father         mac. degeneration femp op bypass     Cancer - colorectal Maternal Grandmother      C.A.D. No family hx of      Breast Cancer No family hx of        ROS:  A complete 10 point review of systems was taken and negative except for those noted in the subjective/HPI section(s) above     BP Readings from Last 3 Encounters:   12/04/19 128/70   10/01/19 122/62   06/07/19 122/72    Wt Readings from Last 3 Encounters:   12/04/19 54.4 kg (120 lb)   10/01/19 55.5 kg (122 lb 4.8 oz)   06/07/19 56.2 kg (123 lb 12.8 oz)                    OBJECTIVE:  "                                                   /70   Pulse 72   Temp 97.8  F (36.6  C) (Oral)   Resp 16   Wt 54.4 kg (120 lb)   BMI 20.92 kg/m     Constitutional: healthy, alert and no distress  Psychiatric: mentation appears normal and affect normal/bright      Results for orders placed or performed in visit on 12/04/19   CBC with platelets     Status: None   Result Value Ref Range    WBC 4.4 4.0 - 11.0 10e9/L    RBC Count 4.23 3.8 - 5.2 10e12/L    Hemoglobin 13.4 11.7 - 15.7 g/dL    Hematocrit 40.0 35.0 - 47.0 %    MCV 95 78 - 100 fl    MCH 31.7 26.5 - 33.0 pg    MCHC 33.5 31.5 - 36.5 g/dL    RDW 13.3 10.0 - 15.0 %    Platelet Count 190 150 - 450 10e9/L   Comprehensive metabolic panel     Status: None   Result Value Ref Range    Sodium 140 133 - 144 mmol/L    Potassium 4.5 3.4 - 5.3 mmol/L    Chloride 108 94 - 109 mmol/L    Carbon Dioxide 26 20 - 32 mmol/L    Anion Gap 6 3 - 14 mmol/L    Glucose 92 70 - 99 mg/dL    Urea Nitrogen 13 7 - 30 mg/dL    Creatinine 0.83 0.52 - 1.04 mg/dL    GFR Estimate 69 >60 mL/min/[1.73_m2]    GFR Estimate If Black 81 >60 mL/min/[1.73_m2]    Calcium 8.9 8.5 - 10.1 mg/dL    Bilirubin Total 0.9 0.2 - 1.3 mg/dL    Albumin 4.0 3.4 - 5.0 g/dL    Protein Total 6.8 6.8 - 8.8 g/dL    Alkaline Phosphatase 57 40 - 150 U/L    ALT 24 0 - 50 U/L    AST 15 0 - 45 U/L            ASSESSMENT/PLAN:                                                    (B00.9) HSV (herpes simplex virus) infection  Comment: discussed with patient (or patient's parents/caregiver) pathophysiology of condition and treatment options.  patient doing well, would karley to take daily due ot frequency of outbreaks. will get labs today, okay o take daily and watch for any side effects etc. Patient verbalized understanding and is agreeable to this plan.   Plan: valACYclovir (VALTREX) 500 MG tablet, CBC with         platelets, Comprehensive metabolic panel          Estimated body mass index is 20.92 kg/m  as calculated from  "the following:    Height as of 10/1/19: 1.613 m (5' 3.5\").    Weight as of this encounter: 54.4 kg (120 lb).      Return to clinic as needed or if symptoms persist, change, worsen or if any new symptoms develop.      Roe Connolly M.D.  Internal Medicine-Pediatrics              "

## 2019-12-05 ENCOUNTER — TELEPHONE (OUTPATIENT)
Dept: DERMATOLOGY | Facility: CLINIC | Age: 74
End: 2019-12-05

## 2019-12-05 LAB
ALBUMIN SERPL-MCNC: 4 G/DL (ref 3.4–5)
ALP SERPL-CCNC: 57 U/L (ref 40–150)
ALT SERPL W P-5'-P-CCNC: 24 U/L (ref 0–50)
ANION GAP SERPL CALCULATED.3IONS-SCNC: 6 MMOL/L (ref 3–14)
AST SERPL W P-5'-P-CCNC: 15 U/L (ref 0–45)
BILIRUB SERPL-MCNC: 0.9 MG/DL (ref 0.2–1.3)
BUN SERPL-MCNC: 13 MG/DL (ref 7–30)
CALCIUM SERPL-MCNC: 8.9 MG/DL (ref 8.5–10.1)
CHLORIDE SERPL-SCNC: 108 MMOL/L (ref 94–109)
CO2 SERPL-SCNC: 26 MMOL/L (ref 20–32)
CREAT SERPL-MCNC: 0.83 MG/DL (ref 0.52–1.04)
GFR SERPL CREATININE-BSD FRML MDRD: 69 ML/MIN/{1.73_M2}
GLUCOSE SERPL-MCNC: 92 MG/DL (ref 70–99)
POTASSIUM SERPL-SCNC: 4.5 MMOL/L (ref 3.4–5.3)
PROT SERPL-MCNC: 6.8 G/DL (ref 6.8–8.8)
SODIUM SERPL-SCNC: 140 MMOL/L (ref 133–144)

## 2019-12-05 NOTE — TELEPHONE ENCOUNTER
Reason for call:  Other   Patient called regarding (reason for call): call back  Additional comments: Patient had a mole removed and is having some concerns with it.  Would like to speak to a nurse.  Going out of town so would like to speak to a nurse as soon as possible.    Phone number to reach patient:  Home number on file [phone] (home)    Best Time:  any    Can we leave a detailed message on this number?  YES

## 2019-12-05 NOTE — TELEPHONE ENCOUNTER
Called patient who notes biopsy pain. No increased redness or drainage. I advised keeping area covered with vaseline and bandage. She may call my cell if worsening.

## 2019-12-09 ENCOUNTER — ALLIED HEALTH/NURSE VISIT (OUTPATIENT)
Dept: NURSING | Facility: CLINIC | Age: 74
End: 2019-12-09
Payer: MEDICARE

## 2019-12-09 DIAGNOSIS — Z23 NEED FOR SHINGLES VACCINE: Primary | ICD-10-CM

## 2019-12-09 LAB — COPATH REPORT: NORMAL

## 2019-12-09 PROCEDURE — 90750 HZV VACC RECOMBINANT IM: CPT

## 2019-12-09 PROCEDURE — 90471 IMMUNIZATION ADMIN: CPT

## 2019-12-15 ENCOUNTER — HEALTH MAINTENANCE LETTER (OUTPATIENT)
Age: 74
End: 2019-12-15

## 2020-01-20 NOTE — PROGRESS NOTES
[facility]  [address] [id number] [phone]  Dept: [phone]    PRE-OP EVALUATION:  Today's date: 2020    Brionna Rollins (: 1945) presents for pre-operative evaluation assessment as requested by Dr. Rojas. She requires evaluation and anesthesia risk assessment prior to undergoing surgery/procedure for treatment of Cataracts both eyes.     Fax number for surgical facility: [phone]  Primary Physician: Roe Connolly  Type of Anesthesia Anticipated: to be determined    Patient has a Health Care Directive or Living Will:  YES     Preop Questions 2020   Who is doing your surgery? Dr. Rojas   What are you having done? Cataract Repair Both Eyes    Date of Surgery/Procedure: 2020 - left & 2020 - right   Facility or Hospital where procedure/surgery will be performed: [facility]   1.  Do you have a history of Heart attack, stroke, stent, coronary bypass surgery, or other heart surgery? No   2.  Do you ever have any pain or discomfort in your chest? No   3.  Do you have a history of  Heart Failure? No   4.   Are you troubled by shortness of breath when:  walking on a level surface, or up a slight hill, or at night? No   5.  Do you currently have a cold, bronchitis or other respiratory infection? No   6.  Do you have a cough, shortness of breath, or wheezing? No   7.  Do you sometimes get pains in the calves of your legs when you walk? No   8. Do you or anyone in your family have previous history of blood clots? YES - Father had PAD; no clot   9.  Do you or does anyone in your family have a serious bleeding problem such as prolonged bleeding following surgeries or cuts? No   10. Have you ever had problems with anemia or been told to take iron pills? No   11. Have you had any abnormal blood loss such as black, tarry or bloody stools, or abnormal vaginal bleeding? No   12. Have you ever had a blood transfusion? No   13. Have you or  any of your relatives ever had problems with anesthesia? No   14. Do you have sleep apnea, excessive snoring or daytime drowsiness? No   15. Do you have any prosthetic heart valves? No   16. Do you have prosthetic joints? YES - right CARROLL   17. Is there any chance that you may be pregnant? No         HPI:     HPI related to upcoming procedure: She has been monitoring vision changes over the past few years and noting steady worsening.       See problem list for active medical problems.  Problems all longstanding and stable, except as noted/documented.  See ROS for pertinent symptoms related to these conditions.      MEDICAL HISTORY:     Patient Active Problem List    Diagnosis Date Noted     Inflamed seborrheic keratosis 11/02/2017     Priority: Medium     SK (seborrheic keratosis) 11/02/2017     Priority: Medium     Cherry angioma 11/02/2017     Priority: Medium     Atopic rhinitis 02/05/2016     Priority: Medium     Hypertension goal BP (blood pressure) < 140/90 01/07/2016     Priority: Medium     Osteoarthritis of knee 12/18/2015     Priority: Medium     Fracture of distal end of radius 10/22/2015     Priority: Medium     Surgical follow-up care 06/01/2015     Priority: Medium     Overview:   inactive icd-9 diagnosis auto replaced with icd-10, display name retained//mporz       Acute posthemorrhagic anemia 04/29/2015     Priority: Medium     Osteoarthritis of hip 04/07/2015     Priority: Medium     Allergy to anti-infective agent 03/26/2014     Priority: Medium     Enteritis 03/26/2014     Priority: Medium     Diverticulitis of sigmoid colon 03/26/2014     Priority: Medium     ACP (advance care planning) 06/06/2011     Priority: Medium     Patient states has Advance Directive and will bring in a copy to clinic. 6/6/2011   Pt document is in epic, dated 1/12/05.  Discussed with pt and she may plan to update this and verbally gave the health care agent information listed. Leigh Naranjo RN           Mixed incontinence  "urge and stress 06/06/2011     Priority: Medium     CARDIOVASCULAR SCREENING; LDL GOAL LESS THAN 160 02/10/2010     Priority: Medium     Microscopic hematuria 11/21/2008     Priority: Medium     Colon Polyps      Priority: Medium     Estrogen replacement therapy 09/07/2005     Priority: Medium     Internal derangement of knee 04/30/2005     Priority: Medium     L knee x 4-5 wks, no known injury but \"runs through it\" regularly  Problem list name updated by automated process. Provider to review       Back pain 04/18/2003     Priority: Medium       Patient is followed by [facility] for ongoing prescription of narcotic pain medicine.  Med: vicodin.   Maximum use per month: 30  Expected duration: indefinite  Narcotic agreement on file: NO  Clinic visit recommended: Q 3 months         Osteoporosis 04/18/2003     Priority: Medium     Problem list name updated by automated process. Provider to review        Past Medical History:   Diagnosis Date     RADIAL STYLOID TENOSYNOV - right 2/23/2006    lifts weights, doesn't think work-related; OK to add PT visits prn     Rotator cuff tear      Past Surgical History:   Procedure Laterality Date     C ROTATOR CUFF STRAP       C TOTAL HIP ARTHROPLASTY Right      COLONOSCOPY  11/26/2013    Dr. Cardenas [facility]     COLONOSCOPY  11/26/2013    Procedure: COMBINED COLONOSCOPY, SINGLE BIOPSY/POLYPECTOMY BY BIOPSY;;  Surgeon: Jovanni Cardenas MD;  Location:  GI     HYSTERECTOMY, [place] [address]     Current Outpatient Medications   Medication Sig Dispense Refill     acetaminophen (MAPAP) 325 MG tablet Take 325-650 mg by mouth every 6 hours as needed for mild pain       ASPIRIN PO Take by mouth 2 times daily as needed for moderate pain       fluticasone (FLONASE) 50 MCG/ACT nasal spray USE ONE TO TWO SPRAYS IN EACH NOSTRIL EVERY DAY 16 g 5     IBUPROFEN PO Take 600 mg by mouth every 6 hours as needed for moderate pain       LACTOBACILLUS RHAMNOSUS, GG, PO Take 2 capsules by mouth 3 times " "daily (with meals)       omeprazole (PRILOSEC) 20 MG CR capsule Take 1 capsule (20 mg) by mouth daily 90 capsule 3     valACYclovir (VALTREX) 1000 mg tablet TAKE TWO TABLETS BY MOUTH TWICE A DAY 4 tablet 1     valACYclovir (VALTREX) 500 MG tablet Take 1 tablet (500 mg) by mouth daily 90 tablet 3     triamcinolone (KENALOG) 0.1 % external ointment To rash area in the pubic area twice daily for up to 2 weeks. (Patient not taking: Reported on 1/21/2020) 30 g 0     OTC products: APAP as needed    Allergies   Allergen Reactions     Ciprofloxacin Swelling     Stiff and swollen neck     Amoxicillin-Pot Clavulanate [Augmentin]      Contrast Dye      gets cold and feels terrible  Pt was ok with Isovue-370 and no pre-meds     Doxycycline Hyclate Rash     Erythromycin Swelling     Flagyl [Metronidazole Hcl]      Meperidine Hcl      low blood pressure     Oxycodone      Prochlorperazine      agitated     Sulfa Drugs Rash     Tetracycline Rash     Cefprozil Rash      Latex Allergy: NO    Social History     Tobacco Use     Smoking status: Never Smoker     Smokeless tobacco: Never Used   Substance Use Topics     Alcohol use: Yes     Comment: occ with dinner q night     History   Drug Use No       REVIEW OF SYSTEMS:   CONSTITUTIONAL: NEGATIVE for fever, chills, change in weight  EYES: POSITIVE for vision change bilateral  ENT/MOUTH: NEGATIVE for ear, mouth and throat problems  RESP: NEGATIVE for significant cough or SOB  CV: NEGATIVE for chest pain, palpitations or peripheral edema  PSYCHIATRIC: POSITIVE for stress  ROS otherwise negative    EXAM:   BP (!) 164/72   Pulse 72   Temp 97  F (36.1  C) (Tympanic)   Resp 16   Ht 1.613 m (5' 3.5\")   Wt 54.4 kg (120 lb)   SpO2 99%   BMI 20.92 kg/m    GENERAL APPEARANCE: healthy, alert and no distress  EYES: Eyes grossly normal to inspection; mild clouding of the lens bilaterally  HENT: ear canals and TM's normal and nose and mouth without ulcers or lesions  RESP: lungs clear to " auscultation - no rales, rhonchi or wheezes  CV: regular rate and rhythm, normal S1 S2, no S3 or S4 and no murmur, click or rub   ABDOMEN: soft, nontender, no HSM or masses and bowel sounds normal  NEURO: Normal strength and tone, sensory exam grossly normal, mentation intact and speech normal    DIAGNOSTICS:   No labs or EKG required for low risk surgery (cataract, skin procedure, breast biopsy, etc)    Recent Labs   Lab Test 12/04/19  1250 11/01/18  0946 09/28/17  1139   HGB 13.4  --  13.1     --  263    143 141   POTASSIUM 4.5 3.9 4.0   CR 0.83 0.70 0.65        IMPRESSION:   Reason for surgery/procedure: cataract repair  Diagnosis/reason for consult: Pre-operative exam    The proposed surgical procedure is considered LOW risk.    REVISED CARDIAC RISK INDEX  The patient has the following serious cardiovascular risks for perioperative complications such as (MI, PE, VFib and 3  AV Block):  No serious cardiac risks  INTERPRETATION: 0 risks: Class I (very low risk - 0.4% complication rate)    The patient has the following additional risks for perioperative complications:  No identified additional risks      ICD-10-CM    1. Preop general physical exam Z01.818    2. Vision changes H53.9    3. Hypertension goal BP (blood pressure) < 140/90 I10 Not at goal, but anomalous from prior readings. Ok for surgery. Monitor regularly        RECOMMENDATIONS:         APPROVAL GIVEN to proceed with proposed procedure, without further diagnostic evaluation       Signed Electronically by: Alvino Molina PA-C    Copy of this evaluation report is provided to requesting physician.    [facility] Preop Guidelines    Revised Cardiac Risk Index

## 2020-01-21 ENCOUNTER — OFFICE VISIT (OUTPATIENT)
Dept: FAMILY MEDICINE | Facility: CLINIC | Age: 75
End: 2020-01-21
Payer: MEDICARE

## 2020-01-21 VITALS
BODY MASS INDEX: 20.49 KG/M2 | WEIGHT: 120 LBS | DIASTOLIC BLOOD PRESSURE: 72 MMHG | OXYGEN SATURATION: 99 % | HEIGHT: 64 IN | RESPIRATION RATE: 16 BRPM | HEART RATE: 72 BPM | SYSTOLIC BLOOD PRESSURE: 164 MMHG | TEMPERATURE: 97 F

## 2020-01-21 DIAGNOSIS — H53.9 VISION CHANGES: ICD-10-CM

## 2020-01-21 DIAGNOSIS — Z01.818 PREOP GENERAL PHYSICAL EXAM: Primary | ICD-10-CM

## 2020-01-21 DIAGNOSIS — I10 HYPERTENSION GOAL BP (BLOOD PRESSURE) < 140/90: ICD-10-CM

## 2020-01-21 PROCEDURE — 99214 OFFICE O/P EST MOD 30 MIN: CPT | Performed by: PHYSICIAN ASSISTANT

## 2020-01-21 ASSESSMENT — MIFFLIN-ST. JEOR: SCORE: 1026.38

## 2020-01-22 DIAGNOSIS — K21.9 GASTROESOPHAGEAL REFLUX DISEASE WITHOUT ESOPHAGITIS: ICD-10-CM

## 2020-03-04 ENCOUNTER — OFFICE VISIT (OUTPATIENT)
Dept: PEDIATRICS | Facility: CLINIC | Age: 75
End: 2020-03-04
Payer: MEDICARE

## 2020-03-04 VITALS
BODY MASS INDEX: 19.97 KG/M2 | RESPIRATION RATE: 16 BRPM | WEIGHT: 117 LBS | DIASTOLIC BLOOD PRESSURE: 74 MMHG | HEART RATE: 84 BPM | HEIGHT: 64 IN | TEMPERATURE: 98.2 F | SYSTOLIC BLOOD PRESSURE: 130 MMHG

## 2020-03-04 DIAGNOSIS — M81.0 AGE-RELATED OSTEOPOROSIS WITHOUT CURRENT PATHOLOGICAL FRACTURE: ICD-10-CM

## 2020-03-04 DIAGNOSIS — K21.9 GASTROESOPHAGEAL REFLUX DISEASE WITHOUT ESOPHAGITIS: Primary | ICD-10-CM

## 2020-03-04 DIAGNOSIS — M79.671 PAIN IN BOTH FEET: ICD-10-CM

## 2020-03-04 DIAGNOSIS — M79.672 PAIN IN BOTH FEET: ICD-10-CM

## 2020-03-04 PROCEDURE — 99213 OFFICE O/P EST LOW 20 MIN: CPT | Performed by: INTERNAL MEDICINE

## 2020-03-04 RX ORDER — KETOROLAC TROMETHAMINE 5 MG/ML
SOLUTION OPHTHALMIC
COMMUNITY
Start: 2020-01-29 | End: 2022-12-14

## 2020-03-04 RX ORDER — PREDNISOLONE ACETATE 10 MG/ML
SUSPENSION/ DROPS OPHTHALMIC
COMMUNITY
Start: 2020-02-24 | End: 2022-12-14

## 2020-03-04 ASSESSMENT — MIFFLIN-ST. JEOR: SCORE: 1012.77

## 2020-03-04 NOTE — PROGRESS NOTES
SUBJECTIVE:                                                    Brionna Rollins is a 74 year old female who presents to clinic today for the following health issues:    Follow up imaging      Duration: 02/13/2020    Description (location/character/radiation): hip xray    Intensity:  moderate    Accompanying signs and symptoms: Pt would like to discuss other concerns    History (similar episodes/previous evaluation): Arthritis    Precipitating or alleviating factors: NA    Therapies tried and outcome: pain injection is effective       patient here for fu, follow-up. had steroid injection in knee, worked well but maybe wearing off a little now. has plans to getting hip injection tomorrow. also had cataract surgery, 1st worked well. 2nd one caused a lot of side effects, had increased pressure but went back in and the surgeon fixed it later that night. still using the CBD supplement at night and helping with sleep, pain, etc. mood is good. does get nausea at times with eating, uses ken as needed and seems to hep, also using omeprazoel but as needed and not daily.   patient also gets pain in her feet, the balls of her feet mostly, does try to wear good shoes. no injury, no weakness, no numbness. No other concerns or complaints today.     Problem list and histories reviewed & adjusted, as indicated.  Additional history: as documented    Patient Active Problem List    Diagnosis Date Noted     Inflamed seborrheic keratosis 11/02/2017     Priority: Medium     SK (seborrheic keratosis) 11/02/2017     Priority: Medium     Cherry angioma 11/02/2017     Priority: Medium     Atopic rhinitis 02/05/2016     Priority: Medium     Hypertension goal BP (blood pressure) < 140/90 01/07/2016     Priority: Medium     Osteoarthritis of knee 12/18/2015     Priority: Medium     Fracture of distal end of radius 10/22/2015     Priority: Medium     Surgical follow-up care 06/01/2015     Priority: Medium     Overview:   inactive icd-9 diagnosis  "auto replaced with icd-10, display name retained//mporz       Acute posthemorrhagic anemia 04/29/2015     Priority: Medium     Osteoarthritis of hip 04/07/2015     Priority: Medium     Allergy to anti-infective agent 03/26/2014     Priority: Medium     Enteritis 03/26/2014     Priority: Medium     Diverticulitis of sigmoid colon 03/26/2014     Priority: Medium     ACP (advance care planning) 06/06/2011     Priority: Medium     Patient states has Advance Directive and will bring in a copy to clinic. 6/6/2011   Pt document is in epic, dated 1/12/05.  Discussed with pt and she may plan to update this and verbally gave the health care agent information listed. Leigh Naranjo RN           Mixed incontinence urge and stress 06/06/2011     Priority: Medium     CARDIOVASCULAR SCREENING; LDL GOAL LESS THAN 160 02/10/2010     Priority: Medium     Microscopic hematuria 11/21/2008     Priority: Medium     Colon Polyps      Priority: Medium     Estrogen replacement therapy 09/07/2005     Priority: Medium     Internal derangement of knee 04/30/2005     Priority: Medium     L knee x 4-5 wks, no known injury but \"runs through it\" regularly  Problem list name updated by automated process. Provider to review       Back pain 04/18/2003     Priority: Medium       Patient is followed by [facility] for ongoing prescription of narcotic pain medicine.  Med: vicodin.   Maximum use per month: 30  Expected duration: indefinite  Narcotic agreement on file: NO  Clinic visit recommended: Q 3 months         Osteoporosis 04/18/2003     Priority: Medium     Problem list name updated by automated process. Provider to review       Social History     Tobacco Use     Smoking status: Never Smoker     Smokeless tobacco: Never Used   Substance Use Topics     Alcohol use: Yes     Comment: occ with dinner q night     Drug use: No     Family History   Problem Relation Age of Onset     Gastrointestinal Disease Mother         colon polyps     Diabetes " "Mother      Arthritis Father         Rheumatoid     Eye Disorder Father         mac. degeneration femp op bypass     Cancer - colorectal Maternal Grandmother      TATIANAASUN. No family hx of      Breast Cancer No family hx of        ROS:  A complete 10 point review of systems was taken and negative except for those noted in the subjective/HPI section(s) above     BP Readings from Last 3 Encounters:   03/04/20 130/74   01/21/20 (!) 164/72   12/04/19 128/70    Wt Readings from Last 3 Encounters:   03/04/20 53.1 kg (117 lb)   01/21/20 54.4 kg (120 lb)   12/04/19 54.4 kg (120 lb)                    OBJECTIVE:                                                    /74   Pulse 84   Temp 98.2  F (36.8  C) (Oral)   Resp 16   Ht 1.613 m (5' 3.5\")   Wt 53.1 kg (117 lb)   BMI 20.40 kg/m     Constitutional: healthy, alert and no distress  Psychiatric: mentation appears normal and affect normal/bright  Presentation- normal:Abstract reasoning  Attention and concentration  Coherency and relevance of thought  Dress, grooming, personal hygiene  Facial expression  Information  Judgment  Memory  Mood  Orientation  Perceptions  Posture and motor behavoir  Speech  Thought content  Vocabulary,abnormal:none             ASSESSMENT/PLAN:                                                    (K21.9) Gastroesophageal reflux disease without esophagitis  (primary encounter diagnosis)  Comment: discussed with patient (or patient's parents/caregiver) pathophysiology of condition and treatment options.  recommend use the PPi daily for 2 weeks, discussed with patient (or patient's parents/caregiver) pathophysiology of condition and treatment options.  reviewed labs, medications, diet ,etc.  reviwed EGD results form last year as well.       (M79.671,  M79.672) Pain in both feet  Comment: discussed with patient (or patient's parents/caregiver) pathophysiology of condition and treatment options.  history likely consistant with mild plantarfaccitis. " "  the 2 most important things to start with are wearing shoes with good insoles (or buying over the counter insoles to add) and trying using a frozen juice can or frozen tennis ball and rolling your foot on this twice daily.   Appropriate shoe wear and usage also reviewed as well as indications for further evaluation and podiatry/ortho evaluation, etc. Patient verbalized understanding and is agreeable to this plan.        (M81.0) Age-related osteoporosis without current pathological fracture  Comment: due for dexa  Plan: DX Hip/Pelvis/Spine               Estimated body mass index is 20.92 kg/m  as calculated from the following:    Height as of 1/21/20: 1.613 m (5' 3.5\").    Weight as of 1/21/20: 54.4 kg (120 lb).      Return to clinic as needed or if symptoms persist, change, worsen or if any new symptoms develop.      Roe Connolly M.D.  Internal Medicine-Pediatrics              "

## 2020-03-24 ENCOUNTER — TELEPHONE (OUTPATIENT)
Dept: PEDIATRICS | Facility: CLINIC | Age: 75
End: 2020-03-24

## 2020-03-24 DIAGNOSIS — N30.00 ACUTE CYSTITIS WITHOUT HEMATURIA: Primary | ICD-10-CM

## 2020-03-24 RX ORDER — NITROFURANTOIN 25; 75 MG/1; MG/1
100 CAPSULE ORAL 2 TIMES DAILY
Qty: 14 CAPSULE | Refills: 0 | Status: SHIPPED | OUTPATIENT
Start: 2020-03-24 | End: 2020-03-31

## 2020-03-24 NOTE — TELEPHONE ENCOUNTER
Symptoms  Describe your symptoms: Frequency, burning.  Patient is out of town so cannot come in to .  Wants to speak to a nurse first  Any pain: No  How long have you been having symptoms: 1  days  Have you been seen for this:  No  Appointment offered?: No  Triage offered?: No  Home remedies tried:   [facility]  Okay to leave a detailed message? Yes at Home number on file [phone] (home)

## 2020-03-24 NOTE — TELEPHONE ENCOUNTER
I sent in prescription for Macrobid. Take twice daily for 1 week. Finish the full course even if symptoms resolve.   Should be seen in clinic or UC if symptoms not showing improvement within 48 hours

## 2020-03-24 NOTE — TELEPHONE ENCOUNTER
Spoke to patient. Patient aware of provider note below and agrees to plan.  Mikayla FARIA RN, BSN

## 2020-03-24 NOTE — TELEPHONE ENCOUNTER
Spoke to patient. She is currently having burning right after she urinates. She is going frequently with a strong feeling of urination. Per patient, takes a little while for the urine to come out and then just a small amount will come out.     Denies fever, flank pain or back pain, blood in urine, n/v/d    Patient hoping to have prescription sent without being seen since she doesn't want to expose herself to possible COVID-19/    Pended [facility] pharmacy in [place], [place] since patient is currently out of town.    Patient stated that she does have multiple allergies to Antibiotics.    Please advise. Thanks!  Mikayla FARIA RN, BSN

## 2020-03-27 ENCOUNTER — TELEPHONE (OUTPATIENT)
Dept: PEDIATRICS | Facility: CLINIC | Age: 75
End: 2020-03-27

## 2020-03-27 DIAGNOSIS — N39.0 URINARY TRACT INFECTION WITHOUT HEMATURIA, SITE UNSPECIFIED: Primary | ICD-10-CM

## 2020-03-27 RX ORDER — CEPHALEXIN 500 MG/1
500 CAPSULE ORAL 4 TIMES DAILY
Qty: 20 CAPSULE | Refills: 0 | Status: SHIPPED | OUTPATIENT
Start: 2020-03-27 | End: 2020-04-01

## 2020-03-27 NOTE — TELEPHONE ENCOUNTER
Keke:    Spoke with the Pt who is reporting significant nausea while taking her Macrobid.     The Pt reports moderate to severe nausea that started last night after taking her Macrobid.   She reports 1 loose stool this morning as well.   States she is taking the medication with food, not on an empty stomach.   Reports it is very difficult to eat and drink with the nausea.     She is requesting to switch antibiotics. Advised her most antibiotics cause GI side effects, offered some ideas on how to lesson the nausea, however the Pt insists on switching.     Please advise.     Mary Barry, RN   [facility] -- Triage Nurse

## 2020-03-27 NOTE — TELEPHONE ENCOUNTER
Our options are very limited due to her long list of allergies. You can let her know that I chose Macrobid because she previously tolerated it the last 2 times she was treated for UTI.    I stopped the Macrobid and sent in new prescription for Keflex which she has tolerated previously. Take 1 capsule 4 times daily for 5 days

## 2020-03-27 NOTE — TELEPHONE ENCOUNTER
Symptoms  Describe your symptoms: sick due to macrobiotics for uti, loose stool, nausea, chills  Any pain: No  How long have you been having symptoms   1 days  Have you been seen for this:  No  Appointment offered?: No  Triage offered?: Yes: xfering to nurse  Home remedies tried: n/a  Requested Pharmacy: n/a  Okay to leave a detailed message? Yes at Home number on file [phone] (home)

## 2020-03-27 NOTE — TELEPHONE ENCOUNTER
Called patient reviewed provider message; patient will stick with current prescription of Macrobid for now and continue to monitor. Advised reevaluation if not improving.

## 2020-03-27 NOTE — TELEPHONE ENCOUNTER
Keke:    Called to discuss below with the Pt.     1. She reports getting a call from the pharmacist we sent the Keflex to stating she cannot take this. Possible cross sensitivity?     2. The Pt now wants to consider continuing with the Macrobid, but she is wondering if you think she could do once per day dosing vs BID? She thinks she could tolerate it if it were only one per day. Not sure if this will be effective dosing.     She does acknowledge extensive antibiotic allergies.     Mary Barry RN   [facility] -- Triage Nurse

## 2020-03-27 NOTE — TELEPHONE ENCOUNTER
The pharmacist likely said this as she has a questionable allergy to cefprozil (documented in 2003) and Augmentin but I do see she has taken Keflex more than once. She took Keflex in 2012 (after allergy to other cephalosporin) so I think it is safe to try.     She can't take Macrobid once that daily. That dosing would not be effective. Was she taking with food? If not, she could try taking with food and see if it makes it more tolerable. It looks like she was treated with Macrobid at least twice in the past 2 years so we know she has previously tolerated this medication    If she can't tolerate Macrobid, then I do recommend switching and she should try Keflex unless that pharmacist has information that I don't. If so, perhaps she needs to be seen in an urgent care closer to where she is for further review

## 2020-04-06 ENCOUNTER — NURSE TRIAGE (OUTPATIENT)
Dept: NURSING | Facility: CLINIC | Age: 75
End: 2020-04-06

## 2020-04-07 ENCOUNTER — VIRTUAL VISIT (OUTPATIENT)
Dept: PEDIATRICS | Facility: CLINIC | Age: 75
End: 2020-04-07
Payer: MEDICARE

## 2020-04-07 DIAGNOSIS — R31.9 HEMATURIA, UNSPECIFIED TYPE: Primary | ICD-10-CM

## 2020-04-07 PROCEDURE — 99443 ZZC PHYSICIAN TELEPHONE EVALUATION 21-30 MIN: CPT | Performed by: NURSE PRACTITIONER

## 2020-04-07 NOTE — TELEPHONE ENCOUNTER
Brionna reports that she has new onset of increased urinary frequency and has small amount of blood in toilet paper when she wiped earlier.    Finished Macrobid 1 caq once a day on 4/4/20, had no UTI symptoms for 2 days.     Per protocol, advised phone visit within 24 hours. Care advice reviewed. Patient verbalizes understanding. Advised to call back with further questions/concerns.     COVID 19 Nurse Triage Plan/Patient Instructions    Please be aware that novel coronavirus (COVID-19) may be circulating in the community. If you develop symptoms such as fever, cough, or SOB or if you have concerns about the presence of another infection including coronavirus (COVID-19), please contact your health care provider or visit [email or web address].     Disposition/Instructions    Patient to have scheduled Telephone Visit with a provider. Follow System Ambulatory Workflow for COVID 19.     The clinic staff will assist you to schedule an appointment to complete the Telephone Visit with a provider during normal clinic hours.       Call Back If: Your symptoms worsen before you are able to complete your Telephone Visit with a provider.    Thank you for limiting contact with others, wearing a simple mask to cover your cough, practice good hand hygiene habits and accessing our virtual services where possible to limit the spread of this virus.    For more information about COVID19 and options for caring for yourself at home, please visit the CDC website at https://www.cdc.gov/coronavirus/2019-ncov/about/steps-when-sick.html  For more options for care at [facility], please visit our website at [email or web address]    For more information, please use the [place] Department of Health ([facility]) COVID-19 Hotlines (Interpreters available):     Health questions: Phone Number: [phone] or [phone] and Hours: 7 a.m. to 7 p.m.    Schools and  questions: Phone Number: [phone] or  [phone] and Hours 7 a.m. to 7 p.m.      Ifrah Ralph RN/[place] Nurse Advisor    Reason for Disposition    Urinating more frequently than usual (i.e., frequency)    Additional Information    Negative: Shock suspected (e.g., cold/pale/clammy skin, too weak to stand, low BP, rapid pulse)    Negative: Sounds like a life-threatening emergency to the triager    Negative: [1] Unable to urinate (or only a few drops) > 4 hours AND     [2] bladder feels very full (e.g., palpable bladder or strong urge to urinate)    Negative: [1] Decreased urination and [2] drinking very little AND [2] dehydration suspected (e.g., dark urine, no urine > 12 hours, very dry mouth, very lightheaded)    Negative: Patient sounds very sick or weak to the triager    Negative: Fever > 100.5 F (38.1 C)    Negative: Side (flank) or lower back pain present    Negative: [1] Can't control passage of urine (i.e., urinary incontinence) AND [2] new onset (< 2 weeks) or worsening    Protocols used: URINARY SYMPTOMS-A-AH

## 2020-04-07 NOTE — PROGRESS NOTES
See previous encounters.    Patient with persistent UTI sx (blood in urine) but less burning now. Finished antibiotic course, but only took once a day as opposed to twice.    Either way, she needs a urine culture. She claims there is a [facility] clinic up there. Please find out, and also ask for her to be placed on the lab schedule and let her know when/where to show up. She is residing at [address] [place] [address].    If there is no [facility] clinic within an hour of her home, please see if another clinic in the area could take our lab orders and let her do the sample there.

## 2020-04-07 NOTE — Clinical Note
I dont think this was sent to anyone to look into what clinic the patient can have her labs checked, but I could be mistaken.

## 2020-04-07 NOTE — PROGRESS NOTES
The Pt called in requesting an update on the plan of care.     I dont see that this was routed to anyone, but I could be mistaken. I called the [facility] and they are not currently seeing patients due to Covid. The [facility] location does however see patients. (walk in).     I called the [facility] location, they are only open until 5:00pm (it is currently 4:45pm).     The Pt will go to the [facility] location tomorrow morning when they open at 7:30am.     Mary Barry, RN   [facility] -- Triage Nurse

## 2020-04-07 NOTE — PROGRESS NOTES
"Subjective     Brionna Rollins is a 74 year old female who is being evaluated via a billable telephone visit.      The patient has been notified of following:     \"This telephone visit will be conducted via a call between you and your physician/provider. We have found that certain health care needs can be provided without the need for a physical exam.  This service lets us provide the care you need with a short phone conversation.  If a prescription is necessary we can send it directly to your pharmacy.  If lab work is needed we can place an order for that and you can then stop by our lab to have the test done at a later time.    If during the course of the call the physician/provider feels a telephone visit is not appropriate, you will not be charged for this service.\"     Patient has given verbal consent for Telephone visit?  Yes    Brionna Rollins complains of   Chief Complaint   Patient presents with     Urinary Problem       ALLERGIES  Ciprofloxacin; Amoxicillin-pot clavulanate [augmentin]; Contrast dye; Doxycycline hyclate; Erythromycin; Flagyl [metronidazole hcl]; Meperidine hcl; Oxycodone; Prochlorperazine; Sulfa drugs; Tetracycline; and Cefprozil    URINARY TRACT SYMPTOMS  Just finished taking macrobid 2 days ago - from 3/24 B [place]  Patient is currently up [facility]      Duration: 2-3 weeks    Description  frequency, urgency and hematuria    Intensity:  moderate    Accompanying signs and symptoms:  Fever/chills: no   Flank pain no   Nausea and vomiting: no   Vaginal symptoms: none  Abdominal/Pelvic Pain: no     History  History of frequent UTI's: no   History of kidney stones: no   Sexually Active: no   Possibility of pregnancy: No    Precipitating or alleviating factors: None    Therapies tried and outcome: course of antibiotics - macrobid   Outcome: symptoms better on macrobid, symptoms returned when antibiotic done       Patient with persistent UTI sx (blood in urine) but less burning now. Finished " antibiotic course, but only took once a day as opposed to twice. No flank pain, fever, malaise, abdominal pain.     -------------------------------------    Patient Active Problem List   Diagnosis     Back pain     Osteoporosis     Internal derangement of knee     Estrogen replacement therapy     Colon Polyps     Microscopic hematuria     CARDIOVASCULAR SCREENING; LDL GOAL LESS THAN 160     ACP (advance care planning)     Mixed incontinence urge and stress     Fracture of distal end of radius     Hypertension goal BP (blood pressure) < 140/90     Atopic rhinitis     Allergy to anti-infective agent     Enteritis     Osteoarthritis of hip     Osteoarthritis of knee     Acute posthemorrhagic anemia     Surgical follow-up care     Diverticulitis of sigmoid colon     Inflamed seborrheic keratosis     SK (seborrheic keratosis)     Cherry angioma     Past Surgical History:   Procedure Laterality Date     C ROTATOR CUFF STRAP       C TOTAL HIP ARTHROPLASTY Right      COLONOSCOPY  11/26/2013    Dr. Cardenas [facility]     COLONOSCOPY  11/26/2013    Procedure: COMBINED COLONOSCOPY, SINGLE BIOPSY/POLYPECTOMY BY BIOPSY;;  Surgeon: Jovanni Cardenas MD;  Location:  GI     HYSTERECTOMY, [facility]  1986       Social History     Tobacco Use     Smoking status: Never Smoker     Smokeless tobacco: Never Used   Substance Use Topics     Alcohol use: Yes     Comment: occ with dinner q night     Family History   Problem Relation Age of Onset     Gastrointestinal Disease Mother         colon polyps     Diabetes Mother      Arthritis Father         Rheumatoid     Eye Disorder Father         mac. degeneration femp op bypass     Cancer - colorectal Maternal Grandmother      C.A.D. No family hx of      Breast Cancer No family hx of            Reviewed and updated as needed this visit by Provider         Review of Systems   ROS COMP: GI: NEGATIVE for nausea, abdominal pain, heartburn, or change in bowel habits  : No vag bleeding.   : As  above.        Diagnostic Test Results:  Labs reviewed in Epic        Assessment/Plan:  1. Hematuria, unspecified type  As above. Burning resolved, but now has some very faint hematuria. Only took macrobid once a day instead of twice. No flank pain/fevers to suggest kidney stone or pyelonephritis.   - *UA reflex to Microscopic and Culture ([place] [facility] (except [place] and [place]); Future  -RN to assist in finding clinic near her to leave urine sample. Will address treatment once results are in.     No follow-ups on file.      Phone call duration: 25 minutes    RICARDO Garza CNP

## 2020-04-08 ENCOUNTER — TELEPHONE (OUTPATIENT)
Dept: PEDIATRICS | Facility: CLINIC | Age: 75
End: 2020-04-08

## 2020-04-08 DIAGNOSIS — R31.9 HEMATURIA, UNSPECIFIED TYPE: ICD-10-CM

## 2020-04-08 DIAGNOSIS — R31.9 HEMATURIA, UNSPECIFIED TYPE: Primary | ICD-10-CM

## 2020-04-08 LAB
ALBUMIN UR-MCNC: NEGATIVE MG/DL
APPEARANCE UR: CLEAR
BACTERIA #/AREA URNS HPF: ABNORMAL /HPF
BILIRUB UR QL STRIP: NEGATIVE
COLOR UR AUTO: ABNORMAL
GLUCOSE UR STRIP-MCNC: NEGATIVE MG/DL
HGB UR QL STRIP: ABNORMAL
KETONES UR STRIP-MCNC: NEGATIVE MG/DL
LEUKOCYTE ESTERASE UR QL STRIP: ABNORMAL
NITRATE UR QL: NEGATIVE
PH UR STRIP: 5 PH (ref 4.7–8)
RBC #/AREA URNS AUTO: 1 /HPF (ref 0–2)
SOURCE: ABNORMAL
SP GR UR STRIP: 1 (ref 1–1.03)
UROBILINOGEN UR STRIP-MCNC: NORMAL MG/DL (ref 0–2)
WBC #/AREA URNS AUTO: 9 /HPF (ref 0–5)

## 2020-04-08 PROCEDURE — 81001 URINALYSIS AUTO W/SCOPE: CPT | Mod: ZL | Performed by: NURSE PRACTITIONER

## 2020-04-08 PROCEDURE — 87186 SC STD MICRODIL/AGAR DIL: CPT | Mod: ZL | Performed by: INTERNAL MEDICINE

## 2020-04-08 PROCEDURE — 87086 URINE CULTURE/COLONY COUNT: CPT | Mod: ZL | Performed by: INTERNAL MEDICINE

## 2020-04-08 PROCEDURE — 87088 URINE BACTERIA CULTURE: CPT | Mod: ZL | Performed by: INTERNAL MEDICINE

## 2020-04-08 NOTE — TELEPHONE ENCOUNTER
Covering for Brianna.  I recommend we wait on the urine culture results, UA not obvious for infection and no blood on micro.    Tell her to push fluids and we will notify her in a coupele days when the urine culture is back. Let us know sooner if any fever or worsening symptoms.  Sidra Stratton, APRN, CNP

## 2020-04-08 NOTE — TELEPHONE ENCOUNTER
Test Results  Who is calling?:   Patient   Who ordered the test:  [facility]   Type of test: Lab  Date of test:  Today   Where was the test performed:   [facility]   What are your questions/concerns?:   What are the results and will she needs to get meds today   Okay to leave a detailed message?:  Yes

## 2020-04-08 NOTE — TELEPHONE ENCOUNTER
Call placed to pt with message from provider    Advised pt to call the clinic again on Friday afternoon if she has not heard back on the culture results before then.    Pt verbalized understanding and agrees to the plan    Mojgan Crane RN on 4/8/2020 at 4:58 PM

## 2020-04-09 ENCOUNTER — TELEPHONE (OUTPATIENT)
Dept: PEDIATRICS | Facility: CLINIC | Age: 75
End: 2020-04-09

## 2020-04-09 DIAGNOSIS — R31.9 HEMATURIA, UNSPECIFIED TYPE: Primary | ICD-10-CM

## 2020-04-09 DIAGNOSIS — R31.9 HEMATURIA, UNSPECIFIED TYPE: ICD-10-CM

## 2020-04-09 RX ORDER — CEPHALEXIN 500 MG/1
500 CAPSULE ORAL 4 TIMES DAILY
Qty: 28 CAPSULE | Refills: 0 | Status: SHIPPED | OUTPATIENT
Start: 2020-04-09 | End: 2020-04-16

## 2020-04-09 RX ORDER — CEPHALEXIN 500 MG/1
500 CAPSULE ORAL 2 TIMES DAILY
Qty: 14 CAPSULE | Refills: 0 | Status: SHIPPED | OUTPATIENT
Start: 2020-04-09 | End: 2020-04-09

## 2020-04-09 NOTE — TELEPHONE ENCOUNTER
Reason for call:  Other   Patient called regarding (reason for call): call back  Additional comments: pt states uti sx's not better req rx, [place] will [place] her about 3.5 hrs to get back if she needs to come in. req a     Phone number to reach patient:  Cell number on file:    Telephone Information:   Mobile [phone]       Best Time:  any    Can we leave a detailed message on this number?  YES    Travel screening: Not Applicable

## 2020-04-09 NOTE — TELEPHONE ENCOUNTER
I ordered Keflex. She is allergic to other cephalosporins but tolerated this one just fine back in 2012. Please let her know she needs to take this exactly as prescribed. If she takes less, it will not get rid of this infection. She did not follow our instructions last time with the macrobid.    Call 911 for any signs of anaphylaxis.     Repeat lab only urine in 2-4 weeks to assure resolution of blood in urine

## 2020-04-09 NOTE — TELEPHONE ENCOUNTER
See virtual visit from 4/7/20 with Brianna for more information.   Norma Zepeda on 4/9/2020 at 9:56 AM

## 2020-04-09 NOTE — TELEPHONE ENCOUNTER
Called the Pt and relayed the below information. She did express understanding.     Advised her if culture shows a need to switch antibiotics, we will let her know.   Advised on signs of allergic reaction and how to proceed.     No further questions or concerns remain.     Mary Barry RN   [facility] -- Triage Nurse

## 2020-04-09 NOTE — TELEPHONE ENCOUNTER
Spoke to pt. She says her sxs are terrible today. She has increased frequency, urgency and dysuria. Noticed blood one time today.    She would be willing to start something before the culture comes back if possible. She is very uncomfortable.     UC is still in process at [facility]. The order was inadvertently cancelled. They are starting it now.     Please advise. Mary Merrill RN on 4/9/2020 at 11:33 AM

## 2020-04-11 LAB
BACTERIA SPEC CULT: ABNORMAL
SPECIMEN SOURCE: ABNORMAL

## 2020-10-07 ENCOUNTER — OFFICE VISIT (OUTPATIENT)
Dept: PEDIATRICS | Facility: CLINIC | Age: 75
End: 2020-10-07
Payer: MEDICARE

## 2020-10-07 VITALS
HEART RATE: 68 BPM | TEMPERATURE: 98.1 F | DIASTOLIC BLOOD PRESSURE: 70 MMHG | RESPIRATION RATE: 14 BRPM | WEIGHT: 119 LBS | SYSTOLIC BLOOD PRESSURE: 126 MMHG | BODY MASS INDEX: 20.75 KG/M2

## 2020-10-07 DIAGNOSIS — Z23 NEEDS FLU SHOT: ICD-10-CM

## 2020-10-07 DIAGNOSIS — I10 HYPERTENSION GOAL BP (BLOOD PRESSURE) < 140/90: Primary | ICD-10-CM

## 2020-10-07 DIAGNOSIS — R19.8 UNCOMFORTABLE FULLNESS AFTER MEALS: ICD-10-CM

## 2020-10-07 DIAGNOSIS — B00.9 HSV (HERPES SIMPLEX VIRUS) INFECTION: ICD-10-CM

## 2020-10-07 PROCEDURE — G0008 ADMIN INFLUENZA VIRUS VAC: HCPCS | Performed by: INTERNAL MEDICINE

## 2020-10-07 PROCEDURE — 90662 IIV NO PRSV INCREASED AG IM: CPT | Performed by: INTERNAL MEDICINE

## 2020-10-07 PROCEDURE — 99214 OFFICE O/P EST MOD 30 MIN: CPT | Mod: 25 | Performed by: INTERNAL MEDICINE

## 2020-10-07 RX ORDER — LISINOPRIL 10 MG/1
10 TABLET ORAL DAILY
Qty: 90 TABLET | Refills: 3 | Status: SHIPPED | OUTPATIENT
Start: 2020-10-07 | End: 2021-06-29

## 2020-10-07 RX ORDER — LISINOPRIL 10 MG/1
10 TABLET ORAL DAILY
COMMUNITY
End: 2020-10-07

## 2020-10-07 RX ORDER — VALACYCLOVIR HYDROCHLORIDE 500 MG/1
500 TABLET, FILM COATED ORAL DAILY
Qty: 90 TABLET | Refills: 3 | Status: SHIPPED | OUTPATIENT
Start: 2020-10-07 | End: 2021-06-29

## 2020-10-07 NOTE — PROGRESS NOTES
SUBJECTIVE:                                                    Brionna Rollins is a 75 year old female who presents to clinic today for the following health issues:    Medication Followup of Lisinopril    Taking Medication as prescribed: yes    Side Effects:  None    Medication Helping Symptoms:  yes     patient was at [facility], still having some upper abdominal flutter and did a 30 day event monitor, haven't goten any results yet. Has further follow-up with [facility] scheduled.  patient was started on lisinopril for bps and these other symptoms and this hasn't seem to help at all. mood is good. valtrex working well, needs refill. No other concerns or complaints today.         Problem list and histories reviewed & adjusted, as indicated.  Additional history: as documented    Patient Active Problem List    Diagnosis Date Noted     Inflamed seborrheic keratosis 11/02/2017     Priority: Medium     SK (seborrheic keratosis) 11/02/2017     Priority: Medium     Cherry angioma 11/02/2017     Priority: Medium     Atopic rhinitis 02/05/2016     Priority: Medium     Hypertension goal BP (blood pressure) < 140/90 01/07/2016     Priority: Medium     Osteoarthritis of knee 12/18/2015     Priority: Medium     Fracture of distal end of radius 10/22/2015     Priority: Medium     Surgical follow-up care 06/01/2015     Priority: Medium     Overview:   inactive icd-9 diagnosis auto replaced with icd-10, display name retained//marycarmen       Acute posthemorrhagic anemia 04/29/2015     Priority: Medium     Osteoarthritis of hip 04/07/2015     Priority: Medium     Allergy to anti-infective agent 03/26/2014     Priority: Medium     Enteritis 03/26/2014     Priority: Medium     Diverticulitis of sigmoid colon 03/26/2014     Priority: Medium     ACP (advance care planning) 06/06/2011     Priority: Medium     Patient states has Advance Directive and will bring in a copy to clinic. 6/6/2011   Pt document is in epic, dated 1/12/05.  Discussed with pt  "and she may plan to update this and verbally gave the health care agent information listed. Leigh Naranjo RN           Mixed incontinence urge and stress 06/06/2011     Priority: Medium     CARDIOVASCULAR SCREENING; LDL GOAL LESS THAN 160 02/10/2010     Priority: Medium     Microscopic hematuria 11/21/2008     Priority: Medium     Colon Polyps      Priority: Medium     Estrogen replacement therapy 09/07/2005     Priority: Medium     Internal derangement of knee 04/30/2005     Priority: Medium     L knee x 4-5 wks, no known injury but \"runs through it\" regularly  Problem list name updated by automated process. Provider to review       Back pain 04/18/2003     Priority: Medium       Patient is followed by [facility] for ongoing prescription of narcotic pain medicine.  Med: vicodin.   Maximum use per month: 30  Expected duration: indefinite  Narcotic agreement on file: NO  Clinic visit recommended: Q 3 months         Osteoporosis 04/18/2003     Priority: Medium     Problem list name updated by automated process. Provider to review       Social History     Tobacco Use     Smoking status: Never Smoker     Smokeless tobacco: Never Used   Substance Use Topics     Alcohol use: Yes     Comment: occ with dinner q night     Drug use: No     Family History   Problem Relation Age of Onset     Gastrointestinal Disease Mother         colon polyps     Diabetes Mother      Arthritis Father         Rheumatoid     Eye Disorder Father         mac. degeneration femp op bypass     Cancer - colorectal Maternal Grandmother      C.A.D. No family hx of      Breast Cancer No family hx of        ROS:  A complete 10 point review of systems was taken and negative except for those noted in the subjective/HPI section(s) above     BP Readings from Last 3 Encounters:   10/07/20 126/70   03/04/20 130/74   01/21/20 (!) 164/72    Wt Readings from Last 3 Encounters:   10/07/20 54 kg (119 lb)   03/04/20 53.1 kg (117 lb)   01/21/20 54.4 kg (120 " "lb)                    OBJECTIVE:                                                    /70   Pulse 68   Temp 98.1  F (36.7  C) (Oral)   Resp 14   Wt 54 kg (119 lb)   BMI 20.75 kg/m     Constitutional: healthy, alert and no distress  HEENT: normocephalic and atrumatic, mucous membranes moist  Cardiovascular: regular rate and rhythm no rubs, gallops or murmurs normal S1/S2; no S3 or S4  Respiratory: clear to auscultation bilaterally in all lung fields, normal insp/exp effort. Good air movement  Abd: +bs, non-tender, non-distended no rebound or guarding  Psychiatric: mentation appears normal and affect normal/bright      No results found for any visits on 10/07/20.         ASSESSMENT/PLAN:                                                        ICD-10-CM    1. Hypertension goal BP (blood pressure) < 140/90  I10 lisinopril (ZESTRIL) 10 MG tablet   2. HSV (herpes simplex virus) infection  B00.9 valACYclovir (VALTREX) 500 MG tablet   3. Uncomfortable fullness after meals  R19.8 lisinopril (ZESTRIL) 10 MG tablet   4. Needs flu shot  Z23 ADMIN 1st VACCINE   discussed with patient (or patient's parents/caregiver) pathophysiology of condition and treatment options.  reviwed history in detail today, patient has follow-up with [facility], unclear etiology, undergoing both GI and cardiology's evaluation, patient wondering if vagus nerve playing a role and this makes a lot of sense, she will let me know how the visit goes and next steps. reviwed reflux precautions as well as ways in which the vagus nerve can be stimulated. recommend symptoms journal as well. continue current plan, check BPs at home. flu vaccine today.       Estimated body mass index is 20.4 kg/m  as calculated from the following:    Height as of 3/4/20: 1.613 m (5' 3.5\").    Weight as of 3/4/20: 53.1 kg (117 lb).      Return to clinic as needed or if symptoms persist, change, worsen or if any new symptoms develop.      Roe Connolly M.D.  Internal " Medicine-Pediatrics

## 2020-11-18 ENCOUNTER — OFFICE VISIT (OUTPATIENT)
Dept: PEDIATRICS | Facility: CLINIC | Age: 75
End: 2020-11-18
Payer: MEDICARE

## 2020-11-18 VITALS
OXYGEN SATURATION: 98 % | HEART RATE: 54 BPM | SYSTOLIC BLOOD PRESSURE: 118 MMHG | WEIGHT: 120.8 LBS | DIASTOLIC BLOOD PRESSURE: 68 MMHG | RESPIRATION RATE: 16 BRPM | HEIGHT: 64 IN | BODY MASS INDEX: 20.62 KG/M2 | TEMPERATURE: 96.8 F

## 2020-11-18 DIAGNOSIS — R00.2 PALPITATIONS: Primary | ICD-10-CM

## 2020-11-18 DIAGNOSIS — M25.512 CHRONIC LEFT SHOULDER PAIN: ICD-10-CM

## 2020-11-18 DIAGNOSIS — R19.8 UNCOMFORTABLE FULLNESS AFTER MEALS: ICD-10-CM

## 2020-11-18 DIAGNOSIS — I10 HYPERTENSION GOAL BP (BLOOD PRESSURE) < 140/90: ICD-10-CM

## 2020-11-18 DIAGNOSIS — G89.29 CHRONIC LEFT SHOULDER PAIN: ICD-10-CM

## 2020-11-18 PROCEDURE — 99213 OFFICE O/P EST LOW 20 MIN: CPT | Performed by: INTERNAL MEDICINE

## 2020-11-18 RX ORDER — SCOLOPAMINE TRANSDERMAL SYSTEM 1 MG/1
1 PATCH, EXTENDED RELEASE TRANSDERMAL
COMMUNITY
Start: 2020-11-05 | End: 2020-12-03

## 2020-11-18 SDOH — HEALTH STABILITY: MENTAL HEALTH: HOW OFTEN DO YOU HAVE 6 OR MORE DRINKS ON ONE OCCASION?: NOT ASKED

## 2020-11-18 SDOH — HEALTH STABILITY: MENTAL HEALTH: HOW MANY STANDARD DRINKS CONTAINING ALCOHOL DO YOU HAVE ON A TYPICAL DAY?: 1 OR 2

## 2020-11-18 SDOH — HEALTH STABILITY: MENTAL HEALTH: HOW OFTEN DO YOU HAVE A DRINK CONTAINING ALCOHOL?: 4 OR MORE TIMES A WEEK

## 2020-11-18 ASSESSMENT — MIFFLIN-ST. JEOR: SCORE: 1025.01

## 2020-11-18 NOTE — PROGRESS NOTES
"Subjective     Brionna Rollins is a 75 year old female who presents to clinic today for the following health issues:    HPI         Concern - Here to discuss [facility] visit, met with a cardiologist also wants to discuss labs    She notes her symptoms are about the same, however ntoes some dry mouth, etc as well as \"feeling off' since starting the scopolamine patches for possible vagal nerve issues. Not sure if she will continue or not, would like to discuss possible next steps. mood is good. some left shoulder pain, does follow with a physical therapy clinic and open to having htem take a look at the shoulder too. does have follow-up appts scheduled at [facility].         Review of Systems   Constitutional, HEENT, cardiovascular, pulmonary, gi and gu systems are negative, except as otherwise noted.      Objective    /68 (BP Location: Right arm, Patient Position: Chair, Cuff Size: Adult Regular)   Pulse 54   Temp 96.8  F (36  C) (Tympanic)   Resp 16   Ht 1.613 m (5' 3.5\")   Wt 54.8 kg (120 lb 12.8 oz)   SpO2 98%   BMI 21.06 kg/m    Body mass index is 21.06 kg/m .  Physical Exam   Constitutional: healthy, alert and no distress  HEENT: normocephalic and atrumatic, mucous membranes moist, op clear, mucous membranes moist, neck supple   Psychiatric: mentation appears normal and affect normal/bright              .  ASSESSMENT/PLAN:                                                    (R00.2) Palpitations  (primary encounter diagnosis)  Comment: discussed with patient (or patient's parents/caregiver) pathophysiology of condition and treatment options.  reviwed history and [facility]''s reccs today. I do agree with patient that further evaluation into her symptoms is needed, and will place cardiology referral to get second opinion about vagal nerve component and possible actions, erika given her intolerance of the scopolamine patch to date. will follow-up again after that appointment. Patient verbalized understanding and is " "agreeable to this plan.   Plan: CARDIOLOGY EVAL ADULT REFERRAL          (R19.8) Uncomfortable fullness after meals  Comment: as above, last EGD 2019. will continue current plan, reviwed [facility]'s reccs, etc.   Plan: CARDIOLOGY EVAL ADULT REFERRAL          (I10) Hypertension goal BP (blood pressure) < 140/90  Comment: as above. Blood pressure within acceptable limits today. continue ACEI  Plan: CARDIOLOGY EVAL ADULT REFERRAL          (M25.512,  G89.29) Chronic left shoulder pain  Comment: discussed with patient (or patient's parents/caregiver) pathophysiology of condition and treatment options.  give history, will refer to physical therapy for eval. reviwed RICE, etc too.Patient verbalized understanding and is agreeable to this plan.   Plan: PHYSICAL THERAPY REFERRAL              Estimated body mass index is 21.06 kg/m  as calculated from the following:    Height as of this encounter: 1.613 m (5' 3.5\").    Weight as of this encounter: 54.8 kg (120 lb 12.8 oz).      Return to clinic as needed or if symptoms persist, change, worsen or if any new symptoms develop.    Return in about 2 months (around 1/18/2021) for Next scheduled follow-up visit, or sooner if symptoms persist or worsen.         Roe Connolly M.D.  Internal Medicine-Pediatrics      "

## 2020-12-01 ENCOUNTER — TELEPHONE (OUTPATIENT)
Dept: PEDIATRICS | Facility: CLINIC | Age: 75
End: 2020-12-01

## 2020-12-01 DIAGNOSIS — S49.90XA SHOULDER INJURY: Primary | ICD-10-CM

## 2020-12-01 NOTE — TELEPHONE ENCOUNTER
Order/Referral Request:    Who is requesting: [facility] physical therapy    Orders being requested: physical therapy    Reason service is needed/diagnosis: shoulder pain    When are orders needed by: asap    Has this been discussed with Provider: Yes    Does patient have a preference on a Group/Provider/Facility? [facility] clinic in [place]    Does patient have an appointment scheduled: Yes: did not have specific dates    Where to send Orders: Fax  [phone]    Okay to leave detailed message?  Yes at Home number on file [phone] (home)    Routing

## 2020-12-03 NOTE — TELEPHONE ENCOUNTER
[facility] in [place] called back to check on status of this request. Patient would like to be seen there for her left shoulder. She has been seen there several times in the past. Please fax referral to fax# [phone]. Any questions they can be reached at # [phone]. There is a physical therapy referral in chart but it is not a referral to [facility]. Informed them that this request has been sent to her provider.

## 2020-12-04 NOTE — TELEPHONE ENCOUNTER
Faxed referral to [facility] in [place] per Provider request.  Mikayla FARIA RN, BSN

## 2020-12-04 NOTE — TELEPHONE ENCOUNTER
okay to place referral, unsure how to do it. please free text and fax to the physical therapy clinic?

## 2020-12-21 ENCOUNTER — TELEPHONE (OUTPATIENT)
Dept: PEDIATRICS | Facility: CLINIC | Age: 75
End: 2020-12-21

## 2020-12-21 NOTE — TELEPHONE ENCOUNTER
Called patient to reschedule her appt with Dr. Connolly due to him being unavailable. Patient stated that she has many concerns about the Covid vaccine and her allergies. She wanted to speak to Dr. Connolly about those. I let her know that I could send a message to a nurse team and have them give her a call. Please call patient at [phone].    Emma Bell,     
I called and spoke to the pt and she stated that she will set up an e-visit with the doctor.   Norma Zepeda on 12/21/2020 at 10:03 AM   
MA/TC: Pt needs an appt to discuss her concerns.     Thank you  Mojgan Crane RN on 12/21/2020 at 9:52 AM  
97

## 2021-01-06 ENCOUNTER — VIRTUAL VISIT (OUTPATIENT)
Dept: PEDIATRICS | Facility: CLINIC | Age: 76
End: 2021-01-06
Payer: MEDICARE

## 2021-01-06 DIAGNOSIS — Z71.85 VACCINE COUNSELING: Primary | ICD-10-CM

## 2021-01-06 PROCEDURE — 99442 PR PHYSICIAN TELEPHONE EVALUATION 11-20 MIN: CPT | Mod: 95 | Performed by: INTERNAL MEDICINE

## 2021-01-06 NOTE — PROGRESS NOTES
Brionna Rollins is a 75 year old female who is being evaluated via a billable telephone visit.      What phone number would you like to be contacted at? [phone]  How would you like to obtain your AVS? MyChart  Assessment & Plan     Vaccine counseling  discussed with patient (or patient's parents/caregiver) pathophysiology of condition and treatment options.  reviwed indicatins and updates, as of this visit, for COVID vaccine indications and timelines. All questions answered. Patient will get vaccine asap, likely with next group in a few weeks, reviwed current [facility] and [facility] guilienes as well. Patient verbalized understanding and is agreeable to this plan.         18 minutes spent on the date of the encounter doing chart review, history and exam, documentation and further activities as noted above           See Patient Instructions    No follow-ups on file.    Roe Connolly MD  [facility]    Subjective     Brionna Rollins is a 75 year old who presents to clinic today for the following health issues     HPI       Discuss COVID vaccine.      patient scheduled visit to discuss CVID vaccine, types, timelines, and my thoughts. feels well and has no other concenrs today.     Review of Systems   Constitutional, HEENT, cardiovascular, pulmonary, gi and gu systems are negative, except as otherwise noted.      Objective           Vitals:  No vitals were obtained today due to virtual visit.    Physical Exam   healthy, alert and no distress  PSYCH: Alert and oriented times 3; coherent speech, normal   rate and volume, able to articulate logical thoughts, able   to abstract reason, no tangential thoughts, no hallucinations   or delusions  Her affect is normal  RESP: No cough, no audible wheezing, able to talk in full sentences  Remainder of exam unable to be completed due to telephone visits          Phone call duration: 16 minutes

## 2021-01-15 ENCOUNTER — HEALTH MAINTENANCE LETTER (OUTPATIENT)
Age: 76
End: 2021-01-15

## 2021-02-03 ENCOUNTER — MYC MEDICAL ADVICE (OUTPATIENT)
Dept: PEDIATRICS | Facility: CLINIC | Age: 76
End: 2021-02-03

## 2021-02-04 NOTE — TELEPHONE ENCOUNTER
Spoke with Pt regarding covid vaccine administration. Phone number given to Pt. [phone].    Tia JARRELL, OSMAN,MASHA    
What Type Of Note Output Would You Prefer (Optional)?: Standard Output
Hpi Title: Evaluation of Skin Lesions
How Severe Are Your Spot(S)?: mild
Have Your Spot(S) Been Treated In The Past?: has not been treated
ADMIT

## 2021-02-05 ENCOUNTER — MYC MEDICAL ADVICE (OUTPATIENT)
Dept: PEDIATRICS | Facility: CLINIC | Age: 76
End: 2021-02-05

## 2021-02-05 DIAGNOSIS — H69.93 DYSFUNCTION OF EUSTACHIAN TUBE, BILATERAL: ICD-10-CM

## 2021-02-05 RX ORDER — FLUTICASONE PROPIONATE 50 MCG
SPRAY, SUSPENSION (ML) NASAL
Qty: 16 G | Refills: 5 | Status: SHIPPED | OUTPATIENT
Start: 2021-02-05 | End: 2022-12-14

## 2021-02-08 ENCOUNTER — MYC MEDICAL ADVICE (OUTPATIENT)
Dept: PEDIATRICS | Facility: CLINIC | Age: 76
End: 2021-02-08

## 2021-02-08 NOTE — TELEPHONE ENCOUNTER
Called Pt and I explained the process of checking the [facility] web site for future up dates.  Tia JARRELL, OSMAN,MASHA

## 2021-02-24 ENCOUNTER — MYC MEDICAL ADVICE (OUTPATIENT)
Dept: PEDIATRICS | Facility: CLINIC | Age: 76
End: 2021-02-24

## 2021-02-28 ENCOUNTER — IMMUNIZATION (OUTPATIENT)
Dept: NURSING | Facility: CLINIC | Age: 76
End: 2021-02-28
Payer: MEDICARE

## 2021-02-28 PROCEDURE — 0011A PR COVID VAC MODERNA 100 MCG/0.5 ML IM: CPT

## 2021-02-28 PROCEDURE — 91301 PR COVID VAC MODERNA 100 MCG/0.5 ML IM: CPT

## 2021-03-08 ENCOUNTER — TELEPHONE (OUTPATIENT)
Dept: PEDIATRICS | Facility: CLINIC | Age: 76
End: 2021-03-08

## 2021-03-21 ENCOUNTER — TRANSFERRED RECORDS (OUTPATIENT)
Dept: HEALTH INFORMATION MANAGEMENT | Facility: CLINIC | Age: 76
End: 2021-03-21

## 2021-03-28 ENCOUNTER — IMMUNIZATION (OUTPATIENT)
Dept: NURSING | Facility: CLINIC | Age: 76
End: 2021-03-28
Attending: INTERNAL MEDICINE
Payer: MEDICARE

## 2021-03-28 PROCEDURE — 91301 PR COVID VAC MODERNA 100 MCG/0.5 ML IM: CPT

## 2021-03-28 PROCEDURE — 0012A PR COVID VAC MODERNA 100 MCG/0.5 ML IM: CPT

## 2021-03-30 ENCOUNTER — TRANSFERRED RECORDS (OUTPATIENT)
Dept: HEALTH INFORMATION MANAGEMENT | Facility: CLINIC | Age: 76
End: 2021-03-30

## 2021-04-06 ENCOUNTER — TRANSFERRED RECORDS (OUTPATIENT)
Dept: HEALTH INFORMATION MANAGEMENT | Facility: CLINIC | Age: 76
End: 2021-04-06

## 2021-04-14 ENCOUNTER — OFFICE VISIT (OUTPATIENT)
Dept: PEDIATRICS | Facility: CLINIC | Age: 76
End: 2021-04-14
Payer: MEDICARE

## 2021-04-14 VITALS
TEMPERATURE: 98.8 F | DIASTOLIC BLOOD PRESSURE: 70 MMHG | HEART RATE: 64 BPM | HEIGHT: 63 IN | SYSTOLIC BLOOD PRESSURE: 132 MMHG | WEIGHT: 121 LBS | BODY MASS INDEX: 21.44 KG/M2 | RESPIRATION RATE: 20 BRPM

## 2021-04-14 DIAGNOSIS — I10 HYPERTENSION GOAL BP (BLOOD PRESSURE) < 140/90: Primary | ICD-10-CM

## 2021-04-14 DIAGNOSIS — M81.0 AGE-RELATED OSTEOPOROSIS WITHOUT CURRENT PATHOLOGICAL FRACTURE: ICD-10-CM

## 2021-04-14 DIAGNOSIS — R19.8 UNCOMFORTABLE FULLNESS AFTER MEALS: ICD-10-CM

## 2021-04-14 PROCEDURE — 99213 OFFICE O/P EST LOW 20 MIN: CPT | Performed by: INTERNAL MEDICINE

## 2021-04-14 RX ORDER — LISINOPRIL 5 MG/1
5 TABLET ORAL DAILY
Qty: 90 TABLET | Refills: 3 | Status: SHIPPED | OUTPATIENT
Start: 2021-04-14 | End: 2021-06-16 | Stop reason: DRUGHIGH

## 2021-04-14 RX ORDER — LISINOPRIL 10 MG/1
10 TABLET ORAL DAILY
Qty: 90 TABLET | Refills: 3 | Status: CANCELLED | OUTPATIENT
Start: 2021-04-14

## 2021-04-14 ASSESSMENT — MIFFLIN-ST. JEOR: SCORE: 1017.98

## 2021-04-14 NOTE — PROGRESS NOTES
Assessment & Plan     (I10) Hypertension goal BP (blood pressure) < 140/90  (primary encounter diagnosis)  Comment:   Bps within acceptable limits today, patient will continue to check at home. discussed with patient (or patient's parents/caregiver) pathophysiology of condition and treatment options.  reviewed labs, medications, diet ,etc.    Plan: lisinopril (ZESTRIL) 5 MG tablet            (R19.8) Uncomfortable fullness after meals  Comment: discussed with patient (or patient's parents/caregiver) pathophysiology of condition and treatment options.  better with PPi, continue cares and plan, reviwed last reccs from GI, she is taking CBD supplmenets and reviwed theres could be playing a role, goal would be to find one that doesn't exacerbate any symptoms, Patient verbalized understanding and is agreeable to this plan.     (M81.0) Age-related osteoporosis without current pathological fracture  Comment: due for dexa. discussed with patient (or patient's parents/caregiver) pathophysiology of condition and treatment options.  reviewed labs, medications, diet ,etc.    Plan: CANCELED: DX Hip/Pelvis/Spine             Return in about 3 months (around 7/14/2021) for Next Annual Wellness Visit, or sooner if symptoms persist or worsen.    Roe Connolly MD  [facility] [place] is a 75 year old who presents for the following health issues     HPI     Hypertension Follow-up      Do you check your blood pressure regularly outside of the clinic? rare     Are you following a low salt diet? No    Are your blood pressures ever more than 140 on the top number (systolic) OR more   than 90 on the bottom number (diastolic), for example 140/90? Unsure not monitoring outside of clinic. Pt however stated that she feels better after receiving steroid injection      How many servings of fruits and vegetables do you eat daily?  2-3    On average, how many sweetened beverages do you drink each day  "(Examples: soda, juice, sweet tea, etc.  Do NOT count diet or artificially sweetened beverages)?   0    How many days per week do you exercise enough to make your heart beat faster? 3 or less    How many minutes a day do you exercise enough to make your heart beat faster? 60 or more    How many days per week do you miss taking your medication? 0    patient has upper back injection a few weeks ago, went well and is conidering doing another one. like her back provider and the injection seemed to \"settle down\" her nerves and very happy with the results. she did do [place] work on Saturday and that went okay. has been using CBD supplements, not taking the ppi regulalry, mosly diet controlled. drinks coffee and enjoys this. mood is good. No other concerns or complaints today.       Review of Systems   Constitutional, HEENT, cardiovascular, pulmonary, gi and gu systems are negative, except as otherwise noted.      Objective    /70   Pulse 64   Temp 98.8  F (37.1  C) (Oral)   Resp 20   Ht 1.6 m (5' 3\")   Wt 54.9 kg (121 lb)   BMI 21.43 kg/m    Body mass index is 21.43 kg/m .  Physical Exam   GENERAL: healthy, alert and no distress  MS: no gross musculoskeletal defects noted, no edema  PSYCH: mentation appears normal, affect normal/bright                "

## 2021-04-15 ENCOUNTER — ANCILLARY PROCEDURE (OUTPATIENT)
Dept: BONE DENSITY | Facility: CLINIC | Age: 76
End: 2021-04-15
Attending: INTERNAL MEDICINE
Payer: MEDICARE

## 2021-04-15 ENCOUNTER — ANCILLARY PROCEDURE (OUTPATIENT)
Dept: BONE DENSITY | Facility: CLINIC | Age: 76
End: 2021-04-15
Payer: MEDICARE

## 2021-04-15 DIAGNOSIS — M81.0 AGE-RELATED OSTEOPOROSIS WITHOUT CURRENT PATHOLOGICAL FRACTURE: ICD-10-CM

## 2021-04-15 DIAGNOSIS — Z78.0 ASYMPTOMATIC MENOPAUSAL STATE: ICD-10-CM

## 2021-04-15 PROCEDURE — 77085 DXA BONE DENSITY AXL VRT FX: CPT | Performed by: INTERNAL MEDICINE

## 2021-04-15 PROCEDURE — 77081 DXA BONE DENSITY APPENDICULR: CPT | Performed by: INTERNAL MEDICINE

## 2021-06-16 ENCOUNTER — OFFICE VISIT (OUTPATIENT)
Dept: PEDIATRICS | Facility: CLINIC | Age: 76
End: 2021-06-16
Payer: MEDICARE

## 2021-06-16 VITALS
OXYGEN SATURATION: 98 % | HEART RATE: 65 BPM | TEMPERATURE: 97.5 F | WEIGHT: 122 LBS | BODY MASS INDEX: 20.83 KG/M2 | SYSTOLIC BLOOD PRESSURE: 122 MMHG | DIASTOLIC BLOOD PRESSURE: 70 MMHG | RESPIRATION RATE: 16 BRPM | HEIGHT: 64 IN

## 2021-06-16 DIAGNOSIS — M17.12 OSTEOARTHRITIS OF LEFT KNEE, UNSPECIFIED OSTEOARTHRITIS TYPE: ICD-10-CM

## 2021-06-16 DIAGNOSIS — Z01.818 PREOP GENERAL PHYSICAL EXAM: Primary | ICD-10-CM

## 2021-06-16 LAB
ALBUMIN UR-MCNC: NEGATIVE MG/DL
APPEARANCE UR: CLEAR
BILIRUB UR QL STRIP: NEGATIVE
COLOR UR AUTO: YELLOW
GLUCOSE UR STRIP-MCNC: NEGATIVE MG/DL
HGB UR QL STRIP: ABNORMAL
KETONES UR STRIP-MCNC: NEGATIVE MG/DL
LEUKOCYTE ESTERASE UR QL STRIP: ABNORMAL
NITRATE UR QL: NEGATIVE
PH UR STRIP: 6.5 PH (ref 5–7)
RBC #/AREA URNS AUTO: NORMAL /HPF
SOURCE: ABNORMAL
SP GR UR STRIP: 1.01 (ref 1–1.03)
UROBILINOGEN UR STRIP-ACNC: 0.2 EU/DL (ref 0.2–1)
WBC #/AREA URNS AUTO: NORMAL /HPF

## 2021-06-16 PROCEDURE — 81001 URINALYSIS AUTO W/SCOPE: CPT | Performed by: INTERNAL MEDICINE

## 2021-06-16 PROCEDURE — 93000 ELECTROCARDIOGRAM COMPLETE: CPT | Performed by: INTERNAL MEDICINE

## 2021-06-16 PROCEDURE — 99214 OFFICE O/P EST MOD 30 MIN: CPT | Performed by: INTERNAL MEDICINE

## 2021-06-16 ASSESSMENT — MIFFLIN-ST. JEOR: SCORE: 1033.39

## 2021-06-16 NOTE — PATIENT INSTRUCTIONS

## 2021-06-16 NOTE — PROGRESS NOTES
[facility]  [address] [place] [address]  [address]  [place] [address]  Phone: [phone]  Fax: [phone]  Primary Provider: Fer Welch  Pre-op Performing Provider: FER WELCH      PREOPERATIVE EVALUATION:  Today's date: 6/16/2021    Brionna Rollins is a 76 year old female who presents for a preoperative evaluation.    Surgical Information:  Surgery/Procedure: LEFT UNICOMPARTMENTAL KNEE ARTHROPLASTY  Surgery Location: [facility]  Surgeon: Mike Coe  Surgery Date: 6/22/2021  Time of Surgery: 730AM  Where patient plans to recover: At home with family  Fax number for surgical facility: [phone]    Type of Anesthesia Anticipated: to be determined    Assessment & Plan     The proposed surgical procedure is considered INTERMEDIATE risk.    Preop general physical exam  - EKG 12-lead complete w/read - Clinics    Osteoarthritis of left knee, unspecified osteoarthritis type       Risks and Recommendations:  The patient has the following additional risks and recommendations for perioperative complications:   - No identified additional risk factors other than previously addressed   - reviwed EKG with Cardiology, no further evaluation needed as patient complete asymptomatic    Medication Instructions:  Patient is to take all scheduled medications on the day of surgery    RECOMMENDATION:  APPROVAL GIVEN to proceed with proposed procedure, without further diagnostic evaluation.    956}    Subjective     HPI related to upcoming procedure: patient feels well, BPs within normal limits. no new concerns or issues. no chest pain or shortness of breath. no lower extremity swelling. No other concerns or complaints today.         Preop Questions 6/16/2021   1. Have you ever had a heart attack or stroke? No    2. Have you ever had surgery on your heart or blood vessels, such as a stent placement, a coronary artery bypass, or surgery on an artery in your head, neck, heart, or legs? No    3. Do you have chest pain with activity? No   4. Do you have a history of  heart failure? No   5. Do you currently have a cold, bronchitis or symptoms of other infection? No   6. Do you have a cough, shortness of breath, or wheezing? No   7. Do you or anyone in your family have previous history of blood clots? No   8. Do you or does anyone in your family have a serious bleeding problem such as prolonged bleeding following surgeries or cuts? No   9. Have you ever had problems with anemia or been told to take iron pills? No   10. Have you had any abnormal blood loss such as black, tarry or bloody stools, or abnormal vaginal bleeding? No   11. Have you ever had a blood transfusion? No   12. Are you willing to have a blood transfusion if it is medically needed before, during, or after your surgery? Yes   13. Have you or any of your relatives ever had problems with anesthesia? No   14. Do you have sleep apnea, excessive snoring or daytime drowsiness? No   15. Do you have any artifical heart valves or other implanted medical devices like a pacemaker, defibrillator, or continuous glucose monitor? No   16. Do you have artificial joints? YES -    17. Are you allergic to latex? No   18. Is there any chance that you may be pregnant? -     Health Care Directive:  Patient has a Health Care Directive on file      Preoperative Review of :   reviewed - no record of controlled substances prescribed.      Status of Chronic Conditions:  See problem list for active medical problems.  Problems all longstanding and stable, except as noted/documented.  See ROS for pertinent symptoms related to these conditions.    HYPERTENSION - Patient has longstanding history of HTN , currently denies any symptoms referable to elevated blood pressure. Specifically denies chest pain, palpitations, dyspnea, orthopnea, PND or peripheral edema. Blood pressure readings have been in normal range. Current medication regimen is as listed below. Patient  denies any side effects of medication.       Review of Systems  Constitutional, neuro, ENT, endocrine, pulmonary, cardiac, gastrointestinal, genitourinary, musculoskeletal, integument and psychiatric systems are negative, except as otherwise noted.    Patient Active Problem List    Diagnosis Date Noted     Inflamed seborrheic keratosis 11/02/2017     Priority: Medium     SK (seborrheic keratosis) 11/02/2017     Priority: Medium     Cherry angioma 11/02/2017     Priority: Medium     Atopic rhinitis 02/05/2016     Priority: Medium     Hypertension goal BP (blood pressure) < 140/90 01/07/2016     Priority: Medium     Osteoarthritis of knee 12/18/2015     Priority: Medium     Fracture of distal end of radius 10/22/2015     Priority: Medium     Surgical follow-up care 06/01/2015     Priority: Medium     Overview:   inactive icd-9 diagnosis auto replaced with icd-10, display name retained//[id number]       Acute posthemorrhagic anemia 04/29/2015     Priority: Medium     Osteoarthritis of hip 04/07/2015     Priority: Medium     Allergy to anti-infective agent 03/26/2014     Priority: Medium     Enteritis 03/26/2014     Priority: Medium     Diverticulitis of sigmoid colon 03/26/2014     Priority: Medium     ACP (advance care planning) 06/06/2011     Priority: Medium     Patient states has Advance Directive and will bring in a copy to clinic. 6/6/2011   Pt document is in epic, dated 1/12/05.  Discussed with pt and she may plan to update this and verbally gave the health care agent information listed. Leigh Naranjo RN           Mixed incontinence urge and stress 06/06/2011     Priority: Medium     CARDIOVASCULAR SCREENING; LDL GOAL LESS THAN 160 02/10/2010     Priority: Medium     Microscopic hematuria 11/21/2008     Priority: Medium     Colon Polyps      Priority: Medium     Estrogen replacement therapy 09/07/2005     Priority: Medium     Internal derangement of knee 04/30/2005     Priority: Medium     L knee x 4-5 wks, no known  "injury but \"runs through it\" regularly  Problem list name updated by automated process. Provider to review       Back pain 04/18/2003     Priority: Medium       Patient is followed by FER WELCH for ongoing prescription of narcotic pain medicine.  Med: vicodin.   Maximum use per month: 30  Expected duration: indefinite  Narcotic agreement on file: NO  Clinic visit recommended: Q 3 months         Osteoporosis 04/18/2003     Priority: Medium     Problem list name updated by automated process. Provider to review        Past Medical History:   Diagnosis Date     RADIAL STYLOID TENOSYNOV - right 2/23/2006    lifts weights, doesn't think work-related; OK to add PT visits prn     Rotator cuff tear      Past Surgical History:   Procedure Laterality Date     C ROTATOR CUFF STRAP       C TOTAL HIP ARTHROPLASTY Right      COLONOSCOPY  11/26/2013    Dr. Cardenas [facility]     COLONOSCOPY  11/26/2013    Procedure: COMBINED COLONOSCOPY, SINGLE BIOPSY/POLYPECTOMY BY BIOPSY;;  Surgeon: Jovanni Cardenas MD;  Location:  GI     HYSTERECTOMY, [place] [address]     Current Outpatient Medications   Medication Sig Dispense Refill     acetaminophen (MAPAP) 325 MG tablet Take 325-650 mg by mouth every 6 hours as needed for mild pain       ASPIRIN PO Take by mouth 2 times daily as needed for moderate pain       fluticasone (FLONASE) 50 MCG/ACT nasal spray USE ONE TO TWO SPRAYS IN EACH NOSTRIL ONCE DAILY 16 g 5     IBUPROFEN PO Take 600 mg by mouth every 6 hours as needed for moderate pain       ketorolac (ACULAR) 0.5 % ophthalmic solution        LACTOBACILLUS RHAMNOSUS, GG, PO Take 2 capsules by mouth 3 times daily (with meals)       lisinopril (ZESTRIL) 10 MG tablet Take 1 tablet (10 mg) by mouth daily 90 tablet 3     omeprazole (PRILOSEC) 20 MG DR capsule TAKE ONE CAPSULE BY MOUTH EVERY DAY 90 capsule 3     prednisoLONE acetate (PRED FORTE) 1 % ophthalmic suspension        triamcinolone (KENALOG) 0.1 % external ointment To rash area in the " "pubic area twice daily for up to 2 weeks. 30 g 0     valACYclovir (VALTREX) 1000 mg tablet TAKE TWO TABLETS BY MOUTH TWICE A DAY (Patient not taking: Reported on 10/7/2020) 4 tablet 1     valACYclovir (VALTREX) 500 MG tablet Take 1 tablet (500 mg) by mouth daily 90 tablet 3       Allergies   Allergen Reactions     Scopolamine Anaphylaxis and Itching     Ciprofloxacin Swelling     Stiff and swollen neck     Amoxicillin-Pot Clavulanate [Augmentin]      Contrast Dye      gets cold and feels terrible  Pt was ok with Isovue-370 and no pre-meds     Doxycycline Hyclate Rash     Erythromycin Swelling     Flagyl [Metronidazole Hcl]      Meperidine Hcl      low blood pressure     Oxycodone      Prochlorperazine      agitated     Sulfa Drugs Rash     Tetracycline Rash     Cefprozil Rash        Social History     Tobacco Use     Smoking status: Never Smoker     Smokeless tobacco: Never Used   Substance Use Topics     Alcohol use: Yes     Frequency: 4 or more times a week     Drinks per session: 1 or 2     Comment: occ with dinner q night       History   Drug Use No         Objective     /70 (BP Location: Right arm, Patient Position: Chair, Cuff Size: Adult Regular)   Pulse 65   Temp 97.5  F (36.4  C) (Tympanic)   Resp 16   Ht 1.626 m (5' 4\")   Wt 55.3 kg (122 lb)   SpO2 98%   BMI 20.94 kg/m      Physical Exam    GENERAL APPEARANCE: healthy, alert and no distress     EYES: EOMI, PERRL     HENT: ear canals and TM's normal and nose and mouth covered with mask due to covid     NECK: no adenopathy, no asymmetry,      RESP: lungs clear to auscultation - no rales, rhonchi or wheezes     CV: regular rates and rhythm, normal S1 S2, no S3 or S4 and no murmur, click or rub     ABDOMEN:  soft, nontender, no HSM or masses and bowel sounds normal     MS: extremities normal- no gross deformities noted, no evidence of inflammation in joints, FROM in all extremities.     SKIN: no suspicious lesions or rashes     NEURO: Normal " strength and tone, sensory exam grossly normal, mentation intact and speech normal     PSYCH: mentation appears normal. and affect normal/bright         Recent Labs   Lab Test 12/04/19  1250   HGB 13.4         POTASSIUM 4.5   CR 0.83      Results for orders placed or performed in visit on 06/16/21   *UA reflex to Microscopic and Culture ([place] and [facility] (except [place] and [place])     Status: Abnormal    Specimen: Midstream Urine   Result Value Ref Range    Color Urine Yellow     Appearance Urine Clear     Glucose Urine Negative NEG^Negative mg/dL    Bilirubin Urine Negative NEG^Negative    Ketones Urine Negative NEG^Negative mg/dL    Specific Gravity Urine 1.010 1.003 - 1.035    Blood Urine Trace (A) NEG^Negative    pH Urine 6.5 5.0 - 7.0 pH    Protein Albumin Urine Negative NEG^Negative mg/dL    Urobilinogen Urine 0.2 0.2 - 1.0 EU/dL    Nitrite Urine Negative NEG^Negative    Leukocyte Esterase Urine Small (A) NEG^Negative    Source Midstream Urine    Urine Microscopic     Status: None   Result Value Ref Range    WBC Urine 0 - 5 OTO5^0 - 5 /HPF    RBC Urine O - 2 OTO2^O - 2 /HPF         Diagnostics:     EKG: Normal Sinus Rhythm, normal axis, normal intervals, no acute ST/T changes c/w ischemia, no LVH by voltage criteria, ? Q waves in V1 and V2    Revised Cardiac Risk Index (RCRI):  The patient has the following serious cardiovascular risks for perioperative complications:    RCRI Interpretation: 1 point: Class II (low risk - 0.9% complication rate)    Reviwed EKG above with Dr. Hernandez, patient completely asymptomatic and no further work-up needed for procedure           Signed Electronically by: Roe Connolly MD  Copy of this evaluation report is provided to requesting physician.

## 2021-06-29 ENCOUNTER — E-VISIT (OUTPATIENT)
Dept: PEDIATRICS | Facility: CLINIC | Age: 76
End: 2021-06-29
Payer: MEDICARE

## 2021-06-29 ENCOUNTER — E-VISIT (OUTPATIENT)
Dept: PEDIATRICS | Facility: CLINIC | Age: 76
End: 2021-06-29

## 2021-06-29 ENCOUNTER — OFFICE VISIT (OUTPATIENT)
Dept: URGENT CARE | Facility: URGENT CARE | Age: 76
End: 2021-06-29
Payer: MEDICARE

## 2021-06-29 VITALS
WEIGHT: 125 LBS | BODY MASS INDEX: 21.46 KG/M2 | HEART RATE: 68 BPM | SYSTOLIC BLOOD PRESSURE: 148 MMHG | TEMPERATURE: 98.1 F | OXYGEN SATURATION: 97 % | DIASTOLIC BLOOD PRESSURE: 72 MMHG

## 2021-06-29 DIAGNOSIS — R30.0 DYSURIA: Primary | ICD-10-CM

## 2021-06-29 DIAGNOSIS — R82.90 NONSPECIFIC FINDING ON EXAMINATION OF URINE: ICD-10-CM

## 2021-06-29 DIAGNOSIS — Z87.440 PERSONAL HISTORY OF URINARY TRACT INFECTION: ICD-10-CM

## 2021-06-29 DIAGNOSIS — N30.01 ACUTE CYSTITIS WITH HEMATURIA: Primary | ICD-10-CM

## 2021-06-29 DIAGNOSIS — Z53.9 ERRONEOUS ENCOUNTER--DISREGARD: Primary | ICD-10-CM

## 2021-06-29 DIAGNOSIS — R30.0 DYSURIA: ICD-10-CM

## 2021-06-29 LAB
ALBUMIN UR-MCNC: 30 MG/DL
APPEARANCE UR: CLEAR
BACTERIA #/AREA URNS HPF: ABNORMAL /HPF
BILIRUB UR QL STRIP: NEGATIVE
COLOR UR AUTO: YELLOW
GLUCOSE UR STRIP-MCNC: NEGATIVE MG/DL
HGB UR QL STRIP: ABNORMAL
KETONES UR STRIP-MCNC: 15 MG/DL
LEUKOCYTE ESTERASE UR QL STRIP: ABNORMAL
NITRATE UR QL: NEGATIVE
NON-SQ EPI CELLS #/AREA URNS LPF: ABNORMAL /LPF
PH UR STRIP: 6 PH (ref 5–7)
RBC #/AREA URNS AUTO: ABNORMAL /HPF
SOURCE: ABNORMAL
SP GR UR STRIP: 1.02 (ref 1–1.03)
UROBILINOGEN UR STRIP-ACNC: 1 EU/DL (ref 0.2–1)
WBC #/AREA URNS AUTO: ABNORMAL /HPF

## 2021-06-29 PROCEDURE — 87086 URINE CULTURE/COLONY COUNT: CPT | Performed by: PHYSICIAN ASSISTANT

## 2021-06-29 PROCEDURE — 87186 SC STD MICRODIL/AGAR DIL: CPT | Performed by: PHYSICIAN ASSISTANT

## 2021-06-29 PROCEDURE — 87088 URINE BACTERIA CULTURE: CPT | Performed by: PHYSICIAN ASSISTANT

## 2021-06-29 PROCEDURE — 99213 OFFICE O/P EST LOW 20 MIN: CPT | Performed by: PHYSICIAN ASSISTANT

## 2021-06-29 PROCEDURE — 81001 URINALYSIS AUTO W/SCOPE: CPT | Performed by: PHYSICIAN ASSISTANT

## 2021-06-29 PROCEDURE — 99207 PR NON-BILLABLE SERV PER CHARTING: CPT | Performed by: INTERNAL MEDICINE

## 2021-06-29 RX ORDER — ASPIRIN 325 MG
325 TABLET, DELAYED RELEASE (ENTERIC COATED) ORAL
COMMUNITY
Start: 2021-06-22 | End: 2022-12-14

## 2021-06-29 RX ORDER — HYDROMORPHONE HYDROCHLORIDE 2 MG/1
TABLET ORAL
COMMUNITY
Start: 2021-06-28 | End: 2021-10-20

## 2021-06-29 RX ORDER — NITROFURANTOIN 25; 75 MG/1; MG/1
100 CAPSULE ORAL 2 TIMES DAILY
Qty: 20 CAPSULE | Refills: 0 | Status: SHIPPED | OUTPATIENT
Start: 2021-06-29 | End: 2021-07-09

## 2021-06-29 NOTE — TELEPHONE ENCOUNTER
I called and spoke to the pt and she has had in done already.   Norma Zepeda on 6/29/2021 at 1:49 PM

## 2021-06-29 NOTE — PATIENT INSTRUCTIONS

## 2021-06-29 NOTE — PROGRESS NOTES
SUBJECTIVE:   Brionna Rollins is a 76 year old female who  presents today for a possible UTI. Symptoms of dysuria, urgency, burning and hesitancy have been going on for 1week(s).  Hematuria yes today.  gradual onsetand mild.  There is no history of fever, chills, nausea or vomiting.  No history of vaginal or penile discharge. This patient does have a history of urinary tract infections. Patient denies long duration, rigors, flank pain, temperature > 101 degrees F., Vomiting, significant nausea or diarrhea, taking Coumadin and GFR less than 30 within the last year or vaginal discharge, vaginal odor, vaginal itching and dyspareunia (pain in labia/pelvis)     No history of UTI    Catheter 7 days ago     No vaginal symptoms      Past Medical History:   Diagnosis Date     RADIAL STYLOID TENOSYNOV - right 2/23/2006    lifts weights, doesn't think work-related; OK to add PT visits prn     Rotator cuff tear      Current Outpatient Medications   Medication Sig Dispense Refill     acetaminophen (MAPAP) 325 MG tablet Take 325-650 mg by mouth every 6 hours as needed for mild pain       aspirin (ASA) 325 MG EC tablet Take 325 mg by mouth       ASPIRIN PO Take by mouth 2 times daily as needed for moderate pain       fluticasone (FLONASE) 50 MCG/ACT nasal spray USE ONE TO TWO SPRAYS IN EACH NOSTRIL ONCE DAILY 16 g 5     HYDROmorphone (DILAUDID) 2 MG tablet        IBUPROFEN PO Take 600 mg by mouth every 6 hours as needed for moderate pain       ketorolac (ACULAR) 0.5 % ophthalmic solution        LACTOBACILLUS RHAMNOSUS, GG, PO Take 2 capsules by mouth 3 times daily (with meals)       prednisoLONE acetate (PRED FORTE) 1 % ophthalmic suspension        triamcinolone (KENALOG) 0.1 % external ointment To rash area in the pubic area twice daily for up to 2 weeks. 30 g 0     lisinopril (ZESTRIL) 10 MG tablet Take 1 tablet (10 mg) by mouth daily 90 tablet 3     omeprazole (PRILOSEC) 20 MG DR capsule TAKE ONE CAPSULE BY MOUTH EVERY DAY 90  capsule 3     valACYclovir (VALTREX) 1000 mg tablet TAKE TWO TABLETS BY MOUTH TWICE A DAY (Patient not taking: Reported on 10/7/2020) 4 tablet 1     valACYclovir (VALTREX) 500 MG tablet Take 1 tablet (500 mg) by mouth daily 90 tablet 3     Social History     Tobacco Use     Smoking status: Never Smoker     Smokeless tobacco: Never Used   Substance Use Topics     Alcohol use: Yes     Frequency: 4 or more times a week     Drinks per session: 1 or 2     Comment: occ with dinner q night       ROS:   10 point ROS negative except as listed above      OBJECTIVE:  BP (!) 148/72   Pulse 68   Temp 98.1  F (36.7  C)   Wt 56.7 kg (125 lb)   SpO2 97%   BMI 21.46 kg/m    GENERAL APPEARANCE: healthy, alert and no distress  RESP: lungs clear to auscultation - no rales, rhonchi or wheezes  CV: regular rates and rhythm, normal S1 S2, no murmur noted  BACK: No CVA tenderness  SKIN: no suspicious lesions or rashes      Results for orders placed or performed in visit on 06/29/21   UA reflex to Microscopic and Culture     Status: Abnormal    Specimen: Midstream Urine   Result Value Ref Range    Color Urine Yellow     Appearance Urine Clear     Glucose Urine Negative NEG^Negative mg/dL    Bilirubin Urine Negative NEG^Negative    Ketones Urine 15 (A) NEG^Negative mg/dL    Specific Gravity Urine 1.020 1.003 - 1.035    Blood Urine Large (A) NEG^Negative    pH Urine 6.0 5.0 - 7.0 pH    Protein Albumin Urine 30 (A) NEG^Negative mg/dL    Urobilinogen Urine 1.0 0.2 - 1.0 EU/dL    Nitrite Urine Negative NEG^Negative    Leukocyte Esterase Urine Moderate (A) NEG^Negative    Source Midstream Urine    Urine Microscopic     Status: Abnormal   Result Value Ref Range    WBC Urine 25-50 (A) OTO5^0 - 5 /HPF    RBC Urine 10-25 (A) OTO2^O - 2 /HPF    Squamous Epithelial /LPF Urine Few FEW^Few /LPF    Bacteria Urine Few (A) NEG^Negative /HPF       ASSESSMENT:   (N30.01) Acute cystitis with hematuria  (primary encounter diagnosis)  Plan: nitroFURantoin  macrocrystal-monohydrate         (MACROBID) 100 MG capsule      (R30.0) Dysuria  Plan: UA reflex to Microscopic and Culture, Urine         Microscopic      (R82.90) Nonspecific finding on examination of urine  Plan: Urine Culture Aerobic Bacterial      Red flags and emergent follow up discussed, and understood by patient  Follow up with PCP if symptoms worsen or fail to improve    Patient Instructions     Patient Education     Bladder Infection, Female (Adult)     Urine normally doesn't have any germs (bacteria) in it. But bacteria can get into the urinary tract from the skin around the rectum. Or they can travel in the blood from other parts of the body. Once they are in your urinary tract, they can cause infection in these areas:    The urethra (urethritis)    The bladder (cystitis)    The kidneys (pyelonephritis)  The most common place for an infection is in the bladder. This is called a bladder infection. This is one of the most common infections in women. Most bladder infections are easily treated. They are not serious unless the infection spreads to the kidney.  The terms bladder infection, UTI, and cystitis are often used to describe the same thing. But they are not always the same. Cystitis is an inflammation of the bladder. The most common cause of cystitis is an infection.  Symptoms  The infection causes inflammation in the urethra and bladder. This causes many of the symptoms. The most common symptoms of a bladder infection are:    Pain or burning when urinating    Having to urinate more often than normal    Urgent need to urinate    Only a small amount of urine comes out    Blood in urine    Belly (abdominal) discomfort. This is often in the lower belly above the pubic bone.    Cloudy urine    Strong- or bad-smelling urine    Unable to urinate (urinary retention)    Unable to hold urine in (urinary incontinence)    Fever    Loss of appetite    Confusion (in older adults)  Causes  Bladder infections are  not contagious. You can't get one from someone else, from a toilet seat, or from sharing a bath.  The most common cause of bladder infections is bacteria from the bowels. The bacteria get onto the skin around the opening of the urethra. From there, they can get into the urine. Then they travel up to the bladder, causing inflammation and infection. This often happens because of:    Wiping incorrectly after urinating. Always wipe from front to back.    Bowel incontinence    Pregnancy    Procedures such as having a catheter put in    Older age    Not emptying your bladder. This can give bacteria a chance to grow in your urine.    Fluid loss (dehydration)    Constipation    Having sex    Using a diaphragm for birth control   Treatment  Bladder infections are diagnosed by a urine test and urine culture. They are treated with antibiotics. They often clear up quickly without problems. Treatment helps prevent a more serious kidney infection.  Medicines  Medicines can help in the treatment of a bladder infection:    Take antibiotics until they are used up, even if you feel better. It's important to finish them to make sure the infection has cleared.    You can use acetaminophen or ibuprofen for pain, fever, or discomfort, unless another medicine was prescribed. If you have long-term (chronic) liver or kidney disease, talk with your healthcare provider before using these medicines. Also talk with your provider if you've ever had a stomach ulcer or GI (gastrointestinal) bleeding, or are taking blood-thinner medicines.    If you are given phenazopydridine to reduce burning with urination, it will make your urine a bright orange color. This can stain clothing.  Care and prevention  These self-care steps can help prevent future infections:    Drink plenty of fluids. This helps to prevent dehydration and flush out your bladder. Do this unless you must restrict fluids for other health reasons, or your healthcare provider told you  not to.    Clean yourself correctly after going to the bathroom. Wipe from front to back after using the toilet. This helps prevent the spread of bacteria.    Urinate more often. Don't try to hold urine in for a long time.    Wear loose-fitting clothes and cotton underwear. Don't wear tight-fitting pants.    Improve your diet and prevent constipation. Eat more fresh fruits and vegetables, and fiber. Eat less junk foods and fatty foods.    Don't have sex until your symptoms are gone.    Don't have caffeine, alcohol, and spicy foods. These can irritate your bladder.    Urinate right after you have sex to flush out your bladder.    If you use birth control pills and have frequent bladder infections, discuss it with your healthcare provider.  Follow-up care  Call your healthcare provider if all symptoms are not gone after 3 days of treatment. This is especially important if you have repeat infections.  If a culture was done, you will be told if your treatment needs to be changed. If directed, you can call to find out the results.  If X-rays were done, you will be told if the results will affect your treatment.  Call 911  Call 911 if any of the following occur:    Trouble breathing    Hard to wake up or confusion    Fainting (loss of consciousness)    Fast heart rate  When to get medical advice  Call your healthcare provider right away if any of these occur:    Fever of 100.4 F (38.0 C) or higher, or as directed by your healthcare provider    Symptoms are not better after 3 days of treatment    Back or belly pain that gets worse    Repeated vomiting, or unable to keep medicine down    Weakness or dizziness    Vaginal discharge    Pain, redness, or swelling in the outer vaginal area (labia)  [employer] last reviewed this educational content on 11/1/2019 2000-2021 [employer]. All rights reserved. This information is not intended as a substitute for professional medical care. Always follow your healthcare  professional's instructions.

## 2021-06-29 NOTE — TELEPHONE ENCOUNTER
Team: please call patient and schedule lab only appointment TODAY for a UA. order in. thanks!        patient went to

## 2021-06-30 LAB
BACTERIA SPEC CULT: ABNORMAL
SPECIMEN SOURCE: ABNORMAL

## 2021-07-08 NOTE — Clinical Note
Gonzalo Reed, looks like the urine results are in. She did have her urine tested this morning.     Mary Barry, CHIRAG   [facility] -- Triage Nurse       [Normal] : affect appropriate

## 2021-08-10 ENCOUNTER — TELEPHONE (OUTPATIENT)
Dept: PEDIATRICS | Facility: CLINIC | Age: 76
End: 2021-08-10

## 2021-08-10 DIAGNOSIS — I10 HYPERTENSION GOAL BP (BLOOD PRESSURE) < 140/90: ICD-10-CM

## 2021-08-10 DIAGNOSIS — B00.9 HSV (HERPES SIMPLEX VIRUS) INFECTION: ICD-10-CM

## 2021-08-10 NOTE — TELEPHONE ENCOUNTER
Pt called pharmacy today looking for refills on lisinopril 5mg and valacyclovir 500mg. These orders aren't currently active  - we received cancelations from MD on both. They are not active on her med list so I couldn't request that way. Please review and send new orders to  [facility] is pt should still be on. Call if you have questions.     Chelsea Narayan, PharmD  [facility] [place]  [phone]

## 2021-08-11 RX ORDER — VALACYCLOVIR HYDROCHLORIDE 1 G/1
TABLET, FILM COATED ORAL
Qty: 4 TABLET | Refills: 3 | Status: SHIPPED | OUTPATIENT
Start: 2021-08-11 | End: 2021-08-25

## 2021-08-11 RX ORDER — LISINOPRIL 5 MG/1
5 TABLET ORAL DAILY
Qty: 90 TABLET | Refills: 3 | Status: SHIPPED | OUTPATIENT
Start: 2021-08-11 | End: 2021-11-17

## 2021-08-24 ENCOUNTER — OFFICE VISIT (OUTPATIENT)
Dept: DERMATOLOGY | Facility: CLINIC | Age: 76
End: 2021-08-24
Payer: MEDICARE

## 2021-08-24 DIAGNOSIS — L84 CORN: ICD-10-CM

## 2021-08-24 DIAGNOSIS — L82.1 SEBORRHEIC KERATOSIS: ICD-10-CM

## 2021-08-24 DIAGNOSIS — L82.0 INFLAMED SEBORRHEIC KERATOSIS: Primary | ICD-10-CM

## 2021-08-24 PROCEDURE — 99213 OFFICE O/P EST LOW 20 MIN: CPT | Mod: 25 | Performed by: DERMATOLOGY

## 2021-08-24 PROCEDURE — 17110 DESTRUCTION B9 LES UP TO 14: CPT | Performed by: DERMATOLOGY

## 2021-08-24 NOTE — LETTER
8/24/2021      RE: Brionna Rollins  [address]  [place] [address]       DERMATOLOGY CLINIC VISIT NOTE        DERMATOLOGY PROBLEM LIST:   1.  Seborrheic keratoses.   2.  Irritated seborrheic keratoses.   3.  Cherry angiomas.   4.  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 Rollins a 76-year-old female returning to Dermatology Clinic for ongoing evaluation of irritated seborrheic keratoses and a growth on the finger. The finger lesion was noted this am. No treatment. She has new dark spots on the thighs and itching areas on the R lower back.      Patient Active Problem List   Diagnosis     Back pain     Osteoporosis     Internal derangement of knee     Estrogen replacement therapy     Colon Polyps     Microscopic hematuria     CARDIOVASCULAR SCREENING; LDL GOAL LESS THAN 160     ACP (advance care planning)     Mixed incontinence urge and stress     Fracture of distal end of radius     Hypertension goal BP (blood pressure) < 140/90     Atopic rhinitis     Allergy to anti-infective agent     Enteritis     Osteoarthritis of hip     Osteoarthritis of knee     Acute posthemorrhagic anemia     Surgical follow-up care     Diverticulitis of sigmoid colon     Inflamed seborrheic keratosis     SK (seborrheic keratosis)     Cherry angioma       Allergies   Allergen Reactions     Scopolamine Anaphylaxis and Itching     Ciprofloxacin Swelling     Stiff and swollen neck     Amoxicillin-Pot Clavulanate [Augmentin]      Contrast Dye      gets cold and feels terrible  Pt was ok with Isovue-370 and no pre-meds     Doxycycline Hyclate Rash     Erythromycin Swelling     Flagyl [Metronidazole Hcl]      Meperidine Hcl      low blood pressure     Oxycodone      Prochlorperazine      agitated     Sulfa Drugs Rash     Tetracycline Rash     Cefprozil Rash         Current  Outpatient Medications   Medication     acetaminophen (MAPAP) 325 MG tablet     aspirin (ASA) 325 MG EC tablet     ASPIRIN PO     fluticasone (FLONASE) 50 MCG/ACT nasal spray     HYDROmorphone (DILAUDID) 2 MG tablet     IBUPROFEN PO     ketorolac (ACULAR) 0.5 % ophthalmic solution     LACTOBACILLUS RHAMNOSUS, GG, PO     lisinopril (ZESTRIL) 5 MG tablet     prednisoLONE acetate (PRED FORTE) 1 % ophthalmic suspension     triamcinolone (KENALOG) 0.1 % external ointment     valACYclovir (VALTREX) 1000 mg tablet     Current Facility-Administered Medications   Medication     lidocaine 1% with EPINEPHrine 1:100,000 injection 3 mL          SOCIAL HISTORY:  The patient is .  She lives in [place].  She has a daughter in the area.      PHYSICAL EXAMINATION:   There were no vitals taken for this visit.    GENERAL:  The patient is a healthy-appearing  female in no distress.   HEENT:  Conjunctivae are clear.   PULMONARY:  Breathing comfortably on room air.   ABDOMEN:  No abdominal distention.   SKIN:  Examination today included the back, chest, neck, buttocks  -Extensive tan and brown flat waxy papules on the thighs, calves, dorsal arms  -hyperkeratotic 2 mm papule with keratinous core on the R 2nd finger  -Extensive waxy papules on the lower and upper back, some with rim of RBC crusting    ASSESSMENT AND PLAN:   1.  Seborrheic keratoses; benign keratotic papules.  No treatment advised.   2.  Irritated seborrheic keratoses around the eyes.  A total of 3 lesions were treated with 10-second freeze-thaw cycle with liquid nitrogen on inframammary chest.   3. Corn vs foreign body reaction on the R 2nd finger. Pared today. If recurrent could consider over the counter sal acid to debride.      The patient to return to Dermatology Clinic as needed.     Keisha Carrillo MD  Dermatology Staff       cc:   [place] [address]

## 2021-08-24 NOTE — PROGRESS NOTES
DERMATOLOGY CLINIC VISIT NOTE        DERMATOLOGY PROBLEM LIST:   1.  Seborrheic keratoses.   2.  Irritated seborrheic keratoses.   3.  Cherry angiomas.   4.  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 Rollins a 76-year-old female returning to Dermatology Clinic for ongoing evaluation of irritated seborrheic keratoses and a growth on the finger. The finger lesion was noted this am. No treatment. She has new dark spots on the thighs and itching areas on the R lower back.      Patient Active Problem List   Diagnosis     Back pain     Osteoporosis     Internal derangement of knee     Estrogen replacement therapy     Colon Polyps     Microscopic hematuria     CARDIOVASCULAR SCREENING; LDL GOAL LESS THAN 160     ACP (advance care planning)     Mixed incontinence urge and stress     Fracture of distal end of radius     Hypertension goal BP (blood pressure) < 140/90     Atopic rhinitis     Allergy to anti-infective agent     Enteritis     Osteoarthritis of hip     Osteoarthritis of knee     Acute posthemorrhagic anemia     Surgical follow-up care     Diverticulitis of sigmoid colon     Inflamed seborrheic keratosis     SK (seborrheic keratosis)     Cherry angioma       Allergies   Allergen Reactions     Scopolamine Anaphylaxis and Itching     Ciprofloxacin Swelling     Stiff and swollen neck     Amoxicillin-Pot Clavulanate [Augmentin]      Contrast Dye      gets cold and feels terrible  Pt was ok with Isovue-370 and no pre-meds     Doxycycline Hyclate Rash     Erythromycin Swelling     Flagyl [Metronidazole Hcl]      Meperidine Hcl      low blood pressure     Oxycodone      Prochlorperazine      agitated     Sulfa Drugs Rash     Tetracycline Rash     Cefprozil Rash         Current Outpatient Medications   Medication     acetaminophen (MAPAP) 325 MG tablet      aspirin (ASA) 325 MG EC tablet     ASPIRIN PO     fluticasone (FLONASE) 50 MCG/ACT nasal spray     HYDROmorphone (DILAUDID) 2 MG tablet     IBUPROFEN PO     ketorolac (ACULAR) 0.5 % ophthalmic solution     LACTOBACILLUS RHAMNOSUS, GG, PO     lisinopril (ZESTRIL) 5 MG tablet     prednisoLONE acetate (PRED FORTE) 1 % ophthalmic suspension     triamcinolone (KENALOG) 0.1 % external ointment     valACYclovir (VALTREX) 1000 mg tablet     Current Facility-Administered Medications   Medication     lidocaine 1% with EPINEPHrine 1:100,000 injection 3 mL          SOCIAL HISTORY:  The patient is .  She lives in [place].  She has a daughter in the area.      PHYSICAL EXAMINATION:   There were no vitals taken for this visit.    GENERAL:  The patient is a healthy-appearing  female in no distress.   HEENT:  Conjunctivae are clear.   PULMONARY:  Breathing comfortably on room air.   ABDOMEN:  No abdominal distention.   SKIN:  Examination today included the back, chest, neck, buttocks  -Extensive tan and brown flat waxy papules on the thighs, calves, dorsal arms  -hyperkeratotic 2 mm papule with keratinous core on the R 2nd finger  -Extensive waxy papules on the lower and upper back, some with rim of RBC crusting    ASSESSMENT AND PLAN:   1.  Seborrheic keratoses; benign keratotic papules.  No treatment advised.   2.  Irritated seborrheic keratoses around the eyes.  A total of 3 lesions were treated with 10-second freeze-thaw cycle with liquid nitrogen on inframammary chest.   3. Corn vs foreign body reaction on the R 2nd finger. Pared today. If recurrent could consider over the counter sal acid to debride.      The patient to return to Dermatology Clinic as needed.     Keisha Carrillo MD  Dermatology Staff       cc:   Roe Connolly MD   [address]  [id number]

## 2021-08-25 DIAGNOSIS — B00.9 HSV (HERPES SIMPLEX VIRUS) INFECTION: ICD-10-CM

## 2021-08-25 RX ORDER — VALACYCLOVIR HYDROCHLORIDE 500 MG/1
500 TABLET, FILM COATED ORAL DAILY
Qty: 90 TABLET | Refills: 3 | Status: SHIPPED | OUTPATIENT
Start: 2021-08-25 | End: 2021-11-17

## 2021-09-04 ENCOUNTER — HEALTH MAINTENANCE LETTER (OUTPATIENT)
Age: 76
End: 2021-09-04

## 2021-10-18 ENCOUNTER — ALLIED HEALTH/NURSE VISIT (OUTPATIENT)
Dept: PEDIATRICS | Facility: CLINIC | Age: 76
End: 2021-10-18
Payer: MEDICARE

## 2021-10-18 DIAGNOSIS — Z23 NEED FOR PROPHYLACTIC VACCINATION AND INOCULATION AGAINST INFLUENZA: Primary | ICD-10-CM

## 2021-10-18 PROCEDURE — 99207 PR NO CHARGE NURSE ONLY: CPT

## 2021-10-18 PROCEDURE — G0008 ADMIN INFLUENZA VIRUS VAC: HCPCS

## 2021-10-18 PROCEDURE — 90662 IIV NO PRSV INCREASED AG IM: CPT

## 2021-10-20 ENCOUNTER — VIRTUAL VISIT (OUTPATIENT)
Dept: PEDIATRICS | Facility: CLINIC | Age: 76
End: 2021-10-20
Payer: MEDICARE

## 2021-10-20 DIAGNOSIS — G47.01 INSOMNIA DUE TO MEDICAL CONDITION: Primary | ICD-10-CM

## 2021-10-20 PROCEDURE — 99441 PR PHYSICIAN TELEPHONE EVALUATION 5-10 MIN: CPT | Mod: 95 | Performed by: INTERNAL MEDICINE

## 2021-10-20 RX ORDER — HYDROXYZINE HYDROCHLORIDE 25 MG/1
12.5-25 TABLET, FILM COATED ORAL
Qty: 90 TABLET | Refills: 3 | Status: SHIPPED | OUTPATIENT
Start: 2021-10-20 | End: 2022-12-14

## 2021-10-20 RX ORDER — HYDROXYZINE PAMOATE 25 MG/1
CAPSULE ORAL
Status: CANCELLED | OUTPATIENT
Start: 2021-10-20

## 2021-10-20 RX ORDER — HYDROXYZINE PAMOATE 25 MG/1
CAPSULE ORAL
COMMUNITY
Start: 2021-10-15 | End: 2021-10-20

## 2021-10-20 NOTE — PROGRESS NOTES
Brionna is a 76 year old who is being evaluated via a billable telephone visit.      What phone number would you like to be contacted at? [phone]  How would you like to obtain your AVS? MyChart    Assessment & Plan     Insomnia due to medical condition  discussed with patient (or patient's parents/caregiver) pathophysiology of condition and treatment options.  reviwed chat and history with patient, will send prescription for tablets and okay to try 1/2. New medication(s) indication(s), adverse effects, risks and benefits discussed. reviwed  today as well and no concerns. follow-up as scheduled. Patient verbalized understanding and is agreeable to this plan.   - hydrOXYzine (ATARAX) 25 MG tablet; Take 0.5-1 tablets (12.5-25 mg) by mouth nightly as needed (insomnia or leg pain/discomfort)                 Return for Next scheduled follow-up visit, or sooner if symptoms persist or worsen.    Roe Connolly MD  [facility]    Soniya Staton is a 76 year old who presents for the following health issues     HPI     Medication Followup of  Hydroxyzine 25mg    Taking Medication as prescribed: NO. Pt is taking only at hs    Side Effects:  Capsule. Pt would like to take tab instead    Medication Helping Symptoms:  yes     patient schedule viscit to discuss hydroxazine, prefer to get this in a tablet instead of in a capsul so she can cut in half, does seem to help, in many ways works as well as the dilaudid, helps her discomfort when trying to go to sleep, was prescribed by ortho. mood is good. No other concerns or complaints today.     Review of Systems   Constitutional, HEENT, cardiovascular, pulmonary, gi and gu systems are negative, except as otherwise noted.      Objective           Vitals:  No vitals were obtained today due to virtual visit.    Physical Exam   healthy, alert and no distress  PSYCH: Alert and oriented times 3; coherent speech, normal   rate and volume, able to articulate  logical thoughts, able   to abstract reason, no tangential thoughts, no hallucinations   or delusions  Her affect is normal  RESP: No cough, no audible wheezing, able to talk in full sentences  Remainder of exam unable to be completed due to telephone visits                Phone call duration: 8 minutes

## 2021-10-30 ENCOUNTER — HEALTH MAINTENANCE LETTER (OUTPATIENT)
Age: 76
End: 2021-10-30

## 2021-11-16 ENCOUNTER — TRANSFERRED RECORDS (OUTPATIENT)
Dept: HEALTH INFORMATION MANAGEMENT | Facility: CLINIC | Age: 76
End: 2021-11-16
Payer: COMMERCIAL

## 2021-11-17 ENCOUNTER — OFFICE VISIT (OUTPATIENT)
Dept: PEDIATRICS | Facility: CLINIC | Age: 76
End: 2021-11-17
Payer: MEDICARE

## 2021-11-17 VITALS
SYSTOLIC BLOOD PRESSURE: 130 MMHG | RESPIRATION RATE: 20 BRPM | TEMPERATURE: 98.2 F | WEIGHT: 126.3 LBS | DIASTOLIC BLOOD PRESSURE: 76 MMHG | HEART RATE: 88 BPM | BODY MASS INDEX: 21.56 KG/M2 | HEIGHT: 64 IN

## 2021-11-17 DIAGNOSIS — Z12.31 VISIT FOR SCREENING MAMMOGRAM: ICD-10-CM

## 2021-11-17 DIAGNOSIS — Z23 HIGH PRIORITY FOR 2019-NCOV VACCINE: ICD-10-CM

## 2021-11-17 DIAGNOSIS — Z00.00 ENCOUNTER FOR MEDICARE ANNUAL WELLNESS EXAM: Primary | ICD-10-CM

## 2021-11-17 DIAGNOSIS — I10 HYPERTENSION GOAL BP (BLOOD PRESSURE) < 140/90: ICD-10-CM

## 2021-11-17 DIAGNOSIS — B00.9 HSV (HERPES SIMPLEX VIRUS) INFECTION: ICD-10-CM

## 2021-11-17 DIAGNOSIS — Z01.10 ENCOUNTER FOR HEARING EXAMINATION, UNSPECIFIED WHETHER ABNORMAL FINDINGS: ICD-10-CM

## 2021-11-17 DIAGNOSIS — Z13.6 CARDIOVASCULAR SCREENING; LDL GOAL LESS THAN 160: ICD-10-CM

## 2021-11-17 PROCEDURE — 91306 COVID-19,PF,MODERNA (18+ YRS BOOSTER .25ML): CPT | Performed by: INTERNAL MEDICINE

## 2021-11-17 PROCEDURE — 0064A COVID-19,PF,MODERNA (18+ YRS BOOSTER .25ML): CPT | Performed by: INTERNAL MEDICINE

## 2021-11-17 PROCEDURE — G0439 PPPS, SUBSEQ VISIT: HCPCS | Performed by: INTERNAL MEDICINE

## 2021-11-17 RX ORDER — VALACYCLOVIR HYDROCHLORIDE 500 MG/1
500 TABLET, FILM COATED ORAL DAILY
Qty: 90 TABLET | Refills: 3 | Status: SHIPPED | OUTPATIENT
Start: 2021-11-17 | End: 2023-01-18

## 2021-11-17 RX ORDER — GABAPENTIN 300 MG/1
CAPSULE ORAL AT BEDTIME
COMMUNITY
Start: 2021-03-30 | End: 2022-01-05

## 2021-11-17 RX ORDER — LISINOPRIL 5 MG/1
5 TABLET ORAL DAILY
Qty: 90 TABLET | Refills: 3 | Status: SHIPPED | OUTPATIENT
Start: 2021-11-17 | End: 2022-02-23

## 2021-11-17 ASSESSMENT — ENCOUNTER SYMPTOMS
CHILLS: 0
FREQUENCY: 0
HEADACHES: 0
NERVOUS/ANXIOUS: 0
WEAKNESS: 0
FEVER: 0
SHORTNESS OF BREATH: 0
HEMATOCHEZIA: 0
ARTHRALGIAS: 1
HEMATURIA: 0
EYE PAIN: 0
DYSURIA: 0
CONSTIPATION: 0
DIZZINESS: 0
NAUSEA: 0
PARESTHESIAS: 0
MYALGIAS: 0
JOINT SWELLING: 1
ABDOMINAL PAIN: 0
HEARTBURN: 0
DIARRHEA: 0
SORE THROAT: 0
COUGH: 0

## 2021-11-17 ASSESSMENT — MIFFLIN-ST. JEOR: SCORE: 1055.64

## 2021-11-17 ASSESSMENT — ACTIVITIES OF DAILY LIVING (ADL): CURRENT_FUNCTION: NO ASSISTANCE NEEDED

## 2021-11-17 NOTE — PATIENT INSTRUCTIONS
Patient Education   Personalized Prevention Plan  You are due for the preventive services outlined below.  Your care team is available to assist you in scheduling these services.  If you have already completed any of these items, please share that information with your care team to update in your medical record.  Health Maintenance Due   Topic Date Due     URINE DRUG SCREEN  Never done     Hepatitis C Screening  Never done     Mammogram  07/06/2021     COVID-19 Vaccine (3 - Booster for Moderna series) 09/28/2021       Signs of Hearing Loss      Hearing much better with one ear can be a sign of hearing loss.   Hearing loss is a problem shared by many people. In fact, it is one of the most common health problems, particularly as people age. Most people age 65 and older have some hearing loss. By age 80, almost everyone does. Hearing loss often occurs slowly over the years. So you may not realize your hearing has gotten worse.  Have your hearing checked  Call your healthcare provider if you:    Have to strain to hear normal conversation    Have to watch other people s faces very carefully to follow what they re saying    Need to ask people to repeat what they ve said    Often misunderstand what people are saying    Turn the volume of the television or radio up so high that others complain    Feel that people are mumbling when they re talking to you    Find that the effort to hear leaves you feeling tired and irritated    Notice, when using the phone, that you hear better with one ear than the other  [employer] last reviewed this educational content on 1/1/2020 2000-2021 [employer]. All rights reserved. This information is not intended as a substitute for professional medical care. Always follow your healthcare professional's instructions.

## 2021-11-30 ENCOUNTER — LAB (OUTPATIENT)
Dept: LAB | Facility: CLINIC | Age: 76
End: 2021-11-30
Payer: MEDICARE

## 2021-11-30 DIAGNOSIS — I10 HYPERTENSION GOAL BP (BLOOD PRESSURE) < 140/90: ICD-10-CM

## 2021-11-30 DIAGNOSIS — Z13.6 CARDIOVASCULAR SCREENING; LDL GOAL LESS THAN 160: ICD-10-CM

## 2021-11-30 DIAGNOSIS — Z00.00 ENCOUNTER FOR MEDICARE ANNUAL WELLNESS EXAM: ICD-10-CM

## 2021-11-30 PROCEDURE — 80061 LIPID PANEL: CPT

## 2021-11-30 PROCEDURE — 80053 COMPREHEN METABOLIC PANEL: CPT

## 2021-11-30 PROCEDURE — 36415 COLL VENOUS BLD VENIPUNCTURE: CPT

## 2021-12-01 LAB
ALBUMIN SERPL-MCNC: 3.5 G/DL (ref 3.4–5)
ALP SERPL-CCNC: 58 U/L (ref 40–150)
ALT SERPL W P-5'-P-CCNC: 20 U/L (ref 0–50)
ANION GAP SERPL CALCULATED.3IONS-SCNC: 3 MMOL/L (ref 3–14)
AST SERPL W P-5'-P-CCNC: 18 U/L (ref 0–45)
BILIRUB SERPL-MCNC: 0.8 MG/DL (ref 0.2–1.3)
BUN SERPL-MCNC: 16 MG/DL (ref 7–30)
CALCIUM SERPL-MCNC: 8.4 MG/DL (ref 8.5–10.1)
CHLORIDE BLD-SCNC: 110 MMOL/L (ref 94–109)
CHOLEST SERPL-MCNC: 223 MG/DL
CO2 SERPL-SCNC: 29 MMOL/L (ref 20–32)
CREAT SERPL-MCNC: 0.7 MG/DL (ref 0.52–1.04)
FASTING STATUS PATIENT QL REPORTED: YES
GFR SERPL CREATININE-BSD FRML MDRD: 85 ML/MIN/1.73M2
GLUCOSE BLD-MCNC: 87 MG/DL (ref 70–99)
HDLC SERPL-MCNC: 107 MG/DL
LDLC SERPL CALC-MCNC: 106 MG/DL
NONHDLC SERPL-MCNC: 116 MG/DL
POTASSIUM BLD-SCNC: 4.1 MMOL/L (ref 3.4–5.3)
PROT SERPL-MCNC: 6.6 G/DL (ref 6.8–8.8)
SODIUM SERPL-SCNC: 142 MMOL/L (ref 133–144)
TRIGL SERPL-MCNC: 52 MG/DL

## 2021-12-08 ENCOUNTER — TRANSFERRED RECORDS (OUTPATIENT)
Dept: HEALTH INFORMATION MANAGEMENT | Facility: CLINIC | Age: 76
End: 2021-12-08
Payer: COMMERCIAL

## 2022-01-05 ENCOUNTER — OFFICE VISIT (OUTPATIENT)
Dept: PEDIATRICS | Facility: CLINIC | Age: 77
End: 2022-01-05
Payer: MEDICARE

## 2022-01-05 VITALS
HEART RATE: 72 BPM | RESPIRATION RATE: 20 BRPM | WEIGHT: 125 LBS | TEMPERATURE: 98.2 F | BODY MASS INDEX: 22.15 KG/M2 | DIASTOLIC BLOOD PRESSURE: 80 MMHG | SYSTOLIC BLOOD PRESSURE: 158 MMHG | HEIGHT: 63 IN

## 2022-01-05 DIAGNOSIS — G89.29 CHRONIC LEFT SHOULDER PAIN: ICD-10-CM

## 2022-01-05 DIAGNOSIS — M79.2 NEUROPATHIC PAIN: ICD-10-CM

## 2022-01-05 DIAGNOSIS — M79.641 PAIN OF RIGHT HAND: Primary | ICD-10-CM

## 2022-01-05 DIAGNOSIS — M25.512 CHRONIC LEFT SHOULDER PAIN: ICD-10-CM

## 2022-01-05 PROCEDURE — 99213 OFFICE O/P EST LOW 20 MIN: CPT | Performed by: INTERNAL MEDICINE

## 2022-01-05 RX ORDER — GABAPENTIN 100 MG/1
CAPSULE ORAL
COMMUNITY
Start: 2021-12-28 | End: 2022-01-05

## 2022-01-05 RX ORDER — GABAPENTIN 300 MG/1
300 CAPSULE ORAL AT BEDTIME
Qty: 90 CAPSULE | Refills: 1 | Status: SHIPPED | OUTPATIENT
Start: 2022-01-05 | End: 2022-03-08

## 2022-01-05 RX ORDER — GABAPENTIN 100 MG/1
100 CAPSULE ORAL 2 TIMES DAILY PRN
Qty: 180 CAPSULE | Refills: 1 | Status: SHIPPED | OUTPATIENT
Start: 2022-01-05 | End: 2022-03-08

## 2022-01-05 ASSESSMENT — MIFFLIN-ST. JEOR: SCORE: 1035.09

## 2022-01-05 NOTE — PROGRESS NOTES
Assessment & Plan     Pain of right hand  discussed with patient (or patient's parents/caregiver) pathophysiology of condition and treatment options.  given history will refer to hand for evaluation. Patient verbalized understanding and is agreeable to this plan.   - Orthopedic  Referral; Future    Chronic left shoulder pain  discussed with patient (or patient's parents/caregiver) pathophysiology of condition and treatment options.  she will ask hand who may be a good fit, could go to [facility] but prefers not to if possible.     Neuropathic pain  discussed with patient (or patient's parents/caregiver) pathophysiology of condition and treatment options.  Reviewed  with patient today. I am fine taking over prescription, reviwed risks/benefits of this med in detail today. working well with no side effects, continue cares and plan. follow-up as scheduled. Patient verbalized understanding and is agreeable to this plan.   - gabapentin (NEURONTIN) 300 MG capsule; Take 1 capsule (300 mg) by mouth At Bedtime  - gabapentin (NEURONTIN) 100 MG capsule; Take 1 capsule (100 mg) by mouth 2 times daily as needed for neuropathic pain  - Orthopedic  Referral; Future                 Return in about 6 weeks (around 2/16/2022) for Next scheduled follow-up visit, or sooner if symptoms persist or worsen.    Roe Connolly MD  [facility] HIPOLITO Staton is a 76 year old who presents for the following health issues     HPI     Pt in clinic to discuss ongoing pain of both hands, Lt shoulder and back pain. Gabapentin 400mg has been effective. Pt would like to discuss referral, wondering if I will take over prescription. hand pain is fgetting in way of doing her acticvies, does get swollen at times. .  Pt is experiencing pain/numbness in Lt knee from past surgery as well but not as severe, not at bothersome as hand.  otherwise is doing very well. No other concerns or complaints today.  "      Review of Systems   Constitutional, HEENT, cardiovascular, pulmonary, gi and gu systems are negative, except as otherwise noted.      Objective    BP (!) 158/80   Pulse 72   Temp 98.2  F (36.8  C) (Oral)   Resp 20   Ht 1.607 m (5' 3.25\")   Wt 56.7 kg (125 lb)   BMI 21.97 kg/m    Body mass index is 21.97 kg/m .  Physical Exam   GENERAL: healthy, alert and no distress  HEENT: normocephalic and atrumatic, mouth and nose covered with mask due to covid   PSYCH: affect normal/bright                    "

## 2022-01-19 ENCOUNTER — TRANSFERRED RECORDS (OUTPATIENT)
Dept: HEALTH INFORMATION MANAGEMENT | Facility: CLINIC | Age: 77
End: 2022-01-19

## 2022-01-19 ENCOUNTER — OFFICE VISIT (OUTPATIENT)
Dept: AUDIOLOGY | Facility: CLINIC | Age: 77
End: 2022-01-19
Attending: INTERNAL MEDICINE
Payer: MEDICARE

## 2022-01-19 DIAGNOSIS — Z01.10 ENCOUNTER FOR HEARING EXAMINATION, UNSPECIFIED WHETHER ABNORMAL FINDINGS: ICD-10-CM

## 2022-01-19 DIAGNOSIS — H90.3 SENSORINEURAL HEARING LOSS, BILATERAL: Primary | ICD-10-CM

## 2022-01-19 DIAGNOSIS — H93.8X1 SENSATION OF FULLNESS IN RIGHT EAR: ICD-10-CM

## 2022-01-19 PROCEDURE — 99207 PR NO CHARGE LOS: CPT | Performed by: AUDIOLOGIST

## 2022-01-19 PROCEDURE — 92557 COMPREHENSIVE HEARING TEST: CPT | Performed by: AUDIOLOGIST

## 2022-01-19 PROCEDURE — 92550 TYMPANOMETRY & REFLEX THRESH: CPT | Mod: 52 | Performed by: AUDIOLOGIST

## 2022-01-19 NOTE — PROGRESS NOTES
AUDIOLOGY REPORT    SUBJECTIVE:  Brionna Rollins is a 76 year old female who was seen in the Audiology Clinic at the [facility] for audiologic evaluation, referred by Roe Connolly M.D. Ms. Rollins indicated difficulty hearing conversation, especially in noisy environments, and when speakers are at a distance or are turned away from her when speaking. She endorsed right-sided aural fullness, and occasional, likely positional dizziness which is not debilitating. She denied tinnitus, otalgia, otorrhea, recent illness, or significant history of noise exposure. She was accompanied today by her friend, Edgar Miller, whose hearing was also assessed today.    OBJECTIVE:  Abuse Screening:  Do you feel unsafe at home or work/school? No  Do you feel threatened by someone? No  Does anyone try to keep you from having contact with others, or doing things outside of your home? No  Physical signs of abuse present? No     Fall Risk Screen:  1. Have you fallen two or more times in the past year? No  2. Have you fallen and had an injury in the past year? No    Timed Up and Go Score (in seconds): not tested  Is patient a fall risk? No  Referral initiated: No  Fall Risk Assessment Completed by Audiology    Otoscopic exam indicates ears are clear of cerumen bilaterally.     Pure Tone Thresholds assessed using conventional audiometry with good  reliability from 250-8000 Hz bilaterally using insert earphones and circumaural headphones.     RIGHT:  borderline-normal 250-4000 Hz, sloping to moderate, likely sensorineural hearing loss for 1526-5563 Hz.    LEFT:    borderline-normal sloping to mild, likely sensorineural hearing loss    Tympanogram:    RIGHT: normal eardrum mobility    LEFT:   normal eardrum mobility    Reflexes (reported by stimulus ear):  RIGHT: Ipsilateral is absent at 1000 Hz  LEFT:   Ipsilateral is absent at 1000 Hz    Speech Reception Threshold:    RIGHT: 25 dB HL    LEFT:   25 dB HL  Word  Recognition Score:     RIGHT: 100% at 60 dB HL using NU-6 recorded word list.    LEFT:   96% at 65 dB HL using NU-6 recorded word list.      ASSESSMENT:     ICD-10-CM    1. Sensorineural hearing loss, bilateral  H90.3    2. Encounter for hearing examination, unspecified whether abnormal findings  Z01.10 Adult Audiology Referral   3. Sensation of fullness in right ear  H93.8X1        Today s results were discussed with the patient and her friend in detail.     PLAN:  Patient was counseled regarding hearing loss and impact on communication. Ms. Rollins is not yet and ideal amplification candidate for either ear. Appropriate communication strategies were discussed at length, as were reasonable expectations regarding hearing at a distance, in noisy environments, or when attention is diverted elsewhere.    Hearing sensitivity should be re-assessed annually to monitor, or per medical management/patient concern. Wear hearing protection consistently in noise to preserve residual hearing sensitivity. Please call this clinic with questions regarding these results or recommendations.        Marbella De Leon., [facility]  [place] Licensed Audiologist [id number]

## 2022-01-25 ENCOUNTER — TRANSFERRED RECORDS (OUTPATIENT)
Dept: HEALTH INFORMATION MANAGEMENT | Facility: CLINIC | Age: 77
End: 2022-01-25
Payer: MEDICARE

## 2022-01-26 ENCOUNTER — ANCILLARY PROCEDURE (OUTPATIENT)
Dept: MAMMOGRAPHY | Facility: CLINIC | Age: 77
End: 2022-01-26
Payer: MEDICARE

## 2022-01-26 DIAGNOSIS — Z00.00 ENCOUNTER FOR MEDICARE ANNUAL WELLNESS EXAM: ICD-10-CM

## 2022-01-26 DIAGNOSIS — Z12.31 VISIT FOR SCREENING MAMMOGRAM: ICD-10-CM

## 2022-01-26 PROCEDURE — 77067 SCR MAMMO BI INCL CAD: CPT | Mod: TC | Performed by: RADIOLOGY

## 2022-01-26 PROCEDURE — 77063 BREAST TOMOSYNTHESIS BI: CPT | Mod: TC | Performed by: RADIOLOGY

## 2022-02-09 ENCOUNTER — TRANSFERRED RECORDS (OUTPATIENT)
Dept: HEALTH INFORMATION MANAGEMENT | Facility: CLINIC | Age: 77
End: 2022-02-09
Payer: MEDICARE

## 2022-02-23 ENCOUNTER — OFFICE VISIT (OUTPATIENT)
Dept: PEDIATRICS | Facility: CLINIC | Age: 77
End: 2022-02-23
Payer: MEDICARE

## 2022-02-23 VITALS
DIASTOLIC BLOOD PRESSURE: 74 MMHG | RESPIRATION RATE: 16 BRPM | HEART RATE: 70 BPM | SYSTOLIC BLOOD PRESSURE: 128 MMHG | TEMPERATURE: 97.9 F

## 2022-02-23 DIAGNOSIS — Z82.69 FAMILY HISTORY OF RHEUMATISM: ICD-10-CM

## 2022-02-23 DIAGNOSIS — M13.0 ARTHRITIS OF MULTIPLE SITES: Primary | ICD-10-CM

## 2022-02-23 DIAGNOSIS — M75.102 NONTRAUMATIC TEAR OF LEFT ROTATOR CUFF, UNSPECIFIED TEAR EXTENT: ICD-10-CM

## 2022-02-23 DIAGNOSIS — I10 HYPERTENSION GOAL BP (BLOOD PRESSURE) < 140/90: ICD-10-CM

## 2022-02-23 LAB
BASOPHILS # BLD AUTO: 0.1 10E3/UL (ref 0–0.2)
BASOPHILS NFR BLD AUTO: 1 %
CRP SERPL-MCNC: <2.9 MG/L (ref 0–8)
EOSINOPHIL # BLD AUTO: 0.2 10E3/UL (ref 0–0.7)
EOSINOPHIL NFR BLD AUTO: 5 %
ERYTHROCYTE [DISTWIDTH] IN BLOOD BY AUTOMATED COUNT: 12.7 % (ref 10–15)
ERYTHROCYTE [SEDIMENTATION RATE] IN BLOOD BY WESTERGREN METHOD: 6 MM/HR (ref 0–30)
HCT VFR BLD AUTO: 39.7 % (ref 35–47)
HGB BLD-MCNC: 13.7 G/DL (ref 11.7–15.7)
LYMPHOCYTES # BLD AUTO: 0.7 10E3/UL (ref 0.8–5.3)
LYMPHOCYTES NFR BLD AUTO: 20 %
MCH RBC QN AUTO: 32.2 PG (ref 26.5–33)
MCHC RBC AUTO-ENTMCNC: 34.5 G/DL (ref 31.5–36.5)
MCV RBC AUTO: 93 FL (ref 78–100)
MONOCYTES # BLD AUTO: 0.5 10E3/UL (ref 0–1.3)
MONOCYTES NFR BLD AUTO: 13 %
NEUTROPHILS # BLD AUTO: 2.1 10E3/UL (ref 1.6–8.3)
NEUTROPHILS NFR BLD AUTO: 60 %
PLATELET # BLD AUTO: 187 10E3/UL (ref 150–450)
RBC # BLD AUTO: 4.26 10E6/UL (ref 3.8–5.2)
WBC # BLD AUTO: 3.5 10E3/UL (ref 4–11)

## 2022-02-23 PROCEDURE — 86431 RHEUMATOID FACTOR QUANT: CPT | Performed by: INTERNAL MEDICINE

## 2022-02-23 PROCEDURE — 85025 COMPLETE CBC W/AUTO DIFF WBC: CPT | Performed by: INTERNAL MEDICINE

## 2022-02-23 PROCEDURE — 86140 C-REACTIVE PROTEIN: CPT | Performed by: INTERNAL MEDICINE

## 2022-02-23 PROCEDURE — 86038 ANTINUCLEAR ANTIBODIES: CPT | Performed by: INTERNAL MEDICINE

## 2022-02-23 PROCEDURE — 36415 COLL VENOUS BLD VENIPUNCTURE: CPT | Performed by: INTERNAL MEDICINE

## 2022-02-23 PROCEDURE — 86200 CCP ANTIBODY: CPT | Performed by: INTERNAL MEDICINE

## 2022-02-23 PROCEDURE — 99214 OFFICE O/P EST MOD 30 MIN: CPT | Performed by: INTERNAL MEDICINE

## 2022-02-23 PROCEDURE — 85652 RBC SED RATE AUTOMATED: CPT | Performed by: INTERNAL MEDICINE

## 2022-02-23 RX ORDER — LISINOPRIL 5 MG/1
5 TABLET ORAL DAILY
Qty: 90 TABLET | Refills: 3 | Status: SHIPPED | OUTPATIENT
Start: 2022-02-23 | End: 2023-01-18

## 2022-02-23 NOTE — PROGRESS NOTES
Assessment & Plan     Arthritis of multiple sites  discussed with patient (or patient's parents/caregiver) pathophysiology of condition and treatment options.  reviwed history in detail  as well as ortho results and reccs. given +Fhx and symptoms, will get labs today to evaluation for inflammatory or autoimmune causes, erika rheumatoid arthritis. reivwed diagnosis can be difficult, but if all labs are within acceptable limits or negative would essentially r/o rheumatoid arthritis, so could be helpful, continue supportive cares and follow-up with physical therapy and ortho as schedule, if labs + would refer to rheumatology for consult. Patient verbalized understanding and is agreeable to this plan.   - Rheumatoid factor; Future  - Cyclic Citrullinated Peptide Antibody IgG; Future  - CRP, inflammation; Future  - ESR: Erythrocyte sedimentation rate; Future  - Anti Nuclear Alice IgG by IFA with Reflex; Future  - CBC with platelets and differential; Future  - Rheumatoid factor  - Cyclic Citrullinated Peptide Antibody IgG  - CRP, inflammation  - ESR: Erythrocyte sedimentation rate  - Anti Nuclear Alice IgG by IFA with Reflex  - CBC with platelets and differential    Family history of rheumatism  as abvoe  - Rheumatoid factor; Future  - Cyclic Citrullinated Peptide Antibody IgG; Future  - CRP, inflammation; Future  - ESR: Erythrocyte sedimentation rate; Future  - Anti Nuclear Alice IgG by IFA with Reflex; Future  - CBC with platelets and differential; Future  - Rheumatoid factor  - Cyclic Citrullinated Peptide Antibody IgG  - CRP, inflammation  - ESR: Erythrocyte sedimentation rate  - Anti Nuclear Alice IgG by IFA with Reflex  - CBC with platelets and differential    Nontraumatic tear of left rotator cuff, unspecified tear extent  as above  - Rheumatoid factor; Future  - Cyclic Citrullinated Peptide Antibody IgG; Future  - CRP, inflammation; Future  - ESR: Erythrocyte sedimentation rate; Future  - Anti Nuclear Alice IgG by IFA with  Reflex; Future  - CBC with platelets and differential; Future  - Rheumatoid factor  - Cyclic Citrullinated Peptide Antibody IgG  - CRP, inflammation  - ESR: Erythrocyte sedimentation rate  - Anti Nuclear Alice IgG by IFA with Reflex  - CBC with platelets and differential    Hypertension goal BP (blood pressure) < 140/90  Well controlled on current medication(s). refilled med today.   - lisinopril (ZESTRIL) 5 MG tablet; Take 1 tablet (5 mg) by mouth daily  - CBC with platelets and differential; Future  - CBC with platelets and differential      Return in about 2 months (around 4/23/2022) for Next scheduled follow-up visit, or sooner if symptoms persist or worsen.  appointment made      Roe Connolly MD  [facility] HIPOLITO Staton is a 76 year old who presents for the following health issues     HPI     Pt would like to follow up on Lt shoulder pain. PT, gabapentin, hand brace is not effective. Acupuncture HA been helping, and happy about that. She did see hand team and was noted to have arthritis, physical therapy is helping a little but symptoms persist. does have fhx rh arthritis (father). No redness or nodules that she has noticed. Bps have been good, does need ACEI refill. does have follow-up scheduled for her shoulder, has rotator cuff tear but plan for conservative measures for now, did have MRI. mood is good. has had covid booster. No other concerns or complaints today.     Review of Systems   Constitutional, HEENT, cardiovascular, pulmonary, gi and gu systems are negative, except as otherwise noted.      Objective    /74   Pulse 70   Temp 97.9  F (36.6  C) (Oral)   Resp 16   There is no height or weight on file to calculate BMI.  Physical Exam   GENERAL: healthy, alert and no distress  HEENT: normocephalic and atrumatic, mouth and nose covered with mask due to covid   MS: + multiple deformites of fingers including swan neck of a few fingers at DIP. no erythema. no  swelling no edema  PSYCH: mentation appears normal, affect normal/bright

## 2022-02-24 LAB
ANA SER QL IF: NEGATIVE
CCP AB SER IA-ACNC: 1.5 U/ML
RHEUMATOID FACT SER NEPH-ACNC: <7 IU/ML

## 2022-03-07 DIAGNOSIS — M79.2 NEUROPATHIC PAIN: ICD-10-CM

## 2022-03-07 NOTE — TELEPHONE ENCOUNTER
Patient called stating that she is taking her Gabapentin differently then prescribed but this was discussed at her last office visit note with Dr. Connolly on 2/23/22. The increase in the gabapentin has really helped with her nerve pain and sleep.     Patient is taking 1, 300 mg and 2, 100 mg gabapentin at bedtime and 1, 100 mg gabapentin at lunchtime.     Patient will run out of gabapentin medication in 3 to 4 days.     Patient wonders if she could get enough gabapentin to get her through until her scheduled appointment on 5/4/22.     Routing refill request to provider for review/approval because:  Drug not on the [facility] refill protocol     Rosaura West RN on 3/7/2022 at 10:08 AM

## 2022-03-08 RX ORDER — GABAPENTIN 300 MG/1
300 CAPSULE ORAL AT BEDTIME
Qty: 90 CAPSULE | Refills: 1 | Status: SHIPPED | OUTPATIENT
Start: 2022-03-08 | End: 2022-10-12

## 2022-03-08 RX ORDER — GABAPENTIN 100 MG/1
100 CAPSULE ORAL 2 TIMES DAILY PRN
Qty: 180 CAPSULE | Refills: 1 | Status: SHIPPED | OUTPATIENT
Start: 2022-03-08 | End: 2022-03-08

## 2022-03-08 NOTE — TELEPHONE ENCOUNTER
Pharmacy sent teams message indicating that the Gabapentin 100mg dose is not ordered as indicated below. Please send prescription with updated SIG and quantity amounts.     Pended Gabapentin 100mg dose and updated SIG. Please review.     Rosaura West RN on 3/8/2022 at 1:33 PM

## 2022-03-09 RX ORDER — GABAPENTIN 100 MG/1
CAPSULE ORAL
Qty: 270 CAPSULE | Refills: 1 | Status: SHIPPED | OUTPATIENT
Start: 2022-03-09 | End: 2022-10-12

## 2022-04-06 ENCOUNTER — TELEPHONE (OUTPATIENT)
Dept: PEDIATRICS | Facility: CLINIC | Age: 77
End: 2022-04-06
Payer: MEDICARE

## 2022-04-06 ENCOUNTER — MYC MEDICAL ADVICE (OUTPATIENT)
Dept: PEDIATRICS | Facility: CLINIC | Age: 77
End: 2022-04-06
Payer: MEDICARE

## 2022-04-06 NOTE — TELEPHONE ENCOUNTER
Received call from pt   She is requesting to speak with Tia Rajput MA  She has a question about a vaccine  She would like a call back from Tia Francisco to MA/TC pool    Thank you  Mojgan Crane RN on 4/6/2022 at 9:55 AM

## 2022-04-12 ENCOUNTER — TRANSFERRED RECORDS (OUTPATIENT)
Dept: HEALTH INFORMATION MANAGEMENT | Facility: CLINIC | Age: 77
End: 2022-04-12
Payer: MEDICARE

## 2022-04-12 NOTE — TELEPHONE ENCOUNTER
Spoke with Pt. covid shot canceled. Will completed at visit with provider on 05/04/2022.  Tia JARRELL, OSMAN,FRANCESCOMA

## 2022-05-03 ENCOUNTER — MYC MEDICAL ADVICE (OUTPATIENT)
Dept: PEDIATRICS | Facility: CLINIC | Age: 77
End: 2022-05-03
Payer: MEDICARE

## 2022-05-04 ENCOUNTER — OFFICE VISIT (OUTPATIENT)
Dept: PEDIATRICS | Facility: CLINIC | Age: 77
End: 2022-05-04
Payer: MEDICARE

## 2022-05-04 VITALS
TEMPERATURE: 97.9 F | OXYGEN SATURATION: 97 % | HEART RATE: 76 BPM | WEIGHT: 128.6 LBS | DIASTOLIC BLOOD PRESSURE: 70 MMHG | HEIGHT: 63 IN | BODY MASS INDEX: 22.79 KG/M2 | SYSTOLIC BLOOD PRESSURE: 130 MMHG

## 2022-05-04 DIAGNOSIS — M79.2 NEUROPATHIC PAIN: Primary | ICD-10-CM

## 2022-05-04 DIAGNOSIS — M13.0 ARTHRITIS OF MULTIPLE SITES: ICD-10-CM

## 2022-05-04 DIAGNOSIS — Z23 HIGH PRIORITY FOR 2019-NCOV VACCINE: ICD-10-CM

## 2022-05-04 PROCEDURE — 99213 OFFICE O/P EST LOW 20 MIN: CPT | Mod: 25 | Performed by: INTERNAL MEDICINE

## 2022-05-04 PROCEDURE — 0054A COVID-19,PF,PFIZER (12+ YRS): CPT | Performed by: INTERNAL MEDICINE

## 2022-05-04 PROCEDURE — 91305 COVID-19,PF,PFIZER (12+ YRS): CPT | Performed by: INTERNAL MEDICINE

## 2022-05-04 ASSESSMENT — PAIN SCALES - GENERAL: PAINLEVEL: SEVERE PAIN (7)

## 2022-05-04 NOTE — PROGRESS NOTES
"  Assessment & Plan     Neuropathic pain  discussed with patient (or patient's parents/caregiver) pathophysiology of condition and treatment options.  Well controlled on current medication(s). continue gabapentin,  reviwed today with patient and no concnerns. continue follow-up with ortho, etc. Patient verbalized understanding and is agreeable to this plan.     Arthritis of multiple sites  as above, injections helping, continue follow-up with ortho. patient considering surgery, but prefers to pay oop for the injections as long as working well.     High priority for 2019-nCoV vaccine  booster today  - COVID-19,PF,PFIZER (12+ Yrs GRAY LABEL)        Return in about 3 months (around 8/4/2022) for Next scheduled follow-up visit, or sooner if symptoms persist or worsen.    Roe Connolly MD  [facility] HIPOLITO Staton is a 77 year old who presents for the following health issues     HPI     Medication Followup of Gabapentin    Taking Medication as prescribed: yes    Side Effects:  None    Medication Helping Symptoms:  Yes, but taking a little longer to be effective     patient here for above, would like 2nd covid booster as well. injections have been helpful as well. mood is good.  No other concerns or complaints today.       Review of Systems   Constitutional, HEENT, cardiovascular, pulmonary, gi and gu systems are negative, except as otherwise noted.      Objective    /70   Pulse 76   Temp 97.9  F (36.6  C) (Oral)   Ht 1.6 m (5' 3\")   Wt 58.3 kg (128 lb 9.6 oz)   SpO2 97%   BMI 22.78 kg/m    Body mass index is 22.78 kg/m .  Physical Exam   GENERAL: healthy, alert and no distress  HEENT: normocephalic and atrumatic, mucous membranes moist, op clear, mucous membranes moist, neck supple   MS: no gross musculoskeletal defects noted, no edema  PSYCH: mentation appears normal, affect normal/bright                "

## 2022-05-04 NOTE — TELEPHONE ENCOUNTER
Hi Dr. Connolly,    Please see [employer] message.    Patient has an office visit with you this afternoon,    Patient is having a cortisone injection this morning    Please clarify at office visit if she can have her booster today.    Thank you-    MAX SimmsN, RN  [facility]

## 2022-05-10 ENCOUNTER — TRANSFERRED RECORDS (OUTPATIENT)
Dept: HEALTH INFORMATION MANAGEMENT | Facility: CLINIC | Age: 77
End: 2022-05-10
Payer: MEDICARE

## 2022-07-27 ENCOUNTER — OFFICE VISIT (OUTPATIENT)
Dept: PEDIATRICS | Facility: CLINIC | Age: 77
End: 2022-07-27
Payer: MEDICARE

## 2022-07-27 VITALS
SYSTOLIC BLOOD PRESSURE: 138 MMHG | OXYGEN SATURATION: 98 % | WEIGHT: 123.4 LBS | BODY MASS INDEX: 21.86 KG/M2 | RESPIRATION RATE: 20 BRPM | TEMPERATURE: 97.7 F | HEART RATE: 76 BPM | HEIGHT: 63 IN | DIASTOLIC BLOOD PRESSURE: 60 MMHG

## 2022-07-27 DIAGNOSIS — M13.0 ARTHRITIS OF MULTIPLE SITES: ICD-10-CM

## 2022-07-27 DIAGNOSIS — L82.0 INFLAMED SEBORRHEIC KERATOSIS: Primary | ICD-10-CM

## 2022-07-27 PROCEDURE — 99213 OFFICE O/P EST LOW 20 MIN: CPT | Performed by: INTERNAL MEDICINE

## 2022-07-27 ASSESSMENT — PAIN SCALES - GENERAL: PAINLEVEL: NO PAIN (0)

## 2022-07-27 NOTE — PROGRESS NOTES
"  Assessment & Plan     Inflamed seborrheic keratosis  discussed with patient (or patient's parents/caregiver) pathophysiology of condition and treatment options.  will refer to derm at our [facility] for skin check as need to r/o AKs and removal as symptomatic.   - Adult Dermatology Referral; Future    Arthritis of multiple sites  discussed with patient (or patient's parents/caregiver) pathophysiology of condition and treatment options.  continue follow-up as scheduled. continue voltaren as needed.                    Return in about 2 months (around 9/27/2022) for Next scheduled follow-up visit, or sooner if symptoms persist or worsen.    Roe Connolly MD  [facility] HIPOLITO Staton is a 77 year old, presenting for the following health issues:  Follow Up (Black head on back. Discuss medical THC)      History of Present Illness       Reason for visit:  General Check up    She eats 2-3 servings of fruits and vegetables daily.She consumes 0 sweetened beverage(s) daily.She exercises with enough effort to increase her heart rate 30 to 60 minutes per day.  She exercises with enough effort to increase her heart rate 7 days per week.   She is taking medications regularly.    Pt would like to discuss black heads on back that she has been picking at.    had injection last today in L knee, not sure if helping or not. still numb on the skim, has been walking. has also been doing some acupuncture and this seems to be helping. due for td vaccine. No other concerns or complaints today.         Review of Systems   Constitutional, HEENT, cardiovascular, pulmonary, gi and gu systems are negative, except as otherwise noted.      Objective    /60   Pulse 76   Temp 97.7  F (36.5  C) (Oral)   Resp 20   Ht 1.6 m (5' 3\")   Wt 56 kg (123 lb 6.4 oz)   SpO2 98%   BMI 21.86 kg/m    Body mass index is 21.86 kg/m .  Physical Exam   GENERAL: healthy, alert and no distress  HEENT: normocephalic and " atrumatic, nose and mouth covered with mask due to covid  MS: no gross musculoskeletal defects noted, no edema  Skin: several inflamed nevi on back, ? AK. also several subherric keratosis on trunk that are also excoriated.  PSYCH: affect normal/bright                      .  ..

## 2022-09-14 ENCOUNTER — OFFICE VISIT (OUTPATIENT)
Dept: PEDIATRICS | Facility: CLINIC | Age: 77
End: 2022-09-14
Payer: MEDICARE

## 2022-09-14 VITALS
OXYGEN SATURATION: 97 % | DIASTOLIC BLOOD PRESSURE: 74 MMHG | TEMPERATURE: 98.7 F | HEIGHT: 63 IN | SYSTOLIC BLOOD PRESSURE: 126 MMHG | BODY MASS INDEX: 22.02 KG/M2 | RESPIRATION RATE: 16 BRPM | WEIGHT: 124.3 LBS | HEART RATE: 70 BPM

## 2022-09-14 DIAGNOSIS — G89.29 CHRONIC MIDLINE LOW BACK PAIN WITHOUT SCIATICA: Primary | ICD-10-CM

## 2022-09-14 DIAGNOSIS — M54.50 CHRONIC MIDLINE LOW BACK PAIN WITHOUT SCIATICA: Primary | ICD-10-CM

## 2022-09-14 DIAGNOSIS — G89.29 CHRONIC PAIN OF LEFT KNEE: ICD-10-CM

## 2022-09-14 DIAGNOSIS — Z12.11 SCREEN FOR COLON CANCER: ICD-10-CM

## 2022-09-14 DIAGNOSIS — M25.562 CHRONIC PAIN OF LEFT KNEE: ICD-10-CM

## 2022-09-14 PROCEDURE — 99213 OFFICE O/P EST LOW 20 MIN: CPT | Performed by: INTERNAL MEDICINE

## 2022-09-14 ASSESSMENT — PAIN SCALES - GENERAL: PAINLEVEL: EXTREME PAIN (8)

## 2022-09-27 ENCOUNTER — TRANSFERRED RECORDS (OUTPATIENT)
Dept: HEALTH INFORMATION MANAGEMENT | Facility: CLINIC | Age: 77
End: 2022-09-27

## 2022-10-05 ENCOUNTER — TELEPHONE (OUTPATIENT)
Dept: PEDIATRICS | Facility: CLINIC | Age: 77
End: 2022-10-05

## 2022-10-05 ENCOUNTER — TRANSFERRED RECORDS (OUTPATIENT)
Dept: HEALTH INFORMATION MANAGEMENT | Facility: CLINIC | Age: 77
End: 2022-10-05

## 2022-10-05 NOTE — TELEPHONE ENCOUNTER
Received call from pt  She is requesting a call back from Tia Rajput MA to schedule a flu and covid shot  Pt would also like her s/o have this done at the same time.    See separate encounter    Thank you  Mojgan Crane RN on 10/5/2022 at 3:43 PM

## 2022-10-05 NOTE — TELEPHONE ENCOUNTER
The pt is aware and scheduled for her upcoming appointment.   Norma Zepeda on 10/5/2022 at 3:54 PM

## 2022-10-11 DIAGNOSIS — M79.2 NEUROPATHIC PAIN: ICD-10-CM

## 2022-10-12 RX ORDER — GABAPENTIN 100 MG/1
CAPSULE ORAL
Qty: 270 CAPSULE | Refills: 0 | Status: SHIPPED | OUTPATIENT
Start: 2022-10-12 | End: 2023-01-04

## 2022-10-12 RX ORDER — GABAPENTIN 300 MG/1
CAPSULE ORAL
Qty: 90 CAPSULE | Refills: 1 | Status: SHIPPED | OUTPATIENT
Start: 2022-10-12 | End: 2023-03-15

## 2022-10-13 ENCOUNTER — TRANSFERRED RECORDS (OUTPATIENT)
Dept: HEALTH INFORMATION MANAGEMENT | Facility: CLINIC | Age: 77
End: 2022-10-13

## 2022-10-16 ENCOUNTER — HEALTH MAINTENANCE LETTER (OUTPATIENT)
Age: 77
End: 2022-10-16

## 2022-10-25 ENCOUNTER — ALLIED HEALTH/NURSE VISIT (OUTPATIENT)
Dept: PEDIATRICS | Facility: CLINIC | Age: 77
End: 2022-10-25
Payer: MEDICARE

## 2022-10-25 DIAGNOSIS — Z23 ENCOUNTER FOR IMMUNIZATION: Primary | ICD-10-CM

## 2022-10-25 PROCEDURE — 99207 PR NO CHARGE NURSE ONLY: CPT

## 2022-10-25 PROCEDURE — 0134A COVID-19,PF,MODERNA BIVALENT: CPT

## 2022-10-25 PROCEDURE — 91313 COVID-19,PF,MODERNA BIVALENT: CPT

## 2022-10-25 PROCEDURE — 90471 IMMUNIZATION ADMIN: CPT

## 2022-10-25 PROCEDURE — 90662 IIV NO PRSV INCREASED AG IM: CPT

## 2022-10-25 NOTE — PROGRESS NOTES
Prior to immunization administration, verified patients identity using patient s name and date of birth. Please see Immunization Activity for additional information.     Screening Questionnaire for Adult Immunization    Are you sick today?   No   Do you have allergies to medications, food, a vaccine component or latex?   No   Have you ever had a serious reaction after receiving a vaccination?   No   Do you have a long-term health problem with heart, lung, kidney, or metabolic disease (e.g., diabetes), asthma, a blood disorder, no spleen, complement component deficiency, a cochlear implant, or a spinal fluid leak?  Are you on long-term aspirin therapy?   No   Do you have cancer, leukemia, HIV/AIDS, or any other immune system problem?   No   Do you have a parent, brother, or sister with an immune system problem?   No   In the past 3 months, have you taken medications that affect  your immune system, such as prednisone, other steroids, or anticancer drugs; drugs for the treatment of rheumatoid arthritis, Crohn s disease, or psoriasis; or have you had radiation treatments?   No   Have you had a seizure, or a brain or other nervous system problem?   No   During the past year, have you received a transfusion of blood or blood    products, or been given immune (gamma) globulin or antiviral drug?   No   For women: Are you pregnant or is there a chance you could become       pregnant during the next month?   No   Have you received any vaccinations in the past 4 weeks?   No     Immunization questionnaire answers were all negative.        Per orders of  , injection of Flu and Moderna Bivalent given by Sharla Pate. Patient instructed to remain in clinic for 15 minutes afterwards, and to report any adverse reaction to me immediately.       Screening performed by Sharla Pate on 10/25/2022 at 2:03 PM.

## 2022-12-14 ENCOUNTER — OFFICE VISIT (OUTPATIENT)
Dept: PEDIATRICS | Facility: CLINIC | Age: 77
End: 2022-12-14
Payer: MEDICARE

## 2022-12-14 VITALS
HEART RATE: 70 BPM | RESPIRATION RATE: 16 BRPM | DIASTOLIC BLOOD PRESSURE: 70 MMHG | SYSTOLIC BLOOD PRESSURE: 140 MMHG | OXYGEN SATURATION: 97 % | TEMPERATURE: 97.6 F | WEIGHT: 118.8 LBS | BODY MASS INDEX: 21.04 KG/M2

## 2022-12-14 DIAGNOSIS — Z01.818 PREOPERATIVE EXAMINATION: Primary | ICD-10-CM

## 2022-12-14 DIAGNOSIS — M16.12 OSTEOARTHRITIS OF LEFT HIP, UNSPECIFIED OSTEOARTHRITIS TYPE: ICD-10-CM

## 2022-12-14 PROCEDURE — 99213 OFFICE O/P EST LOW 20 MIN: CPT | Mod: GC | Performed by: STUDENT IN AN ORGANIZED HEALTH CARE EDUCATION/TRAINING PROGRAM

## 2022-12-14 SDOH — ECONOMIC STABILITY: TRANSPORTATION INSECURITY
IN THE PAST 12 MONTHS, HAS THE LACK OF TRANSPORTATION KEPT YOU FROM MEDICAL APPOINTMENTS OR FROM GETTING MEDICATIONS?: NO

## 2022-12-14 SDOH — ECONOMIC STABILITY: FOOD INSECURITY: WITHIN THE PAST 12 MONTHS, THE FOOD YOU BOUGHT JUST DIDN'T LAST AND YOU DIDN'T HAVE MONEY TO GET MORE.: NEVER TRUE

## 2022-12-14 SDOH — HEALTH STABILITY: PHYSICAL HEALTH: ON AVERAGE, HOW MANY DAYS PER WEEK DO YOU ENGAGE IN MODERATE TO STRENUOUS EXERCISE (LIKE A BRISK WALK)?: 7 DAYS

## 2022-12-14 SDOH — ECONOMIC STABILITY: INCOME INSECURITY: IN THE LAST 12 MONTHS, WAS THERE A TIME WHEN YOU WERE NOT ABLE TO PAY THE MORTGAGE OR RENT ON TIME?: NO

## 2022-12-14 SDOH — ECONOMIC STABILITY: INCOME INSECURITY: HOW HARD IS IT FOR YOU TO PAY FOR THE VERY BASICS LIKE FOOD, HOUSING, MEDICAL CARE, AND HEATING?: NOT VERY HARD

## 2022-12-14 SDOH — ECONOMIC STABILITY: TRANSPORTATION INSECURITY
IN THE PAST 12 MONTHS, HAS LACK OF TRANSPORTATION KEPT YOU FROM MEETINGS, WORK, OR FROM GETTING THINGS NEEDED FOR DAILY LIVING?: NO

## 2022-12-14 SDOH — ECONOMIC STABILITY: FOOD INSECURITY: WITHIN THE PAST 12 MONTHS, YOU WORRIED THAT YOUR FOOD WOULD RUN OUT BEFORE YOU GOT MONEY TO BUY MORE.: NEVER TRUE

## 2022-12-14 SDOH — HEALTH STABILITY: PHYSICAL HEALTH: ON AVERAGE, HOW MANY MINUTES DO YOU ENGAGE IN EXERCISE AT THIS LEVEL?: 50 MIN

## 2022-12-14 ASSESSMENT — LIFESTYLE VARIABLES
HOW MANY STANDARD DRINKS CONTAINING ALCOHOL DO YOU HAVE ON A TYPICAL DAY: 1 OR 2
SKIP TO QUESTIONS 9-10: 1
HOW OFTEN DO YOU HAVE SIX OR MORE DRINKS ON ONE OCCASION: NEVER
AUDIT-C TOTAL SCORE: 3
HOW OFTEN DO YOU HAVE A DRINK CONTAINING ALCOHOL: 2-3 TIMES A WEEK

## 2022-12-14 ASSESSMENT — SOCIAL DETERMINANTS OF HEALTH (SDOH)
HOW OFTEN DO YOU ATTEND CHURCH OR RELIGIOUS SERVICES?: MORE THAN 4 TIMES PER YEAR
HOW OFTEN DO YOU GET TOGETHER WITH FRIENDS OR RELATIVES?: THREE TIMES A WEEK
IN A TYPICAL WEEK, HOW MANY TIMES DO YOU TALK ON THE PHONE WITH FAMILY, FRIENDS, OR NEIGHBORS?: MORE THAN THREE TIMES A WEEK
DO YOU BELONG TO ANY CLUBS OR ORGANIZATIONS SUCH AS CHURCH GROUPS UNIONS, FRATERNAL OR ATHLETIC GROUPS, OR SCHOOL GROUPS?: PATIENT DECLINED

## 2022-12-14 ASSESSMENT — PAIN SCALES - GENERAL: PAINLEVEL: MODERATE PAIN (5)

## 2022-12-14 NOTE — PROGRESS NOTES
[address]  [address] [place] [address]  Phone: [phone]  Fax: [phone]  Primary Provider: Roe Connolly  Pre-op Performing Provider: MARIA ANTONIA CHRISTIE      PREOPERATIVE EVALUATION:  Today's date: 12/14/2022    Brionna Rollins is a 77 year old female who presents for a preoperative evaluation.    Surgical Information:  Surgery/Procedure: Same Day Outpatient left 2-incision total hip arthroplasty   Surgery Location: [facility]   Surgeon: Dr. Coe   Surgery Date: 1/4/23  Time of Surgery: 9:19 am   Where patient plans to recover: At home with family  Fax number for surgical facility: [phone]    Type of Anesthesia Anticipated: to be determined    Assessment & Plan     The proposed surgical procedure is considered INTERMEDIATE risk.    Osteoarthritis of left hip, unspecified osteoarthritis type  Preoperative examination  Patient well appearing. Exam without abnormalities. Pre-op screening questions and medications reviewed with patient during visit. No concerns identified.       Risks and Recommendations:  The patient has the following additional risks and recommendations for perioperative complications:   - No identified additional risk factors other than previously addressed    Medication Instructions:  Patient is to take all scheduled medications on the day of surgery EXCEPT for modifications listed below:   -Please hold lisinopril 12 hours prior to surgery     RECOMMENDATION:  APPROVAL GIVEN to proceed with proposed procedure, without further diagnostic evaluation.          Subjective     HPI related to upcoming procedure:     Preop Questions 12/14/2022   1. Have you ever had a heart attack or stroke? No   2. Have you ever had surgery on your heart or blood vessels, such as a stent placement, a coronary artery bypass, or surgery on an artery in your head, neck, heart, or legs? No   3. Do you have chest pain with activity? No   4. Do you  have a history of  heart failure? No   5. Do you currently have a cold, bronchitis or symptoms of other infection? No   6. Do you have a cough, shortness of breath, or wheezing? No   7. Do you or anyone in your family have previous history of blood clots? No   8. Do you or does anyone in your family have a serious bleeding problem such as prolonged bleeding following surgeries or cuts? No   9. Have you ever had problems with anemia or been told to take iron pills? No   10. Have you had any abnormal blood loss such as black, tarry or bloody stools, or abnormal vaginal bleeding? No   11. Have you ever had a blood transfusion? No   12. Are you willing to have a blood transfusion if it is medically needed before, during, or after your surgery? Yes   13. Have you or any of your relatives ever had problems with anesthesia? No   14. Do you have sleep apnea, excessive snoring or daytime drowsiness? No   15. Do you have any artifical heart valves or other implanted medical devices like a pacemaker, defibrillator, or continuous glucose monitor? No   16. Do you have artificial joints? YES - R hip & partial left knee    17. Are you allergic to latex? No   18. Is there any chance that you may be pregnant? -     Health Care Directive:  Patient has a Health Care Directive on file    Preoperative Review of :   reviewed - controlled substances reflected in medication list.        Review of Systems  Constitutional, neuro, ENT, endocrine, pulmonary, cardiac, gastrointestinal, genitourinary, musculoskeletal, integument and psychiatric systems are negative, except as otherwise noted.    Patient Active Problem List    Diagnosis Date Noted     Nontraumatic tear of left rotator cuff, unspecified tear extent 02/23/2022     Priority: Medium     Inflamed seborrheic keratosis 11/02/2017     Priority: Medium     SK (seborrheic keratosis) 11/02/2017     Priority: Medium     Cherry angioma 11/02/2017     Priority: Medium     Atopic rhinitis  "02/05/2016     Priority: Medium     Hypertension goal BP (blood pressure) < 140/90 01/07/2016     Priority: Medium     Osteoarthritis of knee 12/18/2015     Priority: Medium     Fracture of distal end of radius 10/22/2015     Priority: Medium     Surgical follow-up care 06/01/2015     Priority: Medium     Overview:   inactive icd-9 diagnosis auto replaced with icd-10, display name retained//mporz       Acute posthemorrhagic anemia 04/29/2015     Priority: Medium     Osteoarthritis of hip 04/07/2015     Priority: Medium     Allergy to anti-infective agent 03/26/2014     Priority: Medium     Enteritis 03/26/2014     Priority: Medium     Diverticulitis of sigmoid colon 03/26/2014     Priority: Medium     ACP (advance care planning) 06/06/2011     Priority: Medium     Patient states has Advance Directive and will bring in a copy to clinic. 6/6/2011   Pt document is in epic, dated 1/12/05.  Discussed with pt and she may plan to update this and verbally gave the health care agent information listed. Leigh Naranjo RN           Mixed incontinence urge and stress 06/06/2011     Priority: Medium     CARDIOVASCULAR SCREENING; LDL GOAL LESS THAN 160 02/10/2010     Priority: Medium     Microscopic hematuria 11/21/2008     Priority: Medium     Colon Polyps      Priority: Medium     Estrogen replacement therapy 09/07/2005     Priority: Medium     Internal derangement of knee 04/30/2005     Priority: Medium     L knee x 4-5 wks, no known injury but \"runs through it\" regularly  Problem list name updated by automated process. Provider to review       Back pain 04/18/2003     Priority: Medium       Patient is followed by [facility] for ongoing prescription of narcotic pain medicine.  Med: vicodin.   Maximum use per month: 30  Expected duration: indefinite  Narcotic agreement on file: NO  Clinic visit recommended: Q 3 months         Osteoporosis 04/18/2003     Priority: Medium     Problem list name updated by automated process. " Provider to review        Past Medical History:   Diagnosis Date     RADIAL STYLOID TENOSYNOV - right 2/23/2006    lifts weights, doesn't think work-related; OK to add PT visits prn     Rotator cuff tear      Past Surgical History:   Procedure Laterality Date     COLONOSCOPY  11/26/2013    Dr. Cardenas Replaced by [facility]     COLONOSCOPY  11/26/2013    Procedure: COMBINED COLONOSCOPY, SINGLE BIOPSY/POLYPECTOMY BY BIOPSY;;  Surgeon: Jovanni Cardenas MD;  Location:  GI     HYSTERECTOMY, [place]  1986     [facility] ROTATOR CUFF STRAP       [facility] TOTAL HIP ARTHROPLASTY Right      Current Outpatient Medications   Medication Sig Dispense Refill     acetaminophen (MAPAP) 325 MG tablet Take 325-650 mg by mouth every 6 hours as needed for mild pain       aspirin (ASA) 325 MG EC tablet Take 325 mg by mouth       ASPIRIN PO Take by mouth 2 times daily as needed for moderate pain       fluticasone (FLONASE) 50 MCG/ACT nasal spray USE ONE TO TWO SPRAYS IN EACH NOSTRIL ONCE DAILY 16 g 5     gabapentin (NEURONTIN) 100 MG capsule TAKE ONE CAPSULE BY MOUTH AT LUNCH AND TAKE TWO CAPSULES BY MOUTH NIGHTLY AT BEDTIME (ALONG WITH 300MG CAPSULE). 270 capsule 0     gabapentin (NEURONTIN) 300 MG capsule TAKE ONE CAPSULE BY MOUTH EVERY NIGHT AT BEDTIME 90 capsule 1     hydrOXYzine (ATARAX) 25 MG tablet Take 0.5-1 tablets (12.5-25 mg) by mouth nightly as needed (insomnia or leg pain/discomfort) 90 tablet 3     IBUPROFEN PO Take 600 mg by mouth every 6 hours as needed for moderate pain       ketorolac (ACULAR) 0.5 % ophthalmic solution        LACTOBACILLUS RHAMNOSUS, GG, PO Take 2 capsules by mouth 3 times daily (with meals)       lisinopril (ZESTRIL) 5 MG tablet Take 1 tablet (5 mg) by mouth daily 90 tablet 3     prednisoLONE acetate (PRED FORTE) 1 % ophthalmic suspension        triamcinolone (KENALOG) 0.1 % external ointment To rash area in the pubic area twice daily for up to 2 weeks. 30 g 0     valACYclovir (VALTREX) 500 MG tablet Take 1 tablet (500 mg) by mouth  daily 90 tablet 3       Allergies   Allergen Reactions     Scopolamine Anaphylaxis and Itching     Ciprofloxacin Swelling     Stiff and swollen neck     Amoxicillin-Pot Clavulanate [Augmentin]      Contrast Dye      gets cold and feels terrible  Pt was ok with Isovue-370 and no pre-meds     Doxycycline Hyclate Rash     Erythromycin Swelling     Flagyl [Metronidazole Hcl]      Meperidine Hcl      low blood pressure     Oxycodone      Prochlorperazine      agitated     Sulfa Drugs Rash     Tetracycline Rash     Cefprozil Rash        Social History     Tobacco Use     Smoking status: Never     Smokeless tobacco: Never   Substance Use Topics     Alcohol use: Yes     Comment: occ with dinner q night     Family History   Problem Relation Age of Onset     Gastrointestinal Disease Mother         colon polyps     Diabetes Mother      Arthritis Father         Rheumatoid     Eye Disorder Father         mac. degeneration femp op bypass     Cancer - colorectal Maternal Grandmother      C.A.D. No family hx of      Breast Cancer No family hx of      History   Drug Use No         Objective     There were no vitals taken for this visit.    Physical Exam  GENERAL APPEARANCE: healthy, alert and no distress  HENT: ear canals normal and nose and mouth without ulcers or lesions  RESP: lungs clear to auscultation - no rales, rhonchi or wheezes  CV: regular rate and rhythm, normal S1 S2, no S3 or S4 and no murmur, click or rub   ABDOMEN: soft, nontender, no HSM or masses and bowel sounds normal  MS: extremities normal- no gross deformities noted  NEURO: Normal strength and tone, sensory exam grossly normal, mentation intact and speech normal  PSYCH: mentation appears normal and affect normal/bright    Recent Labs   Lab Test 02/23/22  1225 11/30/21  0909   HGB 13.7  --      --    NA  --  142   POTASSIUM  --  4.1   CR  --  0.70      Diagnostics:  No labs were ordered during this visit.   No EKG required, no history of coronary heart  disease, significant arrhythmia, peripheral arterial disease or other structural heart disease.    Revised Cardiac Risk Index (RCRI):  The patient has the following serious cardiovascular risks for perioperative complications:   - No serious cardiac risks = 0 points     RCRI Interpretation: 0 points: Class I (very low risk - 0.4% complication rate)           Signed Electronically by: Leigh Booth MD  Copy of this evaluation report is provided to requesting physician.      I have seen this patient and I was present during key portions of the procedure/exam and immediately available to furnish services during the entire visit. I have reviewed the documentation and discussed the clinical decision making with . Her progress note reflects our joint assessment and plan.     Roe Connolly M.D.  Internal Medicine-Pediatrics

## 2022-12-14 NOTE — PATIENT INSTRUCTIONS
It was great to see you today. I think you are a low risk patient to proceed with surgery. I wish you the best with your recovery!     I recommend holding your lisinopril medication for at least 12 hours prior to surgery. Otherwise continue all your medications as scheduled.

## 2023-01-04 DIAGNOSIS — M79.2 NEUROPATHIC PAIN: ICD-10-CM

## 2023-01-04 RX ORDER — GABAPENTIN 100 MG/1
CAPSULE ORAL
Qty: 270 CAPSULE | Refills: 0 | Status: SHIPPED | OUTPATIENT
Start: 2023-01-04 | End: 2023-03-29

## 2023-01-06 ENCOUNTER — TELEPHONE (OUTPATIENT)
Dept: PEDIATRICS | Facility: CLINIC | Age: 78
End: 2023-01-06

## 2023-01-06 NOTE — TELEPHONE ENCOUNTER
Last night pt got up from sitting down got lightheaded- fainted in the chair  Was taken to [place] ER on 1/5/22    Was advised to follow up with her PCP  Pt feels fine now. Not dizzy or lightheaded today  Hip replacement 1/4/22  Not taking any narcs anymore    Pt is unable to check her BP at home    Can she you on 1/11/23 TAMIKO?    Thank you  Mojgan Crane RN on 1/6/2023 at 12:48 PM

## 2023-01-06 NOTE — TELEPHONE ENCOUNTER
TAMIKO sheldon provider    Call placed to pt   Appt scheduled    Mojgan Crane RN on 1/6/2023 at 1:10 PM

## 2023-01-11 ENCOUNTER — OFFICE VISIT (OUTPATIENT)
Dept: PEDIATRICS | Facility: CLINIC | Age: 78
End: 2023-01-11
Payer: MEDICARE

## 2023-01-11 VITALS
OXYGEN SATURATION: 98 % | HEART RATE: 82 BPM | SYSTOLIC BLOOD PRESSURE: 140 MMHG | HEIGHT: 63 IN | DIASTOLIC BLOOD PRESSURE: 70 MMHG | BODY MASS INDEX: 21.44 KG/M2 | TEMPERATURE: 98 F | WEIGHT: 121 LBS | RESPIRATION RATE: 20 BRPM

## 2023-01-11 DIAGNOSIS — R55 NEAR SYNCOPE: Primary | ICD-10-CM

## 2023-01-11 DIAGNOSIS — R00.2 PALPITATIONS: ICD-10-CM

## 2023-01-11 DIAGNOSIS — Z96.642 S/P TOTAL LEFT HIP ARTHROPLASTY: ICD-10-CM

## 2023-01-11 PROCEDURE — 99214 OFFICE O/P EST MOD 30 MIN: CPT | Performed by: INTERNAL MEDICINE

## 2023-01-11 ASSESSMENT — PAIN SCALES - GENERAL: PAINLEVEL: SEVERE PAIN (6)

## 2023-01-11 NOTE — PROGRESS NOTES
Assessment & Plan     Near syncope  discussed with patient (or patient's parents/caregiver) pathophysiology of condition and treatment options.  reviwed ER course and labs, imaging, etc. history consistant with near syncope after getting up to stand, likel due to dehydration/3rd spacing and decreased Po intake after CARROLL. Has felt well since. Does get occational palpitations after meals (has been long standing issue) and on exam today does have occational PVC, will get 48 hour holter to better quantify and see if triggered by meals, etc. patient already has follow-up appointment next week. consider echo and/or cardiology consult pending results. Patient verbalized understanding and is agreeable to this plan.   - Adult Holter Monitor 48 hour; Future    Palpitations  as above  - Adult Holter Monitor 48 hour; Future    S/P total left hip arthroplasty  discussed with patient (or patient's parents/caregiver) pathophysiology of condition and treatment options.  patient will start formal physical therapy, continue post op cares per ortho and caution with dilaudid due to #1 above. Patient verbalized understanding and is agreeable to this plan.       [facility]  30 minutes spent on the date of the encounter doing chart review, review of outside records, review of test results, patient visit and discussion with family      MED REC REQUIRED  Post Medication Reconciliation Status: discharge medications reconciled, continue medications without change  See Patient Instructions    Return in about 1 week (around 1/18/2023) for Next scheduled follow-up visit, or sooner if symptoms persist or worsen.    Roe Connolly MD  [facility] [place] is a 77 year old, presenting for the following health issues:  ER F/U (Follow-up on BP and vertigo)      History of Present Illness       Reason for visit:  Follow up from ER  "visit.  Blood pressures    She eats 2-3 servings of fruits and vegetables daily.She consumes 1 sweetened beverage(s) daily.She exercises with enough effort to increase her heart rate 30 to 60 minutes per day.  She exercises with enough effort to increase her heart rate 7 days per week.   She is taking medications regularly.       ED/UC Followup:    Facility:  [facility]  Date of visit: 01/05/2023  Reason for visit: near syncope after hip replacement  Current Status: improved      patient was at home sat down to chat with family and she got up to sand, got very light headed and then was sat in a chair by family, then was on the floor (family helped) and then called 911, took a min or two to realize what had happened. this happened the day after the surgery. has been okay since. no recurrent symptoms. mood is good. does have formal physical therapy set up. she did call ortho and checked in as well. has a couple sips of coffee and a biscotti since coming here. has only taken 2 dilaudid since discharge (was given 15 tabs on 1/4, reviwed  today)      Review of Systems   Constitutional, HEENT, cardiovascular, pulmonary, gi and gu systems are negative, except as otherwise noted.      Objective    BP (!) 140/70   Pulse 82   Temp 98  F (36.7  C) (Oral)   Resp 20   Ht 1.6 m (5' 3\")   Wt 54.9 kg (121 lb)   SpO2 98%   BMI 21.43 kg/m    Body mass index is 21.43 kg/m .  Physical Exam   Constitutional: healthy, alert and no distress  HEENT: normocephalic and atrumatic, mucous membranes moist, op clear, mucous membranes moist, neck supple   Cardiovascular: regular rate and rhythm no rubs, gallops or murmurs normal S1/S2; no S3 or S4; occational PVC, < 3/min  Respiratory: clear to auscultation bilaterally in all lung fields, normal insp/exp effort. Good air movement  Gastrointestinal: Abdomen soft, non-tender. BS normal. No masses, organomegaly  Musculoskeletal: extremities normal- no gross deformities noted, " gait normal and normal muscle tone  Neurologic: Gait normal. Reflexes normal and symmetric. Sensation grossly WNL.  Psychiatric: mentation appears normal and affect normal/bright

## 2023-01-16 DIAGNOSIS — B00.9 HSV (HERPES SIMPLEX VIRUS) INFECTION: ICD-10-CM

## 2023-01-18 ENCOUNTER — OFFICE VISIT (OUTPATIENT)
Dept: PEDIATRICS | Facility: CLINIC | Age: 78
End: 2023-01-18
Payer: MEDICARE

## 2023-01-18 VITALS
BODY MASS INDEX: 21.23 KG/M2 | DIASTOLIC BLOOD PRESSURE: 72 MMHG | SYSTOLIC BLOOD PRESSURE: 148 MMHG | HEIGHT: 63 IN | RESPIRATION RATE: 20 BRPM | HEART RATE: 82 BPM | OXYGEN SATURATION: 100 % | TEMPERATURE: 97.7 F | WEIGHT: 119.8 LBS

## 2023-01-18 DIAGNOSIS — M16.12 OSTEOARTHRITIS OF LEFT HIP, UNSPECIFIED OSTEOARTHRITIS TYPE: ICD-10-CM

## 2023-01-18 DIAGNOSIS — R55 NEAR SYNCOPE: ICD-10-CM

## 2023-01-18 DIAGNOSIS — R00.2 PALPITATIONS: ICD-10-CM

## 2023-01-18 DIAGNOSIS — Z12.31 VISIT FOR SCREENING MAMMOGRAM: ICD-10-CM

## 2023-01-18 DIAGNOSIS — Z12.11 SCREEN FOR COLON CANCER: ICD-10-CM

## 2023-01-18 DIAGNOSIS — I10 HYPERTENSION GOAL BP (BLOOD PRESSURE) < 140/90: Primary | ICD-10-CM

## 2023-01-18 DIAGNOSIS — Z96.642 S/P TOTAL LEFT HIP ARTHROPLASTY: ICD-10-CM

## 2023-01-18 PROCEDURE — 99214 OFFICE O/P EST MOD 30 MIN: CPT | Performed by: INTERNAL MEDICINE

## 2023-01-18 RX ORDER — LISINOPRIL 5 MG/1
10 TABLET ORAL DAILY
Qty: 90 TABLET | Refills: 3 | Status: SHIPPED | OUTPATIENT
Start: 2023-01-18 | End: 2023-01-18

## 2023-01-18 RX ORDER — LISINOPRIL 5 MG/1
10 TABLET ORAL DAILY
Qty: 180 TABLET | Refills: 3 | Status: SHIPPED | OUTPATIENT
Start: 2023-01-18 | End: 2024-02-12

## 2023-01-18 RX ORDER — VALACYCLOVIR HYDROCHLORIDE 500 MG/1
TABLET, FILM COATED ORAL
Qty: 90 TABLET | Refills: 3 | Status: SHIPPED | OUTPATIENT
Start: 2023-01-18 | End: 2023-03-29

## 2023-01-18 ASSESSMENT — PAIN SCALES - GENERAL: PAINLEVEL: MILD PAIN (3)

## 2023-01-18 NOTE — PROGRESS NOTES
Assessment & Plan     Hypertension goal BP (blood pressure) < 140/90  discussed with patient (or patient's parents/caregiver) pathophysiology of condition and treatment options.  will increase ACEI and patient will check at home, also reviwed diet ,etc.   - lisinopril (ZESTRIL) 5 MG tablet; Take 2 tablets (10 mg) by mouth daily    Osteoarthritis of left hip, unspecified osteoarthritis type  discussed with patient (or patient's parents/caregiver) pathophysiology of condition and treatment options.  doing well, continue follow-up with ortho at [facility].    S/P total left hip arthroplasty  as aboev    Palpitations  discussed with patient (or patient's parents/caregiver) pathophysiology of condition and treatment options.  will get zio patch to better quantify if PACs/PVCs.   - Adult Leadless EKG Monitor 3 to 7 Days; Future    Near syncope  discussed with patient (or patient's parents/caregiver) pathophysiology of condition and treatment options.  per history vw vasovagal after surgery, but will get zio as noted above to r/o possible arrythmia  - Adult Leadless EKG Monitor 3 to 7 Days; Future    Screen for colon cancer  patient are due  - Colonoscopy Screening  Referral; Future    Visit for screening mammogram  patient aware      Return in about 2 months (around 3/18/2023) for Next scheduled follow-up visit, or sooner if symptoms persist or worsen.      Roe Connolly MD  [facility] [place] is a 77 year old, presenting for the following health issues:  Follow Up and Referral (Reorder Zio Patch)      History of Present Illness       Reason for visit:  Follow up from ER visit.  Blood pressures    She eats 2-3 servings of fruits and vegetables daily.She consumes 1 sweetened beverage(s) daily.She exercises with enough effort to increase her heart rate 30 to 60 minutes per day.  She exercises with enough effort to increase her heart rate 7 days per week.   She is taking  "medications regularly.   doing physical therapy, doing acupuncture, feels pretty good, BPs up some. no symptoms since last office visit. mood is good. notes occational palpitations. no chest pain. palpitations ? related to GERD type symptoms she has had for years. No other concerns or complaints today.       Review of Systems   Constitutional, HEENT, cardiovascular, pulmonary, gi and gu systems are negative, except as otherwise noted.      Objective    BP (!) 148/72   Pulse 82   Temp 97.7  F (36.5  C) (Oral)   Resp 20   Ht 1.6 m (5' 3\")   Wt 54.3 kg (119 lb 12.8 oz)   SpO2 100%   BMI 21.22 kg/m    Body mass index is 21.22 kg/m .  Physical Exam   GENERAL: healthy, alert and no distress  EYES: Eyes grossly normal to inspection, PERRL and conjunctivae and sclerae normal  HENT: ear canals and TM's normal, mouth and nose covered with mask due to covid   RESP: lungs clear to auscultation - no rales, rhonchi or wheezes  CV: regular rate and rhythm, normal S1 S2, no S3 or S4, no murmur, click or rub, no peripheral edema   MS: no gross musculoskeletal defects noted, no edema  SKIN: no suspicious lesions or rashes  NEURO: Normal strength and tone, mentation intact and speech normal  PSYCH: mentation appears normal, affect normal/bright                      "

## 2023-01-18 NOTE — TELEPHONE ENCOUNTER
Routing refill request to provider for review/approval because:  Labs not current:  Creatinine    Creatinine   Date Value Ref Range Status   11/30/2021 0.70 0.52 - 1.04 mg/dL Final   12/04/2019 0.83 0.52 - 1.04 mg/dL Final     Mikayla FARIA RN, BSN

## 2023-01-26 ENCOUNTER — OFFICE VISIT (OUTPATIENT)
Dept: DERMATOLOGY | Facility: CLINIC | Age: 78
End: 2023-01-26
Attending: INTERNAL MEDICINE
Payer: MEDICARE

## 2023-01-26 DIAGNOSIS — L82.0 INFLAMED SEBORRHEIC KERATOSIS: ICD-10-CM

## 2023-01-26 DIAGNOSIS — D18.01 ANGIOMA OF SKIN: ICD-10-CM

## 2023-01-26 DIAGNOSIS — L81.4 LENTIGO: Primary | ICD-10-CM

## 2023-01-26 DIAGNOSIS — L82.1 SEBORRHEIC KERATOSIS: ICD-10-CM

## 2023-01-26 DIAGNOSIS — D23.9 DERMAL NEVUS: ICD-10-CM

## 2023-01-26 PROCEDURE — 99213 OFFICE O/P EST LOW 20 MIN: CPT | Mod: 25 | Performed by: DERMATOLOGY

## 2023-01-26 PROCEDURE — 17110 DESTRUCTION B9 LES UP TO 14: CPT | Performed by: DERMATOLOGY

## 2023-01-26 NOTE — PROGRESS NOTES
Brionna Rollins , a 77 year old year old female patient, I was asked to see by Dr. Connolly for brown spots on skin.  Patient states this has been present for a while.  Patient reports the following symptoms:  itching .  Patient reports the following previous treatments none.  Patient reports the following modifying factors none.  Associated symptoms: none.  Patient has no other skin complaints today.  Remainder of the HPI, Meds, PMH, Allergies, FH, and SH was reviewed in chart.      Past Medical History:   Diagnosis Date     RADIAL STYLOID TENOSYNOV - right 2/23/2006    lifts weights, doesn't think work-related; OK to add PT visits prn     Rotator cuff tear        Past Surgical History:   Procedure Laterality Date     COLONOSCOPY  11/26/2013    Dr. Cardenas [facility]     COLONOSCOPY  11/26/2013    Procedure: COMBINED COLONOSCOPY, SINGLE BIOPSY/POLYPECTOMY BY BIOPSY;;  Surgeon: Jovanni Cardenas MD;  Location: [facility]     HYSTERECTOMY, [place]  1986     ZZC ROTATOR CUFF STRAP       ZZC TOTAL HIP ARTHROPLASTY Right         Family History   Problem Relation Age of Onset     Gastrointestinal Disease Mother         colon polyps     Diabetes Mother      Arthritis Father         Rheumatoid     Eye Disorder Father         mac. degeneration femp op bypass     Melanoma Son      Cancer - colorectal Maternal Grandmother      C.A.D. No family hx of      Breast Cancer No family hx of        Social History     Socioeconomic History     Marital status:      Spouse name: Not on file     Number of children: Not on file     Years of education: Not on file     Highest education level: Not on file   Occupational History     Occupation:  at [employer]   Tobacco Use     Smoking status: Never     Smokeless tobacco: Never   Vaping Use     Vaping Use: Never used   Substance and Sexual Activity     Alcohol use: Yes     Comment: occ with dinner q night     Drug use: No     Sexual activity: Yes     Partners: Male   Other Topics  Concern     Parent/sibling w/ CABG, MI or angioplasty before 65F 55M? No   Social History Narrative     Not on file     Social Determinants of Health     Financial Resource Strain: Low Risk      Difficulty of Paying Living Expenses: Not very hard   Food Insecurity: No Food Insecurity     Worried About Running Out of Food in the Last Year: Never true     Ran Out of Food in the Last Year: Never true   Transportation Needs: No Transportation Needs     Lack of Transportation (Medical): No     Lack of Transportation (Non-Medical): No   Physical Activity: Sufficiently Active     Days of Exercise per Week: 7 days     Minutes of Exercise per Session: 50 min   Stress: No Stress Concern Present     Feeling of Stress : Not at all   Social Connections: Unknown     Frequency of Communication with Friends and Family: More than three times a week     Frequency of Social Gatherings with Friends and Family: Three times a week     Attends Roman Catholic Services: More than 4 times per year     Active Member of Clubs or Organizations: Patient refused     Attends Club or Organization Meetings: Not on file     Marital Status:    Intimate Partner Violence: Not on file   Housing Stability: Low Risk      Unable to Pay for Housing in the Last Year: No     Number of Places Lived in the Last Year: 1     Unstable Housing in the Last Year: No       Outpatient Encounter Medications as of 1/26/2023   Medication Sig Dispense Refill     acetaminophen (TYLENOL) 325 MG tablet Take 325-650 mg by mouth every 6 hours as needed for mild pain       gabapentin (NEURONTIN) 100 MG capsule TAKE ONE CAPSULE BY MOUTH AT LUNCH AND TAKE TWO CAPSULES BY MOUTH NIGHTLY AT BEDTIME (ALONG WITH 300MG CAPSULE). 270 capsule 0     gabapentin (NEURONTIN) 300 MG capsule TAKE ONE CAPSULE BY MOUTH EVERY NIGHT AT BEDTIME 90 capsule 1     LACTOBACILLUS RHAMNOSUS, GG, PO Take 2 capsules by mouth 3 times daily (with meals)       lisinopril (ZESTRIL) 5 MG tablet Take 2 tablets  (10 mg) by mouth daily 180 tablet 3     valACYclovir (VALTREX) 500 MG tablet TAKE ONE TABLET BY MOUTH ONCE DAILY 90 tablet 3     Facility-Administered Encounter Medications as of 1/26/2023   Medication Dose Route Frequency Provider Last Rate Last Admin     lidocaine 1% with EPINEPHrine 1:100,000 injection 3 mL  3 mL Intradermal Once Keisha Carrillo MD                 Review Of Systems  Skin: As above  Eyes: negative  Ears/Nose/Throat: negative  Respiratory: No shortness of breath, dyspnea on exertion, cough, or hemoptysis  Cardiovascular: negative  Gastrointestinal: negative  Genitourinary: negative  Musculoskeletal: negative  Neurologic: negative  Psychiatric: negative  Hematologic/Lymphatic/Immunologic: negative  Endocrine: negative      O:   NAD, WDWN, Alert & Oriented, Mood & Affect wnl, Vitals stable   Here today with     General appearance fit zii   Vitals stbale   Alert, oriented and in no acute distress     Inflamed seborrheic keratosis on face  And trunk and neck    Stuck on papules and brown macules on trunk and ext    Red papules on trunk   Flesh colored papules on trunk          Eyes: Conjunctivae/lids:Normal     ENT: Lips, buccal mucosa, tongue: normal    MSK:Normal    Cardiovascular: peripheral edema none    Pulm: Breathing Normal    Neuro/Psych: Orientation:Normal; Mood/Affect:Normal      A/P:  1. Seborrheic keratosis, lentigo, angioma, dermal nevus  2. Inflamed seborrheic keratosis   L cheek x1, back x4, neck x2  Under breasts x5  LN2:  Treated with LN2 for 5s for 1-2 cycles. Warned risks of blistering, pain, pigment change, scarring, and incomplete resolution.  Advised patient to return if lesions do not completely resolve.  Wound care sheet given.    It was a pleasure speaking to Brionna Rollins today.  Previous clinic  notes and pertinent laboratory tests were reviewed prior to Brionna Rollins's visit.  Nature of benign skin lesions dicussed with patient.  Signs and Symptoms of  skin cancer discussed with patient.  Patient encouraged to perform monthly skin exams.  UV precautions reviewed with patient.  Return to clinic 12 months

## 2023-01-26 NOTE — LETTER
1/26/2023         RE: Brionna Rollins  [address] [place] [address]        Dear Colleague,    Thank you for referring your patient, Brionna Rollins, to the [facility]. Please see a copy of my visit note below.    Brionna Rollins , a 77 year old year old female patient, I was asked to see by Dr. Connolly for brown spots on skin.  Patient states this has been present for a while.  Patient reports the following symptoms:  itching .  Patient reports the following previous treatments none.  Patient reports the following modifying factors none.  Associated symptoms: none.  Patient has no other skin complaints today.  Remainder of the HPI, Meds, PMH, Allergies, FH, and SH was reviewed in chart.      Past Medical History:   Diagnosis Date     RADIAL STYLOID TENOSYNOV - right 2/23/2006    lifts weights, doesn't think work-related; OK to add PT visits prn     Rotator cuff tear        Past Surgical History:   Procedure Laterality Date     COLONOSCOPY  11/26/2013    Dr. Cardenas [facility]     COLONOSCOPY  11/26/2013    Procedure: COMBINED COLONOSCOPY, SINGLE BIOPSY/POLYPECTOMY BY BIOPSY;;  Surgeon: Jovanni Cardenas MD;  Location: [facility]     HYSTERECTOMY, [place]  1986     ZZC ROTATOR CUFF STRAP       ZZC TOTAL HIP ARTHROPLASTY Right         Family History   Problem Relation Age of Onset     Gastrointestinal Disease Mother         colon polyps     Diabetes Mother      Arthritis Father         Rheumatoid     Eye Disorder Father         mac. degeneration femp op bypass     Melanoma Son      Cancer - colorectal Maternal Grandmother      C.A.D. No family hx of      Breast Cancer No family hx of        Social History     Socioeconomic History     Marital status:      Spouse name: Not on file     Number of children: Not on file     Years of education: Not on file     Highest education level: Not on file   Occupational History     Occupation:  at [employer]   Tobacco Use      Smoking status: Never     Smokeless tobacco: Never   Vaping Use     Vaping Use: Never used   Substance and Sexual Activity     Alcohol use: Yes     Comment: occ with dinner q night     Drug use: No     Sexual activity: Yes     Partners: Male   Other Topics Concern     Parent/sibling w/ CABG, MI or angioplasty before 65F 55M? No   Social History Narrative     Not on file     Social Determinants of Health     Financial Resource Strain: Low Risk      Difficulty of Paying Living Expenses: Not very hard   Food Insecurity: No Food Insecurity     Worried About Running Out of Food in the Last Year: Never true     Ran Out of Food in the Last Year: Never true   Transportation Needs: No Transportation Needs     Lack of Transportation (Medical): No     Lack of Transportation (Non-Medical): No   Physical Activity: Sufficiently Active     Days of Exercise per Week: 7 days     Minutes of Exercise per Session: 50 min   Stress: No Stress Concern Present     Feeling of Stress : Not at all   Social Connections: Unknown     Frequency of Communication with Friends and Family: More than three times a week     Frequency of Social Gatherings with Friends and Family: Three times a week     Attends Buddhism Services: More than 4 times per year     Active Member of Clubs or Organizations: Patient refused     Attends Club or Organization Meetings: Not on file     Marital Status:    Intimate Partner Violence: Not on file   Housing Stability: Low Risk      Unable to Pay for Housing in the Last Year: No     Number of Places Lived in the Last Year: 1     Unstable Housing in the Last Year: No       Outpatient Encounter Medications as of 1/26/2023   Medication Sig Dispense Refill     acetaminophen (TYLENOL) 325 MG tablet Take 325-650 mg by mouth every 6 hours as needed for mild pain       gabapentin (NEURONTIN) 100 MG capsule TAKE ONE CAPSULE BY MOUTH AT LUNCH AND TAKE TWO CAPSULES BY MOUTH NIGHTLY AT BEDTIME (ALONG WITH 300MG CAPSULE). 270  capsule 0     gabapentin (NEURONTIN) 300 MG capsule TAKE ONE CAPSULE BY MOUTH EVERY NIGHT AT BEDTIME 90 capsule 1     LACTOBACILLUS RHAMNOSUS, GG, PO Take 2 capsules by mouth 3 times daily (with meals)       lisinopril (ZESTRIL) 5 MG tablet Take 2 tablets (10 mg) by mouth daily 180 tablet 3     valACYclovir (VALTREX) 500 MG tablet TAKE ONE TABLET BY MOUTH ONCE DAILY 90 tablet 3     Facility-Administered Encounter Medications as of 1/26/2023   Medication Dose Route Frequency Provider Last Rate Last Admin     lidocaine 1% with EPINEPHrine 1:100,000 injection 3 mL  3 mL Intradermal Once Keisha Carrillo MD                 Review Of Systems  Skin: As above  Eyes: negative  Ears/Nose/Throat: negative  Respiratory: No shortness of breath, dyspnea on exertion, cough, or hemoptysis  Cardiovascular: negative  Gastrointestinal: negative  Genitourinary: negative  Musculoskeletal: negative  Neurologic: negative  Psychiatric: negative  Hematologic/Lymphatic/Immunologic: negative  Endocrine: negative      O:   NAD, WDWN, Alert & Oriented, Mood & Affect wnl, Vitals stable   Here today with     General appearance fit zii   Vitals stbale   Alert, oriented and in no acute distress     Inflamed seborrheic keratosis on face  And trunk and neck    Stuck on papules and brown macules on trunk and ext    Red papules on trunk   Flesh colored papules on trunk          Eyes: Conjunctivae/lids:Normal     ENT: Lips, buccal mucosa, tongue: normal    MSK:Normal    Cardiovascular: peripheral edema none    Pulm: Breathing Normal    Neuro/Psych: Orientation:Normal; Mood/Affect:Normal      A/P:  1. Seborrheic keratosis, lentigo, angioma, dermal nevus  2. Inflamed seborrheic keratosis   L cheek x1, back x4, neck x2  Under breasts x5  LN2:  Treated with LN2 for 5s for 1-2 cycles. Warned risks of blistering, pain, pigment change, scarring, and incomplete resolution.  Advised patient to return if lesions do not completely resolve.  Wound care  sheet given.    It was a pleasure speaking to Brionna Rollins today.  Previous clinic  notes and pertinent laboratory tests were reviewed prior to Brionna Rollins's visit.  Nature of benign skin lesions dicussed with patient.  Signs and Symptoms of skin cancer discussed with patient.  Patient encouraged to perform monthly skin exams.  UV precautions reviewed with patient.  Return to clinic 12 months      Again, thank you for allowing me to participate in the care of your patient.        Sincerely,        Jayy Vega MD

## 2023-02-01 ENCOUNTER — HOSPITAL ENCOUNTER (OUTPATIENT)
Dept: CARDIOLOGY | Facility: CLINIC | Age: 78
Discharge: HOME | End: 2023-02-01
Attending: INTERNAL MEDICINE | Admitting: INTERNAL MEDICINE
Payer: MEDICARE

## 2023-02-01 DIAGNOSIS — R00.2 PALPITATIONS: ICD-10-CM

## 2023-02-01 DIAGNOSIS — R55 NEAR SYNCOPE: ICD-10-CM

## 2023-02-01 PROCEDURE — 93242 EXT ECG>48HR<7D RECORDING: CPT

## 2023-02-01 PROCEDURE — 93244 EXT ECG>48HR<7D REV&INTERPJ: CPT | Performed by: INTERNAL MEDICINE

## 2023-02-07 ENCOUNTER — ANCILLARY PROCEDURE (OUTPATIENT)
Dept: MAMMOGRAPHY | Facility: CLINIC | Age: 78
End: 2023-02-07
Payer: MEDICARE

## 2023-02-07 DIAGNOSIS — Z12.31 VISIT FOR SCREENING MAMMOGRAM: ICD-10-CM

## 2023-02-07 PROCEDURE — 77067 SCR MAMMO BI INCL CAD: CPT | Mod: TC | Performed by: RADIOLOGY

## 2023-02-07 PROCEDURE — 77063 BREAST TOMOSYNTHESIS BI: CPT | Mod: TC | Performed by: RADIOLOGY

## 2023-03-06 ENCOUNTER — MYC MEDICAL ADVICE (OUTPATIENT)
Dept: PEDIATRICS | Facility: CLINIC | Age: 78
End: 2023-03-06
Payer: MEDICARE

## 2023-03-06 NOTE — TELEPHONE ENCOUNTER
See mychart message from patient, wants to know if you recommend a particular cardiologist for her to see.

## 2023-03-06 NOTE — TELEPHONE ENCOUNTER
both Boy and Mary are great, I would have her schedule with which ever one has the most convineinet, earliest opening that accomodates her schedule.

## 2023-03-15 DIAGNOSIS — M79.2 NEUROPATHIC PAIN: ICD-10-CM

## 2023-03-15 RX ORDER — GABAPENTIN 300 MG/1
CAPSULE ORAL
Qty: 90 CAPSULE | Refills: 1 | Status: SHIPPED | OUTPATIENT
Start: 2023-03-15 | End: 2023-10-05

## 2023-03-24 NOTE — PROGRESS NOTES
CARDIOLOGY CONSULT    REASON FOR CONSULT: palpitations    PRIMARY CARE PHYSICIAN:  Roe Connolly    HISTORY OF PRESENT ILLNESS:  77-year-old female seen for palpitations.    She reports about a 1 year history of a heaviness in the epigastric area and some palpitations.  This will occur most every day.  It is worse after eating, she does not have this with exertion.  It would last several minutes, sometimes an hour.  She is not checked her heart rate during the symptoms.  Otherwise she is fairly active.  She will try to get 6000 steps and she walks the track on a regular basis with no exertional symptoms.    6-day Zio monitor February 2023 showed a sinus rhythm, rare ectopy, 27 SVT runs up to 13 seconds, no triggers during SVT.    PAST MEDICAL HISTORY:  Past Medical History:   Diagnosis Date     RADIAL STYLOID TENOSYNOV - right 2/23/2006    lifts weights, doesn't think work-related; OK to add PT visits prn     Rotator cuff tear        MEDICATIONS:  Current Outpatient Medications   Medication     acetaminophen (TYLENOL) 325 MG tablet     gabapentin (NEURONTIN) 100 MG capsule     gabapentin (NEURONTIN) 300 MG capsule     LACTOBACILLUS RHAMNOSUS, GG, PO     lisinopril (ZESTRIL) 5 MG tablet     valACYclovir (VALTREX) 500 MG tablet     Current Facility-Administered Medications   Medication     lidocaine 1% with EPINEPHrine 1:100,000 injection 3 mL       ALLERGIES:  Allergies   Allergen Reactions     Scopolamine Anaphylaxis and Itching     Ciprofloxacin Swelling     Stiff and swollen neck     Amoxicillin-Pot Clavulanate [Augmentin]      Contrast Dye      gets cold and feels terrible  Pt was ok with Isovue-370 and no pre-meds     Doxycycline Hyclate Rash     Erythromycin Swelling     Flagyl [Metronidazole Hcl]      Meperidine Hcl      low blood pressure     Oxycodone      Prochlorperazine      agitated     Sulfa Drugs Rash     Tetracycline Rash     Cefprozil Rash       SOCIAL HISTORY:  I have reviewed this patient's  "social history and updated it with pertinent information if needed. Brionna Rollins  reports that she has never smoked. She has never used smokeless tobacco. She reports current alcohol use. She reports that she does not use drugs.    FAMILY HISTORY:  I have reviewed this patient's family history and updated it with pertinent information if needed.   Family History   Problem Relation Age of Onset     Gastrointestinal Disease Mother         colon polyps     Diabetes Mother      Arthritis Father         Rheumatoid     Eye Disorder Father         mac. degeneration femp op bypass     Melanoma Son      Cancer - colorectal Maternal Grandmother      C.A.D. No family hx of      Breast Cancer No family hx of        REVIEW OF SYSTEMS:  Constitutional:  No weight loss, fever, chills  HEENT:  Eyes:  No visual loss, blurred vision, double vision or yellow sclerae. No hearing loss, sneezing, congestion, runny nose or sore throat.  Skin:  No rash or itching.  Cardiovascular: per HPI  Respiratory: per HPI  GI:  No anorexia, nausea, vomiting or diarrhea. No abdominal pain or blood.  :  No dysurea, hematuria  Neurologic:  No headache, paralysis, ataxia, numbness or tingling in the extremities. No change in bowel or bladder control.  Musculoskeletal:  No muscle pain  Hematologic:  No bleeding or bruising.  Lymphatics:  No enlarged nodes. No history of splenectomy.  Endocrine:  No reports of sweating, cold or heat intolerance. No polyuria or polydipsia.  Allergies:  No history of asthma, hives, eczema or rhinitis.    PHYSICAL EXAM:  BP (!) 148/78   Pulse 64   Ht 1.6 m (5' 3\")   Wt 52.6 kg (116 lb)   SpO2 99%   BMI 20.55 kg/m      Constitutional: awake, alert, no distress  Eyes: PERRL, sclera nonicteric  ENT: trachea midline  Respiratory: Lungs clear  Cardiovascular: Regular rate and rhythm, occasional ectopy, no murmur  GI: nondistended, nontender, bowel sounds present  Lymph/Hematologic: no lymphadenopathy  Skin: dry, no " rash  Musculoskeletal: good muscle tone, strength 5/5 in upper and lower extremities  Neurologic: no focal deficits  Neuropsychiatric: appropriate affact    DATA:  Labs:   January 2023: BNP 29, potassium 3.9, creatinine 1.2  Recent Labs   Lab Test 11/30/21  0909 11/01/18  0946   CHOL 223* 203*    106   * 81   TRIG 52 78     EKG, March 29: Sinus rhythm, 2 PACs    ASSESSMENT:  77-year-old female seen for palpitations.  She has some PACs on EKG and exam today.  She had a few short runs of nonsustained SVT on her Zio monitor.  She did not trigger the monitor, it  is unclear if this correlates with her symptoms.  She may be having some minimal palpitations, but suspect the epigastric discomfort is more noncardiac.  Echo will be done to make sure there is no underlying structural heart disease or valve issues.  Symptoms probably do not warrant beta-blocker at this time.  She could try an antacid, symptoms do seem to worsen after eating.    RECOMMENDATIONS:  1.  PACs and nonsustained SVT  -Echo  -No need for beta-blocker at this point    Follow-up as needed.    Yao Brunson MD  Cardiology - [facility]  Pager:  [phone]  Text Page  March 29, 2023

## 2023-03-26 ENCOUNTER — HEALTH MAINTENANCE LETTER (OUTPATIENT)
Age: 78
End: 2023-03-26

## 2023-03-29 ENCOUNTER — OFFICE VISIT (OUTPATIENT)
Dept: CARDIOLOGY | Facility: CLINIC | Age: 78
End: 2023-03-29
Payer: MEDICARE

## 2023-03-29 ENCOUNTER — OFFICE VISIT (OUTPATIENT)
Dept: PEDIATRICS | Facility: CLINIC | Age: 78
End: 2023-03-29
Payer: MEDICARE

## 2023-03-29 VITALS
OXYGEN SATURATION: 99 % | HEART RATE: 64 BPM | DIASTOLIC BLOOD PRESSURE: 78 MMHG | WEIGHT: 116 LBS | HEIGHT: 63 IN | SYSTOLIC BLOOD PRESSURE: 148 MMHG | BODY MASS INDEX: 20.55 KG/M2

## 2023-03-29 VITALS
HEIGHT: 63 IN | TEMPERATURE: 97.5 F | SYSTOLIC BLOOD PRESSURE: 132 MMHG | RESPIRATION RATE: 18 BRPM | BODY MASS INDEX: 20.55 KG/M2 | HEART RATE: 60 BPM | WEIGHT: 116 LBS | OXYGEN SATURATION: 99 % | DIASTOLIC BLOOD PRESSURE: 58 MMHG

## 2023-03-29 DIAGNOSIS — I10 HYPERTENSION GOAL BP (BLOOD PRESSURE) < 140/90: Primary | ICD-10-CM

## 2023-03-29 DIAGNOSIS — M79.2 NEUROPATHIC PAIN: ICD-10-CM

## 2023-03-29 DIAGNOSIS — R00.2 PALPITATIONS: Primary | ICD-10-CM

## 2023-03-29 DIAGNOSIS — B00.9 HSV (HERPES SIMPLEX VIRUS) INFECTION: ICD-10-CM

## 2023-03-29 PROCEDURE — 99204 OFFICE O/P NEW MOD 45 MIN: CPT | Mod: 25 | Performed by: INTERNAL MEDICINE

## 2023-03-29 PROCEDURE — 99214 OFFICE O/P EST MOD 30 MIN: CPT | Performed by: INTERNAL MEDICINE

## 2023-03-29 PROCEDURE — 93000 ELECTROCARDIOGRAM COMPLETE: CPT | Performed by: INTERNAL MEDICINE

## 2023-03-29 RX ORDER — GABAPENTIN 100 MG/1
CAPSULE ORAL
Qty: 270 CAPSULE | Refills: 0 | Status: SHIPPED | OUTPATIENT
Start: 2023-03-29 | End: 2023-07-05

## 2023-03-29 RX ORDER — VALACYCLOVIR HYDROCHLORIDE 500 MG/1
500 TABLET, FILM COATED ORAL DAILY
Qty: 90 TABLET | Refills: 3 | Status: SHIPPED | OUTPATIENT
Start: 2023-03-29 | End: 2024-07-08

## 2023-03-29 ASSESSMENT — PAIN SCALES - GENERAL: PAINLEVEL: EXTREME PAIN (8)

## 2023-03-29 NOTE — PROGRESS NOTES
Assessment & Plan     Hypertension goal BP (blood pressure) < 140/90  discussed with patient (or patient's parents/caregiver) pathophysiology of condition and treatment options.  Well controlled on current medication(s). reviwed diet, etc.     HSV (herpes simplex virus) infection  Well controlled on current medication(s).   - valACYclovir (VALTREX) 500 MG tablet; Take 1 tablet (500 mg) by mouth daily    Neuropathic pain  Well controlled on current medication(s). reviwed risks/benefits of this medication in > 65 age group in detail with patient. questions answered.   - gabapentin (NEURONTIN) 100 MG capsule; TAKE ONE CAPSULE BY MOUTH AT LUNCH AND TAKE TWO CAPSULES BY MOUTH NIGHTLY AT BEDTIME (ALONG WITH 300MG CAPSULE).           MED REC REQUIRED  Post Medication Reconciliation Status: discharge medications reconciled, continue medications without change    No follow-ups on file.    Roe Connolly MD  [facility] HIPOLITO Staton is a 77 year old, presenting for the following health issues:  ER F/U  No flowsheet data found.  History of Present Illness       She eats 2-3 servings of fruits and vegetables daily.She consumes 1 sweetened beverage(s) daily.She exercises with enough effort to increase her heart rate 30 to 60 minutes per day.  She exercises with enough effort to increase her heart rate 7 days per week.   She is taking medications regularly.       ED/UC Followup:    Facility:  [facility]  Date of visit: 03/09/2023 03/10/2023  Reason for visit: cystitis and fall with Lt hand contusion  Current Status: Feeling better    feel when walking while distracted, was seen in urgency room. has xray of hand and ct of face, all within normal limits, was seen again the next day and was diagnosed with UTI, took the antibiotics and feels well. say cardiology's this am and Dr zimmer ordered an echo.would like gabapentin and valtrex refills today, both working well and no side effects noted.   "No other concerns or complaints today.       Review of Systems   Constitutional, HEENT, cardiovascular, pulmonary, gi and gu systems are negative, except as otherwise noted.      Objective    /58   Pulse 60   Temp 97.5  F (36.4  C) (Oral)   Resp 18   Ht 1.6 m (5' 3\")   Wt 52.6 kg (116 lb)   SpO2 99%   BMI 20.55 kg/m    Body mass index is 20.55 kg/m .  Physical Exam   GENERAL: healthy, alert and no distress  HENT: normocephalic and atrumatic  mouth and nose covered with mask due to covid   RESP: lungs clear to auscultation - no rales, rhonchi or wheezes  CV: regular rate and rhythm, normal S1 S2, no S3 or S4, no murmur, click or rub, no peripheral edema   ABDOMEN: soft, nontender, no hepatosplenomegaly, no masses and bowel sounds normal  MS: no gross musculoskeletal defects noted, no edema  NEURO: Normal strength and tone, mentation intact and speech normal  PSYCH: mentation appears normal, affect normal/bright                     "

## 2023-03-29 NOTE — LETTER
3/29/2023    Roe Connolly MD  [address] [place] [address]    RE: Brionna Rollins       Dear Colleague,     I had the pleasure of seeing Brionna Rollins in the [facility].  CARDIOLOGY CONSULT    REASON FOR CONSULT: palpitations    PRIMARY CARE PHYSICIAN:  Roe Connolly    HISTORY OF PRESENT ILLNESS:  77-year-old female seen for palpitations.    She reports about a 1 year history of a heaviness in the epigastric area and some palpitations.  This will occur most every day.  It is worse after eating, she does not have this with exertion.  It would last several minutes, sometimes an hour.  She is not checked her heart rate during the symptoms.  Otherwise she is fairly active.  She will try to get 6000 steps and she walks the track on a regular basis with no exertional symptoms.    6-day Zio monitor February 2023 showed a sinus rhythm, rare ectopy, 27 SVT runs up to 13 seconds, no triggers during SVT.    PAST MEDICAL HISTORY:  Past Medical History:   Diagnosis Date     RADIAL STYLOID TENOSYNOV - right 2/23/2006    lifts weights, doesn't think work-related; OK to add PT visits prn     Rotator cuff tear        MEDICATIONS:  Current Outpatient Medications   Medication     acetaminophen (TYLENOL) 325 MG tablet     gabapentin (NEURONTIN) 100 MG capsule     gabapentin (NEURONTIN) 300 MG capsule     LACTOBACILLUS RHAMNOSUS, GG, PO     lisinopril (ZESTRIL) 5 MG tablet     valACYclovir (VALTREX) 500 MG tablet     Current Facility-Administered Medications   Medication     lidocaine 1% with EPINEPHrine 1:100,000 injection 3 mL       ALLERGIES:  Allergies   Allergen Reactions     Scopolamine Anaphylaxis and Itching     Ciprofloxacin Swelling     Stiff and swollen neck     Amoxicillin-Pot Clavulanate [Augmentin]      Contrast Dye      gets cold and feels terrible  Pt was ok with Isovue-370 and no pre-meds     Doxycycline Hyclate Rash     Erythromycin Swelling     Flagyl [Metronidazole  "Hcl]      Meperidine Hcl      low blood pressure     Oxycodone      Prochlorperazine      agitated     Sulfa Drugs Rash     Tetracycline Rash     Cefprozil Rash       SOCIAL HISTORY:  I have reviewed this patient's social history and updated it with pertinent information if needed. Brionna Rollins  reports that she has never smoked. She has never used smokeless tobacco. She reports current alcohol use. She reports that she does not use drugs.    FAMILY HISTORY:  I have reviewed this patient's family history and updated it with pertinent information if needed.   Family History   Problem Relation Age of Onset     Gastrointestinal Disease Mother         colon polyps     Diabetes Mother      Arthritis Father         Rheumatoid     Eye Disorder Father         mac. degeneration femp op bypass     Melanoma Son      Cancer - colorectal Maternal Grandmother      C.A.D. No family hx of      Breast Cancer No family hx of        REVIEW OF SYSTEMS:  Constitutional:  No weight loss, fever, chills  HEENT:  Eyes:  No visual loss, blurred vision, double vision or yellow sclerae. No hearing loss, sneezing, congestion, runny nose or sore throat.  Skin:  No rash or itching.  Cardiovascular: per HPI  Respiratory: per HPI  GI:  No anorexia, nausea, vomiting or diarrhea. No abdominal pain or blood.  :  No dysurea, hematuria  Neurologic:  No headache, paralysis, ataxia, numbness or tingling in the extremities. No change in bowel or bladder control.  Musculoskeletal:  No muscle pain  Hematologic:  No bleeding or bruising.  Lymphatics:  No enlarged nodes. No history of splenectomy.  Endocrine:  No reports of sweating, cold or heat intolerance. No polyuria or polydipsia.  Allergies:  No history of asthma, hives, eczema or rhinitis.    PHYSICAL EXAM:  BP (!) 148/78   Pulse 64   Ht 1.6 m (5' 3\")   Wt 52.6 kg (116 lb)   SpO2 99%   BMI 20.55 kg/m      Constitutional: awake, alert, no distress  Eyes: PERRL, sclera nonicteric  ENT: trachea " midline  Respiratory: Lungs clear  Cardiovascular: Regular rate and rhythm, occasional ectopy, no murmur  GI: nondistended, nontender, bowel sounds present  Lymph/Hematologic: no lymphadenopathy  Skin: dry, no rash  Musculoskeletal: good muscle tone, strength 5/5 in upper and lower extremities  Neurologic: no focal deficits  Neuropsychiatric: appropriate affact    DATA:  Labs:   January 2023: BNP 29, potassium 3.9, creatinine 1.2  Recent Labs   Lab Test 11/30/21  0909 11/01/18  0946   CHOL 223* 203*    106   * 81   TRIG 52 78     EKG, March 29: Sinus rhythm, 2 PACs    ASSESSMENT:  77-year-old female seen for palpitations.  She has some PACs on EKG and exam today.  She had a few short runs of nonsustained SVT on her Zio monitor.  She did not trigger the monitor, it  is unclear if this correlates with her symptoms.  She may be having some minimal palpitations, but suspect the epigastric discomfort is more noncardiac.  Echo will be done to make sure there is no underlying structural heart disease or valve issues.  Symptoms probably do not warrant beta-blocker at this time.  She could try an antacid, symptoms do seem to worsen after eating.    RECOMMENDATIONS:  1.  PACs and nonsustained SVT  -Echo  -No need for beta-blocker at this point    Follow-up as needed.    Yao Brunson MD  Cardiology - [facility]  Pager:  [phone]  Text Page  March 29, 2023          Thank you for allowing me to participate in the care of your patient.      Sincerely,     Yao Brunson MD     [facility]  cc:   No referring provider defined for this encounter.

## 2023-03-29 NOTE — PATIENT INSTRUCTIONS
Your recent heart monitor showed that you are in a normal rhythm.  There were a few times when your heart would speed up for a few seconds, this is called SVT, or supraventricular tachycardia.  You did not trigger the monitor when you had this, it seems you did not feel this.  This is a benign finding, it is relatively common.  If you do not have any symptoms, no further treatment is recommended.    Otherwise there was no arrhythmia such as atrial fibrillation that would need further evaluation or treatment.    Echocardiogram  Will schedule an echocardiogram, or ultrasound of the heart.  This looks at the size and function of the heart and valves.  It generally takes about 20-30 minutes.  This will tell if there is any reduced heart function or problem with any of the valves.

## 2023-04-26 ENCOUNTER — HOSPITAL ENCOUNTER (OUTPATIENT)
Dept: CARDIOLOGY | Facility: CLINIC | Age: 78
Discharge: HOME | End: 2023-04-26
Attending: INTERNAL MEDICINE | Admitting: INTERNAL MEDICINE
Payer: MEDICARE

## 2023-04-26 DIAGNOSIS — R00.2 PALPITATIONS: ICD-10-CM

## 2023-04-26 LAB — LVEF ECHO: NORMAL

## 2023-04-26 PROCEDURE — 93306 TTE W/DOPPLER COMPLETE: CPT | Mod: 26 | Performed by: INTERNAL MEDICINE

## 2023-04-26 PROCEDURE — 93306 TTE W/DOPPLER COMPLETE: CPT

## 2023-05-01 ENCOUNTER — TRANSFERRED RECORDS (OUTPATIENT)
Dept: HEALTH INFORMATION MANAGEMENT | Facility: CLINIC | Age: 78
End: 2023-05-01
Payer: MEDICARE

## 2023-05-17 ENCOUNTER — OFFICE VISIT (OUTPATIENT)
Dept: PEDIATRICS | Facility: CLINIC | Age: 78
End: 2023-05-17
Payer: MEDICARE

## 2023-05-17 VITALS
DIASTOLIC BLOOD PRESSURE: 78 MMHG | TEMPERATURE: 98.4 F | SYSTOLIC BLOOD PRESSURE: 136 MMHG | HEART RATE: 75 BPM | BODY MASS INDEX: 20.34 KG/M2 | OXYGEN SATURATION: 98 % | WEIGHT: 114.8 LBS | HEIGHT: 63 IN | RESPIRATION RATE: 16 BRPM

## 2023-05-17 DIAGNOSIS — M19.012 PRIMARY OSTEOARTHRITIS OF LEFT SHOULDER: ICD-10-CM

## 2023-05-17 DIAGNOSIS — M54.50 CHRONIC MIDLINE LOW BACK PAIN WITHOUT SCIATICA: Primary | ICD-10-CM

## 2023-05-17 DIAGNOSIS — G89.29 CHRONIC MIDLINE LOW BACK PAIN WITHOUT SCIATICA: Primary | ICD-10-CM

## 2023-05-17 PROCEDURE — 99213 OFFICE O/P EST LOW 20 MIN: CPT | Performed by: INTERNAL MEDICINE

## 2023-05-17 ASSESSMENT — PAIN SCALES - GENERAL: PAINLEVEL: MILD PAIN (2)

## 2023-05-17 NOTE — PROGRESS NOTES
Assessment & Plan     Chronic midline low back pain without sciatica  discussed with patient (or patient's parents/caregiver) pathophysiology of condition and treatment options.  reviwed history in detail today, patient considerign next steps. she will let me know if she needs anything.    Primary osteoarthritis of left shoulder  discussed with patient (or patient's parents/caregiver) pathophysiology of condition and treatment options.  has follow-up with ortho scheduled. she will let me know if/when she gets surgery scheduled and I will work her in for a preop. Patient verbalized understanding and is agreeable to this plan.     Return in about 4 months (around 9/17/2023) for Next Annual Wellness Visit, or sooner if symptoms persist or worsen.      Roe Connolly MD  [facility] HIPOLITO Staton is a 78 year old, presenting for the following health issues:  RECHECK        5/17/2023     2:04 PM   Additional Questions   Roomed by America Sanchez CMA   Accompanied by      History of Present Illness       Back Pain:  She presents for follow up of back pain. Patient's back pain is a chronic problem.  Location of back pain:  Right lower back, left lower back, right middle of back, left middle of back and left shoulder  Description of back pain: dull ache and gnawing  Back pain spreads: right buttocks, right foot and right side of neck    Since patient first noticed back pain, pain is: always present, but gets better and worse  Does back pain interfere with her job:  Not applicable      She eats 2-3 servings of fruits and vegetables daily.She consumes 1 sweetened beverage(s) daily.She exercises with enough effort to increase her heart rate 30 to 60 minutes per day.  She exercises with enough effort to increase her heart rate 7 days per week.   She is taking medications regularly.     patient saw back surgeon, has secided nto going to do anyting about it until the fall, wants to enjoy  "the summer. can walk, its really bending and getting up from when sitting that are the hardest things to do. has been using CBD salve she gets form a dispensery and this has been working well, happy about this. will need a L shulder replacement at some point, not sure when she plans to do this.. No other concerns or complaints today.           Review of Systems   Constitutional, HEENT, cardiovascular, pulmonary, gi and gu systems are negative, except as otherwise noted.      Objective    /78 (BP Location: Right arm, Cuff Size: Adult Small)   Pulse 75   Temp 98.4  F (36.9  C) (Tympanic)   Resp 16   Ht 1.6 m (5' 3\")   Wt 52.1 kg (114 lb 12.8 oz)   SpO2 98%   BMI 20.34 kg/m    Body mass index is 20.34 kg/m .  Physical Exam   GENERAL: healthy, alert and no distress  HENT: normocephalic and atrumatic. mucous membranes moist  MS: no gross musculoskeletal defects noted, no edema  NEURO: Normal strength and tone, mentation intact and speech normal  PSYCH: mentation appears normal, affect normal/bright                     "

## 2023-07-03 NOTE — NURSING NOTE
"Chief Complaint   Patient presents with     Consult     New consult for nutcracker syndrome, ref by Dr. Connolly, records & CT in Epic       Initial /79 (BP Location: Left arm, Patient Position: Chair, Cuff Size: Adult Regular)  Pulse 64  Breastfeeding? No Estimated body mass index is 23.82 kg/(m^2) as calculated from the following:    Height as of 10/19/16: 5' 4\" (1.626 m).    Weight as of 10/9/17: 138 lb 12.8 oz (63 kg).  Medication Reconciliation: complete     Face to face nursing time: 8 minutes    Mary Purcell MA     " Total Volume Injected (Ccs- Only Use Numbers And Decimals): .4

## 2023-07-05 DIAGNOSIS — M79.2 NEUROPATHIC PAIN: ICD-10-CM

## 2023-07-05 RX ORDER — GABAPENTIN 100 MG/1
CAPSULE ORAL
Qty: 270 CAPSULE | Refills: 0 | Status: SHIPPED | OUTPATIENT
Start: 2023-07-05 | End: 2023-10-05

## 2023-07-20 ENCOUNTER — OFFICE VISIT (OUTPATIENT)
Dept: DERMATOLOGY | Facility: CLINIC | Age: 78
End: 2023-07-20
Payer: MEDICARE

## 2023-07-20 DIAGNOSIS — L82.0 INFLAMED SEBORRHEIC KERATOSIS: Primary | ICD-10-CM

## 2023-07-20 DIAGNOSIS — L82.1 SK (SEBORRHEIC KERATOSIS): ICD-10-CM

## 2023-07-20 DIAGNOSIS — L81.4 LENTIGO: ICD-10-CM

## 2023-07-20 DIAGNOSIS — D23.9 DERMAL NEVUS: ICD-10-CM

## 2023-07-20 DIAGNOSIS — D18.01 ANGIOMA OF SKIN: ICD-10-CM

## 2023-07-20 PROCEDURE — 99213 OFFICE O/P EST LOW 20 MIN: CPT | Mod: 25 | Performed by: DERMATOLOGY

## 2023-07-20 PROCEDURE — 17110 DESTRUCTION B9 LES UP TO 14: CPT | Performed by: DERMATOLOGY

## 2023-07-20 NOTE — PROGRESS NOTES
Brionna Rollins is an extremely pleasant 78 year old year old female patient here today for itching spots on back/  Patient has no other skin complaints today.  Remainder of the HPI, Meds, PMH, Allergies, FH, and SH was reviewed in chart.      Past Medical History:   Diagnosis Date    RADIAL STYLOID TENOSYNOV - right 02/23/2006    lifts weights, doesn't think work-related; OK to add PT visits prn    Rotator cuff tear        Past Surgical History:   Procedure Laterality Date    COLONOSCOPY  11/26/2013    Dr. Cardenas [facility]    COLONOSCOPY  11/26/2013    Procedure: COMBINED COLONOSCOPY, SINGLE BIOPSY/POLYPECTOMY BY BIOPSY;;  Surgeon: Jovanni Cardenas MD;  Location:  GI    HYSTERECTOMY, [place]  1986    ZZC ROTATOR CUFF STRAP      ZZC TOTAL HIP ARTHROPLASTY Right         Family History   Problem Relation Age of Onset    Gastrointestinal Disease Mother         colon polyps    Diabetes Mother     Arthritis Father         Rheumatoid    Eye Disorder Father         mac. degeneration femp op bypass    Melanoma Son     Cancer - colorectal Maternal Grandmother     C.A.D. No family hx of     Breast Cancer No family hx of        Social History     Socioeconomic History    Marital status:      Spouse name: Not on file    Number of children: Not on file    Years of education: Not on file    Highest education level: Not on file   Occupational History    Occupation:  at [employer]   Tobacco Use    Smoking status: Never    Smokeless tobacco: Never   Vaping Use    Vaping Use: Never used   Substance and Sexual Activity    Alcohol use: Yes     Comment: occ with dinner q night    Drug use: No    Sexual activity: Yes     Partners: Male   Other Topics Concern    Parent/sibling w/ CABG, MI or angioplasty before 65F 55M? No   Social History Narrative    Not on file     Social Determinants of Health     Financial Resource Strain: Low Risk  (12/14/2022)    Overall Financial Resource Strain (CARDIA)     Difficulty of  Paying Living Expenses: Not very hard   Food Insecurity: No Food Insecurity (12/14/2022)    Hunger Vital Sign     Worried About Running Out of Food in the Last Year: Never true     Ran Out of Food in the Last Year: Never true   Transportation Needs: No Transportation Needs (12/14/2022)    PRAPARE - Transportation     Lack of Transportation (Medical): No     Lack of Transportation (Non-Medical): No   Physical Activity: Sufficiently Active (12/14/2022)    Exercise Vital Sign     Days of Exercise per Week: 7 days     Minutes of Exercise per Session: 50 min   Stress: No Stress Concern Present (12/14/2022)    [employer] - Occupational Stress Questionnaire     Feeling of Stress : Not at all   Social Connections: Unknown (12/14/2022)    Social Connection and Isolation Panel [NHANES]     Frequency of Communication with Friends and Family: More than three times a week     Frequency of Social Gatherings with Friends and Family: Three times a week     Attends Nondenominational Services: More than 4 times per year     Active Member of Clubs or Organizations: Patient refused     Attends Club or Organization Meetings: Not on file     Marital Status:    Intimate Partner Violence: Not on file   Housing Stability: Low Risk  (12/14/2022)    Housing Stability Vital Sign     Unable to Pay for Housing in the Last Year: No     Number of Places Lived in the Last Year: 1     Unstable Housing in the Last Year: No       Outpatient Encounter Medications as of 7/20/2023   Medication Sig Dispense Refill    acetaminophen (TYLENOL) 325 MG tablet Take 325-650 mg by mouth every 6 hours as needed for mild pain      gabapentin (NEURONTIN) 100 MG capsule TAKE ONE CAPSULE BY MOUTH AT LUNCH AND TWO CAPSULES BY MOUTH NIGHTLY AT BEDTIME ALONG WITH ONE 300MG CAPSULE FOR A TOTAL DAILY DOSE  MG. 270 capsule 0    gabapentin (NEURONTIN) 300 MG capsule TAKE ONE CAPSULE BY MOUTH EVERY NIGHT AT BEDTIME 90 capsule 1    lisinopril  (ZESTRIL) 5 MG tablet Take 2 tablets (10 mg) by mouth daily 180 tablet 3    valACYclovir (VALTREX) 500 MG tablet Take 1 tablet (500 mg) by mouth daily 90 tablet 3    LACTOBACILLUS RHAMNOSUS, GG, PO Take 2 capsules by mouth 3 times daily (with meals) (Patient not taking: Reported on 3/29/2023)       Facility-Administered Encounter Medications as of 7/20/2023   Medication Dose Route Frequency Provider Last Rate Last Admin    lidocaine 1% with EPINEPHrine 1:100,000 injection 3 mL  3 mL Intradermal Once Keisha Carrillo MD                 O:   NAD, WDWN, Alert & Oriented, Mood & Affect wnl, Vitals stable   Here today with     General appearance normal   Vitals stable   Alert, oriented and in no acute distress     Inflamed seborrheic keratosis on back    Stuck on papules and brown macules on trunk and ext   Red papules on trunk  Flesh colored papules on trunk         Eyes: Conjunctivae/lids:Normal     ENT: Lips, buccal mucosa, tongue: normal    MSK:Normal    Cardiovascular: peripheral edema none    Pulm: Breathing Normal    Neuro/Psych: Orientation:Alert and Orientedx3 ; Mood/Affect:normal       A/P:  1. Seborrheic keratosis, lentigo, angioma, dermal nevus  2. Inflamed seborrheic keratosis back x14  LN2:  Treated with LN2 for 5s for 1-2 cycles. Warned risks of blistering, pain, pigment change, scarring, and incomplete resolution.  Advised patient to return if lesions do not completely resolve.  Wound care sheet given.  It was a pleasure speaking to Brionna Rollins today.  Previous clinic notes and pertinent laboratory tests were reviewed prior to Brionna Rollins's visit.  Nature and genetics of benign skin lesions dicussed with patient.  Signs and Symptoms of skin cancer discussed with patient.  Patient encouraged to perform monthly skin exams.  UV precautions reviewed with patient.  Return to clinic 12 months

## 2023-07-20 NOTE — LETTER
7/20/2023         RE: Brionna Rollins  [address] [place] [address]        Dear Colleague,    Thank you for referring your patient, Brionna Rollins, to the [facility]. Please see a copy of my visit note below.    Brionna Rollins is an extremely pleasant 78 year old year old female patient here today for itching spots on back/  Patient has no other skin complaints today.  Remainder of the HPI, Meds, PMH, Allergies, FH, and SH was reviewed in chart.      Past Medical History:   Diagnosis Date     RADIAL STYLOID TENOSYNOV - right 02/23/2006    lifts weights, doesn't think work-related; OK to add PT visits prn     Rotator cuff tear        Past Surgical History:   Procedure Laterality Date     COLONOSCOPY  11/26/2013    Dr. Cardenas [facility]     COLONOSCOPY  11/26/2013    Procedure: COMBINED COLONOSCOPY, SINGLE BIOPSY/POLYPECTOMY BY BIOPSY;;  Surgeon: Jovanni Cardenas MD;  Location:  GI     HYSTERECTOMY, [place]  1986     ZZC ROTATOR CUFF STRAP       ZZC TOTAL HIP ARTHROPLASTY Right         Family History   Problem Relation Age of Onset     Gastrointestinal Disease Mother         colon polyps     Diabetes Mother      Arthritis Father         Rheumatoid     Eye Disorder Father         mac. degeneration femp op bypass     Melanoma Son      Cancer - colorectal Maternal Grandmother      C.A.D. No family hx of      Breast Cancer No family hx of        Social History     Socioeconomic History     Marital status:      Spouse name: Not on file     Number of children: Not on file     Years of education: Not on file     Highest education level: Not on file   Occupational History     Occupation:  at [employer]   Tobacco Use     Smoking status: Never     Smokeless tobacco: Never   Vaping Use     Vaping Use: Never used   Substance and Sexual Activity     Alcohol use: Yes     Comment: occ with dinner q night     Drug use: No     Sexual activity: Yes     Partners: Male    Other Topics Concern     Parent/sibling w/ CABG, MI or angioplasty before 65F 55M? No   Social History Narrative     Not on file     Social Determinants of Health     Financial Resource Strain: Low Risk  (12/14/2022)    Overall Financial Resource Strain (CARDIA)      Difficulty of Paying Living Expenses: Not very hard   Food Insecurity: No Food Insecurity (12/14/2022)    Hunger Vital Sign      Worried About Running Out of Food in the Last Year: Never true      Ran Out of Food in the Last Year: Never true   Transportation Needs: No Transportation Needs (12/14/2022)    PRAPARE - Transportation      Lack of Transportation (Medical): No      Lack of Transportation (Non-Medical): No   Physical Activity: Sufficiently Active (12/14/2022)    Exercise Vital Sign      Days of Exercise per Week: 7 days      Minutes of Exercise per Session: 50 min   Stress: No Stress Concern Present (12/14/2022)    [employer] - Occupational Stress Questionnaire      Feeling of Stress : Not at all   Social Connections: Unknown (12/14/2022)    Social Connection and Isolation Panel [NHANES]      Frequency of Communication with Friends and Family: More than three times a week      Frequency of Social Gatherings with Friends and Family: Three times a week      Attends Alevism Services: More than 4 times per year      Active Member of Clubs or Organizations: Patient refused      Attends Club or Organization Meetings: Not on file      Marital Status:    Intimate Partner Violence: Not on file   Housing Stability: Low Risk  (12/14/2022)    Housing Stability Vital Sign      Unable to Pay for Housing in the Last Year: No      Number of Places Lived in the Last Year: 1      Unstable Housing in the Last Year: No       Outpatient Encounter Medications as of 7/20/2023   Medication Sig Dispense Refill     acetaminophen (TYLENOL) 325 MG tablet Take 325-650 mg by mouth every 6 hours as needed for mild pain        gabapentin (NEURONTIN) 100 MG capsule TAKE ONE CAPSULE BY MOUTH AT LUNCH AND TWO CAPSULES BY MOUTH NIGHTLY AT BEDTIME ALONG WITH ONE 300MG CAPSULE FOR A TOTAL DAILY DOSE  MG. 270 capsule 0     gabapentin (NEURONTIN) 300 MG capsule TAKE ONE CAPSULE BY MOUTH EVERY NIGHT AT BEDTIME 90 capsule 1     lisinopril (ZESTRIL) 5 MG tablet Take 2 tablets (10 mg) by mouth daily 180 tablet 3     valACYclovir (VALTREX) 500 MG tablet Take 1 tablet (500 mg) by mouth daily 90 tablet 3     LACTOBACILLUS RHAMNOSUS, GG, PO Take 2 capsules by mouth 3 times daily (with meals) (Patient not taking: Reported on 3/29/2023)       Facility-Administered Encounter Medications as of 7/20/2023   Medication Dose Route Frequency Provider Last Rate Last Admin     lidocaine 1% with EPINEPHrine 1:100,000 injection 3 mL  3 mL Intradermal Once Keisha Carrillo MD                 O:   NAD, WDWN, Alert & Oriented, Mood & Affect wnl, Vitals stable   Here today with     General appearance normal   Vitals stable   Alert, oriented and in no acute distress     Inflamed seborrheic keratosis on back    Stuck on papules and brown macules on trunk and ext   Red papules on trunk  Flesh colored papules on trunk         Eyes: Conjunctivae/lids:Normal     ENT: Lips, buccal mucosa, tongue: normal    MSK:Normal    Cardiovascular: peripheral edema none    Pulm: Breathing Normal    Neuro/Psych: Orientation:Alert and Orientedx3 ; Mood/Affect:normal       A/P:  1. Seborrheic keratosis, lentigo, angioma, dermal nevus  2. Inflamed seborrheic keratosis back x14  LN2:  Treated with LN2 for 5s for 1-2 cycles. Warned risks of blistering, pain, pigment change, scarring, and incomplete resolution.  Advised patient to return if lesions do not completely resolve.  Wound care sheet given.  It was a pleasure speaking to Brionna Rollins today.  Previous clinic notes and pertinent laboratory tests were reviewed prior to Brionna Rollins's visit.  Nature and genetics of  benign skin lesions dicussed with patient.  Signs and Symptoms of skin cancer discussed with patient.  Patient encouraged to perform monthly skin exams.  UV precautions reviewed with patient.  Return to clinic 12 months      Again, thank you for allowing me to participate in the care of your patient.        Sincerely,        Jayy Vega MD

## 2023-08-03 ASSESSMENT — ENCOUNTER SYMPTOMS
BREAST MASS: 0
HEADACHES: 0
NAUSEA: 0
HEMATOCHEZIA: 0
DYSURIA: 0
COUGH: 0
NERVOUS/ANXIOUS: 0
EYE PAIN: 0
ARTHRALGIAS: 0
DIARRHEA: 0
CHILLS: 0
ABDOMINAL PAIN: 0
SHORTNESS OF BREATH: 0
PALPITATIONS: 0
HEARTBURN: 0
HEMATURIA: 0
JOINT SWELLING: 1
SORE THROAT: 0
WEAKNESS: 0
FREQUENCY: 0
CONSTIPATION: 0
MYALGIAS: 0
FEVER: 0
PARESTHESIAS: 0
DIZZINESS: 0

## 2023-08-03 ASSESSMENT — ACTIVITIES OF DAILY LIVING (ADL): CURRENT_FUNCTION: NO ASSISTANCE NEEDED

## 2023-08-09 ENCOUNTER — OFFICE VISIT (OUTPATIENT)
Dept: PEDIATRICS | Facility: CLINIC | Age: 78
End: 2023-08-09
Payer: MEDICARE

## 2023-08-09 VITALS
WEIGHT: 116.2 LBS | SYSTOLIC BLOOD PRESSURE: 130 MMHG | BODY MASS INDEX: 20.59 KG/M2 | TEMPERATURE: 98.3 F | RESPIRATION RATE: 12 BRPM | HEIGHT: 63 IN | DIASTOLIC BLOOD PRESSURE: 76 MMHG | HEART RATE: 72 BPM | OXYGEN SATURATION: 96 %

## 2023-08-09 DIAGNOSIS — Z00.00 ENCOUNTER FOR MEDICARE ANNUAL WELLNESS EXAM: Primary | ICD-10-CM

## 2023-08-09 DIAGNOSIS — Z12.11 SCREEN FOR COLON CANCER: ICD-10-CM

## 2023-08-09 DIAGNOSIS — Z13.6 CARDIOVASCULAR SCREENING; LDL GOAL LESS THAN 160: ICD-10-CM

## 2023-08-09 DIAGNOSIS — Z23 NEED FOR DIPHTHERIA-TETANUS-PERTUSSIS (TDAP) VACCINE: ICD-10-CM

## 2023-08-09 DIAGNOSIS — Z23 NEED FOR PROPHYLACTIC VACCINATION WITH COMBINED DIPHTHERIA-TETANUS-PERTUSSIS (DTP) VACCINE: ICD-10-CM

## 2023-08-09 PROCEDURE — G0439 PPPS, SUBSEQ VISIT: HCPCS | Performed by: INTERNAL MEDICINE

## 2023-08-09 ASSESSMENT — ENCOUNTER SYMPTOMS
MYALGIAS: 0
DIZZINESS: 0
DIARRHEA: 0
COUGH: 0
WEAKNESS: 0
CONSTIPATION: 0
FEVER: 0
PARESTHESIAS: 0
SHORTNESS OF BREATH: 0
HEMATURIA: 0
ARTHRALGIAS: 0
FREQUENCY: 0
NERVOUS/ANXIOUS: 0
ABDOMINAL PAIN: 0
BREAST MASS: 0
PALPITATIONS: 0
EYE PAIN: 0
DYSURIA: 0
HEADACHES: 0
NAUSEA: 0
SORE THROAT: 0
CHILLS: 0
HEMATOCHEZIA: 0
HEARTBURN: 0
JOINT SWELLING: 1

## 2023-08-09 ASSESSMENT — PAIN SCALES - GENERAL: PAINLEVEL: SEVERE PAIN (6)

## 2023-08-09 ASSESSMENT — ACTIVITIES OF DAILY LIVING (ADL): CURRENT_FUNCTION: NO ASSISTANCE NEEDED

## 2023-08-09 NOTE — PROGRESS NOTES
"SUBJECTIVE:   Brionna is a 78 year old who presents for Preventive Visit.      8/9/2023    12:55 PM   Additional Questions   Roomed by Indy   Accompanied by Edgar         8/9/2023    12:55 PM   Patient Reported Additional Medications   Patient reports taking the following new medications no       Are you in the first 12 months of your Medicare coverage?  No    Healthy Habits:     In general, how would you rate your overall health?  Good    Frequency of exercise:  6-7 days/week    Duration of exercise:  45-60 minutes    Do you usually eat at least 4 servings of fruit and vegetables a day, include whole grains    & fiber and avoid regularly eating high fat or \"junk\" foods?  Yes    Taking medications regularly:  Yes    Barriers to taking medications:  None    Medication side effects:  None    Ability to successfully perform activities of daily living:  No assistance needed    Home Safety:  No safety concerns identified    Hearing Impairment:  Difficulty following a conversation in a noisy restaurant or crowded room    In the past 6 months, have you been bothered by leaking of urine?  No    In general, how would you rate your overall mental or emotional health?  Excellent    Additional concerns today:  Yes    Today for annual office visit.  She notes she has back surgery schedule next month, needs preop.  We will schedule this today. She note still pain in left knee, worse at night, uses CBD cream and this helps.  Not sure if she wants to pursue further orthopedic evaluation on for this.  She is aware that she is due for colonoscopy wants to hold on this for now.  Given her age she is not sure if she wants to do additional screening or not.  Her mood is good.  She declines any immunizations today will consider this in the future.  No other concerns or complaints today.    Have you ever done Advance Care Planning? (For example, a Health Directive, POLST, or a discussion with a medical provider or your loved ones about " your wishes): Yes, advance care planning is on file.       Fall risk  Fallen 2 or more times in the past year?: No  Any fall with injury in the past year?: No    Cognitive Screening   1) Repeat 3 items (Leader, Season, Table)    2) Clock draw: NORMAL  3) 3 item recall: Recalls 3 objects  Results: 3 items recalled: COGNITIVE IMPAIRMENT LESS LIKELY    Mini-CogTM Copyright ELTON Thompson. Licensed by the author for use in [facility]; reprinted with permission (pedro luis@[facility]). All rights reserved.      Do you have sleep apnea, excessive snoring or daytime drowsiness? : no    Reviewed and updated as needed this visit by clinical staff   Tobacco  Allergies  Meds              Reviewed and updated as needed this visit by Provider                 Social History     Tobacco Use    Smoking status: Never    Smokeless tobacco: Never   Substance Use Topics    Alcohol use: Yes     Comment: occ with dinner q night             8/3/2023    12:04 AM   Alcohol Use   Prescreen: >3 drinks/day or >7 drinks/week? No     Do you have a current opioid prescription? No  Do you use any other controlled substances or medications that are not prescribed by a provider? None              Current providers sharing in care for this patient include:   Patient Care Team:  Roe Connolly MD as PCP - General (Internal Medicine)  Roe Connolly MD as Assigned PCP  Jayy Vega MD as Assigned Surgical Provider  Yao Brunson MD as MD (Cardiovascular Disease)  Yao Brunson MD as Assigned Heart and Vascular Provider    The following health maintenance items are reviewed in Epic and correct as of today:  Health Maintenance   Topic Date Due    DTAP/TDAP/TD IMMUNIZATION (2 - Td or Tdap) 04/09/2022    COLORECTAL CANCER SCREENING  10/13/2022    COVID-19 Vaccine (6 - Moderna series) 02/25/2023    INFLUENZA VACCINE (1) 09/01/2023    ANNUAL REVIEW OF HM ORDERS  01/11/2024    MAMMO SCREENING  02/07/2024     "DEXA  04/15/2024    MEDICARE ANNUAL WELLNESS VISIT  08/09/2024    FALL RISK ASSESSMENT  08/09/2024    LIPID  11/30/2026    ADVANCE CARE PLANNING  08/09/2028    PHQ-2 (once per calendar year)  Completed    Pneumococcal Vaccine: 65+ Years  Completed    ZOSTER IMMUNIZATION  Completed    IPV IMMUNIZATION  Aged Out    MENINGITIS IMMUNIZATION  Aged Out    HEPATITIS C SCREENING  Discontinued       Mammogram Screening: Mammogram Screening - Patient over age 75, has elected to continue with screening.      Pertinent mammograms are reviewed under the imaging tab.    Review of Systems   Constitutional:  Negative for chills and fever.   HENT:  Negative for congestion, ear pain, hearing loss and sore throat.    Eyes:  Negative for pain and visual disturbance.   Respiratory:  Negative for cough and shortness of breath.    Cardiovascular:  Negative for chest pain, palpitations and peripheral edema.   Gastrointestinal:  Negative for abdominal pain, constipation, diarrhea, heartburn, hematochezia and nausea.   Breasts:  Negative for tenderness, breast mass and discharge.   Genitourinary:  Negative for dysuria, frequency, genital sores, hematuria, pelvic pain, urgency, vaginal bleeding and vaginal discharge.   Musculoskeletal:  Positive for joint swelling. Negative for arthralgias and myalgias.   Skin:  Negative for rash.   Neurological:  Negative for dizziness, weakness, headaches and paresthesias.   Psychiatric/Behavioral:  Negative for mood changes. The patient is not nervous/anxious.      Reviwed above, all are stable/chronic and/or patient doesn't want to address at physical today unless noted in A/P.      OBJECTIVE:   /76 (BP Location: Right arm, Patient Position: Chair, Cuff Size: Adult Regular)   Pulse 72   Temp 98.3  F (36.8  C) (Tympanic)   Resp 12   Ht 1.607 m (5' 3.25\")   Wt 52.7 kg (116 lb 3.2 oz)   SpO2 96%   BMI 20.42 kg/m   Estimated body mass index is 20.42 kg/m  as calculated from the following:    " "Height as of this encounter: 1.607 m (5' 3.25\").    Weight as of this encounter: 52.7 kg (116 lb 3.2 oz).  Physical Exam  GENERAL: healthy, alert and no distress  EYES: Eyes grossly normal to inspection, PERRL and conjunctivae and sclerae normal  HENT: ear canals and TM's normal, op clear, mucous membranes moist   NECK: no adenopathy, no asymmetry  RESP: lungs clear to auscultation - no rales, rhonchi or wheezes  CV: regular rate and rhythm, normal S1 S2, no S3 or S4, no murmur, click or rub, no peripheral edema   ABDOMEN: soft, nontender, no hepatosplenomegaly, no masses and bowel sounds normal  MS: no gross musculoskeletal defects noted, no edema  SKIN: no suspicious lesions or rashes  NEURO: Normal strength and tone, mentation intact and speech normal  PSYCH: mentation appears normal, affect normal/bright          ASSESSMENT / PLAN:   (Z00.00) Encounter for Medicare annual wellness exam  (primary encounter diagnosis)  Comment: d/w pt preventative care measures including seat belt use, bike helmet, moderation of EtOH, avoiding tobacco, avoiding excessive sun exposure/sunscreen, wt management or wt loss if BMI > 30, need to screen for lipid disorders, mood disorders, CAD risk factors, etc. Also discussed accident prevention and future RHM schedule.       (Z23) Need for diphtheria-tetanus-pertussis (Tdap) vaccine  Comment: patient declines today, plans to get it in the future    (Z12.11) Screen for colon cancer  Comment: patient considering further screen     (Z23) Need for prophylactic vaccination with combined diphtheria-tetanus-pertussis (DTP) vaccine  Comment: patient declines today, aware she is due and can get in pharmacy    (Z13.6) CARDIOVASCULAR SCREENING; LDL GOAL LESS THAN 160  Comment: due for labs.   Plan: Lipid panel reflex to direct LDL Fasting,         Comprehensive metabolic panel (BMP + Alb, Alk         Phos, ALT, AST, Total. Bili, TP)                  COUNSELING:  Reviewed preventive health " counseling, as reflected in patient instructions        She reports that she has never smoked. She has never used smokeless tobacco.      Appropriate preventive services were discussed with this patient, including applicable screening as appropriate for cardiovascular disease, diabetes, osteopenia/osteoporosis, and glaucoma.  As appropriate for age/gender, discussed screening for colorectal cancer, prostate cancer, breast cancer, and cervical cancer. Checklist reviewing preventive services available has been given to the patient.    Reviewed patients plan of care and provided an AVS. The Basic Care Plan (routine screening as documented in Health Maintenance) for Brionna meets the Care Plan requirement. This Care Plan has been established and reviewed with the Patient.      I have discussed with patient the risks, benefits, medications, treatment options and modalities.   I have instructed the patient to call or schedule a follow-up appointment if any problems or failure to improve.      Roe Connolly MD  [facility]    Identified Health Risks:    The patient was provided with written information regarding signs of hearing loss.

## 2023-08-11 ENCOUNTER — TRANSFERRED RECORDS (OUTPATIENT)
Dept: HEALTH INFORMATION MANAGEMENT | Facility: CLINIC | Age: 78
End: 2023-08-11
Payer: MEDICARE

## 2023-09-14 ENCOUNTER — OFFICE VISIT (OUTPATIENT)
Dept: CHIROPRACTIC MEDICINE | Facility: OTHER | Age: 78
End: 2023-09-14
Attending: CHIROPRACTOR
Payer: MEDICARE

## 2023-09-14 DIAGNOSIS — M99.01 SEGMENTAL AND SOMATIC DYSFUNCTION OF CERVICAL REGION: Primary | ICD-10-CM

## 2023-09-14 DIAGNOSIS — M99.02 SEGMENTAL AND SOMATIC DYSFUNCTION OF THORACIC REGION: ICD-10-CM

## 2023-09-14 DIAGNOSIS — M54.2 CERVICALGIA: ICD-10-CM

## 2023-09-14 PROCEDURE — 98940 CHIROPRACT MANJ 1-2 REGIONS: CPT | Mod: AT | Performed by: CHIROPRACTOR

## 2023-09-14 NOTE — PROGRESS NOTES
Subjective Finding:    Chief compalint: Patient presents with:  Neck Pain: Dizzy and vertigo.  Started this morning.  Has happened in the past and good relief with chiro tx. Patient from out of town , Pain Scale: 8/10, Intensity: constant, Duration: 1 days, Radiating: no.    Date of injury:     Activities that the pain restricts:   Home/household/hobbies/social activities: Yes.  Work duties: Yes.  Sleep: No.  Makes symptoms better: rest.  Makes symptoms worse: activity.  Have you seen anyone else for the symptoms? No.  Work related: No.  Automobile related injury: No.    Objective and Assessment:    Posture Analysis:   High shoulder: .  Head tilt: .  High iliac crest: .  Head carriage: forward.  Thoracic Kyphosis: neutral.  Lumbar Lordosis: neutral.    Lumbar Range of Motion: .  Cervical Range of Motion: extension decreased.  Thoracic Range of Motion: .  Extremity Range of Motion: .    Palpation:   Sub-occiput: sharp pain, referred pain: no    Segmental dysfunction pre-treatment and treatment area: C1, C4, C5, and T2.    Assessment post-treatment:  Cervical: ROM increased.  Thoracic: ROM increased.  Lumbar: .    Comments: .      Complicating Factors: .    Procedure(s):  CMT:  27807 Chiropractic manipulative treatment 1-2 regions performed   Cervical: Diversified, See above for level, Supine and Thoracic: Diversified, See above for level, Supine    Modalities:  None performed this visit    Therapeutic procedures:  None    Plan:  Treatment plan: PRN.  Instructed patient: stretch as instructed at visit.  Short term goals: reduce pain.  Long term goals: increase ADL.  Prognosis: excellent.

## 2023-09-20 ENCOUNTER — OFFICE VISIT (OUTPATIENT)
Dept: PEDIATRICS | Facility: CLINIC | Age: 78
End: 2023-09-20
Payer: MEDICARE

## 2023-09-20 ENCOUNTER — E-CONSULT (OUTPATIENT)
Dept: CARDIOLOGY | Facility: CLINIC | Age: 78
End: 2023-09-20
Payer: MEDICARE

## 2023-09-20 VITALS
SYSTOLIC BLOOD PRESSURE: 132 MMHG | DIASTOLIC BLOOD PRESSURE: 68 MMHG | RESPIRATION RATE: 18 BRPM | HEIGHT: 63 IN | TEMPERATURE: 98.4 F | WEIGHT: 115 LBS | HEART RATE: 84 BPM | BODY MASS INDEX: 20.38 KG/M2 | OXYGEN SATURATION: 97 %

## 2023-09-20 DIAGNOSIS — I45.10 RBBB: ICD-10-CM

## 2023-09-20 DIAGNOSIS — Z13.6 CARDIOVASCULAR SCREENING; LDL GOAL LESS THAN 160: ICD-10-CM

## 2023-09-20 DIAGNOSIS — M54.50 MIDLINE LOW BACK PAIN WITHOUT SCIATICA, UNSPECIFIED CHRONICITY: ICD-10-CM

## 2023-09-20 DIAGNOSIS — Z01.818 PREOP GENERAL PHYSICAL EXAM: Primary | ICD-10-CM

## 2023-09-20 LAB
ALBUMIN SERPL BCG-MCNC: 4 G/DL (ref 3.5–5.2)
ALP SERPL-CCNC: 66 U/L (ref 35–104)
ALT SERPL W P-5'-P-CCNC: 10 U/L (ref 0–50)
ANION GAP SERPL CALCULATED.3IONS-SCNC: 10 MMOL/L (ref 7–15)
AST SERPL W P-5'-P-CCNC: 21 U/L (ref 0–45)
BASOPHILS # BLD AUTO: 0.1 10E3/UL (ref 0–0.2)
BASOPHILS NFR BLD AUTO: 2 %
BILIRUB SERPL-MCNC: 0.6 MG/DL
BUN SERPL-MCNC: 15.8 MG/DL (ref 8–23)
CALCIUM SERPL-MCNC: 9.2 MG/DL (ref 8.8–10.2)
CHLORIDE SERPL-SCNC: 105 MMOL/L (ref 98–107)
CHOLEST SERPL-MCNC: 190 MG/DL
CREAT SERPL-MCNC: 0.57 MG/DL (ref 0.51–0.95)
DEPRECATED HCO3 PLAS-SCNC: 26 MMOL/L (ref 22–29)
EGFRCR SERPLBLD CKD-EPI 2021: >90 ML/MIN/1.73M2
EOSINOPHIL # BLD AUTO: 0.1 10E3/UL (ref 0–0.7)
EOSINOPHIL NFR BLD AUTO: 4 %
ERYTHROCYTE [DISTWIDTH] IN BLOOD BY AUTOMATED COUNT: 13 % (ref 10–15)
GLUCOSE SERPL-MCNC: 95 MG/DL (ref 70–99)
HCT VFR BLD AUTO: 39.7 % (ref 35–47)
HDLC SERPL-MCNC: 104 MG/DL
HGB BLD-MCNC: 13.2 G/DL (ref 11.7–15.7)
IMM GRANULOCYTES # BLD: 0 10E3/UL
IMM GRANULOCYTES NFR BLD: 0 %
LDLC SERPL CALC-MCNC: 74 MG/DL
LYMPHOCYTES # BLD AUTO: 0.8 10E3/UL (ref 0.8–5.3)
LYMPHOCYTES NFR BLD AUTO: 23 %
MCH RBC QN AUTO: 30.8 PG (ref 26.5–33)
MCHC RBC AUTO-ENTMCNC: 33.2 G/DL (ref 31.5–36.5)
MCV RBC AUTO: 93 FL (ref 78–100)
MONOCYTES # BLD AUTO: 0.5 10E3/UL (ref 0–1.3)
MONOCYTES NFR BLD AUTO: 13 %
NEUTROPHILS # BLD AUTO: 2 10E3/UL (ref 1.6–8.3)
NEUTROPHILS NFR BLD AUTO: 58 %
NONHDLC SERPL-MCNC: 86 MG/DL
PLATELET # BLD AUTO: 206 10E3/UL (ref 150–450)
POTASSIUM SERPL-SCNC: 4.1 MMOL/L (ref 3.4–5.3)
PROT SERPL-MCNC: 6.1 G/DL (ref 6.4–8.3)
RBC # BLD AUTO: 4.29 10E6/UL (ref 3.8–5.2)
SODIUM SERPL-SCNC: 141 MMOL/L (ref 136–145)
TRIGL SERPL-MCNC: 59 MG/DL
TSH SERPL DL<=0.005 MIU/L-ACNC: 0.84 UIU/ML (ref 0.3–4.2)
WBC # BLD AUTO: 3.4 10E3/UL (ref 4–11)

## 2023-09-20 PROCEDURE — 99207 PR NO CHARGE LOS: CPT | Performed by: INTERNAL MEDICINE

## 2023-09-20 PROCEDURE — 80053 COMPREHEN METABOLIC PANEL: CPT | Performed by: INTERNAL MEDICINE

## 2023-09-20 PROCEDURE — 84443 ASSAY THYROID STIM HORMONE: CPT | Performed by: INTERNAL MEDICINE

## 2023-09-20 PROCEDURE — 93000 ELECTROCARDIOGRAM COMPLETE: CPT | Performed by: INTERNAL MEDICINE

## 2023-09-20 PROCEDURE — 99214 OFFICE O/P EST MOD 30 MIN: CPT | Mod: 25 | Performed by: INTERNAL MEDICINE

## 2023-09-20 PROCEDURE — 80061 LIPID PANEL: CPT | Performed by: INTERNAL MEDICINE

## 2023-09-20 PROCEDURE — 99207 E-CONSULT TO CARDIOLOGY (ADULT OUTPT PROVIDER TO SPECIALIST WRITTEN QUESTION & RESPONSE): CPT | Performed by: INTERNAL MEDICINE

## 2023-09-20 PROCEDURE — 85025 COMPLETE CBC W/AUTO DIFF WBC: CPT | Performed by: INTERNAL MEDICINE

## 2023-09-20 PROCEDURE — 36415 COLL VENOUS BLD VENIPUNCTURE: CPT | Performed by: INTERNAL MEDICINE

## 2023-09-20 RX ORDER — IBUPROFEN 600 MG/1
400 TABLET, FILM COATED ORAL EVERY 8 HOURS PRN
COMMUNITY
Start: 2023-01-04 | End: 2023-10-16

## 2023-09-20 SDOH — HEALTH STABILITY: PHYSICAL HEALTH: ON AVERAGE, HOW MANY MINUTES DO YOU ENGAGE IN EXERCISE AT THIS LEVEL?: PATIENT DECLINED

## 2023-09-20 SDOH — HEALTH STABILITY: PHYSICAL HEALTH
ON AVERAGE, HOW MANY DAYS PER WEEK DO YOU ENGAGE IN MODERATE TO STRENUOUS EXERCISE (LIKE A BRISK WALK)?: PATIENT DECLINED

## 2023-09-20 ASSESSMENT — SOCIAL DETERMINANTS OF HEALTH (SDOH)
HOW OFTEN DO YOU ATTEND CHURCH OR RELIGIOUS SERVICES?: PATIENT DECLINED
HOW OFTEN DO YOU GET TOGETHER WITH FRIENDS OR RELATIVES?: PATIENT DECLINED
ARE YOU MARRIED, WIDOWED, DIVORCED, SEPARATED, NEVER MARRIED, OR LIVING WITH A PARTNER?: PATIENT DECLINED
DO YOU BELONG TO ANY CLUBS OR ORGANIZATIONS SUCH AS CHURCH GROUPS UNIONS, FRATERNAL OR ATHLETIC GROUPS, OR SCHOOL GROUPS?: PATIENT DECLINED
HOW OFTEN DO YOU ATTENT MEETINGS OF THE CLUB OR ORGANIZATION YOU BELONG TO?: PATIENT DECLINED
IN A TYPICAL WEEK, HOW MANY TIMES DO YOU TALK ON THE PHONE WITH FAMILY, FRIENDS, OR NEIGHBORS?: PATIENT DECLINED

## 2023-09-20 ASSESSMENT — LIFESTYLE VARIABLES
HOW MANY STANDARD DRINKS CONTAINING ALCOHOL DO YOU HAVE ON A TYPICAL DAY: PATIENT DECLINED
AUDIT-C TOTAL SCORE: -1
SKIP TO QUESTIONS 9-10: 0
HOW OFTEN DO YOU HAVE A DRINK CONTAINING ALCOHOL: PATIENT DECLINED
HOW OFTEN DO YOU HAVE SIX OR MORE DRINKS ON ONE OCCASION: PATIENT DECLINED

## 2023-09-20 ASSESSMENT — PAIN SCALES - GENERAL: PAINLEVEL: EXTREME PAIN (8)

## 2023-09-20 NOTE — PROGRESS NOTES
9/20/2023     E-Consult has been denied due to: Doesn't meet criteria for E-Consult - Patient previously established care with specialty, or scheduled within the next 3 months.    Interprofessional consultation requested by:  Roe Connolly MD      Clinical Question/Purpose: MY CLINICAL QUESTION IS: new RBBB with ? on preop EKG (RSR' V1-V3) and ? LVH, normal rate, last EKG 3/2023 was within normal limits x sinus bradycardia. Patient asymptomatic (no chest pain, no increased palpitations but still has them). Plan for spinal fusion surgery 9/27 at [facility]. ? need for futher evaluation/workup, if surgery postponed will be months before she can get rescheduled. Patient saw Dr Brunson earlier this year for palpitations. had echo. thanks!    Patient assessment and information reviewed:     Recommendations:      The recommendations provided in this E-Consult are based on a review of clinical data pertinent to the clinical question presented, without a review of the patient's complete medical record or, the benefit of a comprehensive in-person or virtual patient evaluation. This consultation should not replace the clinical judgement and evaluation of the provider ordering this E-Consult. Any new clinical issues, or changes in patient status since the filing of this E-Consult will need to be taken into account when assessing these recommendations. Please contact me if you have further questions.    My total time spent reviewing clinical information and formulating assessment was 5 minutes.        López Guzman MD

## 2023-09-20 NOTE — PATIENT INSTRUCTIONS
Preparing for Your Surgery  Getting started  A nurse will call you to review your health history and instructions. They will give you an arrival time based on your scheduled surgery time. Please be ready to share:  Your doctor's clinic name and phone number  Your medical, surgical, and anesthesia history  A list of allergies and sensitivities  A list of medicines, including herbal treatments and over-the-counter drugs  Whether the patient has a legal guardian (ask how to send us the papers in advance)  Please tell us if you're pregnant--or if there's any chance you might be pregnant. Some surgeries may injure a fetus (unborn baby), so they require a pregnancy test. Surgeries that are safe for a fetus don't always need a test, and you can choose whether to have one.   If you have a child who's having surgery, please ask for a copy of Preparing for Your Child's Surgery.    Preparing for surgery  Within 10 to 30 days of surgery: Have a pre-op exam (sometimes called an H&P, or History and Physical). This can be done at a clinic or pre-operative center.  If you're having a , you may not need this exam. Talk to your care team.  At your pre-op exam, talk to your care team about all medicines you take. If you need to stop any medicines before surgery, ask when to start taking them again.  We do this for your safety. Many medicines can make you bleed too much during surgery. Some change how well surgery (anesthesia) drugs work.  Call your insurance company to let them know you're having surgery. (If you don't have insurance, call [phone].)  Call your clinic if there's any change in your health. This includes signs of a cold or flu (sore throat, runny nose, cough, rash, fever). It also includes a scrape or scratch near the surgery site.  If you have questions on the day of surgery, call your hospital or surgery center.  Eating and drinking guidelines  For your safety: Unless your surgeon tells you otherwise,  follow the guidelines below.  Eat and drink as usual until 8 hours before you arrive for surgery. After that, no food or milk.  Drink clear liquids until 2 hours before you arrive. These are liquids you can see through, like water, Gatorade, and Propel Water. They also include plain black coffee and tea (no cream or milk), candy, and breath mints. You can spit out gum when you arrive.  If you drink alcohol: Stop drinking it the night before surgery.  If your care team tells you to take medicine on the morning of surgery, it's okay to take it with a sip of water.  Preventing infection  Shower or bathe the night before and morning of your surgery. Follow the instructions your clinic gave you. (If no instructions, use regular soap.)  Don't shave or clip hair near your surgery site. We'll remove the hair if needed.  Don't smoke or vape the morning of surgery. You may chew nicotine gum up to 2 hours before surgery. A nicotine patch is okay.  Note: Some surgeries require you to completely quit smoking and nicotine. Check with your surgeon.  Your care team will make every effort to keep you safe from infection. We will:  Clean our hands often with soap and water (or an alcohol-based hand rub).  Clean the skin at your surgery site with a special soap that kills germs.  Give you a special gown to keep you warm. (Cold raises the risk of infection.)  Wear special hair covers, masks, gowns and gloves during surgery.  Give antibiotic medicine, if prescribed. Not all surgeries need antibiotics.  What to bring on the day of surgery  Photo ID and insurance card  Copy of your health care directive, if you have one  Glasses and hearing aids (bring cases)  You can't wear contacts during surgery  Inhaler and eye drops, if you use them (tell us about these when you arrive)  CPAP machine or breathing device, if you use them  A few personal items, if spending the night  If you have . . .  A pacemaker, ICD (cardiac defibrillator) or other  implant: Bring the ID card.  An implanted stimulator: Bring the remote control.  A legal guardian: Bring a copy of the certified (court-stamped) guardianship papers.  Please remove any jewelry, including body piercings. Leave jewelry and other valuables at home.  If you're going home the day of surgery  You must have a responsible adult drive you home. They should stay with you overnight as well.  If you don't have someone to stay with you, and you aren't safe to go home alone, we may keep you overnight. Insurance often won't pay for this.  After surgery  If it's hard to control your pain or you need more pain medicine, please call your surgeon's office.  Questions?   If you have any questions for your care team, list them here: _________________________________________________________________________________________________________________________________________________________________________ ____________________________________ ____________________________________ ____________________________________  For informational purposes only. Not to replace the advice of your health care provider. Copyright   2003, 2019 [facility]. All rights reserved. Clinically reviewed by Yuli Cortes MD. [employer] [id number] - REV 12/22.

## 2023-09-20 NOTE — PROGRESS NOTES
[facility]  [address]  [address]  [place] [address]  Phone: [phone]  Fax: [phone]  Primary Provider: Fer Welch  Pre-op Performing Provider: FER WELCH      PREOPERATIVE EVALUATION:  Today's date: 9/20/2023    Brionna is a 78 year old female who presents for a preoperative evaluation.      9/20/2023    11:54 AM   Additional Questions   Roomed by Sadia Friend   Accompanied by Partner, Geronimo         9/20/2023    11:54 AM   Patient Reported Additional Medications   Patient reports taking the following new medications No       Surgical Information:  Surgery/Procedure: Decompression - Foraminotomy L4-S1 Bilateral, Posterior Spine Fusion with Instrumentation L4-S1   Surgery Location: [facility]  Surgeon: Sd Dumont MD  Surgery Date: 9/7/2023  Time of Surgery: 7:30 AM  Where patient plans to recover: At home with family  Fax number for surgical facility: [phone]    Assessment & Plan     The proposed surgical procedure is considered INTERMEDIATE risk.    Preop general physical exam  - EKG 12-lead complete w/read - Clinics    Midline low back pain without sciatica, unspecified chronicity    RBBB  new today, patient asymptomatic. reviwed with Dr Brunson who saw patient earlier in the year for consult and he recommends no further work-up before procedure as long as patient asymptomatic/no new or worse symptoms. Patient verbalized understanding and is agreeable to this plan.      - No identified additional risk factors other than previously addressed    Antiplatelet or Anticoagulation Medication Instructions:   - Patient is on no antiplatelet or anticoagulation medications.  - does take ibuprofen at night, will ask surgery team about holding this    Additional Medication Instructions:  Patient is to take all scheduled medications on the day of surgery   - ibuprofen (Advil, Motrin): HOLD 1 day before surgery. - check with  surgeon    RECOMMENDATION:  APPROVAL GIVEN to proceed with proposed procedure, without further diagnostic evaluation.    Subjective       HPI related to upcoming procedure: patient here for preop, overall doing well. still has palpitations at times but this is not worse and no new symptoms. no chest pain or shortness of breath. Still some neck issues and is hoping spine surgeon will inject her neck after the surgery is complete. No other concerns or complaints today.         9/13/2023     2:18 PM   Preop Questions   1. Have you ever had a heart attack or stroke? No   2. Have you ever had surgery on your heart or blood vessels, such as a stent placement, a coronary artery bypass, or surgery on an artery in your head, neck, heart, or legs? No   3. Do you have chest pain with activity? No   4. Do you have a history of  heart failure? No   5. Do you currently have a cold, bronchitis or symptoms of other infection? No   6. Do you have a cough, shortness of breath, or wheezing? No   7. Do you or anyone in your family have previous history of blood clots? No   8. Do you or does anyone in your family have a serious bleeding problem such as prolonged bleeding following surgeries or cuts? No   9. Have you ever had problems with anemia or been told to take iron pills? No   10. Have you had any abnormal blood loss such as black, tarry or bloody stools, or abnormal vaginal bleeding? No   11. Have you ever had a blood transfusion? No   12. Are you willing to have a blood transfusion if it is medically needed before, during, or after your surgery? Yes   13. Have you or any of your relatives ever had problems with anesthesia? No   14. Do you have sleep apnea, excessive snoring or daytime drowsiness? No   15. Do you have any artifical heart valves or other implanted medical devices like a pacemaker, defibrillator, or continuous glucose monitor? No   16. Do you have artificial joints? YES -    17. Are you allergic to latex? No        Health Care Directive:  Patient has a Health Care Directive on file      Preoperative Review of :   reviewed - controlled substances reflected in medication list.      Status of Chronic Conditions:  See problem list for active medical problems.  Problems all longstanding and stable, except as noted/documented.  See ROS for pertinent symptoms related to these conditions.    HYPERTENSION - Patient has longstanding history of HTN , currently denies any symptoms referable to elevated blood pressure. Specifically denies chest pain, palpitations, dyspnea, orthopnea, PND or peripheral edema. Blood pressure readings have been in normal range. Current medication regimen is as listed below. Patient denies any side effects of medication.     Review of Systems  Constitutional, neuro, ENT, endocrine, pulmonary, cardiac, gastrointestinal, genitourinary, musculoskeletal, integument and psychiatric systems are negative, except as otherwise noted.    Patient Active Problem List    Diagnosis Date Noted    Nontraumatic tear of left rotator cuff, unspecified tear extent 02/23/2022     Priority: Medium    Inflamed seborrheic keratosis 11/02/2017     Priority: Medium    SK (seborrheic keratosis) 11/02/2017     Priority: Medium    Cherry angioma 11/02/2017     Priority: Medium    Atopic rhinitis 02/05/2016     Priority: Medium    Hypertension goal BP (blood pressure) < 140/90 01/07/2016     Priority: Medium    Osteoarthritis of knee 12/18/2015     Priority: Medium    Fracture of distal end of radius 10/22/2015     Priority: Medium    Surgical follow-up care 06/01/2015     Priority: Medium     Overview:   inactive icd-9 diagnosis auto replaced with icd-10, display name retained//mporz      Acute posthemorrhagic anemia 04/29/2015     Priority: Medium    Osteoarthritis of hip 04/07/2015     Priority: Medium    Allergy to anti-infective agent 03/26/2014     Priority: Medium    Enteritis 03/26/2014     Priority: Medium     "Diverticulitis of sigmoid colon 03/26/2014     Priority: Medium    ACP (advance care planning) 06/06/2011     Priority: Medium     Patient states has Advance Directive and will bring in a copy to clinic. 6/6/2011   Pt document is in epic, dated 1/12/05.  Discussed with pt and she may plan to update this and verbally gave the health care agent information listed. Leigh Naranjo RN          Mixed incontinence urge and stress 06/06/2011     Priority: Medium    CARDIOVASCULAR SCREENING; LDL GOAL LESS THAN 160 02/10/2010     Priority: Medium    Microscopic hematuria 11/21/2008     Priority: Medium    Colon Polyps      Priority: Medium    Estrogen replacement therapy 09/07/2005     Priority: Medium    Internal derangement of knee 04/30/2005     Priority: Medium     L knee x 4-5 wks, no known injury but \"runs through it\" regularly  Problem list name updated by automated process. Provider to review      Back pain 04/18/2003     Priority: Medium       Patient is followed by FER WELCH for ongoing prescription of narcotic pain medicine.  Med: vicodin.   Maximum use per month: 30  Expected duration: indefinite  Narcotic agreement on file: NO  Clinic visit recommended: Q 3 months        Osteoporosis 04/18/2003     Priority: Medium     Problem list name updated by automated process. Provider to review        Past Medical History:   Diagnosis Date    RADIAL STYLOID TENOSYNOV - right 02/23/2006    lifts weights, doesn't think work-related; OK to add PT visits prn    Rotator cuff tear      Past Surgical History:   Procedure Laterality Date    COLONOSCOPY  11/26/2013    Dr. Cardenas [facility]    COLONOSCOPY  11/26/2013    Procedure: COMBINED COLONOSCOPY, SINGLE BIOPSY/POLYPECTOMY BY BIOPSY;;  Surgeon: Jovanni Cardenas MD;  Location:  GI    HYSTERECTOMY, ENID  1986    [facility] ROTATOR CUFF STRAP      [facility] TOTAL HIP ARTHROPLASTY Right      Current Outpatient Medications   Medication Sig Dispense Refill    gabapentin (NEURONTIN) 100 MG " "capsule TAKE ONE CAPSULE BY MOUTH AT LUNCH AND TWO CAPSULES BY MOUTH NIGHTLY AT BEDTIME ALONG WITH ONE 300MG CAPSULE FOR A TOTAL DAILY DOSE  MG. 270 capsule 0    gabapentin (NEURONTIN) 300 MG capsule TAKE ONE CAPSULE BY MOUTH EVERY NIGHT AT BEDTIME 90 capsule 1    ibuprofen (ADVIL/MOTRIN) 600 MG tablet Take 400 mg by mouth every 8 hours as needed      lisinopril (ZESTRIL) 5 MG tablet Take 2 tablets (10 mg) by mouth daily 180 tablet 3    valACYclovir (VALTREX) 500 MG tablet Take 1 tablet (500 mg) by mouth daily 90 tablet 3    acetaminophen (TYLENOL) 325 MG tablet Take 325-650 mg by mouth every 6 hours as needed for mild pain (Patient not taking: Reported on 9/20/2023)         Allergies   Allergen Reactions    Scopolamine Anaphylaxis and Itching    Ciprofloxacin Swelling     Stiff and swollen neck    Amoxicillin-Pot Clavulanate [Amoxicillin-Pot Clavulanate]     Contrast Dye      gets cold and feels terrible  Pt was ok with Isovue-370 and no pre-meds    Doxycycline Hyclate Rash    Erythromycin Swelling    Flagyl [Metronidazole Hcl]     Meperidine Hcl      low blood pressure    Oxycodone     Prochlorperazine      agitated    Sulfa Antibiotics Rash    Tetracycline Rash    Cefprozil Rash        Social History     Tobacco Use    Smoking status: Never    Smokeless tobacco: Never   Substance Use Topics    Alcohol use: Yes     Comment: occ with dinner q night       History   Drug Use No         Objective     /68 (BP Location: Right arm, Patient Position: Sitting, Cuff Size: Adult Regular)   Pulse 84   Temp 98.4  F (36.9  C) (Tympanic)   Resp 18   Ht 1.611 m (5' 3.43\")   Wt 52.2 kg (115 lb)   SpO2 97%   BMI 20.10 kg/m      Physical Exam    GENERAL APPEARANCE: healthy, alert and no distress     EYES: EOMI, PERRL     HENT: ear canals and TM's normal and nose and mouth without ulcers or lesions     NECK: no adenopathy, no asymmetry, masses, or scars and thyroid normal to palpation     RESP: lungs clear to " auscultation - no rales, rhonchi or wheezes     CV: regular rates and rhythm, normal S1 S2, no S3 or S4 and no murmur, click or rub     ABDOMEN:  soft, nontender, no HSM or masses and bowel sounds normal     MS: extremities normal- no gross deformities noted, no evidence of inflammation in joints, FROM in all extremities.     SKIN: no suspicious lesions or rashes     NEURO: Normal strength and tone, sensory exam grossly normal, mentation intact and speech normal     PSYCH: mentation appears normal. and affect normal/bright    Recent Labs   Lab Test 02/23/22  1225 11/30/21  0909   HGB 13.7  --      --    NA  --  142   POTASSIUM  --  4.1   CR  --  0.70        Diagnostics:  Labs pending at this time.  Results will be reviewed when available.   EKG: Normal Sinus Rhythm, Right Bundle Branch Block, no ST changes consistant with ischemia, no Q waves    Revised Cardiac Risk Index (RCRI):  The patient has the following serious cardiovascular risks for perioperative complications:   - No serious cardiac risks = 0 points     RCRI Interpretation: 0 points: Class I (very low risk - 0.4% complication rate)         Signed Electronically by: Roe Connolly MD  Copy of this evaluation report is provided to requesting physician.

## 2023-09-29 ENCOUNTER — TELEPHONE (OUTPATIENT)
Dept: PEDIATRICS | Facility: CLINIC | Age: 78
End: 2023-09-29
Payer: MEDICARE

## 2023-09-29 NOTE — TELEPHONE ENCOUNTER
Outgoing call spoke to patient.    Offered appointment with Sidra Stratton next week.    She is requesting to only see Dr. Connolly.    Thank you  Erick HERNANDES

## 2023-09-29 NOTE — TELEPHONE ENCOUNTER
Reason for Call:  Appointment Request    Patient requesting this type of appt:  Hospital/ED Follow-Up     Requested provider: Roe Connolly    Reason patient unable to be scheduled: Not within requested timeframe    When does patient want to be seen/preferred time: 3-7 days    Comments: Needs hospital follow up within 5 calendar days of 09/29/23. Hospital Follow Up, 09/27-09/29, Spine Surgery, [facility]. Please contact pt.     Could we send this information to you in [employer] or would you prefer to receive a phone call?:   Patient would prefer a phone call   Okay to leave a detailed message?: Yes at Cell number on file:    Telephone Information:   Mobile [phone]       Call taken on 9/29/2023 at 9:36 AM by Lucie Ngo

## 2023-10-02 NOTE — TELEPHONE ENCOUNTER
Patient agreed to appointment on Weds 10-4-23 at noon, will arrive at 1140, will have TC schedule as noon appointment is private.

## 2023-10-05 ENCOUNTER — TELEPHONE (OUTPATIENT)
Dept: PEDIATRICS | Facility: CLINIC | Age: 78
End: 2023-10-05
Payer: MEDICARE

## 2023-10-05 DIAGNOSIS — M79.2 NEUROPATHIC PAIN: ICD-10-CM

## 2023-10-05 RX ORDER — GABAPENTIN 300 MG/1
CAPSULE ORAL
Qty: 90 CAPSULE | Refills: 1 | Status: SHIPPED | OUTPATIENT
Start: 2023-10-05 | End: 2023-10-16

## 2023-10-05 RX ORDER — GABAPENTIN 100 MG/1
CAPSULE ORAL
Qty: 270 CAPSULE | Refills: 0 | Status: SHIPPED | OUTPATIENT
Start: 2023-10-05 | End: 2023-10-16

## 2023-10-05 NOTE — TELEPHONE ENCOUNTER
Received call from patient. Patient was in hospital 9/27/23-9/29/23 with back surgery. Patient fell in shower and dislocated right shoulder on 10/1/23. Patient then went back to [facility] r/t shoulder. Patient came home from hospital yesterday. Patient is currently scheduled for phone visit with Dr. Connolly 10/11/23. Patient is inquiring about refill for gabapentin in the meantime AND if she can receive an oral steroid. Patient is hoping for script for gabapentin 300mg morning and afternoon as well as 300mg plus 100mg at bedtime. Please review and advise. Earlier visit needed? E-visit?     Danyel GARCIA RN 10/5/2023 at 9:48 AM

## 2023-10-05 NOTE — TELEPHONE ENCOUNTER
gabapentin sent via refill encounter. WHat does she want the steroid for? I would want her to discuss that with ortho as it could interfere with healing, etc.

## 2023-10-05 NOTE — TELEPHONE ENCOUNTER
Called and spoke with patient. RN relayed information on gabapentin script sent to pharmacy. Patient states she did discuss with ortho about steroid and they are prescribing. No further questions.     Danyel GARCIA RN 10/5/2023 at 11:05 AM

## 2023-10-08 ENCOUNTER — MEDICAL CORRESPONDENCE (OUTPATIENT)
Dept: HEALTH INFORMATION MANAGEMENT | Facility: CLINIC | Age: 78
End: 2023-10-08
Payer: MEDICARE

## 2023-10-08 ENCOUNTER — MEDICAL CORRESPONDENCE (OUTPATIENT)
Dept: HEALTH INFORMATION MANAGEMENT | Facility: CLINIC | Age: 78
End: 2023-10-08

## 2023-10-09 ENCOUNTER — TELEPHONE (OUTPATIENT)
Dept: PEDIATRICS | Facility: CLINIC | Age: 78
End: 2023-10-09
Payer: MEDICARE

## 2023-10-09 NOTE — TELEPHONE ENCOUNTER
Home Care is calling regarding an established patient with [facility].       Requesting orders from: Roe Connolly  Provider is following patient: No       PT, 1z weekly for 3 weeks, OT evaluation to treat, skilled nursing telehealth visit and social work assessment.     Orders Requested    Skilled Nursing  Request for initial evaluation and treatment (one time) (first set of orders)   Telehealth Visit      Physical Therapy  Request for initial certification (first set of orders)   Frequency:  1x/wk for 3 wks      Occupational Therapy  Request for initial evaluation and treatment (one time) (first set of orders)       Social Work  Request for initial evaluation and treatment (one time) (first set of orders)         Information was gathered and will be sent to provider for review.  RN will contact Home Care with information after provider review.  Information was gathered and will be sent to provider to confirm provider will be following patient.  RN will contact Home Care with information after provider review.  Confirmed ok to leave a detailed message with call back.  Contact information confirmed and updated as needed.      [facility]: [phone] - ok to [facility].     Danyel Teague RN

## 2023-10-09 NOTE — TELEPHONE ENCOUNTER
Call placed to [facility] relayed message from provider    Mojgan Crane RN on 10/9/2023 at 3:09 PM

## 2023-10-11 ENCOUNTER — MEDICAL CORRESPONDENCE (OUTPATIENT)
Dept: HEALTH INFORMATION MANAGEMENT | Facility: CLINIC | Age: 78
End: 2023-10-11

## 2023-10-11 ENCOUNTER — OFFICE VISIT (OUTPATIENT)
Dept: PEDIATRICS | Facility: CLINIC | Age: 78
End: 2023-10-11
Payer: MEDICARE

## 2023-10-11 VITALS
OXYGEN SATURATION: 100 % | WEIGHT: 112 LBS | BODY MASS INDEX: 19.57 KG/M2 | TEMPERATURE: 98.3 F | RESPIRATION RATE: 14 BRPM | DIASTOLIC BLOOD PRESSURE: 90 MMHG | SYSTOLIC BLOOD PRESSURE: 144 MMHG | HEART RATE: 76 BPM

## 2023-10-11 DIAGNOSIS — S43.004D DISLOCATION OF RIGHT SHOULDER JOINT, SUBSEQUENT ENCOUNTER: Primary | Chronic | ICD-10-CM

## 2023-10-11 DIAGNOSIS — M48.062 SPINAL STENOSIS, LUMBAR REGION, WITH NEUROGENIC CLAUDICATION: ICD-10-CM

## 2023-10-11 PROCEDURE — 99214 OFFICE O/P EST MOD 30 MIN: CPT | Performed by: INTERNAL MEDICINE

## 2023-10-11 RX ORDER — RESPIRATORY SYNCYTIAL VIRUS VACCINE 120MCG/0.5
0.5 KIT INTRAMUSCULAR ONCE
Qty: 1 EACH | Refills: 0 | Status: CANCELLED | OUTPATIENT
Start: 2023-10-11 | End: 2023-10-11

## 2023-10-11 ASSESSMENT — PAIN SCALES - GENERAL: PAINLEVEL: WORST PAIN (10)

## 2023-10-11 NOTE — PROGRESS NOTES
Assessment & Plan     ICD-10-CM    1. Dislocation of right shoulder joint, subsequent encounter  S43.004D       2. Spinal stenosis, lumbar region, with neurogenic claudication  M48.062       discussed with patient (or patient's parents/caregiver) pathophysiology of condition and treatment options.  Reviewed history in detail today with the patient and her children.  Patient in imaging today and has close neurosurgical follow-up scheduled this week.  She does have Dilaudid as well as other medications to use and we reviewed these in detail today.  Reviewed important to stay ahead of the pain is much as possible.  She mostly has pain when she lays down at night and reviewed ways to tactically try to address this at that time of the day.  Reviewed the risks and benefits of all his medications in detail today with the patient and reviewed that she is to be sure the neurosurgical team is aware of what she is doing to manage the pain.  She knows that she can reach out to them if she needs any refills until we can get this acute issue figured out.  She does take THC Gummies at times and did review its okay to try those for this but she should not take a THC gummy with the Dilaudid etc.  Patient family verbalized understanding agreeable to this plan.  They will keep me posted.     MED REC REQUIRED  Post Medication Reconciliation Status:     MEDICATIONS:  Continue current medications without change    Roe Connolly MD  [facility]    (*documentation in this chart was partially completed using Dragon [employer] which could potentially lead to some misspellings or grammar/autocorrect issues in the narrative.)    Soniya Staton is a 78 year old, presenting for the following health issues:  Hospital F/U (/)        10/11/2023    11:48 AM   Additional Questions   Roomed by Adela Kumar CMA   Accompanied by CAREN BUSTAMANTE         Hospital Follow-up Visit:    Hospital/Nursing Home/IP Rehab Facility:   [facility]   Date of Admission: 09/27/2023  Date of Discharge: 09/29/2023 (10/02/2023)   Reason(s) for Admission: Fall in shower, post-surgery     Was your hospitalization related to COVID-19? No   Problems taking medications regularly:  None  Medication changes since discharge: Patient was given a commode and shower chair.   Problems adhering to non-medication therapy:  None    Summary of hospitalization:  CareEverywhere information obtained and reviewed  Diagnostic Tests/Treatments reviewed.  Follow up needed: multiple with specility  Other Healthcare Providers Involved in Patient s Care:         Homecare, Specialist appointment - multiple, and Physical Therapy  Update since discharge: stable.         Plan of care communicated with patient and family    patient here for hospital follow-up, is okay when ambulating, but get sever pain when lays down, neurosurg tem aware and she has upcoming appointment and had imagine today, has dilaudid and other medications to use, family here to day to discuss best ways to alternate narcotics, apap, etc. fritrated wih the fall and ongoing symptoms but feels well supported. shoulde david mclean and doing physical therapy on this at home. No other concerns or complaints today.       Review of Systems   Constitutional, HEENT, cardiovascular, pulmonary, gi and gu systems are negative, except as otherwise noted.      Objective    BP (!) 144/90 (BP Location: Right arm, Patient Position: Sitting, Cuff Size: Adult Regular)   Pulse 76   Temp 98.3  F (36.8  C) (Tympanic)   Resp 14   Wt 50.8 kg (112 lb)   SpO2 100%   BMI 19.57 kg/m    Body mass index is 19.57 kg/m .  Physical Exam   GENERAL: healthy, alert and no distress  ABDOMEN: soft, nontender bowel sounds normal  MS: no gross musculoskeletal defects noted, no edema  SKIN: no suspicious lesions or rashes  NEURO: Normal strength and tone, mentation intact and speech normal  PSYCH: mentation appears normal, affect normal/bright

## 2023-10-16 ENCOUNTER — TRANSITIONAL CARE UNIT VISIT (OUTPATIENT)
Dept: GERIATRICS | Facility: CLINIC | Age: 78
End: 2023-10-16
Payer: MEDICARE

## 2023-10-16 VITALS
RESPIRATION RATE: 15 BRPM | DIASTOLIC BLOOD PRESSURE: 85 MMHG | TEMPERATURE: 98.3 F | HEART RATE: 63 BPM | WEIGHT: 117 LBS | BODY MASS INDEX: 20.73 KG/M2 | HEIGHT: 63 IN | OXYGEN SATURATION: 95 % | SYSTOLIC BLOOD PRESSURE: 170 MMHG

## 2023-10-16 DIAGNOSIS — M79.2 NEUROPATHIC PAIN: ICD-10-CM

## 2023-10-16 DIAGNOSIS — R29.6 FALLS FREQUENTLY: ICD-10-CM

## 2023-10-16 DIAGNOSIS — S32.049D CLOSED FRACTURE OF FOURTH LUMBAR VERTEBRA WITH ROUTINE HEALING, UNSPECIFIED FRACTURE MORPHOLOGY, SUBSEQUENT ENCOUNTER: Primary | ICD-10-CM

## 2023-10-16 DIAGNOSIS — I10 HYPERTENSION GOAL BP (BLOOD PRESSURE) < 140/90: ICD-10-CM

## 2023-10-16 DIAGNOSIS — R53.81 PHYSICAL DECONDITIONING: ICD-10-CM

## 2023-10-16 DIAGNOSIS — M48.061 SPINAL STENOSIS OF LUMBAR REGION, UNSPECIFIED WHETHER NEUROGENIC CLAUDICATION PRESENT: ICD-10-CM

## 2023-10-16 PROCEDURE — 99310 SBSQ NF CARE HIGH MDM 45: CPT | Performed by: NURSE PRACTITIONER

## 2023-10-16 RX ORDER — GABAPENTIN 300 MG/1
600 CAPSULE ORAL AT BEDTIME
Start: 2023-10-16 | End: 2023-11-29

## 2023-10-16 RX ORDER — ACETAMINOPHEN 500 MG
1000 TABLET ORAL EVERY 6 HOURS PRN
Start: 2023-10-16 | End: 2024-04-17

## 2023-10-16 RX ORDER — GABAPENTIN 100 MG/1
200 CAPSULE ORAL 2 TIMES DAILY
Start: 2023-10-16 | End: 2024-03-18

## 2023-10-16 NOTE — LETTER
"    10/16/2023        RE: Brionna Rollins  [address] [place] [address]        [facility] GERIATRICS    PRIMARY CARE PROVIDER AND CLINIC:  Roe Connolly MD, [address] [place] [address]  Chief Complaint   Patient presents with     Hospital F/U      [facility] Medical Record Number:  [id number]  Place of Service where encounter took place:  [facility]) [25]    Brionna Rollins  is a 78 year old  (1945), admitted to the above facility from  [facility] . Hospital stay 10/11/23 through 10/15/23..   HPI:    PMH HTN, spinal stenosis, spondylosis of lumbar spine with frequent falls who presented with back pain after a fall.   Closed Fx of lumbar verebra  Hx of lumbar spine decompression 9/27/23 Dr. Dumont, recent hospitalization for right shoulder dislocation d/t fall at home  Spine consulted and recommend LSO brace and medrol dosepak.   On exam today: patient just finished walking in therapy, states she is feeling well, states pain in back is \"no more than usual\" she does not rate pain, states she is taking dilaudid infrequently, feels pain is controlled, denies fever, chills, CP, palpitations, SOB, N/V/D or constipation    CODE STATUS/ADVANCE DIRECTIVES DISCUSSION:  Special Code  CPR/Full code   ALLERGIES:   Allergies   Allergen Reactions     Scopolamine Anaphylaxis and Itching     Ciprofloxacin Swelling     Stiff and swollen neck     Amoxicillin-Pot Clavulanate [Amoxicillin-Pot Clavulanate]      Contrast Dye      gets cold and feels terrible  Pt was ok with Isovue-370 and no pre-meds     Doxycycline Hyclate Rash     Erythromycin Swelling     Flagyl [Metronidazole Hcl]      Meperidine Hcl      low blood pressure     Oxycodone      Prochlorperazine      agitated     Sulfa Antibiotics Rash     Tetracycline Rash     Cefprozil Rash      PAST MEDICAL HISTORY:   Past Medical History:   Diagnosis Date     RADIAL STYLOID TENOSYNOV - right 02/23/2006    " "lifts weights, doesn't think work-related; OK to add PT visits prn     Rotator cuff tear       PAST SURGICAL HISTORY:   has a past surgical history that includes ROTATOR CUFF STRAP; hysterectomy, aguilar (1986); colonoscopy (11/26/2013); Colonoscopy (11/26/2013); and TOTAL HIP ARTHROPLASTY (Right).  FAMILY HISTORY: family history includes Arthritis in her father; Cancer - colorectal in her maternal grandmother; Diabetes in her mother; Eye Disorder in her father; Gastrointestinal Disease in her mother; Melanoma in her son.  SOCIAL HISTORY:   reports that she has never smoked. She has never used smokeless tobacco. She reports current alcohol use. She reports that she does not use drugs.  Patient's living condition: lives alone    Post Discharge Medication Reconciliation Status:   MED REC REQUIRED  Post Medication Reconciliation Status: discharge medications reconciled and changed, per note/orders       Current Outpatient Medications   Medication Sig     gabapentin (NEURONTIN) 100 MG capsule TAKE ONE CAPSULE BY MOUTH AT LUNCH AND TWO CAPSULES BY MOUTH NIGHTLY AT BEDTIME ALONG WITH ONE 300MG CAPSULE FOR A TOTAL DAILY DOSE  MG.     gabapentin (NEURONTIN) 300 MG capsule TAKE ONE CAPSULE BY MOUTH EVERY NIGHT AT BEDTIME     ibuprofen (ADVIL/MOTRIN) 600 MG tablet Take 400 mg by mouth every 8 hours as needed     lisinopril (ZESTRIL) 5 MG tablet Take 2 tablets (10 mg) by mouth daily     valACYclovir (VALTREX) 500 MG tablet Take 1 tablet (500 mg) by mouth daily     Current Facility-Administered Medications   Medication     lidocaine 1% with EPINEPHrine 1:100,000 injection 3 mL       ROS:  10 point ROS of systems including Constitutional, Eyes, Respiratory, Cardiovascular, Gastroenterology, Genitourinary, Integumentary, Musculoskeletal, Psychiatric were all negative except for pertinent positives noted in my HPI.    Vitals:  BP (!) 170/85   Pulse 63   Temp 98.3  F (36.8  C)   Resp 15   Ht 1.6 m (5' 3\")   Wt 53.1 kg (117 " lb)   SpO2 95%   BMI 20.73 kg/m    Exam:  GENERAL APPEARANCE:  Alert, in no distress, thin  ENT:  Mouth and posterior oropharynx normal, moist mucous membranes, normal hearing acuity  EYES:  EOM, conjunctivae, lids, pupils and irises normal, PERRL  RESP:  respiratory effort and palpation of chest normal, lungs clear to auscultation , no respiratory distress  CV:  Palpation and auscultation of heart done , regular rate and rhythm, no murmur, rub, or gallop, no edema  ABDOMEN:  normal bowel sounds, soft, nontender, no hepatosplenomegaly or other masses  M/S:   patient sitting up on edge of bed  SKIN:  Inspection of skin and subcutaneous tissue baseline  NEURO:   speech wnl  PSYCH:  affect and mood normal    Lab/Diagnostic data:  Recent labs in [facility] reviewed by me today.  and Most Recent 3 CBC's:  Recent Labs   Lab Test 09/20/23  1258 02/23/22  1225 12/04/19  1250   WBC 3.4* 3.5* 4.4   HGB 13.2 13.7 13.4   MCV 93 93 95    187 190     Most Recent 3 BMP's:  Recent Labs   Lab Test 09/20/23  1258 11/30/21  0909 12/04/19  1250    142 140   POTASSIUM 4.1 4.1 4.5   CHLORIDE 105 110* 108   CO2 26 29 26   BUN 15.8 16 13   CR 0.57 0.70 0.83   ANIONGAP 10 3 6   CHRIS 9.2 8.4* 8.9   GLC 95 87 92       ASSESSMENT/PLAN:    (S32.049D) Closed fracture of fourth lumbar vertebra with routine healing, unspecified fracture morphology, subsequent encounter  (primary encounter diagnosis)  (R53.81) Physical deconditioning  (R29.6) Falls frequently  Comment: acute/ongoing  Spine consulted recommend TLSO brace and medrol dosepak  Plan: PT and OT, TLSO brace, medrol dosepak, neurontin 200mg BID and 600mg qhs, tylenol 1000mg TID, dilaudid 2-4mg q 6 hours prn      (M48.061) Spinal stenosis of lumbar region, unspecified whether neurogenic claudication present  Comment: chronic ongoing  S/p spine decompression and fusion on 9/27/23 Dr. Dumont  Plan: PT and OT    (I10) Hypertension goal BP (blood pressure) < 140/90  Comment:  ongoing  Plan: BMP follow, continue lisinopril 10mg QD      Orders:  BMP and hgb on Monday      Total time spent with patient visit at the [facility] nursing facility was 45 min including patient visit and review of past records. Reviewed internal medicine progress notes, medication changes and lab and imaging results, discussed pain management with patient at bedside    Electronically signed by:  Tonya Lynn Haase, APRN CNP                   Sincerely,        Tonya Lynn Haase, APRN CNP

## 2023-10-16 NOTE — PROGRESS NOTES
"[facility] GERIATRICS    PRIMARY CARE PROVIDER AND CLINIC:  Roe Connolly MD, [address]  / [place] [address]  Chief Complaint   Patient presents with    Hospital F/U      [facility] Medical Record Number:  [id number]  Place of Service where encounter took place:  [facility]) [25]    Brionna Rollins  is a 78 year old  (1945), admitted to the above facility from  [facility] . Hospital stay 10/11/23 through 10/15/23..   HPI:    PMH HTN, spinal stenosis, spondylosis of lumbar spine with frequent falls who presented with back pain after a fall.   Closed Fx of lumbar verebra  Hx of lumbar spine decompression 9/27/23 Dr. Dumont, recent hospitalization for right shoulder dislocation d/t fall at home  Spine consulted and recommend LSO brace and medrol dosepak.   On exam today: patient just finished walking in therapy, states she is feeling well, states pain in back is \"no more than usual\" she does not rate pain, states she is taking dilaudid infrequently, feels pain is controlled, denies fever, chills, CP, palpitations, SOB, N/V/D or constipation    CODE STATUS/ADVANCE DIRECTIVES DISCUSSION:  Special Code  CPR/Full code   ALLERGIES:   Allergies   Allergen Reactions    Scopolamine Anaphylaxis and Itching    Ciprofloxacin Swelling     Stiff and swollen neck    Amoxicillin-Pot Clavulanate [Amoxicillin-Pot Clavulanate]     Contrast Dye      gets cold and feels terrible  Pt was ok with Isovue-370 and no pre-meds    Doxycycline Hyclate Rash    Erythromycin Swelling    Flagyl [Metronidazole Hcl]     Meperidine Hcl      low blood pressure    Oxycodone     Prochlorperazine      agitated    Sulfa Antibiotics Rash    Tetracycline Rash    Cefprozil Rash      PAST MEDICAL HISTORY:   Past Medical History:   Diagnosis Date    RADIAL STYLOID TENOSYNOV - right 02/23/2006    lifts weights, doesn't think work-related; OK to add PT visits prn    Rotator cuff tear       PAST " "SURGICAL HISTORY:   has a past surgical history that includes ROTATOR CUFF STRAP; hysterectomy, aguilar (1986); colonoscopy (11/26/2013); Colonoscopy (11/26/2013); and TOTAL HIP ARTHROPLASTY (Right).  FAMILY HISTORY: family history includes Arthritis in her father; Cancer - colorectal in her maternal grandmother; Diabetes in her mother; Eye Disorder in her father; Gastrointestinal Disease in her mother; Melanoma in her son.  SOCIAL HISTORY:   reports that she has never smoked. She has never used smokeless tobacco. She reports current alcohol use. She reports that she does not use drugs.  Patient's living condition: lives alone    Post Discharge Medication Reconciliation Status:   MED REC REQUIRED  Post Medication Reconciliation Status: discharge medications reconciled and changed, per note/orders       Current Outpatient Medications   Medication Sig    gabapentin (NEURONTIN) 100 MG capsule TAKE ONE CAPSULE BY MOUTH AT LUNCH AND TWO CAPSULES BY MOUTH NIGHTLY AT BEDTIME ALONG WITH ONE 300MG CAPSULE FOR A TOTAL DAILY DOSE  MG.    gabapentin (NEURONTIN) 300 MG capsule TAKE ONE CAPSULE BY MOUTH EVERY NIGHT AT BEDTIME    ibuprofen (ADVIL/MOTRIN) 600 MG tablet Take 400 mg by mouth every 8 hours as needed    lisinopril (ZESTRIL) 5 MG tablet Take 2 tablets (10 mg) by mouth daily    valACYclovir (VALTREX) 500 MG tablet Take 1 tablet (500 mg) by mouth daily     Current Facility-Administered Medications   Medication    lidocaine 1% with EPINEPHrine 1:100,000 injection 3 mL       ROS:  10 point ROS of systems including Constitutional, Eyes, Respiratory, Cardiovascular, Gastroenterology, Genitourinary, Integumentary, Musculoskeletal, Psychiatric were all negative except for pertinent positives noted in my HPI.    Vitals:  BP (!) 170/85   Pulse 63   Temp 98.3  F (36.8  C)   Resp 15   Ht 1.6 m (5' 3\")   Wt 53.1 kg (117 lb)   SpO2 95%   BMI 20.73 kg/m    Exam:  GENERAL APPEARANCE:  Alert, in no distress, thin  ENT:  Mouth " and posterior oropharynx normal, moist mucous membranes, normal hearing acuity  EYES:  EOM, conjunctivae, lids, pupils and irises normal, PERRL  RESP:  respiratory effort and palpation of chest normal, lungs clear to auscultation , no respiratory distress  CV:  Palpation and auscultation of heart done , regular rate and rhythm, no murmur, rub, or gallop, no edema  ABDOMEN:  normal bowel sounds, soft, nontender, no hepatosplenomegaly or other masses  M/S:   patient sitting up on edge of bed  SKIN:  Inspection of skin and subcutaneous tissue baseline  NEURO:   speech wnl  PSYCH:  affect and mood normal    Lab/Diagnostic data:  Recent labs in [facility] reviewed by me today.  and Most Recent 3 CBC's:  Recent Labs   Lab Test 09/20/23  1258 02/23/22  1225 12/04/19  1250   WBC 3.4* 3.5* 4.4   HGB 13.2 13.7 13.4   MCV 93 93 95    187 190     Most Recent 3 BMP's:  Recent Labs   Lab Test 09/20/23  1258 11/30/21  0909 12/04/19  1250    142 140   POTASSIUM 4.1 4.1 4.5   CHLORIDE 105 110* 108   CO2 26 29 26   BUN 15.8 16 13   CR 0.57 0.70 0.83   ANIONGAP 10 3 6   CRHIS 9.2 8.4* 8.9   GLC 95 87 92       ASSESSMENT/PLAN:    (S32.049D) Closed fracture of fourth lumbar vertebra with routine healing, unspecified fracture morphology, subsequent encounter  (primary encounter diagnosis)  (R53.81) Physical deconditioning  (R29.6) Falls frequently  Comment: acute/ongoing  Spine consulted recommend TLSO brace and medrol dosepak  Plan: PT and OT, TLSO brace, medrol dosepak, neurontin 200mg BID and 600mg qhs, tylenol 1000mg TID, dilaudid 2-4mg q 6 hours prn      (M48.061) Spinal stenosis of lumbar region, unspecified whether neurogenic claudication present  Comment: chronic ongoing  S/p spine decompression and fusion on 9/27/23 Dr. Dumont  Plan: PT and OT    (I10) Hypertension goal BP (blood pressure) < 140/90  Comment: ongoing  Plan: BMP follow, continue lisinopril 10mg QD      Orders:  BMP and hgb on Monday      Total time spent  with patient visit at the [facility] nursing facility was 45 min including patient visit and review of past records. Reviewed internal medicine progress notes, medication changes and lab and imaging results, discussed pain management with patient at bedside    Electronically signed by:  Tonya Lynn Haase, APRN CNP

## 2023-10-18 ENCOUNTER — TRANSITIONAL CARE UNIT VISIT (OUTPATIENT)
Dept: GERIATRICS | Facility: CLINIC | Age: 78
End: 2023-10-18
Payer: MEDICARE

## 2023-10-18 VITALS
HEIGHT: 63 IN | WEIGHT: 116 LBS | HEART RATE: 76 BPM | BODY MASS INDEX: 20.55 KG/M2 | TEMPERATURE: 98 F | RESPIRATION RATE: 17 BRPM | SYSTOLIC BLOOD PRESSURE: 121 MMHG | DIASTOLIC BLOOD PRESSURE: 67 MMHG | OXYGEN SATURATION: 99 %

## 2023-10-18 DIAGNOSIS — R53.81 PHYSICAL DECONDITIONING: ICD-10-CM

## 2023-10-18 DIAGNOSIS — S32.049D CLOSED FRACTURE OF FOURTH LUMBAR VERTEBRA WITH ROUTINE HEALING, UNSPECIFIED FRACTURE MORPHOLOGY, SUBSEQUENT ENCOUNTER: Primary | ICD-10-CM

## 2023-10-18 DIAGNOSIS — M48.061 SPINAL STENOSIS OF LUMBAR REGION, UNSPECIFIED WHETHER NEUROGENIC CLAUDICATION PRESENT: ICD-10-CM

## 2023-10-18 DIAGNOSIS — I10 HYPERTENSION GOAL BP (BLOOD PRESSURE) < 140/90: ICD-10-CM

## 2023-10-18 DIAGNOSIS — M79.2 NEUROPATHIC PAIN: ICD-10-CM

## 2023-10-18 DIAGNOSIS — R29.6 FALLS FREQUENTLY: ICD-10-CM

## 2023-10-18 PROCEDURE — 99310 SBSQ NF CARE HIGH MDM 45: CPT | Performed by: NURSE PRACTITIONER

## 2023-10-18 NOTE — PROGRESS NOTES
"[facility] GERIATRICS    Chief Complaint   Patient presents with    RECHECK     HPI:  Brionna Rollins is a 78 year old  (1945), who is being seen today for an episodic care visit at: [facility]) [25]. Today's concern is:   Spinal stenosis, L4 Fx: ongoing chronic pain, no increase. Patient rates pain in low back as 5/10 at time of exam, sitting up on edge of bed  In therapy patient is walking up to 700 feet using a RW with SBA  HTN: /67, 140/80, 170/78 with HR 70 range, denies CP, palpitations, SOB  Neuropathy: ongoing, patient has a call out to neurology to increase neurontin    Allergies, and PMH/PSH reviewed in [facility] today.  REVIEW OF SYSTEMS:  10 point ROS of systems including Constitutional, Eyes, Respiratory, Cardiovascular, Gastroenterology, Genitourinary, Integumentary, Musculoskeletal, Psychiatric were all negative except for pertinent positives noted in my HPI.    Objective:   /67   Pulse 76   Temp 98  F (36.7  C)   Resp 17   Ht 1.6 m (5' 3\")   Wt 52.6 kg (116 lb)   SpO2 99%   BMI 20.55 kg/m    GENERAL APPEARANCE:  Alert, in no distress, thin  ENT:  Mouth and posterior oropharynx normal, moist mucous membranes, normal hearing acuity  EYES:  EOM, conjunctivae, lids, pupils and irises normal, PERRL  RESP:  respiratory effort and palpation of chest normal, lungs clear to auscultation , no respiratory distress  CV:  Palpation and auscultation of heart done , regular rate and rhythm, no murmur, rub, or gallop, no edema  ABDOMEN:  normal bowel sounds, soft, nontender, no hepatosplenomegaly or other masses  M/S:   patient sitting up on edge of bed  SKIN:  Inspection of skin and subcutaneous tissue baseline  NEURO:   speech wnl  PSYCH:  affect and mood normal    Recent labs in [facility] reviewed by me today.  and Most Recent 3 CBC's:  Recent Labs   Lab Test 09/20/23  1258 02/23/22  1225 12/04/19  1250   WBC 3.4* 3.5* 4.4   HGB 13.2 13.7 13.4   MCV 93 93 95    187 190 "     Most Recent 3 BMP's:  Recent Labs   Lab Test 09/20/23  1258 11/30/21  0909 12/04/19  1250    142 140   POTASSIUM 4.1 4.1 4.5   CHLORIDE 105 110* 108   CO2 26 29 26   BUN 15.8 16 13   CR 0.57 0.70 0.83   ANIONGAP 10 3 6   CHRIS 9.2 8.4* 8.9   GLC 95 87 92       Assessment/Plan:  (S32.049D) Closed fracture of fourth lumbar vertebra with routine healing, unspecified fracture morphology, subsequent encounter  (primary encounter diagnosis)  (R53.81) Physical deconditioning  (R29.6) Falls frequently  Comment: acute/ongoing, no change  Spine consulted recommend TLSO brace and medrol dosepak  Plan: PT and OT, TLSO brace, medrol dosepak, neurontin 200mg BID and 600mg qhs, tylenol 1000mg TID, dilaudid 2-4mg q 6 hours prn        (M48.061) Spinal stenosis of lumbar region, unspecified whether neurogenic claudication present  Comment: chronic ongoing, no change  S/p spine decompression and fusion on 9/27/23 Dr. Dumont  Plan: PT and OT     (I10) Hypertension goal BP (blood pressure) < 140/90  Comment: ongoing, no change  Plan: BMP follow, continue lisinopril 10mg QD    MED REC REQUIRED  Post Medication Reconciliation Status: medication reconcilation previously completed during another office visit      Orders:  No new orders      Electronically signed by: Tonya Lynn Haase, APRN CNP

## 2023-10-18 NOTE — LETTER
"    10/18/2023        RE: Brionna Rollins  [address] [address]        [facility]    Chief Complaint   Patient presents with     RECHECK     HPI:  Brionna Rollins is a 78 year old  (1945), who is being seen today for an episodic care visit at: [facility]) [25]. Today's concern is:   Spinal stenosis, L4 Fx: ongoing chronic pain, no increase. Patient rates pain in low back as 5/10 at time of exam, sitting up on edge of bed  In therapy patient is walking up to 700 feet using a RW with SBA  HTN: /67, 140/80, 170/78 with HR 70 range, denies CP, palpitations, SOB  Neuropathy: ongoing, patient has a call out to neurology to increase neurontin    Allergies, and PMH/PSH reviewed in [facility] today.  REVIEW OF SYSTEMS:  10 point ROS of systems including Constitutional, Eyes, Respiratory, Cardiovascular, Gastroenterology, Genitourinary, Integumentary, Musculoskeletal, Psychiatric were all negative except for pertinent positives noted in my HPI.    Objective:   /67   Pulse 76   Temp 98  F (36.7  C)   Resp 17   Ht 1.6 m (5' 3\")   Wt 52.6 kg (116 lb)   SpO2 99%   BMI 20.55 kg/m    GENERAL APPEARANCE:  Alert, in no distress, thin  ENT:  Mouth and posterior oropharynx normal, moist mucous membranes, normal hearing acuity  EYES:  EOM, conjunctivae, lids, pupils and irises normal, PERRL  RESP:  respiratory effort and palpation of chest normal, lungs clear to auscultation , no respiratory distress  CV:  Palpation and auscultation of heart done , regular rate and rhythm, no murmur, rub, or gallop, no edema  ABDOMEN:  normal bowel sounds, soft, nontender, no hepatosplenomegaly or other masses  M/S:   patient sitting up on edge of bed  SKIN:  Inspection of skin and subcutaneous tissue baseline  NEURO:   speech wnl  PSYCH:  affect and mood normal    Recent labs in [facility] reviewed by me today.  and Most Recent 3 CBC's:  Recent Labs   Lab Test 09/20/23  1258 02/23/22  1225 " 12/04/19  1250   WBC 3.4* 3.5* 4.4   HGB 13.2 13.7 13.4   MCV 93 93 95    187 190     Most Recent 3 BMP's:  Recent Labs   Lab Test 09/20/23  1258 11/30/21  0909 12/04/19  1250    142 140   POTASSIUM 4.1 4.1 4.5   CHLORIDE 105 110* 108   CO2 26 29 26   BUN 15.8 16 13   CR 0.57 0.70 0.83   ANIONGAP 10 3 6   CHRIS 9.2 8.4* 8.9   GLC 95 87 92       Assessment/Plan:  (S32.049D) Closed fracture of fourth lumbar vertebra with routine healing, unspecified fracture morphology, subsequent encounter  (primary encounter diagnosis)  (R53.81) Physical deconditioning  (R29.6) Falls frequently  Comment: acute/ongoing, no change  Spine consulted recommend TLSO brace and medrol dosepak  Plan: PT and OT, TLSO brace, medrol dosepak, neurontin 200mg BID and 600mg qhs, tylenol 1000mg TID, dilaudid 2-4mg q 6 hours prn        (M48.061) Spinal stenosis of lumbar region, unspecified whether neurogenic claudication present  Comment: chronic ongoing, no change  S/p spine decompression and fusion on 9/27/23 Dr. Dumont  Plan: PT and OT     (I10) Hypertension goal BP (blood pressure) < 140/90  Comment: ongoing, no change  Plan: BMP follow, continue lisinopril 10mg QD    MED REC REQUIRED  Post Medication Reconciliation Status: medication reconcilation previously completed during another office visit      Orders:  No new orders      Electronically signed by: Tonya Lynn Haase, APRN CNP         Sincerely,        Tonya Lynn Haase, APRN CNP

## 2023-10-19 ENCOUNTER — DISCHARGE SUMMARY NURSING HOME (OUTPATIENT)
Dept: GERIATRICS | Facility: CLINIC | Age: 78
End: 2023-10-19
Payer: MEDICARE

## 2023-10-19 VITALS
BODY MASS INDEX: 20.55 KG/M2 | RESPIRATION RATE: 18 BRPM | TEMPERATURE: 98.3 F | HEIGHT: 63 IN | SYSTOLIC BLOOD PRESSURE: 145 MMHG | WEIGHT: 116 LBS | DIASTOLIC BLOOD PRESSURE: 76 MMHG | OXYGEN SATURATION: 97 % | HEART RATE: 89 BPM

## 2023-10-19 DIAGNOSIS — M48.061 SPINAL STENOSIS OF LUMBAR REGION, UNSPECIFIED WHETHER NEUROGENIC CLAUDICATION PRESENT: ICD-10-CM

## 2023-10-19 DIAGNOSIS — I10 HYPERTENSION GOAL BP (BLOOD PRESSURE) < 140/90: ICD-10-CM

## 2023-10-19 DIAGNOSIS — R53.81 PHYSICAL DECONDITIONING: ICD-10-CM

## 2023-10-19 DIAGNOSIS — S32.049D CLOSED FRACTURE OF FOURTH LUMBAR VERTEBRA WITH ROUTINE HEALING, UNSPECIFIED FRACTURE MORPHOLOGY, SUBSEQUENT ENCOUNTER: Primary | ICD-10-CM

## 2023-10-19 DIAGNOSIS — M79.2 NEUROPATHIC PAIN: ICD-10-CM

## 2023-10-19 DIAGNOSIS — R29.6 FALLS FREQUENTLY: ICD-10-CM

## 2023-10-19 PROCEDURE — 99316 NF DSCHRG MGMT 30 MIN+: CPT | Performed by: NURSE PRACTITIONER

## 2023-10-19 RX ORDER — HYDROMORPHONE HYDROCHLORIDE 2 MG/1
2 TABLET ORAL EVERY 6 HOURS PRN
Qty: 10 TABLET | Refills: 0 | Status: SHIPPED | OUTPATIENT
Start: 2023-10-19 | End: 2023-11-29

## 2023-10-19 NOTE — PROGRESS NOTES
[facility] GERIATRICS DISCHARGE SUMMARY  PATIENT'S NAME: Brionna Rollins  YOB: 1945  MEDICAL RECORD NUMBER:  [id number]  Place of Service where encounter took place:  [facility]) [25]    PRIMARY CARE PROVIDER AND CLINIC RESPONSIBLE AFTER TRANSFER:   Roe Connolly MD, [address]  / [place] [address]    [facility] Provider     Transferring providers: Tonya Lynn Haase, APRN CNP, Alvino Thakur MD  Recent Hospitalization/ED:  [facility]  stay 10/11/23 to 10/14/23.  Date of SNF Admission: October 14, 2023  Date of SNF (anticipated) Discharge: October 21, 2023  Discharged to: previous independent home  Cognitive Scores: BIMS: 15/15 and Short blessed: 0/28  Physical Function: Ambulating > 400 ft with RW  DME: Walker    CODE STATUS/ADVANCE DIRECTIVES DISCUSSION:  Special Code   ALLERGIES: Scopolamine, Ciprofloxacin, Amoxicillin-pot clavulanate [amoxicillin-pot clavulanate], Contrast dye, Doxycycline hyclate, Erythromycin, Flagyl [metronidazole hcl], Meperidine hcl, Oxycodone, Prochlorperazine, Sulfa antibiotics, Tetracycline, and Cefprozil    NURSING FACILITY COURSE   Medication Changes/Rationale:   See assessment and plan    Summary of nursing facility stay:   Patient progressed to walking > 400 feet using a RW independently, independent with ADL's patient will discharge to home with home PT and HHA through [facility]    Discharge Medications:  MED REC REQUIRED  Post Medication Reconciliation Status: discharge medications reconciled and changed, per note/orders       Current Outpatient Medications   Medication Sig Dispense Refill    acetaminophen (TYLENOL) 500 MG tablet Take 2 tablets (1,000 mg) by mouth every 6 hours as needed for mild pain      gabapentin (NEURONTIN) 100 MG capsule Take 2 capsules (200 mg) by mouth 2 times daily      gabapentin (NEURONTIN) 300 MG capsule Take 2 capsules (600 mg) by mouth at bedtime      lisinopril (ZESTRIL) 5 MG tablet  "Take 2 tablets (10 mg) by mouth daily 180 tablet 3    valACYclovir (VALTREX) 500 MG tablet Take 1 tablet (500 mg) by mouth daily 90 tablet 3          Controlled medications:   Medication dilaudid 2mg q 8 hours prn electronically prescribed to [facility]     Past Medical History:   Past Medical History:   Diagnosis Date    RADIAL STYLOID TENOSYNOV - right 02/23/2006    lifts weights, doesn't think work-related; OK to add PT visits prn    Rotator cuff tear      Physical Exam:   Vitals: BP (!) 145/76   Pulse 89   Temp 98.3  F (36.8  C)   Resp 18   Ht 1.6 m (5' 3\")   Wt 52.6 kg (116 lb)   SpO2 97%   BMI 20.55 kg/m    BMI: Body mass index is 20.55 kg/m .  GENERAL APPEARANCE:  Alert, in no distress, thin  ENT:  Mouth and posterior oropharynx normal, moist mucous membranes, normal hearing acuity  EYES:  EOM, conjunctivae, lids, pupils and irises normal, PERRL  RESP:  respiratory effort and palpation of chest normal, lungs clear to auscultation , no respiratory distress  CV:  Palpation and auscultation of heart done , regular rate and rhythm, no murmur, rub, or gallop, no edema  ABDOMEN:  normal bowel sounds, soft, nontender, no hepatosplenomegaly or other masses  M/S:   patient standing in room, able to move all 4 extremities  SKIN:  Inspection of skin and subcutaneous tissue baseline  NEURO:   speech wnl  PSYCH:  affect and mood normal     SNF labs: Recent labs in [facility] reviewed by me today.  and Most Recent 3 CBC's:  Recent Labs   Lab Test 09/20/23  1258 02/23/22  1225 12/04/19  1250   WBC 3.4* 3.5* 4.4   HGB 13.2 13.7 13.4   MCV 93 93 95    187 190     Most Recent 3 BMP's:  Recent Labs   Lab Test 09/20/23  1258 11/30/21  0909 12/04/19  1250    142 140   POTASSIUM 4.1 4.1 4.5   CHLORIDE 105 110* 108   CO2 26 29 26   BUN 15.8 16 13   CR 0.57 0.70 0.83   ANIONGAP 10 3 6   CHRIS 9.2 8.4* 8.9   GLC 95 87 92     Assessment/Plan:  (S32.049D) Closed fracture of fourth lumbar vertebra with routine " healing, unspecified fracture morphology, subsequent encounter  (primary encounter diagnosis)  (R53.81) Physical deconditioning  (R29.6) Falls frequently  Comment: ongoing   Continue TLSO brace, medrol dosepak finished  Plan: DC to home with home PT and HHA through [facility],  therapy recommend patient use RW with ambulation as she is safer with use of device, continue  neurontin 200mg BID and 600mg qhs, tylenol 1000mg TID, dilaudid 2mg q 6 hours prn disp #10 tabs        (M48.061) Spinal stenosis of lumbar region, unspecified whether neurogenic claudication present  Comment: chronic ongoing  S/p spine decompression and fusion on 9/27/23 Dr. Dumont  Plan: Discharge to home with home PT and HHA through [facility]     (I10) Hypertension goal BP (blood pressure) < 140/90  Comment: ongoing  Plan: continue lisinopril 10mg QD    DISCHARGE PLAN:  Follow up labs: No labs orders/due  Medical Follow Up:      Follow up with primary care provider in 1-2 weeks  [facility] scheduled appointments:  Next 5 appointments (look out 90 days)      Nov 29, 2023  2:30 PM  (Arrive by 2:10 PM)  Provider Visit with Roe [facility]olly MD  [facility] ([facility] ) [address] [place] [id number] [phone]           Discharge Services: Home Care:  Physical Therapy and Home Health Aide  Discharge Instructions Verbalized to Patient at Discharge:   None    TOTAL DISCHARGE TIME:   Greater than 30 minutes  Electronically signed by:  Tonya Lynn Haase, APRN CNP

## 2023-10-19 NOTE — LETTER
10/19/2023        RE: Brionna Rollins  [address]  [place] [address]        [facility] GERIATRICS DISCHARGE SUMMARY  PATIENT'S NAME: Brionna Rollins  YOB: 1945  MEDICAL RECORD NUMBER:  [id number]  Place of Service where encounter took place:  [facility]) [25]    PRIMARY CARE PROVIDER AND CLINIC RESPONSIBLE AFTER TRANSFER:   Roe Connolly MD, [address]  / [place] [address]    [facility] Provider     Transferring providers: Tonya Lynn Haase, APRN CNP, Alvino Thakur MD  Recent Hospitalization/ED:  [facility]  stay 10/11/23 to 10/14/23.  Date of SNF Admission: October 14, 2023  Date of SNF (anticipated) Discharge: October 21, 2023  Discharged to: previous independent home  Cognitive Scores: BIMS: 15/15 and Short blessed: 0/28  Physical Function: Ambulating > 400 ft with RW  DME: Walker    CODE STATUS/ADVANCE DIRECTIVES DISCUSSION:  Special Code   ALLERGIES: Scopolamine, Ciprofloxacin, Amoxicillin-pot clavulanate [amoxicillin-pot clavulanate], Contrast dye, Doxycycline hyclate, Erythromycin, Flagyl [metronidazole hcl], Meperidine hcl, Oxycodone, Prochlorperazine, Sulfa antibiotics, Tetracycline, and Cefprozil    NURSING FACILITY COURSE   Medication Changes/Rationale:   See assessment and plan    Summary of nursing facility stay:   Patient progressed to walking > 400 feet using a RW independently, independent with ADL's patient will discharge to home with home PT and HHA through [facility]    Discharge Medications:  MED REC REQUIRED  Post Medication Reconciliation Status: discharge medications reconciled and changed, per note/orders       Current Outpatient Medications   Medication Sig Dispense Refill     acetaminophen (TYLENOL) 500 MG tablet Take 2 tablets (1,000 mg) by mouth every 6 hours as needed for mild pain       gabapentin (NEURONTIN) 100 MG capsule Take 2 capsules (200 mg) by mouth 2 times daily       gabapentin (NEURONTIN) 300 MG  "capsule Take 2 capsules (600 mg) by mouth at bedtime       lisinopril (ZESTRIL) 5 MG tablet Take 2 tablets (10 mg) by mouth daily 180 tablet 3     valACYclovir (VALTREX) 500 MG tablet Take 1 tablet (500 mg) by mouth daily 90 tablet 3          Controlled medications:   Medication dilaudid 2mg q 8 hours prn electronically prescribed to [facility]     Past Medical History:   Past Medical History:   Diagnosis Date     RADIAL STYLOID TENOSYNOV - right 02/23/2006    lifts weights, doesn't think work-related; OK to add PT visits prn     Rotator cuff tear      Physical Exam:   Vitals: BP (!) 145/76   Pulse 89   Temp 98.3  F (36.8  C)   Resp 18   Ht 1.6 m (5' 3\")   Wt 52.6 kg (116 lb)   SpO2 97%   BMI 20.55 kg/m    BMI: Body mass index is 20.55 kg/m .  GENERAL APPEARANCE:  Alert, in no distress, thin  ENT:  Mouth and posterior oropharynx normal, moist mucous membranes, normal hearing acuity  EYES:  EOM, conjunctivae, lids, pupils and irises normal, PERRL  RESP:  respiratory effort and palpation of chest normal, lungs clear to auscultation , no respiratory distress  CV:  Palpation and auscultation of heart done , regular rate and rhythm, no murmur, rub, or gallop, no edema  ABDOMEN:  normal bowel sounds, soft, nontender, no hepatosplenomegaly or other masses  M/S:   patient standing in room, able to move all 4 extremities  SKIN:  Inspection of skin and subcutaneous tissue baseline  NEURO:   speech wnl  PSYCH:  affect and mood normal     SNF labs: Recent labs in [facility] reviewed by me today.  and Most Recent 3 CBC's:  Recent Labs   Lab Test 09/20/23  1258 02/23/22  1225 12/04/19  1250   WBC 3.4* 3.5* 4.4   HGB 13.2 13.7 13.4   MCV 93 93 95    187 190     Most Recent 3 BMP's:  Recent Labs   Lab Test 09/20/23  1258 11/30/21  0909 12/04/19  1250    142 140   POTASSIUM 4.1 4.1 4.5   CHLORIDE 105 110* 108   CO2 26 29 26   BUN 15.8 16 13   CR 0.57 0.70 0.83   ANIONGAP 10 3 6   CHRIS 9.2 8.4* 8.9   GLC 95 87 92 "     Assessment/Plan:  (S32.049D) Closed fracture of fourth lumbar vertebra with routine healing, unspecified fracture morphology, subsequent encounter  (primary encounter diagnosis)  (R53.81) Physical deconditioning  (R29.6) Falls frequently  Comment: ongoing   Continue TLSO brace, medrol dosepak finished  Plan: DC to home with home PT and HHA through [facility],  therapy recommend patient use RW with ambulation as she is safer with use of device, continue  neurontin 200mg BID and 600mg qhs, tylenol 1000mg TID, dilaudid 2mg q 6 hours prn disp #10 tabs        (M48.061) Spinal stenosis of lumbar region, unspecified whether neurogenic claudication present  Comment: chronic ongoing  S/p spine decompression and fusion on 9/27/23 Dr. Dumont  Plan: Discharge to home with home PT and HHA through [facility]     (I10) Hypertension goal BP (blood pressure) < 140/90  Comment: ongoing  Plan: continue lisinopril 10mg QD    DISCHARGE PLAN:  Follow up labs: No labs orders/due  Medical Follow Up:      Follow up with primary care provider in 1-2 weeks  [facility] scheduled appointments:  Next 5 appointments (look out 90 days)      Nov 29, 2023  2:30 PM  (Arrive by 2:10 PM)  Provider [facility]  [facility] ([facility] ) [address] [place] [id number] [phone]           Discharge Services: Home Care:  Physical Therapy and Home Health Aide  Discharge Instructions Verbalized to Patient at Discharge:   None    TOTAL DISCHARGE TIME:   Greater than 30 minutes  Electronically signed by:  Tonya Lynn Haase, APRN CNP                   Sincerely,        Tonya Lynn Haase, APRN CNP

## 2023-10-25 ENCOUNTER — TELEPHONE (OUTPATIENT)
Dept: PEDIATRICS | Facility: CLINIC | Age: 78
End: 2023-10-25
Payer: MEDICARE

## 2023-10-25 NOTE — TELEPHONE ENCOUNTER
Received call from CHIRAG Kenny from [facility]. RN was not able to admit her to home care. Patient is not home bound. Pain is controlled. Does not need to use walker. Balance and gait are normal. Home is set up with any needed equipment. No need for home health at this time. Routing to update PCP.     Danyel GARCIA RN 10/25/2023 at 12:24 PM

## 2023-11-14 NOTE — PROGRESS NOTES
"  Assessment & Plan     Chronic midline low back pain without sciatica  discussed with patient (or patient's parents/caregiver) pathophysiology of condition and treatment options.  reviwed history today as well and work-up to date. patient will continue with ortho to get second opinion.      Chronic pain of left knee  as above continue cares and plan. continue supportive cares, follow-up as scheduled.                    Return in about 3 months (around 12/14/2022) for Next scheduled follow-up visit, or sooner if symptoms persist or worsen.    Roe Connolly MD  [facility] HIPOLITO Staton is a 77 year old, presenting for the following health issues:  RECHECK      HPI     Follow-up, pt has a list of things she would like to discuss with you.    patient here for follow-up, notes knee pain, better with over-the-counter pain patch and some ace bandages.. does have follow-up orhto appointment in October and plans to get a 2nd opinion.   back is sore all the time, low back mostly, has been walking 2-2.5 miles a day, does stretch, hasn't been doing yoga, acupuncture was working well, but she stopped this and has had trouble getting an appointment. patient has a friend who had a spinal fusion and this helped, has been seeing chiropractor and was suggested to seen there and she has an appointment. No other concerns or complaints today.         Review of Systems   Constitutional, HEENT, cardiovascular, pulmonary, gi and gu systems are negative, except as otherwise noted.      Objective    /74 (BP Location: Right arm, Patient Position: Chair, Cuff Size: Adult Regular)   Pulse 70   Temp 98.7  F (37.1  C) (Tympanic)   Resp 16   Ht 1.6 m (5' 3\")   Wt 56.4 kg (124 lb 4.8 oz)   SpO2 97%   BMI 22.02 kg/m    Body mass index is 22.02 kg/m .  Physical Exam   GENERAL: healthy, alert and no distress  HENT: ear canals and TM's normal, mouth and nose covered with mask due to covid   PSYCH: " mentation appears normal, affect normal/bright                       Assessment and Plan:   Patient is a 58 y/o M with a PHx HTN, DM, CVA sp tnK with full resolution symptoms, who presented to [facility] on 10/12 due to sudden fall at work and lost consciousness.  MRI brain showed left temporal occipital lobe infarctions and acute punctate left occipital lobe infarction now s/p thrombectomy and Right MCA/PCA oclusion with left sided weakness, sensory impairment, left homonymous hemianopsia, gait and ADL impairments.     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 Continue for 3 months.    - Atorvastatin 80mg qhs.   - C/w Memantine 5mg BID for cognitive deficits - 11/3.   - cont comprehensive rehab program OT, PT, SLP  3 hours daily 5 x week  - neuropsychology evaluation and support, recreation therapy  - Precautions: cardiac, DM, respiratory, breast CA, fall, aspiration.  - No new neurological deficits    #Memory deficits  -10/13: MRI Brain  showing L PCA P2 occlusion    -Cont. Memantine  10mg BID   -Trial increase Donepezil to 10mg in AM--start  11/14--monitor for SE  - EKG QTc  11/10--445  -Monitor HR as donepezil has slight risk of worsening bradycardia--HR has been stable  -QTc 460 on 11/7       # Bradycardia  - No indication for pacemaker  - Metoprolol 12.5mg BID has been discontinued   - per electrophysiology notes, no indication for pacemaker, s/p ILR on 10/20  - needs EP follow up as an outpatient  - HR stable in 50s-60s     # HTN  - No medication at home and patient is sensitive to HTN medication. Was previously started on lisinopril.   - Can treat with hydralazine 25mg PRN for >170.   - did not tolerate lisinopril 5mg during hospitalization (intermittent hypotension)  - Dr. Curry suggests 10 point decrease in BP parameters based on clinical exam  - Current parameters -150 over weekend, titrate 120-140 as tolerated  - Monitor BP-- SBP has been 120-150    # HLD  - Atorvastatin 80 mg daily    # DM 2  - A1C 8.6  - Metformin   - Accu checks and ISS    # ETOH abuse  - Not in active withdrawal.  - Continue MVI, thiamine and folic acid    # Pain management  - No complaints     # GI/Bowel:  - Senna QHS, Miralax Daily  - dulcolax PO     # /Bladder:   -Continent    # FEN   - Diet: DASH DIET     # DVT ppx  - Eliquis 5mg BID    #Dispo  - awaiting placement    IDT: 11/7   TDD:  11/11 to home.  d/c plan to [facility] ASAP   RN: continent with bowel and continent with bladder   SW: Lives in Apartment with his daughter. On the main floor. He was independent before. Sister is supportive  SP: Regular with thin. Mild cognitive deficits-- . Recall and short term memory deficits, reasoning, attention, decreased mental flexibility.  Needs assistance with IADLs  OT: Setup - eating & UBD. CG - grooming, toileting, toilet transfers and CS-- LBD.   PT : Supervision - transfer. Ambulation 150 feet without AD and 12 steps with supervision.    Goals:1.  Supervision for toileting.   2.  Supervision for ambulation.   3. Recall salient information with external aides    11/8 - SW working on [facility] placement, discussed with sister     Addendum - Spoke to Lety about discharge planning. No-one is able to support at home at this time.     Outpatient Follow-up (Specialty/Name of physician):    Rj Delgado  Cardiac Electrophysiology  [address] [place] [id number]  Phone: [phone]  Fax: [phone]  Follow Up Time:     Wily Barkley  Neurology  [address] [place] [address]  Phone: [phone]  Fax: [phone]  Follow Up Time: 1 week    Denzel Zapata  Interventional Cardiology  [address] [facility], [address] [address]  Phone: [phone]  Fax: [phone]  Follow Up Time: 1 week    PCP,   Phone: (   )    -  Fax: (   )    -  Follow Up Time: 1 week    Arsenio Goodrich  Interventional Neuroradiology  [address] [place] [address]  Phone: [phone]  Fax: [phone]             Assessment and Plan:   Patient is a 58 y/o M with a PHx HTN, DM, CVA sp tnK with full resolution symptoms, who presented to [facility] on 10/12 due to sudden fall at work and lost consciousness.  MRI brain showed left temporal occipital lobe infarctions and acute punctate left occipital lobe infarction now s/p thrombectomy and Right MCA/PCA oclusion with left sided weakness, sensory impairment, left homonymous hemianopsia, gait and ADL impairments.     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 Continue for 3 months.    - Atorvastatin 80mg qhs.   - C/w Memantine 5mg BID for cognitive deficits - 11/3.   - cont comprehensive rehab program OT, PT, SLP  3 hours daily 5 x week  - neuropsychology evaluation and support, recreation therapy  - Precautions: cardiac, DM, respiratory, breast CA, fall, aspiration.  - No new neurological deficits    #Memory deficits  -10/13: MRI Brain  showing L PCA P2 occlusion    -Cont. Memantine 10mg BID   -Trial increase Donepezil to 10mg in AM--start  11/14--monitor for SE  - EKG QTc  11/10--445  -Monitor HR as donepezil has slight risk of worsening bradycardia--HR has been stable  -QTc 460 on 11/7       # Bradycardia  - No indication for pacemaker  - Metoprolol 12.5mg BID has been discontinued   - per electrophysiology notes, no indication for pacemaker, s/p ILR on 10/20  - needs EP follow up as an outpatient  - HR stable in 50s-60s     # HTN  - No medication at home and patient is sensitive to HTN medication. Was previously started on lisinopril.   - Can treat with hydralazine 25mg PRN for >170.   - did not tolerate lisinopril 5mg during hospitalization (intermittent hypotension)  - Dr. Curry suggests 10 point decrease in BP parameters based on clinical exam  - Current parameters -150 over weekend, titrate 120-140 as tolerated  - Monitor BP-- SBP has been 120-150    # HLD  - Atorvastatin 80 mg daily    # DM 2  - A1C 8.6  - Metformin   - Accu checks and ISS    # ETOH abuse  - Not in active withdrawal.  - Continue MVI, thiamine and folic acid    # Pain management  - No complaints     # GI/Bowel:  - Senna QHS, Miralax Daily  - dulcolax PO     # /Bladder:   -Continent    # FEN   - Diet: DASH DIET     # DVT ppx  - Eliquis 5mg BID    #Dispo  - awaiting placement    IDT: 11/7   TDD:  11/11 to home.  d/c plan to [facility] ASAP   RN: continent with bowel and continent with bladder   SW: Lives in Apartment with his daughter. On the main floor. He was independent before. Sister is supportive  SP: Regular with thin. Mild cognitive deficits-- . Recall and short term memory deficits, reasoning, attention, decreased mental flexibility.  Needs assistance with IADLs  OT: Setup - eating & UBD. CG - grooming, toileting, toilet transfers and CS-- LBD.   PT : Supervision - transfer. Ambulation 150 feet without AD and 12 steps with supervision.    Goals:1.  Supervision for toileting.   2.  Supervision for ambulation.   3. Recall salient information with external aides    11/8 - SW working on [facility] placement, discussed with sister     Addendum - Spoke to Lety about discharge planning. No-one is able to support at home at this time.     Outpatient Follow-up (Specialty/Name of physician):    Rj Delgado  Cardiac Electrophysiology  [address] [place] [id number]  Phone: [phone]  Fax: [phone]  Follow Up Time:     Wily Barkley  Neurology  [address] [place] [address]  Phone: [phone]  Fax: [phone]  Follow Up Time: 1 week    Denzel Zapata  Interventional Cardiology  [address] [facility], [address] [address]  Phone: [phone]  Fax: [phone]  Follow Up Time: 1 week    PCP,   Phone: (   )    -  Fax: (   )    -  Follow Up Time: 1 week    Arsenio Goodrich  Interventional Neuroradiology  [address] [place] [address]  Phone: [phone]  Fax: [phone]             Assessment and Plan:   Patient is a 60 y/o M with a PHx HTN, DM, CVA sp tnK with full resolution symptoms, who presented to [facility] on 10/12 due to sudden fall at work and lost consciousness.  MRI brain showed left temporal occipital lobe infarctions and acute punctate left occipital lobe infarction now s/p thrombectomy and Right MCA/PCA oclusion with left sided weakness, sensory impairment, left homonymous hemianopsia, gait and ADL impairments.     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 Continue for 3 months.    - Atorvastatin 80mg qhs.   - C/w Memantine 5mg BID for cognitive deficits - 11/3.   - cont comprehensive rehab program OT, SLP  3 hours daily 5 x week. d/c SLP  - neuropsychology evaluation and support, recreation therapy  - Precautions: cardiac, DM, respiratory, breast CA, fall, aspiration.  - No new neurological deficits    #Memory deficits  -10/13: MRI Brain  showing L PCA P2 occlusion    -Cont. Memantine 10mg BID   -Trial increase Donepezil to 10mg in AM--start  11/14--monitor for SE  - EKG QTc  11/10--445  -Monitor HR as donepezil has slight risk of worsening bradycardia--HR has been stable  -QTc 460 on 11/7       # Bradycardia  - No indication for pacemaker  - Metoprolol 12.5mg BID has been discontinued   - per electrophysiology notes, no indication for pacemaker, s/p ILR on 10/20  - needs EP follow up as an outpatient  - HR stable in 50s-60s     # HTN  - No medication at home and patient is sensitive to HTN medication. Was previously started on lisinopril.   - Can treat with hydralazine 25mg PRN for >170.   - did not tolerate lisinopril 5mg during hospitalization (intermittent hypotension)  - Dr. Curry suggests 10 point decrease in BP parameters based on clinical exam  - Current parameters -150 over weekend, titrate 120-140 as tolerated  - Monitor BP-- SBP has been 120-150    # HLD  - Atorvastatin 80 mg daily    # DM 2  - A1C 8.6  - Metformin   - Accu checks and ISS    # ETOH abuse  - Not in active withdrawal.  - Continue MVI, thiamine and folic acid    # Pain management  - No complaints     # GI/Bowel:  - Senna QHS, Miralax Daily  - dulcolax PO     # /Bladder:   -Continent    # FEN   - Diet: DASH DIET     # DVT ppx  - Eliquis 5mg BID    #Dispo  - awaiting placement    IDT: 11/14  TDD:  d/c plan to [facility] ASAP   RN: continent with bowel and continent with bladder   SW: Lives in Apartment with his daughter. On the main floor. He was independent before. Sister is supportive  SP: Regular with thin. Mild cognitive deficits-- . Recall and short term memory deficits, reasoning, attention, decreased mental flexibility.  Needs assistance with IADLs, finances, meds  OT: Setup/sup - eating, grooming, UBD. CG - toileting, toilet transfers and CS-- LBD.  Min-A Bathing.   PT : D/C WFL. Ambulation 150 feet without AD and 12 steps with supervision.    Goals:  1. Recall salient information with external aides    Outpatient Follow-up (Specialty/Name of physician):    Rj Delgado  Cardiac Electrophysiology  [address] [id number]  Phone: [phone]  Fax: [phone]  Follow Up Time:     Wily Barkley  Neurology  [address] [place] [address]  Phone: [phone]  Fax: [phone]  Follow Up Time: 1 week    Denzel Zapata  Interventional Cardiology  [address] [address]  Phone: [phone]  Fax: [phone]  Follow Up Time: 1 week    PCP,   Phone: (   )    -  Fax: (   )    -  Follow Up Time: 1 week    Arsenio Goodrich  Interventional Neuroradiology  [address] [place] [address]  Phone: [phone]  Fax: [phone]             Assessment and Plan:   Patient is a 58 y/o M with a PHx HTN, DM, CVA sp tnK with full resolution symptoms, who presented to [facility] on 10/12 due to sudden fall at work and lost consciousness.  MRI brain showed left temporal occipital lobe infarctions and acute punctate left occipital lobe infarction now s/p thrombectomy and Right MCA/PCA oclusion with left sided weakness, sensory impairment, left homonymous hemianopsia, gait and ADL impairments.     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 Continue for 3 months.    - Atorvastatin 80mg qhs.   - C/w Memantine 5mg BID for cognitive deficits - 11/3.   - cont comprehensive rehab program OT, SLP  3 hours daily 5 x week. d/c SLP  - neuropsychology evaluation and support, recreation therapy  - Precautions: cardiac, DM, respiratory, breast CA, fall, aspiration.  - No new neurological deficits    #Memory deficits  -10/13: MRI Brain  showing L PCA P2 occlusion    -Cont. Memantine 10mg BID   -Trial increase Donepezil to 10mg in AM--start  11/14--monitor for SE  - EKG QTc  11/10--445  -Monitor HR as donepezil has slight risk of worsening bradycardia--HR has been stable  -QTc 460 on 11/7       # Bradycardia  - No indication for pacemaker  - Metoprolol 12.5mg BID has been discontinued   - per electrophysiology notes, no indication for pacemaker, s/p ILR on 10/20  - needs EP follow up as an outpatient  - HR stable in 50s-60s     # HTN  - No medication at home and patient is sensitive to HTN medication. Was previously started on lisinopril.   - Can treat with hydralazine 25mg PRN for >170.   - did not tolerate lisinopril 5mg during hospitalization (intermittent hypotension)  - Dr. Curry suggests 10 point decrease in BP parameters based on clinical exam  - Current parameters -150 over weekend, titrate 120-140 as tolerated  - Monitor BP-- SBP has been 120-150    # HLD  - Atorvastatin 80 mg daily    # DM 2  - A1C 8.6  - Metformin   - Accu checks and ISS    # ETOH abuse  - Not in active withdrawal.  - Continue MVI, thiamine and folic acid    # Pain management  - No complaints     # GI/Bowel:  - Senna QHS, Miralax Daily  - dulcolax PO     # /Bladder:   -Continent    # FEN   - Diet: DASH DIET     # DVT ppx  - Eliquis 5mg BID    #Dispo  - awaiting placement    IDT: 11/14  TDD:  d/c plan to [facility] ASAP   RN: continent with bowel and continent with bladder   SW: Lives in Apartment with his daughter. On the main floor. He was independent before. Sister is supportive  SP: Regular with thin. Mild cognitive deficits-- . Recall and short term memory deficits, reasoning, attention, decreased mental flexibility. Needs assistance with IADLs, finances, meds  OT: Setup/sup - eating, grooming, UBD. CG - toileting, toilet transfers and CS-- LBD.  Min-A Bathing.   PT : D/C WFL. Ambulation 150 feet without AD and 12 steps with supervision.    Goals: Recall salient information with external aides    -- Spoke with patient's sister, Selma. She had questions about his current cognitive state and recovery. Discussed that he is not able to be unsupervised at this time, requiring help with money, medication, etc. as mentioned above. All questions answered. Selma is aware of the divorce/death documentation holding up placement and states the family is actively working on this issue. 11/14/23.     Outpatient Follow-up (Specialty/Name of physician):    Rj Delgado  Cardiac Electrophysiology  [address] [place], [place] [id number]  Phone: [phone]  Fax: [phone]  Follow Up Time:     Wily Barkley  Neurology  [address] [place] [address]  Phone: [phone]  Fax: [phone]  Follow Up Time: 1 week    Denzel Zapata  Interventional Cardiology  [address] [address]  Phone: [phone]  Fax: [phone]  Follow Up Time: 1 week    PCP,   Phone: (   )    -  Fax: (   )    -  Follow Up Time: 1 week    Arsenio Goodrich  Interventional Neuroradiology  [address] [place] [address]  Phone: [phone]  Fax: [phone]             Assessment and Plan:   Patient is a 60 y/o M with a PHx HTN, DM, CVA sp tnK with full resolution symptoms, who presented to [facility] on 10/12 due to sudden fall at work and lost consciousness.  MRI brain showed left temporal occipital lobe infarctions and acute punctate left occipital lobe infarction now s/p thrombectomy and Right MCA/PCA oclusion with left sided weakness, sensory impairment, left homonymous hemianopsia, gait and ADL impairments.     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 Continue for 3 months.    - Atorvastatin 80mg qhs.   - C/w Memantine 5mg BID for cognitive deficits - 11/3.   - cont comprehensive rehab program OT, SLP  3 hours daily 5 x week. d/c SLP  - neuropsychology evaluation and support, recreation therapy  - Precautions: cardiac, DM, respiratory, breast CA, fall, aspiration.  - No new neurological deficits    #Memory deficits  -10/13: MRI Brain  showing L PCA P2 occlusion    -Cont. Memantine 10mg BID   -Trial increase Donepezil to 10mg in AM--started  11/14--monitor for SE  - EKG QTc  11/10--445  -Monitor HR as donepezil has slight risk of worsening bradycardia--HR has been stable  -QTc 460 on 11/7       # Bradycardia  - No indication for pacemaker  - Metoprolol 12.5mg BID has been discontinued   - per electrophysiology notes, no indication for pacemaker, s/p ILR on 10/20  - needs EP follow up as an outpatient  - HR stable in 50s-60s     # HTN  - No medication at home and patient is sensitive to HTN medication. Was previously started on lisinopril.   - Can treat with hydralazine 25mg PRN for >170.   - did not tolerate lisinopril 5mg during hospitalization (intermittent hypotension)  - Dr. Curry suggests 10 point decrease in BP parameters based on clinical exam  - Current parameters -150 over weekend, titrate 120-140 as tolerated  - Monitor BP-- SBP has been 120-150    # HLD  - Atorvastatin 80 mg daily    # DM 2  - A1C 8.6  - Metformin   - Accu checks and ISS    # ETOH abuse  - Not in active withdrawal.  - Continue MVI, thiamine and folic acid    # Pain management  - No complaints     # GI/Bowel:  - Senna QHS, Miralax Daily  - dulcolax PO     # /Bladder:   -Continent    # FEN   - Diet: DASH DIET     # DVT ppx  - Eliquis 5mg BID    #Dispo  - awaiting placement    IDT: 11/14  TDD:  d/c plan to [facility] ASAP   RN: continent with bowel and continent with bladder   SW: Lives in Apartment with his daughter. On the main floor. He was independent before. Sister is supportive  SP: Regular with thin. Mild cognitive deficits-- . Recall and short term memory deficits, reasoning, attention, decreased mental flexibility. Needs assistance with IADLs, finances, meds  OT: Setup/sup - eating, grooming, UBD. CG - toileting, toilet transfers and CS-- LBD.  Min-A Bathing.   PT : D/C WFL. Ambulation 150 feet without AD and 12 steps with supervision.    Goals: Recall salient information with external aides    -- Spoke with patient's sister, Selma. She had questions about his current cognitive state and recovery. Discussed that he is not able to be unsupervised at this time, requiring help with money, medication, etc. as mentioned above. All questions answered. Selma is aware of the divorce/death documentation holding up placement and states the family is actively working on this issue. 11/14/23.     Outpatient Follow-up (Specialty/Name of physician):    Rj Delgado  Cardiac Electrophysiology  [address] [place], [place] [id number]  Phone: [phone]  Fax: [phone]  Follow Up Time:     Wily Barkley  Neurology  [address] [place] [address]  Phone: [phone]  Fax: [phone]  Follow Up Time: 1 week    Denzel Zapata  Interventional Cardiology  [address] [address]  Phone: [phone]  Fax: [phone]  Follow Up Time: 1 week    PCP,   Phone: (   )    -  Fax: (   )    -  Follow Up Time: 1 week    Arsenio Goodrich  Interventional Neuroradiology  [address] [place] [address]  Phone: [phone]  Fax: [phone]             Assessment and Plan:   Patient is a 58 y/o M with a PHx HTN, DM, CVA sp tnK with full resolution symptoms, who presented [facility] on 10/12 due to sudden fall at work and lost consciousness.  MRI brain showed left temporal occipital lobe infarctions and acute punctate left occipital lobe infarction now s/p thrombectomy and Right MCA/PCA oclusion with left sided weakness, sensory impairment, left homonymous hemianopsia, gait and ADL impairments.     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 Continue for 3 months.    - Atorvastatin 80mg qhs.   - C/w Memantine 5mg BID for cognitive deficits - 11/3.   - cont comprehensive rehab program OT, SLP  3 hours daily 5 x week. d/c SLP  - neuropsychology evaluation and support, recreation therapy  - Precautions: cardiac, DM, respiratory, breast CA, fall, aspiration.  - No new neurological deficits    #Memory deficits  -10/13: MRI Brain  showing L PCA P2 occlusion    -Cont. Memantine 10mg BID   -Trial increase Donepezil to 10mg in AM--started  11/14--monitor for SE  - EKG QTc  11/10--445  -Monitor HR as donepezil has slight risk of worsening bradycardia--HR has been stable  -QTc 460 on 11/7       # Bradycardia  - No indication for pacemaker  - Metoprolol 12.5mg BID has been discontinued   - per electrophysiology notes, no indication for pacemaker, s/p ILR on 10/20  - needs EP follow up as an outpatient  - HR stable in 50s-60s     # HTN  - No medication at home and patient is sensitive to HTN medication. Was previously started on lisinopril.   - Can treat with hydralazine 25mg PRN for >170.   - did not tolerate lisinopril 5mg during hospitalization (intermittent hypotension)  - Dr. Curry suggests 10 point decrease in BP parameters based on clinical exam  - Current parameters -150 over weekend, titrate 120-140 as tolerated  - Monitor BP-- SBP has been 120-150    # HLD  - Atorvastatin 80 mg daily    # DM 2  - A1C 8.6  - Metformin   - Accu checks and ISS    # ETOH abuse  - Not in active withdrawal.  - Continue MVI, thiamine and folic acid    # Pain management  - No complaints     # GI/Bowel:  - Senna QHS, Miralax Daily  - dulcolax PO     # /Bladder:   -Continent    # FEN   - Diet: DASH DIET     # DVT ppx  - Eliquis 5mg BID    #Dispo  - awaiting placement    IDT: 11/14  TDD:  d/c plan to [facility] ASAP   RN: continent with bowel and continent with bladder   SW: Lives in Apartment with his daughter. On the main floor. He was independent before. family cannot provide supervision.  Insurance coverage for Medicaid holding up ROGERIO discharge  SP: Regular with thin. Mild cognitive deficits-- . Recall and short term memory deficits, reasoning, attention, decreased mental flexibility. Needs assistance with IADLs, finances, meds  OT: Setup/sup - eating, grooming, UBD. CG - toileting, toilet transfers and LBD.  Min-A Bathing.   PT : D/C WFL. Ambulation 150 feet without AD and 12 steps with supervision.    Goals: 1. Recall salient information with external aides  2. supervision ambulation to bathroom and toileting    -- Spoke with patient's sister, Selma. She had questions about his current cognitive state and recovery. Discussed that he is not able to be unsupervised at this time, requiring help with money, medication, etc. as mentioned above. All questions answered. Selma is aware of the divorce/death documentation holding up placement and states the family is actively working on this issue. 11/14/23.     Outpatient Follow-up (Specialty/Name of physician):    Rj Delgado  Cardiac Electrophysiology  [address] [place], [place] [address]  Phone: [phone]  Fax: [phone]  Follow Up Time:     Wily Barkley  Neurology  [address] [place] [address]  Phone: [phone]  Fax: [phone]  Follow Up Time: 1 week    Denzel Zapata  Interventional Cardiology  [facility], [address] [address]  Phone: [phone]  Fax: [phone]  Follow Up Time: 1 week    PCP,   Phone: (   )    -  Fax: (   )    -  Follow Up Time: 1 week    Arsenio Goodrich  Interventional Neuroradiology  [address] [place] [address]  Phone: [phone]  Fax: [phone]

## 2023-11-29 ENCOUNTER — OFFICE VISIT (OUTPATIENT)
Dept: PEDIATRICS | Facility: CLINIC | Age: 78
End: 2023-11-29
Payer: MEDICARE

## 2023-11-29 VITALS
RESPIRATION RATE: 16 BRPM | TEMPERATURE: 98.1 F | SYSTOLIC BLOOD PRESSURE: 132 MMHG | DIASTOLIC BLOOD PRESSURE: 76 MMHG | OXYGEN SATURATION: 98 % | HEART RATE: 74 BPM | BODY MASS INDEX: 19.92 KG/M2 | HEIGHT: 64 IN | WEIGHT: 116.7 LBS

## 2023-11-29 DIAGNOSIS — M79.2 NEUROPATHIC PAIN: ICD-10-CM

## 2023-11-29 DIAGNOSIS — I10 HYPERTENSION GOAL BP (BLOOD PRESSURE) < 140/90: ICD-10-CM

## 2023-11-29 DIAGNOSIS — H69.93 DYSFUNCTION OF BOTH EUSTACHIAN TUBES: ICD-10-CM

## 2023-11-29 DIAGNOSIS — S32.049D CLOSED FRACTURE OF FOURTH LUMBAR VERTEBRA WITH ROUTINE HEALING, UNSPECIFIED FRACTURE MORPHOLOGY, SUBSEQUENT ENCOUNTER: Primary | ICD-10-CM

## 2023-11-29 PROCEDURE — 91320 SARSCV2 VAC 30MCG TRS-SUC IM: CPT | Performed by: INTERNAL MEDICINE

## 2023-11-29 PROCEDURE — 90480 ADMN SARSCOV2 VAC 1/ONLY CMP: CPT | Performed by: INTERNAL MEDICINE

## 2023-11-29 PROCEDURE — 99214 OFFICE O/P EST MOD 30 MIN: CPT | Performed by: INTERNAL MEDICINE

## 2023-11-29 RX ORDER — VALACYCLOVIR HYDROCHLORIDE 500 MG/1
500 TABLET, FILM COATED ORAL DAILY
Qty: 90 TABLET | Refills: 3 | Status: CANCELLED | OUTPATIENT
Start: 2023-11-29

## 2023-11-29 RX ORDER — GABAPENTIN 300 MG/1
600 CAPSULE ORAL AT BEDTIME
Qty: 180 CAPSULE | Refills: 3 | Status: SHIPPED | OUTPATIENT
Start: 2023-11-29

## 2023-11-29 RX ORDER — RESPIRATORY SYNCYTIAL VIRUS VACCINE 120MCG/0.5
0.5 KIT INTRAMUSCULAR ONCE
Qty: 1 EACH | Refills: 0 | Status: CANCELLED | OUTPATIENT
Start: 2023-11-29 | End: 2023-11-29

## 2023-11-29 RX ORDER — LISINOPRIL 5 MG/1
10 TABLET ORAL DAILY
Qty: 180 TABLET | Refills: 3 | Status: CANCELLED | OUTPATIENT
Start: 2023-11-29

## 2023-11-29 ASSESSMENT — PAIN SCALES - GENERAL: PAINLEVEL: NO PAIN (0)

## 2023-11-29 NOTE — PROGRESS NOTES
Assessment & Plan     Closed fracture of fourth lumbar vertebra with routine healing, unspecified fracture morphology, subsequent encounter  discussed with patient (or patient's parents/caregiver) pathophysiology of condition and treatment options.  Reviewed most recent notes and recommendations from the spine team.  Shared with the patient today and Margarito thrilled that she is doing so much better that her pain has significantly improved.:  Continue current regimen and I will refill her Neurontin today.  Follow-up with the spine team as scheduled.    Dysfunction of both eustachian tubes  discussed with patient (or patient's parents/caregiver) pathophysiology of condition and treatment options.  recommned trail of over-the-counter flonase. New medication(s) indication(s), adverse effects, risks and benefits discussed. Patient verbalized understanding and is agreeable to this plan.     Neuropathic pain  as above. Well controlled on current medication(s).   - gabapentin (NEURONTIN) 300 MG capsule; Take 2 capsules (600 mg) by mouth at bedtime    Hypertension goal BP (blood pressure) < 140/90  discussed with patient (or patient's parents/caregiver) pathophysiology of condition and treatment options.  BPs within acceptable limits. continue ACEI.     HSV (herpes simplex virus) infection  discussed with patient (or patient's parents/caregiver) pathophysiology of condition and treatment options.  Well controlled on current medication(s).                      Return in about 3 months (around 2/29/2024) for Next scheduled follow-up visit, or sooner if symptoms persist or worsen.    Roe Connolly MD  [facility] HIPOLITO Staton is a 78 year old, presenting for the following health issues:  RECHECK        11/29/2023     1:54 PM   Additional Questions   Roomed by America Sanchez CMA   Accompanied by Edgar       History of Present Illness       Reason for visit:  Recent surgery    She eats 2-3  "servings of fruits and vegetables daily.She consumes 1 sweetened beverage(s) daily.She exercises with enough effort to increase her heart rate 30 to 60 minutes per day.  She exercises with enough effort to increase her heart rate 7 days per week.   She is taking medications regularly.       Patient presents today for regular scheduled follow-up.  Of note she feels significantly improved since her last visit.  They did do a spinal revision and this has greatly improved her pain.  She is very happy about this.  She does need a refill of the Neurontin today.  She notes her blood pressures have been within acceptable limits.  She is also wondering about some plugging of both of her ears that she has been getting that she has not tried anything for.  She notes her mood is good.  She denies any other complaints or concerns today.  Patient is aware that she is overdue for colonoscopy and she is declining this today.  She like me to keep this on the health maintenance however so we can discuss at her next visit.  She also is aware she is due for a tetanus and RSV vaccine and she plans to get these in the pharmacy.            Review of Systems         Objective    /76 (BP Location: Right arm, Cuff Size: Adult Regular)   Pulse 74   Temp 98.1  F (36.7  C) (Tympanic)   Resp 16   Ht 1.619 m (5' 3.75\")   Wt 52.9 kg (116 lb 11.2 oz)   SpO2 98%   BMI 20.19 kg/m    Body mass index is 20.19 kg/m .  Physical Exam   GENERAL: healthy, alert and no distress  HEENT: normocephalic and atrumatic, mucous membranes moist, op clear, patient has a retracted tympanic membrane on the left and there is some clear fluid behind the tympanic membrane on the right.  No erythema or signs of infection.  RESP: lungs clear to auscultation - no rales, rhonchi or wheezes  CV: regular rate and rhythm, normal S1 S2, no S3 or S4, no murmur, click or rub, no peripheral edema and peripheral pulses strong  ABDOMEN: soft, nontender, no " hepatosplenomegaly, no masses and bowel sounds normal  SKIN: no suspicious lesions or rashes  PSYCH: mentation appears normal, affect normal/bright                    (*documentation in this chart was partially completed using Dragon PowerMic which could potentially lead to some misspellings or grammar/autocorrect issues in the narrative.)

## 2023-12-10 ENCOUNTER — TELEPHONE (OUTPATIENT)
Dept: PEDIATRICS | Facility: CLINIC | Age: 78
End: 2023-12-10
Payer: MEDICARE

## 2023-12-11 NOTE — TELEPHONE ENCOUNTER
Reason for Call: Orders for Dr. Connolly     Order or referral being requested: Orders from [facility]    Date needed: as soon as possible    Has the patient been seen by the PCP for this problem? Not Applicable    Additional comments:     Phone number Patient can be reached at:  Home number on file [phone] (home)    Best Time:  NA    Can we leave a detailed message on this number?  Not Applicable    Call taken on 12/10/2023 at 7:26 PM by Irene Blackburn

## 2024-01-08 ENCOUNTER — PATIENT OUTREACH (OUTPATIENT)
Dept: CARE COORDINATION | Facility: CLINIC | Age: 79
End: 2024-01-08
Payer: MEDICARE

## 2024-01-31 ENCOUNTER — E-VISIT (OUTPATIENT)
Dept: PEDIATRICS | Facility: CLINIC | Age: 79
End: 2024-01-31
Payer: MEDICARE

## 2024-01-31 ENCOUNTER — TELEPHONE (OUTPATIENT)
Dept: PEDIATRICS | Facility: CLINIC | Age: 79
End: 2024-01-31
Payer: MEDICARE

## 2024-01-31 DIAGNOSIS — L65.9 LOSS OF HAIR: Primary | ICD-10-CM

## 2024-01-31 PROCEDURE — 99207 PR NON-BILLABLE SERV PER CHARTING: CPT | Performed by: INTERNAL MEDICINE

## 2024-01-31 NOTE — TELEPHONE ENCOUNTER
Reason for Call: Request for an order or referral:Lab Order    Order or referral being requested: Thyroid    Date needed: as soon as possible    Has the patient been seen by the PCP for this problem? YES    Additional comments: Hair is falling out so she would like her Thyroid check if you could please put in a Lab Order    Phone number Patient can be reached at:  Home number on file [phone] (home)    Best Time:  Anytime    Can we leave a detailed message on this number?  YES    Call taken on 1/31/2024 at 3:16 PM by Nanda Wooten

## 2024-02-01 ENCOUNTER — LAB (OUTPATIENT)
Dept: LAB | Facility: CLINIC | Age: 79
End: 2024-02-01
Payer: MEDICARE

## 2024-02-01 DIAGNOSIS — L65.9 LOSS OF HAIR: ICD-10-CM

## 2024-02-01 PROCEDURE — 84443 ASSAY THYROID STIM HORMONE: CPT

## 2024-02-01 PROCEDURE — 36415 COLL VENOUS BLD VENIPUNCTURE: CPT

## 2024-02-01 NOTE — PATIENT INSTRUCTIONS
Thank you for choosing us for your care. Given your symptoms, I would like you to do a lab-only visit to determine what is causing them.  I have placed the orders.  Please schedule an appointment with the lab right here in [place], or call [phone].  I will let you know when the results are back and next steps to take.

## 2024-02-02 LAB — TSH SERPL DL<=0.005 MIU/L-ACNC: 0.94 UIU/ML (ref 0.3–4.2)

## 2024-02-05 ENCOUNTER — PATIENT OUTREACH (OUTPATIENT)
Dept: CARE COORDINATION | Facility: CLINIC | Age: 79
End: 2024-02-05
Payer: MEDICARE

## 2024-02-12 DIAGNOSIS — I10 HYPERTENSION GOAL BP (BLOOD PRESSURE) < 140/90: ICD-10-CM

## 2024-02-12 RX ORDER — LISINOPRIL 5 MG/1
10 TABLET ORAL DAILY
Qty: 180 TABLET | Refills: 2 | Status: SHIPPED | OUTPATIENT
Start: 2024-02-12

## 2024-02-22 ENCOUNTER — TRANSFERRED RECORDS (OUTPATIENT)
Dept: HEALTH INFORMATION MANAGEMENT | Facility: CLINIC | Age: 79
End: 2024-02-22
Payer: MEDICARE

## 2024-03-18 DIAGNOSIS — M79.2 NEUROPATHIC PAIN: ICD-10-CM

## 2024-03-18 RX ORDER — GABAPENTIN 100 MG/1
CAPSULE ORAL
Qty: 270 CAPSULE | Refills: 0 | Status: SHIPPED | OUTPATIENT
Start: 2024-03-18 | End: 2024-06-20

## 2024-04-17 ENCOUNTER — OFFICE VISIT (OUTPATIENT)
Dept: PEDIATRICS | Facility: CLINIC | Age: 79
End: 2024-04-17
Payer: MEDICARE

## 2024-04-17 VITALS
HEIGHT: 64 IN | HEART RATE: 79 BPM | OXYGEN SATURATION: 98 % | SYSTOLIC BLOOD PRESSURE: 136 MMHG | RESPIRATION RATE: 16 BRPM | BODY MASS INDEX: 20.96 KG/M2 | DIASTOLIC BLOOD PRESSURE: 81 MMHG | WEIGHT: 122.8 LBS | TEMPERATURE: 97.7 F

## 2024-04-17 DIAGNOSIS — M81.0 OSTEOPOROSIS, UNSPECIFIED OSTEOPOROSIS TYPE, UNSPECIFIED PATHOLOGICAL FRACTURE PRESENCE: ICD-10-CM

## 2024-04-17 DIAGNOSIS — Z12.11 SCREEN FOR COLON CANCER: ICD-10-CM

## 2024-04-17 DIAGNOSIS — M79.2 NEUROPATHIC PAIN: ICD-10-CM

## 2024-04-17 DIAGNOSIS — M48.062 SPINAL STENOSIS, LUMBAR REGION, WITH NEUROGENIC CLAUDICATION: ICD-10-CM

## 2024-04-17 DIAGNOSIS — Z29.11 NEED FOR VACCINATION AGAINST RESPIRATORY SYNCYTIAL VIRUS: ICD-10-CM

## 2024-04-17 DIAGNOSIS — Z23 NEED FOR TDAP VACCINATION: ICD-10-CM

## 2024-04-17 DIAGNOSIS — I10 HYPERTENSION GOAL BP (BLOOD PRESSURE) < 140/90: Primary | ICD-10-CM

## 2024-04-17 DIAGNOSIS — Z12.31 VISIT FOR SCREENING MAMMOGRAM: ICD-10-CM

## 2024-04-17 PROCEDURE — 99214 OFFICE O/P EST MOD 30 MIN: CPT | Performed by: INTERNAL MEDICINE

## 2024-04-17 RX ORDER — RESPIRATORY SYNCYTIAL VIRUS VACCINE 120MCG/0.5
0.5 KIT INTRAMUSCULAR ONCE
Qty: 1 EACH | Refills: 0 | Status: CANCELLED | OUTPATIENT
Start: 2024-04-17 | End: 2024-04-17

## 2024-04-17 ASSESSMENT — PAIN SCALES - GENERAL: PAINLEVEL: NO PAIN (0)

## 2024-04-17 NOTE — PROGRESS NOTES
Assessment & Plan     Hypertension goal BP (blood pressure) < 140/90  discussed with patient (or patient's parents/caregiver) pathophysiology of condition and treatment options.  Well controlled on current medication(s). reviwed diet, etc.     Osteoporosis, unspecified osteoporosis type, unspecified pathological fracture presence  discussed with patient (or patient's parents/caregiver) pathophysiology of condition and treatment options.  due for gogo, continue ca/vit d, daily exercise, fall avoidance, et.c   - DEXA HIP/PELVIS/SPINE - Future; Future    Spinal stenosis, lumbar region, with neurogenic claudication  discussed with patient (or patient's parents/caregiver) pathophysiology of condition and treatment options.  Patient overall doing well after her last procedure.  She will follow-up with orthopedics scheduled for a new symptoms.    Neuropathic pain  discussed with patient (or patient's parents/caregiver) pathophysiology of condition and treatment options.  Well controlled on current medication(s).  Patient has used THC Gummies in the past is wondering about potentially trying THC drink(s).  Reviewed these are not FDA approved or managed and would encourage her to sure she discuss this with someone who may have the knowledge to understand what will be the best modality and type of drink.  Also start very very slow do not take with her Neurontin do not drive etc.Patient verbalized understanding and is agreeable to this plan.     Need for Tdap vaccination  patient aware overdue, can get in pharmacy    Need for vaccination against respiratory syncytial virus  patient will consider in the fall    Screen for colon cancer  patient due  - Colonoscopy Screening  Referral; Future    Visit for screening mammogram  patient declines. aware of risks/benefits of stopping screening              Return in about 3 months (around 7/17/2024) for Routine Visit, or sooner if symptoms persist or worsen.      Subjective  "  Brionna is a 78 year old, presenting for the following health issues:  Follow Up        4/17/2024     3:15 PM   Additional Questions   Roomed by COLIN Ortiz   Accompanied by Geronimo- Friend         4/17/2024     3:15 PM   Patient Reported Additional Medications   Patient reports taking the following new medications No     History of Present Illness       Reason for visit:  Dr scheduled    She eats 2-3 servings of fruits and vegetables daily.She consumes 2 sweetened beverage(s) daily.She exercises with enough effort to increase her heart rate 30 to 60 minutes per day.  She exercises with enough effort to increase her heart rate 7 days per week. She is missing 1 dose(s) of medications per week.  She is not taking prescribed medications regularly due to remembering to take.       Patient comes today with her spouse for regular scheduled check-in.  Overall she is doing pretty well.  She notes that her tinnitus improved when she switched to Aleve instead of taking Tylenol and she initially stopped the Aleve as well but she notes the tinnitus persists.  She is taking Neurontin still and this continues to work well.  She takes 300 at night 200 during the day.  She occasionally does also take THC Gummies and drinks and has not questions about this today.  She did have 3 Synvisc shots in her knee and this was somewhat helpful but continues to have symptoms.  She also think she has a pinched nerve in her shoulder and she plans to follow-up with orthopedics regarding this.  She is well aware she is due for vaccines but declines any of these today.  She has no other concerns or complaints today.                  Objective    /81 (BP Location: Right arm, Patient Position: Sitting, Cuff Size: Adult Regular)   Pulse 79   Temp 97.7  F (36.5  C) (Tympanic)   Resp 16   Ht 1.628 m (5' 4.09\")   Wt 55.7 kg (122 lb 12.8 oz)   SpO2 98%   BMI 21.02 kg/m    Body mass index is 21.02 kg/m .  Physical Exam   GENERAL: healthy, " alert and no distress  HEENT: normocephalic and atrumatic, mucous membranes moist, op clear, mucous membranes moist, neck supple   PSYCH: affect normal/bright               Signed Electronically by: Roe Connolly MD    (*documentation in this chart was partially completed using Dragon PowerMic which could potentially lead to some misspellings or grammar/autocorrect issues in the narrative.)

## 2024-05-08 ENCOUNTER — TELEPHONE (OUTPATIENT)
Dept: GASTROENTEROLOGY | Facility: CLINIC | Age: 79
End: 2024-05-08
Payer: MEDICARE

## 2024-05-08 NOTE — TELEPHONE ENCOUNTER
"Endoscopy Scheduling Screen    Have you had a positive Covid test in the last 14 days?  No    What is your communication preference for Instructions and/or Bowel Prep?   MyChart    What insurance is in the chart?  Other:  BCBS    Ordering/Referring Provider:     FER WELCH      (If ordering provider performs procedure, schedule with ordering provider unless otherwise instructed. )    BMI: Estimated body mass index is 21.02 kg/m  as calculated from the following:    Height as of 4/17/24: 1.628 m (5' 4.09\").    Weight as of 4/17/24: 55.7 kg (122 lb 12.8 oz).     Sedation Ordered  moderate sedation.   If patient BMI > 50 do not schedule in [facility].    If patient BMI > 45 do not schedule at [facility].    Are you taking methadone or Suboxone?  No    Have you had difficulties, pain, or discomfort during past endoscopy procedures?  No    Are you taking any prescription medications for pain 3 or more times per week?   NO, No RN review required.    Do you have a history of malignant hyperthermia?  No    (Females) Are you currently pregnant?        Have you been diagnosed or told you have pulmonary hypertension?   No    Do you have an LVAD?  No    Have you been told you have moderate to severe sleep apnea?  No    Have you been told you have COPD, asthma, or any other lung disease?  No    Do you have any heart conditions?  No     Have you ever had or are you waiting for an organ transplant?  No. Continue scheduling, no site restrictions.    Have you had a stroke or transient ischemic attack (TIA aka \"mini stroke\" in the last 6 months?   No    Have you been diagnosed with or been told you have cirrhosis of the liver?   No    Are you currently on dialysis?   No    Do you need assistance transferring?   No    BMI: Estimated body mass index is 21.02 kg/m  as calculated from the following:    Height as of 4/17/24: 1.628 m (5' 4.09\").    Weight as of 4/17/24: 55.7 kg (122 lb 12.8 oz).     Is patients BMI > 40 and scheduling location " [facility]?  No    Do you take an injectable medication for weight loss or diabetes (excluding insulin)?  No    Do you take the medication Naltrexone?  No    Do you take blood thinners?  No       Prep   Are you currently on dialysis or do you have chronic kidney disease?  No    Do you have a diagnosis of diabetes?  No    Do you have a diagnosis of cystic fibrosis (CF)?  No    On a regular basis do you go 3 -5 days between bowel movements?  No    BMI > 40?  No    Preferred Pharmacy:    [facility] [place] - CHARLIE Abreu - [address]   [address]   [address]  [place] [address]  Phone: [phone] Fax: [phone]      Final Scheduling Details     Procedure scheduled  Colonoscopy    Surgeon:  TEREZA     Date of procedure:  6/18/     Pre-OP / PAC:   No - Not required for this site.    Location  [facility] - Per order.    Sedation   Moderate Sedation - Per order.      Patient Reminders:   You will receive a call from a Nurse to review instructions and health history.  This assessment must be completed prior to your procedure.  Failure to complete the Nurse assessment may result in the procedure being cancelled.      On the day of your procedure, please designate an adult(s) who can drive you home stay with you for the next 24 hours. The medicines used in the exam will make you sleepy. You will not be able to drive.      You cannot take public transportation, ride share services, or non-medical taxi service without a responsible caregiver.  Medical transport services are allowed with the requirement that a responsible caregiver will receive you at your destination.  We require that drivers and caregivers are confirmed prior to your procedure.

## 2024-05-24 NOTE — TELEPHONE ENCOUNTER
"failed protocol.    Reanna AZEVEDORN BSN  [facility]  [phone]      Last Written Prescription Date:  10/30/18  Last Fill Quantity: 90,  # refills: 3   Last office visit: 12/4/2019 with prescribing provider:     Future Office Visit:   Next 5 appointments (look out 90 days)    Mar 03, 2020  1:15 PM CST  [facility]illo [facility]  [facility] [address] [place] [address]  [address]  [facility] [address] [phone]   Mar 04, 2020  2:25 PM CST  (Arrive by 2:05 PM)  Office Visit with Roe Connolly MD  [facility] [address] [place] [address]  [address]  [facility] [address] [phone]         Requested Prescriptions   Pending Prescriptions Disp Refills     omeprazole (PRILOSEC) 20 MG DR capsule [Pharmacy Med Name: OMEPRAZOLE 20MG CPDR] 90 capsule 3     Sig: TAKE ONE CAPSULE BY MOUTH EVERY DAY       PPI Protocol Failed - 1/22/2020  4:30 PM        Failed - No diagnosis of osteoporosis on record        Passed - Not on Clopidogrel (unless Pantoprazole ordered)        Passed - Recent (12 mo) or future (30 days) visit within the authorizing provider's specialty     Patient has had an office visit with the authorizing provider or a provider within the authorizing providers department within the previous 12 mos or has a future within next 30 days. See \"Patient Info\" tab in inbasket, or \"Choose Columns\" in Meds & Orders section of the refill encounter.              Passed - Medication is active on med list        Passed - Patient is age 18 or older        Passed - No active pregnacy on record        Passed - No positive pregnancy test in past 12 months          "
with patient

## 2024-06-01 ENCOUNTER — HEALTH MAINTENANCE LETTER (OUTPATIENT)
Age: 79
End: 2024-06-01

## 2024-06-11 ENCOUNTER — TELEPHONE (OUTPATIENT)
Dept: GASTROENTEROLOGY | Facility: CLINIC | Age: 79
End: 2024-06-11
Payer: MEDICARE

## 2024-06-11 NOTE — TELEPHONE ENCOUNTER
Pre visit planning completed.      Procedure details:    Patient scheduled for Colonoscopy  on 06.18.2024.     Arrival time: 0730. Procedure time 0815    Facility location: [facility]; [address] [place] [address]. Check in location: Main entrance at . Come through the roundabout underneath the awning (not the ER entrance).    Sedation type: Conscious sedation     Pre op exam needed? N/A    Indication for procedure: Screen for colon cancer       Chart review:     Electronic implanted devices? No    Recent diagnosis of diverticulitis within the last 6 weeks? No    Diabetic? No      Medication review:    Anticoagulants? No    NSAIDS? No NSAID medications per patient's medication list.  RN will verify with pre-assessment call.    Other medication HOLDING recommendations:  N/A      Prep for procedure:     Bowel prep recommendation: Standard Miralax  Due to: standard bowel prep.    Prep instructions sent via [place] Gray RN  Endoscopy Procedure Pre Assessment RN  [phone] option 4

## 2024-06-11 NOTE — TELEPHONE ENCOUNTER
Pre assessment completed for upcoming procedure.   (Please see previous telephone encounter notes for complete details)    Procedure details:    Arrival time and facility location reviewed.    Pre op exam needed? N/A    Designated  policy reviewed. Instructed to have someone stay 6  hours post procedure.       Medication review:    Medications reviewed. Please see supporting documentation below. Holding recommendations discussed (if applicable).       Prep for procedure:     Procedure prep instructions reviewed.        Any additional information needed:  N/A      Patient  verbalized understanding and had no questions or concerns at this time.      Elizabeth Castellano RN  [phone] option 4

## 2024-06-14 ENCOUNTER — TRANSFERRED RECORDS (OUTPATIENT)
Dept: HEALTH INFORMATION MANAGEMENT | Facility: CLINIC | Age: 79
End: 2024-06-14
Payer: MEDICARE

## 2024-06-17 ENCOUNTER — TELEPHONE (OUTPATIENT)
Dept: GASTROENTEROLOGY | Facility: CLINIC | Age: 79
End: 2024-06-17
Payer: MEDICARE

## 2024-06-17 NOTE — TELEPHONE ENCOUNTER
Caller: Brionna    Reason for Reschedule/Cancellation   (please be detailed, any staff messages or encounters to note?): Patient       Prior to reschedule please review:  Ordering Provider: Mohsen Link Determined: CS  Does patient have any ASC Exclusions, please identify?: N      Notes on Cancelled Procedure:  Procedure: Lower Endoscopy [Colonoscopy]   Date: 6/18/24  Location: [facility]; [facility] [address] [place] [address]  Surgeon: Jesse      Rescheduled: Yes,   Procedure: Lower Endoscopy [Colonoscopy]    Date: 8/20/24   Location: [facility]; [address] [place] [address]   Surgeon: Jesse   Sedation Level Scheduled  CS ,  Reason for Sedation Level Protocol   Instructions updated and sent: Yes     Does patient need PAC or Pre -Op Rescheduled? : N       Did you cancel or rescheduled an EUS procedure? No.

## 2024-06-20 DIAGNOSIS — M79.2 NEUROPATHIC PAIN: ICD-10-CM

## 2024-06-20 RX ORDER — GABAPENTIN 100 MG/1
CAPSULE ORAL
Qty: 270 CAPSULE | Refills: 0 | Status: SHIPPED | OUTPATIENT
Start: 2024-06-20 | End: 2024-09-18

## 2024-07-08 DIAGNOSIS — B00.9 HSV (HERPES SIMPLEX VIRUS) INFECTION: ICD-10-CM

## 2024-07-08 RX ORDER — VALACYCLOVIR HYDROCHLORIDE 500 MG/1
500 TABLET, FILM COATED ORAL DAILY
Qty: 90 TABLET | Refills: 3 | Status: SHIPPED | OUTPATIENT
Start: 2024-07-08

## 2024-08-05 ENCOUNTER — PATIENT OUTREACH (OUTPATIENT)
Dept: CARE COORDINATION | Facility: CLINIC | Age: 79
End: 2024-08-05
Payer: MEDICARE

## 2024-08-08 NOTE — TELEPHONE ENCOUNTER
Rescheduled Procedure  Pre visit planning completed.      Procedure details:    Patient scheduled for Colonoscopy on 8/20/24.     Arrival time: 0800. Procedure time 0845    Facility location: [facility]; [address] [place] [address]. Check in location: Main entrance, door #1 on the North side of the building under roundabout awning. DO NOT GO TO SURGERY/ED ENTRANCE.     Sedation type: Conscious sedation     Pre op exam needed? No.    Indication for procedure: screening       Chart review:     Electronic implanted devices? No    Recent diagnosis of diverticulitis within the last 6 weeks? No      Medication review:    Diabetic? No    Anticoagulants? No    Weight loss medication/injectable? No.    NSAIDS? Yes.  Naproxen (Naprosyn, Aleve).  Holding interval of 4 days.    Other medication HOLDING recommendations:  N/A      Prep for procedure:     Bowel prep recommendation: Standard Miralax  Due to: standard bowel prep.    Prep instructions sent via [employer]         Dottie Hanna RN  Endoscopy Procedure Pre Assessment RN  [phone] option 4

## 2024-08-09 SDOH — HEALTH STABILITY: PHYSICAL HEALTH: ON AVERAGE, HOW MANY DAYS PER WEEK DO YOU ENGAGE IN MODERATE TO STRENUOUS EXERCISE (LIKE A BRISK WALK)?: 7 DAYS

## 2024-08-09 SDOH — HEALTH STABILITY: PHYSICAL HEALTH: ON AVERAGE, HOW MANY MINUTES DO YOU ENGAGE IN EXERCISE AT THIS LEVEL?: 60 MIN

## 2024-08-09 ASSESSMENT — SOCIAL DETERMINANTS OF HEALTH (SDOH): HOW OFTEN DO YOU GET TOGETHER WITH FRIENDS OR RELATIVES?: THREE TIMES A WEEK

## 2024-08-12 ENCOUNTER — TRANSFERRED RECORDS (OUTPATIENT)
Dept: HEALTH INFORMATION MANAGEMENT | Facility: CLINIC | Age: 79
End: 2024-08-12
Payer: MEDICARE

## 2024-08-12 NOTE — TELEPHONE ENCOUNTER
Attempted to contact patient in order to complete pre assessment questions. Pre visit planning completed below.     No answer. Left message to return call to [phone] option 4. MyChart message sent.        Dottie Hanna RN  Endoscopy Procedure Pre Assessment RN

## 2024-08-12 NOTE — TELEPHONE ENCOUNTER
Pre assessment completed for upcoming procedure.   (Please see previous telephone encounter notes for complete details)    Patient  returned call.       Procedure details:    Arrival time and facility location reviewed.    Pre op exam needed? No.    Designated  policy reviewed. Instructed to have someone stay 6  hours post procedure.       Medication review:    Medications reviewed. Please see supporting documentation below. Holding recommendations discussed (if applicable).     Patient denies taking NSAIDs.     Prep for procedure:     Procedure prep instructions reviewed.        Any additional information needed:  N/A      Patient  verbalized understanding and had no questions or concerns at this time.      Belkis Macario RN  Endoscopy Procedure Pre Assessment   [phone] option 4

## 2024-08-14 ENCOUNTER — OFFICE VISIT (OUTPATIENT)
Dept: PEDIATRICS | Facility: CLINIC | Age: 79
End: 2024-08-14
Payer: MEDICARE

## 2024-08-14 VITALS
DIASTOLIC BLOOD PRESSURE: 71 MMHG | SYSTOLIC BLOOD PRESSURE: 128 MMHG | HEART RATE: 79 BPM | HEIGHT: 64 IN | WEIGHT: 121 LBS | BODY MASS INDEX: 20.66 KG/M2 | OXYGEN SATURATION: 96 % | TEMPERATURE: 98.4 F | RESPIRATION RATE: 15 BRPM

## 2024-08-14 DIAGNOSIS — Z00.00 ENCOUNTER FOR MEDICARE ANNUAL WELLNESS EXAM: Primary | ICD-10-CM

## 2024-08-14 DIAGNOSIS — M25.511 RIGHT SHOULDER PAIN, UNSPECIFIED CHRONICITY: ICD-10-CM

## 2024-08-14 DIAGNOSIS — I10 HYPERTENSION GOAL BP (BLOOD PRESSURE) < 140/90: ICD-10-CM

## 2024-08-14 DIAGNOSIS — Z12.31 VISIT FOR SCREENING MAMMOGRAM: ICD-10-CM

## 2024-08-14 DIAGNOSIS — Z13.6 CARDIOVASCULAR SCREENING; LDL GOAL LESS THAN 160: ICD-10-CM

## 2024-08-14 PROCEDURE — G0439 PPPS, SUBSEQ VISIT: HCPCS | Performed by: INTERNAL MEDICINE

## 2024-08-14 PROCEDURE — 99213 OFFICE O/P EST LOW 20 MIN: CPT | Mod: 25 | Performed by: INTERNAL MEDICINE

## 2024-08-14 ASSESSMENT — ACTIVITIES OF DAILY LIVING (ADL): CURRENT_FUNCTION: NO ASSISTANCE NEEDED

## 2024-08-14 ASSESSMENT — PAIN SCALES - GENERAL: PAINLEVEL: SEVERE PAIN (6)

## 2024-08-14 NOTE — PROGRESS NOTES
Preventive Care Visit  [facility] HIPOLITO Connolly MD, Internal Medicine - Pediatrics  Aug 14, 2024      Assessment & Plan     Encounter for Medicare annual wellness exam  d/w pt preventative care measures including seat belt use, bike helmet, moderation of EtOH, avoiding tobacco, avoiding excessive sun exposure/sunscreen, wt management or wt loss if BMI > 30, need to screen for lipid disorders, mood disorders, CAD risk factors, etc. Also discussed accident prevention and future RHM schedule.   - Comprehensive metabolic panel (BMP + Alb, Alk Phos, ALT, AST, Total. Bili, TP); Future  - TSH with free T4 reflex; Future  - Lipid panel reflex to direct LDL Fasting; Future    Hypertension goal BP (blood pressure) < 140/90  discussed with patient (or patient's parents/caregiver) pathophysiology of condition and treatment options.  Well controlled on current medication(s). reviwed diet, etc.   - Comprehensive metabolic panel (BMP + Alb, Alk Phos, ALT, AST, Total. Bili, TP); Future    Right shoulder pain, unspecified chronicity  discussed with patient (or patient's parents/caregiver) pathophysiology of condition and treatment options.  follow-up with specialists as scheuled tolerating the neurontin which helps this and the nerve pain without sig side effects.     CARDIOVASCULAR SCREENING; LDL GOAL LESS THAN 160  due for labs, diet is good.   - Comprehensive metabolic panel (BMP + Alb, Alk Phos, ALT, AST, Total. Bili, TP); Future  - Lipid panel reflex to direct LDL Fasting; Future    Visit for screening mammogram  - MA Screening Bilateral w/ Tian; Future      Counseling  Appropriate preventive services were addressed with this patient via screening, questionnaire, or discussion as appropriate for fall prevention, nutrition, physical activity, Tobacco-use cessation, social engagement, weight loss and cognition.  Checklist reviewing preventive services available has been given to the  "patient.          Soniya Staton is a 79 year old, presenting for the following:  Physical        8/14/2024     1:53 PM   Additional Questions   Roomed by Augustina LUU   Accompanied by Edgar         8/14/2024     1:53 PM   Patient Reported Additional Medications   Patient reports taking the following new medications No         Health Care Directive  Patient has a Health Care Directive on file  Advance care planning document is on file and is current.    Healthy Habits:     In general, how would you rate your overall health?  Good    Frequency of exercise:  6-7 days/week    Duration of exercise:  45-60 minutes    Do you usually eat at least 4 servings of fruit and vegetables a day, include whole grains    & fiber and avoid regularly eating high fat or \"junk\" foods?  Yes    Taking medications regularly:  Yes    Barriers to taking medications:  None    Medication side effects:  None    Ability to successfully perform activities of daily living:  No assistance needed    Home Safety:  No safety concerns identified    Hearing Impairment:  Difficulty following a conversation in a noisy restaurant or crowded room    In the past 6 months, have you been bothered by leaking of urine? Yes    In general, how would you rate your overall mental or emotional health?  Very good    Additional concerns today:  No (test results from monday)  Patient presents today for annual visit.  Overall she is doing quite well.  She has had left shoulder issues had an EMG at [place] done yesterday and once about her left arm goes numb with certain positions.  This gets better with repositioning and she has an appointment to see Dr. Dumont who is both the back and neck specialist at the end of this month as well as Dr. Rodriguez the shoulder specialist at the end of the month as well.  She is taking all of her medications and notes no side effects or issues.  She notes her mood overall is good.  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  She denies any other concerns or complaints today.                8/9/2024   General Health   How would you rate your overall physical health? Good   Feel stress (tense, anxious, or unable to sleep) Only a little      (!) STRESS CONCERN      8/9/2024   Nutrition   Diet: Regular (no restrictions)            8/9/2024   Exercise   Days per week of moderate/strenous exercise 7 days   Average minutes spent exercising at this level 60 min            8/9/2024   Social Factors   Frequency of gathering with friends or relatives Three times a week   Worry food won't last until get money to buy more No   Food not last or not have enough money for food? No   Do you have housing? (Housing is defined as stable permanent housing and does not include staying ouside in a car, in a tent, in an abandoned building, in an overnight shelter, or couch-surfing.) Yes   Are you worried about losing your housing? No   Lack of transportation? No   Unable to get utilities (heat,electricity)? No            8/14/2024   Fall Risk   Reason Gait Speed Test Not Completed Patient declines   Reason for decline No concerns for walking or balance specifically             8/9/2024   Activities of Daily Living- Home Safety   Needs help with the following daily activites None of the above   Safety concerns in the home None of the above            8/9/2024   Dental   Dentist two times every year? Yes            8/9/2024   Hearing Screening   Hearing concerns? (!) IT'S HARD TO FOLLOW A CONVERSATION IN A NOISY RESTAURANT OR CROWDED ROOM.            8/9/2024   Driving Risk Screening   Patient/family members have concerns about driving No            8/9/2024   General Alertness/Fatigue Screening   Have you been more tired than usual lately? No            8/9/2024   Urinary Incontinence Screening   Bothered by leaking urine in past 6 months Yes             8/9/2024   TB Screening   Were you born outside of the [place]? No            Today's PHQ-2 Score:       8/13/2024    10:19 PM   PHQ-2 ( 1999 [employer])   Q1: Little interest or pleasure in doing things 0   Q2: Feeling down, depressed or hopeless 0   PHQ-2 Score 0   Q1: Little interest or pleasure in doing things Not at all   Q2: Feeling down, depressed or hopeless Not at all   PHQ-2 Score 0           8/9/2024   Substance Use   Alcohol more than 3/day or more than 7/wk Not Applicable   Do you have a current opioid prescription? No   How severe/bad is pain from 1 to 10? 3/10   Do you use any other substances recreationally? (!) PRESCRIPTION DRUGS        Social History     Tobacco Use    Smoking status: Never     Passive exposure: Never    Smokeless tobacco: Never   Vaping Use    Vaping status: Never Used   Substance Use Topics    Alcohol use: Yes     Comment: Beer with dinner    Drug use: No           1/26/2022   LAST FHS-7 RESULTS   1st degree relative breast or ovarian cancer No   Any relative bilateral breast cancer No   Any male have breast cancer No   Any ONE woman have BOTH breast AND ovarian cancer No   Any woman with breast cancer before 50yrs No   2 or more relatives with breast AND/OR ovarian cancer No   2 or more relatives with breast AND/OR bowel cancer Yes           Mammogram Screening - After age 74- determine frequency with patient based on health status, life expectancy and patient goals    ASCVD Risk   The ASCVD Risk score (Janell PEREZ, et al., 2019) failed to calculate for the following reasons:    The valid HDL cholesterol range is 20 to 100 mg/dL            Reviewed and updated as needed this visit by Provider                      Current providers sharing in care for this patient include:  Patient Care Team:  Roe Connolly MD as PCP - General (Internal Medicine)  Roe Connolly MD as Assigned PCP  Jayy Vega MD as Assigned Surgical Provider  Yao Brunson  "MD Flakito as MD (Cardiovascular Disease)  Yao Brunson MD as Assigned Heart and Vascular Provider    The following health maintenance items are reviewed in Epic and correct as of today:  Health Maintenance   Topic Date Due    RSV VACCINE (Pregnancy & 60+) (1 - 1-dose 60+ series) Never done    DTAP/TDAP/TD IMMUNIZATION (2 - Td or Tdap) 04/09/2022    COLORECTAL CANCER SCREENING  10/13/2022    MAMMO SCREENING  02/07/2024    COVID-19 Vaccine (7 - 2023-24 season) 03/29/2024    DEXA  04/15/2024    MEDICARE ANNUAL WELLNESS VISIT  08/09/2024    INFLUENZA VACCINE (1) 09/01/2024    ANNUAL REVIEW OF HM ORDERS  04/17/2025    FALL RISK ASSESSMENT  08/14/2025    GLUCOSE  09/20/2026    ADVANCE CARE PLANNING  08/27/2028    LIPID  09/20/2028    PHQ-2 (once per calendar year)  Completed    Pneumococcal Vaccine: 65+ Years  Completed    ZOSTER IMMUNIZATION  Completed    IPV IMMUNIZATION  Aged Out    HPV IMMUNIZATION  Aged Out    MENINGITIS IMMUNIZATION  Aged Out    RSV MONOCLONAL ANTIBODY  Aged Out    HEPATITIS C SCREENING  Discontinued            Objective    Exam  /71 (BP Location: Right arm, Patient Position: Sitting, Cuff Size: Adult Regular)   Pulse 79   Temp 98.4  F (36.9  C) (Oral)   Resp 15   Ht 1.626 m (5' 4\")   Wt 54.9 kg (121 lb)   SpO2 96%   BMI 20.77 kg/m     Estimated body mass index is 20.77 kg/m  as calculated from the following:    Height as of this encounter: 1.626 m (5' 4\").    Weight as of this encounter: 54.9 kg (121 lb).    Physical Exam  GENERAL: healthy, alert and no distress  EYES: Eyes grossly normal to inspection, PERRL and conjunctivae and sclerae normal  HENT: ear canals and TM's normal, op clear, mucous membranes moist   NECK: no adenopathy, no asymmetry  RESP: lungs clear to auscultation - no rales, rhonchi or wheezes  CV: regular rate and rhythm, normal S1 S2, no S3 or S4, no murmur, click or rub, no peripheral edema   ABDOMEN: soft, nontender, no hepatosplenomegaly, no " masses and bowel sounds normal  MS: no gross musculoskeletal defects noted, no edema  SKIN: no suspicious lesions or rashes  NEURO: Normal strength and tone, mentation intact and speech normal  PSYCH: mentation appears normal, affect normal/bright          8/14/2024   Mini Cog   Clock Draw Score 2 Normal   3 Item Recall 3 objects recalled   Mini Cog Total Score 5                 Signed Electronically by: Roe Connolly MD

## 2024-08-14 NOTE — PATIENT INSTRUCTIONS
Patient Education   Preventive Care Advice   This is general advice given by our system to help you stay healthy. However, your care team may have specific advice just for you. Please talk to your care team about your preventive care needs.  Nutrition  Eat 5 or more servings of fruits and vegetables each day.  Try wheat bread, brown rice and whole grain pasta (instead of white bread, rice, and pasta).  Get enough calcium and vitamin D. Check the label on foods and aim for 100% of the RDA (recommended daily allowance).  Lifestyle  Exercise at least 150 minutes each week  (30 minutes a day, 5 days a week).  Do muscle strengthening activities 2 days a week. These help control your weight and prevent disease.  No smoking.  Wear sunscreen to prevent skin cancer.  Have a dental exam and cleaning every 6 months.  Yearly exams  See your health care team every year to talk about:  Any changes in your health.  Any medicines your care team has prescribed.  Preventive care, family planning, and ways to prevent chronic diseases.  Shots (vaccines)   HPV shots (up to age 26), if you've never had them before.  Hepatitis B shots (up to age 59), if you've never had them before.  COVID-19 shot: Get this shot when it's due.  Flu shot: Get a flu shot every year.  Tetanus shot: Get a tetanus shot every 10 years.  Pneumococcal, hepatitis A, and RSV shots: Ask your care team if you need these based on your risk.  Shingles shot (for age 50 and up)  General health tests  Diabetes screening:  Starting at age 35, Get screened for diabetes at least every 3 years.  If you are younger than age 35, ask your care team if you should be screened for diabetes.  Cholesterol test: At age 39, start having a cholesterol test every 5 years, or more often if advised.  Bone density scan (DEXA): At age 50, ask your care team if you should have this scan for osteoporosis (brittle bones).  Hepatitis C: Get tested at least once in your life.  STIs (sexually  transmitted infections)  Before age 24: Ask your care team if you should be screened for STIs.  After age 24: Get screened for STIs if you're at risk. You are at risk for STIs (including HIV) if:  You are sexually active with more than one person.  You don't use condoms every time.  You or a partner was diagnosed with a sexually transmitted infection.  If you are at risk for HIV, ask about PrEP medicine to prevent HIV.  Get tested for HIV at least once in your life, whether you are at risk for HIV or not.  Cancer screening tests  Cervical cancer screening: If you have a cervix, begin getting regular cervical cancer screening tests starting at age 21.  Breast cancer scan (mammogram): If you've ever had breasts, begin having regular mammograms starting at age 40. This is a scan to check for breast cancer.  Colon cancer screening: It is important to start screening for colon cancer at age 45.  Have a colonoscopy test every 10 years (or more often if you're at risk) Or, ask your provider about stool tests like a FIT test every year or Cologuard test every 3 years.  To learn more about your testing options, visit:   .  For help making a decision, visit:   https://bit.ly/ag49975.  Prostate cancer screening test: If you have a prostate, ask your care team if a prostate cancer screening test (PSA) at age 55 is right for you.  Lung cancer screening: If you are a current or former smoker ages 50 to 80, ask your care team if ongoing lung cancer screenings are right for you.  For informational purposes only. Not to replace the advice of your health care provider. Copyright   2023 [facility]. All rights reserved. Clinically reviewed by the [facility]. Boostable 192254 - REV 01/24.  Preventing Falls: Care Instructions  Injuries and health problems such as trouble walking or poor eyesight can increase your risk of falling. So can some medicines. But there are things you can do to help  "prevent falls. You can exercise to get stronger. You can also arrange your home to make it safer.    Talk to your doctor about the medicines you take. Ask if any of them increase the risk of falls and whether they can be changed or stopped.   Try to exercise regularly. It can help improve your strength and balance. This can help lower your risk of falling.     Practice fall safety and prevention.    Wear low-heeled shoes that fit well and give your feet good support. Talk to your doctor if you have foot problems that make this hard.  Carry a cellphone or wear a medical alert device that you can use to call for help.  Use stepladders instead of chairs to reach high objects. Don't climb if you're at risk for falls. Ask for help, if needed.  Wear the correct eyeglasses, if you need them.    Make your home safer.    Remove rugs, cords, clutter, and furniture from walkways.  Keep your house well lit. Use night-lights in hallways and bathrooms.  Install and use sturdy handrails on stairways.  Wear nonskid footwear, even inside. Don't walk barefoot or in socks without shoes.    Be safe outside.    Use handrails, curb cuts, and ramps whenever possible.  Keep your hands free by using a shoulder bag or backpack.  Try to walk in well-lit areas. Watch out for uneven ground, changes in pavement, and debris.  Be careful in the winter. Walk on the grass or gravel when sidewalks are slippery. Use de-icer on steps and walkways. Add non-slip devices to shoes.    Put grab bars and nonskid mats in your shower or tub and near the toilet. Try to use a shower chair or bath bench when bathing.   Get into a tub or shower by putting in your weaker leg first. Get out with your strong side first. Have a phone or medical alert device in the bathroom with you.   Where can you learn more?  Go to https://www.Likva.net/patiented  Enter G117 in the search box to learn more about \"Preventing Falls: Care Instructions.\"  Current as of: July 17, " 2023               Content Version: 14.0    8933-2244 [employer].   Care instructions adapted under license by your healthcare professional. If you have questions about a medical condition or this instruction, always ask your healthcare professional. [employer] disclaims any warranty or liability for your use of this information.      Hearing Loss: Care Instructions  Overview     Hearing loss is a sudden or slow decrease in how well you hear. It can range from slight to profound. Permanent hearing loss can occur with aging. It also can happen when you are exposed long-term to loud noise. Examples include listening to loud music, riding motorcycles, or being around other loud machines.  Hearing loss can affect your work and home life. It can make you feel lonely or depressed. You may feel that you have lost your independence. But hearing aids and other devices can help you hear better and feel connected to others.  Follow-up care is a key part of your treatment and safety. Be sure to make and go to all appointments, and call your doctor if you are having problems. It's also a good idea to know your test results and keep a list of the medicines you take.  How can you care for yourself at home?  Avoid loud noises whenever possible. This helps keep your hearing from getting worse.  Always wear hearing protection around loud noises.  Wear a hearing aid as directed.  A professional can help you pick a hearing aid that will work best for you.  You can also get hearing aids over the counter for mild to moderate hearing loss.  Have hearing tests as your doctor suggests. They can show whether your hearing has changed. Your hearing aid may need to be adjusted.  Use other devices as needed. These may include:  Telephone amplifiers and hearing aids that can connect to a television, stereo, radio, or microphone.  Devices that use lights or vibrations. These alert you to the doorbell, a ringing  "telephone, or a baby monitor.  Television closed-captioning. This shows the words at the bottom of the screen. Most new TVs can do this.  TTY (text telephone). This lets you type messages back and forth on the telephone instead of talking or listening. These devices are also called TDD. When messages are typed on the keyboard, they are sent over the phone line to a receiving TTY. The message is shown on a monitor.  Use text messaging, social media, and email if it is hard for you to communicate by telephone.  Try to learn a listening technique called speechreading. It is not lipreading. You pay attention to people's gestures, expressions, posture, and tone of voice. These clues can help you understand what a person is saying. Face the person you are talking to, and have them face you. Make sure the lighting is good. You need to see the other person's face clearly.  Think about counseling if you need help to adjust to your hearing loss.  When should you call for help?  Watch closely for changes in your health, and be sure to contact your doctor if:    You think your hearing is getting worse.     You have new symptoms, such as dizziness or nausea.   Where can you learn more?  Go to https://www.Zakaz.ua.net/patiented  Enter R798 in the search box to learn more about \"Hearing Loss: Care Instructions.\"  Current as of: September 27, 2023               Content Version: 14.0    7684-9271 [employer].   Care instructions adapted under license by your healthcare professional. If you have questions about a medical condition or this instruction, always ask your healthcare professional. [employer] disclaims any warranty or liability for your use of this information.      Bladder Training: Care Instructions  Your Care Instructions     Bladder training is used to treat urge incontinence and stress incontinence. Urge incontinence means that the need to urinate comes on so fast that you can't get to a " toilet in time. Stress incontinence means that you leak urine because of pressure on your bladder. For example, it may happen when you laugh, cough, or lift something heavy.  Bladder training can increase how long you can wait before you have to urinate. It can also help your bladder hold more urine. And it can give you better control over the urge to urinate.  It is important to remember that bladder training takes a few weeks to a few months to make a difference. You may not see results right away, but don't give up.  Follow-up care is a key part of your treatment and safety. Be sure to make and go to all appointments, and call your doctor if you are having problems. It's also a good idea to know your test results and keep a list of the medicines you take.  How can you care for yourself at home?  Work with your doctor to come up with a bladder training program that is right for you. You may use one or more of the following methods.  Delayed urination  In the beginning, try to keep from urinating for 5 minutes after you first feel the need to go.  While you wait, take deep, slow breaths to relax. Kegel exercises can also help you delay the need to go to the bathroom.  After some practice, when you can easily wait 5 minutes to urinate, try to wait 10 minutes before you urinate.  Slowly increase the waiting period until you are able to control when you have to urinate.  Scheduled urination  Empty your bladder when you first wake up in the morning.  Schedule times throughout the day when you will urinate.  Start by going to the bathroom every hour, even if you don't need to go.  Slowly increase the time between trips to the bathroom.  When you have found a schedule that works well for you, keep doing it.  If you wake up during the night and have to urinate, do it. Apply your schedule to waking hours only.  Kegel exercises  These tighten and strengthen pelvic muscles, which can help you control the flow of urine. (If  "doing these exercises causes pain, stop doing them and talk with your doctor.) To do Kegel exercises:  Squeeze your muscles as if you were trying not to pass gas. Or squeeze your muscles as if you were stopping the flow of urine. Your belly, legs, and buttocks shouldn't move.  Hold the squeeze for 3 seconds, then relax for 5 to 10 seconds.  Start with 3 seconds, then add 1 second each week until you are able to squeeze for 10 seconds.  Repeat the exercise 10 times a session. Do 3 to 8 sessions a day.  When should you call for help?  Watch closely for changes in your health, and be sure to contact your doctor if:    Your incontinence is getting worse.     You do not get better as expected.   Where can you learn more?  Go to https://www.Livemap.net/patiented  Enter V684 in the search box to learn more about \"Bladder Training: Care Instructions.\"  Current as of: November 15, 2023               Content Version: 14.0    1505-5247 [employer].   Care instructions adapted under license by your healthcare professional. If you have questions about a medical condition or this instruction, always ask your healthcare professional. [employer] disclaims any warranty or liability for your use of this information.      Substance Use Disorder: Care Instructions  Overview     You can improve your life and health by stopping your use of alcohol or drugs. When you don't drink or use drugs, you may feel and sleep better. You may get along better with your family, friends, and coworkers. There are medicines and programs that can help with substance use disorder.  How can you care for yourself at home?  Here are some ways to help you stay sober and prevent relapse.  If you have been given medicine to help keep you sober or reduce your cravings, be sure to take it exactly as prescribed.  Talk to your doctor about programs that can help you stop using drugs or drinking alcohol.  Do not keep alcohol or drugs in " your home.  Plan ahead. Think about what you'll say if other people ask you to drink or use drugs. Try not to spend time with people who drink or use drugs.  Use the time and money spent on drinking or drugs to do something that's important to you.  Preventing a relapse  Have a plan to deal with relapse. Learn to recognize changes in your thinking that lead you to drink or use drugs. Get help before you start to drink or use drugs again.  Try to stay away from situations, friends, or places that may lead you to drink or use drugs.  If you feel the need to drink alcohol or use drugs again, seek help right away. Call a trusted friend or family member. Some people get support from organizations such as Narcotics Anonymous or [employer] or from treatment facilities.  If you relapse, get help as soon as you can. Some people make a plan with another person that outlines what they want that person to do for them if they relapse. The plan usually includes how to handle the relapse and who to notify in case of relapse.  Don't give up. Remember that a relapse doesn't mean that you have failed. Use the experience to learn the triggers that lead you to drink or use drugs. Then quit again. Recovery is a lifelong process. Many people have several relapses before they are able to quit for good.  Follow-up care is a key part of your treatment and safety. Be sure to make and go to all appointments, and call your doctor if you are having problems. It's also a good idea to know your test results and keep a list of the medicines you take.  When should you call for help?   Call 911  anytime you think you may need emergency care. For example, call if you or someone else:    Has overdosed or has withdrawal signs. Be sure to tell the emergency workers that you are or someone else is using or trying to quit using drugs. Overdose or withdrawal signs may include:  Losing consciousness.  Seizure.  Seeing or hearing things that aren't there  "(hallucinations).     Is thinking or talking about suicide or harming others.   Where to get help 24 hours a day, 7 days a week   If you or someone you know talks about suicide, self-harm, a mental health crisis, a substance use crisis, or any other kind of emotional distress, get help right away. You can:    Call the Suicide and Crisis Lifeline at 988.     Call 0-966-718-TALK (1-942.211.1788).     Text HOME to 564887 to access the Crisis Text Line.   Consider saving these numbers in your phone.  Go to [employer] for more information or to chat online.  Call your doctor now or seek immediate medical care if:    You are having withdrawal symptoms. These may include nausea or vomiting, sweating, shakiness, and anxiety.   Watch closely for changes in your health, and be sure to contact your doctor if:    You have a relapse.     You need more help or support to stop.   Where can you learn more?  Go to https://www.Project Colourjack.net/patiented  Enter H573 in the search box to learn more about \"Substance Use Disorder: Care Instructions.\"  Current as of: November 15, 2023               Content Version: 14.0    5923-5291 [employer].   Care instructions adapted under license by your healthcare professional. If you have questions about a medical condition or this instruction, always ask your healthcare professional. [employer] disclaims any warranty or liability for your use of this information.         "

## 2024-08-20 ENCOUNTER — HOSPITAL ENCOUNTER (OUTPATIENT)
Facility: CLINIC | Age: 79
Discharge: HOME | End: 2024-08-20
Attending: INTERNAL MEDICINE | Admitting: INTERNAL MEDICINE
Payer: MEDICARE

## 2024-08-20 VITALS
SYSTOLIC BLOOD PRESSURE: 160 MMHG | HEIGHT: 64 IN | BODY MASS INDEX: 20.49 KG/M2 | OXYGEN SATURATION: 100 % | WEIGHT: 120 LBS | RESPIRATION RATE: 16 BRPM | HEART RATE: 64 BPM | DIASTOLIC BLOOD PRESSURE: 83 MMHG

## 2024-08-20 LAB — COLONOSCOPY: NORMAL

## 2024-08-20 PROCEDURE — 250N000011 HC RX IP 250 OP 636: Performed by: INTERNAL MEDICINE

## 2024-08-20 PROCEDURE — 88305 TISSUE EXAM BY PATHOLOGIST: CPT | Mod: TC | Performed by: INTERNAL MEDICINE

## 2024-08-20 PROCEDURE — G0500 MOD SEDAT ENDO SERVICE >5YRS: HCPCS | Mod: PT | Performed by: INTERNAL MEDICINE

## 2024-08-20 PROCEDURE — 45385 COLONOSCOPY W/LESION REMOVAL: CPT | Performed by: INTERNAL MEDICINE

## 2024-08-20 PROCEDURE — 88305 TISSUE EXAM BY PATHOLOGIST: CPT | Mod: 26 | Performed by: PATHOLOGY

## 2024-08-20 RX ORDER — ONDANSETRON 2 MG/ML
4 INJECTION INTRAMUSCULAR; INTRAVENOUS
Status: DISCONTINUED | OUTPATIENT
Start: 2024-08-20 | End: 2024-08-20 | Stop reason: HOSPADM

## 2024-08-20 RX ORDER — NALOXONE HYDROCHLORIDE 0.4 MG/ML
0.2 INJECTION, SOLUTION INTRAMUSCULAR; INTRAVENOUS; SUBCUTANEOUS
Status: DISCONTINUED | OUTPATIENT
Start: 2024-08-20 | End: 2024-08-20 | Stop reason: HOSPADM

## 2024-08-20 RX ORDER — SIMETHICONE 40MG/0.6ML
133 SUSPENSION, DROPS(FINAL DOSAGE FORM)(ML) ORAL
Status: DISCONTINUED | OUTPATIENT
Start: 2024-08-20 | End: 2024-08-20 | Stop reason: HOSPADM

## 2024-08-20 RX ORDER — NALOXONE HYDROCHLORIDE 0.4 MG/ML
0.4 INJECTION, SOLUTION INTRAMUSCULAR; INTRAVENOUS; SUBCUTANEOUS
Status: DISCONTINUED | OUTPATIENT
Start: 2024-08-20 | End: 2024-08-20 | Stop reason: HOSPADM

## 2024-08-20 RX ORDER — DIPHENHYDRAMINE HYDROCHLORIDE 50 MG/ML
25-50 INJECTION INTRAMUSCULAR; INTRAVENOUS
Status: DISCONTINUED | OUTPATIENT
Start: 2024-08-20 | End: 2024-08-20 | Stop reason: HOSPADM

## 2024-08-20 RX ORDER — LIDOCAINE 40 MG/G
CREAM TOPICAL
Status: DISCONTINUED | OUTPATIENT
Start: 2024-08-20 | End: 2024-08-20 | Stop reason: HOSPADM

## 2024-08-20 RX ORDER — FLUMAZENIL 0.1 MG/ML
0.2 INJECTION, SOLUTION INTRAVENOUS
Status: DISCONTINUED | OUTPATIENT
Start: 2024-08-20 | End: 2024-08-20 | Stop reason: HOSPADM

## 2024-08-20 RX ORDER — FENTANYL CITRATE 50 UG/ML
50-100 INJECTION, SOLUTION INTRAMUSCULAR; INTRAVENOUS EVERY 5 MIN PRN
Status: DISCONTINUED | OUTPATIENT
Start: 2024-08-20 | End: 2024-08-20 | Stop reason: HOSPADM

## 2024-08-20 RX ORDER — ATROPINE SULFATE 0.1 MG/ML
1 INJECTION INTRAVENOUS
Status: DISCONTINUED | OUTPATIENT
Start: 2024-08-20 | End: 2024-08-20 | Stop reason: HOSPADM

## 2024-08-20 RX ORDER — EPINEPHRINE 1 MG/ML
0.1 INJECTION, SOLUTION INTRAMUSCULAR; SUBCUTANEOUS
Status: DISCONTINUED | OUTPATIENT
Start: 2024-08-20 | End: 2024-08-20 | Stop reason: HOSPADM

## 2024-08-20 RX ORDER — ONDANSETRON 2 MG/ML
4 INJECTION INTRAMUSCULAR; INTRAVENOUS EVERY 6 HOURS PRN
Status: DISCONTINUED | OUTPATIENT
Start: 2024-08-20 | End: 2024-08-20 | Stop reason: HOSPADM

## 2024-08-20 RX ORDER — ONDANSETRON 4 MG/1
4 TABLET, ORALLY DISINTEGRATING ORAL EVERY 6 HOURS PRN
Status: DISCONTINUED | OUTPATIENT
Start: 2024-08-20 | End: 2024-08-20 | Stop reason: HOSPADM

## 2024-08-20 RX ADMIN — FENTANYL CITRATE 50 MCG: 0.05 INJECTION, SOLUTION INTRAMUSCULAR; INTRAVENOUS at 08:50

## 2024-08-20 RX ADMIN — MIDAZOLAM 1 MG: 1 INJECTION INTRAMUSCULAR; INTRAVENOUS at 08:50

## 2024-08-20 ASSESSMENT — ACTIVITIES OF DAILY LIVING (ADL)
ADLS_ACUITY_SCORE: 35
ADLS_ACUITY_SCORE: 35

## 2024-08-20 NOTE — H&P
Pre-Endoscopy History and Physical     Brionna Rollins MRN# [id number]   YOB: 1945 Age: 79 year old     Date of Procedure: 8/20/2024  Primary care provider: Roe Connolly  Type of Endoscopy: Colonoscopy with possible biopsy, possible polypectomy  Reason for Procedure: polyp  Type of Anesthesia Anticipated: Conscious Sedation    HPI:    Brionna is a 79 year old female who will be undergoing the above procedure.      A history and physical has been performed. The patient's medications and allergies have been reviewed. The risks and benefits of the procedure and the sedation options and risks were discussed with the patient.  All questions were answered and informed consent was obtained.      She denies a personal or family history of anesthesia complications or bleeding disorders.     Patient Active Problem List   Diagnosis    Back pain    Osteoporosis    Internal derangement of knee    Estrogen replacement therapy    Colon Polyps    Microscopic hematuria    CARDIOVASCULAR SCREENING; LDL GOAL LESS THAN 160    ACP (advance care planning)    Mixed incontinence urge and stress    Fracture of distal end of radius    Hypertension goal BP (blood pressure) < 140/90    Atopic rhinitis    Allergy to anti-infective agent    Enteritis    Osteoarthritis of hip    Osteoarthritis of knee    Acute posthemorrhagic anemia    Surgical follow-up care    Diverticulitis of sigmoid colon    Inflamed seborrheic keratosis    SK (seborrheic keratosis)    Cherry angioma    Nontraumatic tear of left rotator cuff, unspecified tear extent        Past Medical History:   Diagnosis Date    RADIAL STYLOID TENOSYNOV - right 02/23/2006    lifts weights, doesn't think work-related; OK to add PT visits prn    Rotator cuff tear         Past Surgical History:   Procedure Laterality Date    COLONOSCOPY  11/26/2013    Dr. Cardenas [facility]    COLONOSCOPY  11/26/2013    Procedure: COMBINED COLONOSCOPY, SINGLE BIOPSY/POLYPECTOMY BY BIOPSY;;   Surgeon: Jovanni Cardenas MD;  Location: [facility]    HYSTERECTOMY, [place]  1986    ZZC ROTATOR CUFF STRAP      ZZC TOTAL HIP ARTHROPLASTY Right        Social History     Tobacco Use    Smoking status: Never     Passive exposure: Never    Smokeless tobacco: Never   Substance Use Topics    Alcohol use: Yes     Comment: Beer with dinner       Family History   Problem Relation Age of Onset    Gastrointestinal Disease Mother         colon polyps    Diabetes Mother     Arthritis Father         Rheumatoid    Eye Disorder Father         mac. degeneration femp op bypass    Melanoma Son     Cancer - colorectal Maternal Grandmother     C.A.D. No family hx of     Breast Cancer No family hx of        Prior to Admission medications    Medication Sig Start Date End Date Taking? Authorizing Provider   gabapentin (NEURONTIN) 100 MG capsule TAKE ONE CAPSULE BY MOUTH AT LUNCH AND TWO CAPSULES BY MOUTH NIGHTLY AT BEDTIME ALONG WITH TWO 300MG CAPSULES FOR A TOTAL DAILY DOSE OF 900MG. 6/20/24   Roe Connolly MD   gabapentin (NEURONTIN) 300 MG capsule Take 2 capsules (600 mg) by mouth at bedtime 11/29/23   Roe Connolly MD   lisinopril (ZESTRIL) 5 MG tablet TAKE TWO TABLETS BY MOUTH ONCE DAILY 2/12/24   Roe Connolly MD   valACYclovir (VALTREX) 500 MG tablet TAKE ONE TABLET BY MOUTH ONCE DAILY 7/8/24   Roe Connolly MD       Allergies   Allergen Reactions    Scopolamine Anaphylaxis and Itching    Ciprofloxacin Swelling     Stiff and swollen neck    Amoxicillin-Pot Clavulanate [Amoxicillin-Pot Clavulanate]     Contrast Dye      gets cold and feels terrible  Pt was ok with Isovue-370 and no pre-meds    Doxycycline Hyclate Rash    Erythromycin Swelling    Flagyl [Metronidazole Hcl]     Meperidine Hcl      low blood pressure    Oxycodone     Prochlorperazine      agitated    Sulfa Antibiotics Rash    Tetracycline Rash    Cefprozil Rash        REVIEW OF SYSTEMS:   5 point ROS negative except as noted above in HPI, including  "Gen., Resp., CV, GI &  system review.    PHYSICAL EXAM:   There were no vitals taken for this visit. Estimated body mass index is 20.77 kg/m  as calculated from the following:    Height as of 8/14/24: 1.626 m (5' 4\").    Weight as of 8/14/24: 54.9 kg (121 lb).   GENERAL APPEARANCE: alert, and oriented  MENTAL STATUS: alert  AIRWAY EXAM: Mallampatti Class I (visualization of the soft palate, fauces, uvula, anterior and posterior pillars)  RESP: lungs clear to auscultation - no rales, rhonchi or wheezes  CV: regular rates and rhythm  DIAGNOSTICS:    Not indicated    IMPRESSION   ASA Class 2 - Mild systemic disease    PLAN:   Plan for Colonoscopy with possible biopsy, possible polypectomy. We discussed the risks, benefits and alternatives and the patient wished to proceed.    The above has been forwarded to the consulting provider.      Signed Electronically by: Sd Molina MD  August 20, 2024          "

## 2024-08-21 LAB
PATH REPORT.COMMENTS IMP SPEC: NORMAL
PATH REPORT.COMMENTS IMP SPEC: NORMAL
PATH REPORT.FINAL DX SPEC: NORMAL
PATH REPORT.GROSS SPEC: NORMAL
PATH REPORT.MICROSCOPIC SPEC OTHER STN: NORMAL
PATH REPORT.RELEVANT HX SPEC: NORMAL
PHOTO IMAGE: NORMAL

## 2024-09-05 ENCOUNTER — TRANSFERRED RECORDS (OUTPATIENT)
Dept: HEALTH INFORMATION MANAGEMENT | Facility: CLINIC | Age: 79
End: 2024-09-05
Payer: MEDICARE

## 2024-09-11 ENCOUNTER — TRANSFERRED RECORDS (OUTPATIENT)
Dept: HEALTH INFORMATION MANAGEMENT | Facility: CLINIC | Age: 79
End: 2024-09-11
Payer: MEDICARE

## 2024-09-12 ENCOUNTER — OFFICE VISIT (OUTPATIENT)
Dept: DERMATOLOGY | Facility: CLINIC | Age: 79
End: 2024-09-12
Payer: MEDICARE

## 2024-09-12 DIAGNOSIS — L82.0 INFLAMED SEBORRHEIC KERATOSIS: ICD-10-CM

## 2024-09-12 DIAGNOSIS — L81.4 LENTIGO: ICD-10-CM

## 2024-09-12 DIAGNOSIS — D18.01 ANGIOMA OF SKIN: ICD-10-CM

## 2024-09-12 DIAGNOSIS — L82.1 SEBORRHEIC KERATOSES: ICD-10-CM

## 2024-09-12 DIAGNOSIS — D23.9 DERMAL NEVUS: Primary | ICD-10-CM

## 2024-09-12 PROCEDURE — 99213 OFFICE O/P EST LOW 20 MIN: CPT | Mod: 25 | Performed by: DERMATOLOGY

## 2024-09-12 PROCEDURE — 17110 DESTRUCTION B9 LES UP TO 14: CPT | Performed by: DERMATOLOGY

## 2024-09-12 ASSESSMENT — PAIN SCALES - GENERAL: PAINLEVEL: NO PAIN (0)

## 2024-09-12 NOTE — LETTER
9/12/2024      Brionna Rollins  [address] [place] [address]      Dear Colleague,    Thank you for referring your patient, Brionna Rollins, to the [facility]. Please see a copy of my visit note below.    Brionna Rollins is an extremely pleasant 79 year old year old female patient here today for itching spot on trunk.  Patient has no other skin complaints today.  Remainder of the HPI, Meds, PMH, Allergies, FH, and SH was reviewed in chart.      Past Medical History:   Diagnosis Date     RADIAL STYLOID TENOSYNOV - right 02/23/2006    lifts weights, doesn't think work-related; OK to add PT visits prn     Rotator cuff tear        Past Surgical History:   Procedure Laterality Date     COLONOSCOPY  11/26/2013    Dr. Cardenas [facility]     COLONOSCOPY  11/26/2013    Procedure: COMBINED COLONOSCOPY, SINGLE BIOPSY/POLYPECTOMY BY BIOPSY;;  Surgeon: Jovanni Cardenas MD;  Location:  GI     COLONOSCOPY N/A 8/20/2024    Procedure: Colonoscopy with polypectomies using exacto snare;  Surgeon: Sd Molina MD;  Location:  GI     HYSTERECTOMY, [place]  1986     ZZC ROTATOR CUFF STRAP       ZZC TOTAL HIP ARTHROPLASTY Right         Family History   Problem Relation Age of Onset     Gastrointestinal Disease Mother         colon polyps     Diabetes Mother      Arthritis Father         Rheumatoid     Eye Disorder Father         mac. degeneration femp op bypass     Melanoma Son      Cancer - colorectal Maternal Grandmother      C.A.D. No family hx of      Breast Cancer No family hx of        Social History     Socioeconomic History     Marital status:      Spouse name: Not on file     Number of children: Not on file     Years of education: Not on file     Highest education level: Not on file   Occupational History     Occupation:  at [employer]   Tobacco Use     Smoking status: Never     Passive exposure: Never     Smokeless tobacco: Never   Vaping Use     Vaping  status: Never Used   Substance and Sexual Activity     Alcohol use: Yes     Comment: Beer with dinner     Drug use: No     Sexual activity: Yes     Partners: Male     Birth control/protection: None   Other Topics Concern     Parent/sibling w/ CABG, MI or angioplasty before 65F 55M? No   Social History Narrative     Not on file     Social Determinants of Health     Financial Resource Strain: Low Risk  (8/9/2024)    Financial Resource Strain      Within the past 12 months, have you or your family members you live with been unable to get utilities (heat, electricity) when it was really needed?: No   Food Insecurity: Low Risk  (8/9/2024)    Food Insecurity      Within the past 12 months, did you worry that your food would run out before you got money to buy more?: No      Within the past 12 months, did the food you bought just not last and you didn t have money to get more?: No   Transportation Needs: Low Risk  (8/9/2024)    Transportation Needs      Within the past 12 months, has lack of transportation kept you from medical appointments, getting your medicines, non-medical meetings or appointments, work, or from getting things that you need?: No   Physical Activity: Sufficiently Active (8/9/2024)    Exercise Vital Sign      Days of Exercise per Week: 7 days      Minutes of Exercise per Session: 60 min   Stress: No Stress Concern Present (8/9/2024)    [place] [employer] - Occupational Stress Questionnaire      Feeling of Stress : Only a little   Social Connections: Unknown (8/9/2024)    Social Connection and Isolation Panel [NHANES]      Frequency of Communication with Friends and Family: Patient declined      Frequency of Social Gatherings with Friends and Family: Three times a week      Attends Moravian Services: Patient declined      Active Member of Clubs or Organizations: Patient declined      Attends Club or Organization Meetings: Patient declined      Marital Status: Patient declined    Interpersonal Safety: Low Risk  (9/20/2023)    Interpersonal Safety      Do you feel physically and emotionally safe where you currently live?: Yes      Within the past 12 months, have you been hit, slapped, kicked or otherwise physically hurt by someone?: No      Within the past 12 months, have you been humiliated or emotionally abused in other ways by your partner or ex-partner?: No   Housing Stability: Low Risk  (8/9/2024)    Housing Stability      Do you have housing? : Yes      Are you worried about losing your housing?: No       Outpatient Encounter Medications as of 9/12/2024   Medication Sig Dispense Refill     gabapentin (NEURONTIN) 100 MG capsule TAKE ONE CAPSULE BY MOUTH AT LUNCH AND TWO CAPSULES BY MOUTH NIGHTLY AT BEDTIME ALONG WITH TWO 300MG CAPSULES FOR A TOTAL DAILY DOSE OF 900MG. 270 capsule 0     gabapentin (NEURONTIN) 300 MG capsule Take 2 capsules (600 mg) by mouth at bedtime 180 capsule 3     lisinopril (ZESTRIL) 5 MG tablet TAKE TWO TABLETS BY MOUTH ONCE DAILY 180 tablet 2     valACYclovir (VALTREX) 500 MG tablet TAKE ONE TABLET BY MOUTH ONCE DAILY 90 tablet 3     Facility-Administered Encounter Medications as of 9/12/2024   Medication Dose Route Frequency Provider Last Rate Last Admin     lidocaine 1% with EPINEPHrine 1:100,000 injection 3 mL  3 mL Intradermal Once Keisha Carrillo MD                 O:   NAD, WDWN, Alert & Oriented, Mood & Affect wnl, Vitals stable   General appearance normal   Vitals stable   Alert, oriented and in no acute distress   Inflamed seborrheic keratosis on trunk      Stuck on papules and brown macules on trunk and ext   Red papules on trunk  Flesh colored papules on trunk         Eyes: Conjunctivae/lids:Normal     ENT: Lips, mucosa: normal    MSK:Normal    Cardiovascular: peripheral edema none    Pulm: Breathing Normal    Neuro/Psych: Orientation:Alert and Orientedx3 ; Mood/Affect:normal       A/P:  1. Seborrheic keratosis, lentigo, angioma, dermal  nevus  2. Inflamed seborrheic keratosis x12 trunk   LN2:  Treated with LN2 for 5s for 1-2 cycles. Warned risks of blistering, pain, pigment change, scarring, and incomplete resolution.  Advised patient to return if lesions do not completely resolve.  Wound care sheet given.    It was a pleasure speaking to Brionna Rollins today.  Previous clinic notes and pertinent laboratory tests were reviewed prior to Brionna Rollins's visit.  Nature and genetics of benign skin lesions dicussed with patient.  Signs and Symptoms of skin cancer discussed with patient.  Patient encouraged to perform monthly skin exams.  UV precautions reviewed with patient.  Return to clinic 12 months      Again, thank you for allowing me to participate in the care of your patient.        Sincerely,        Jayy Vega MD

## 2024-09-12 NOTE — PROGRESS NOTES
Brionna Rollins is an extremely pleasant 79 year old year old female patient here today for itching spot on trunk.  Patient has no other skin complaints today.  Remainder of the HPI, Meds, PMH, Allergies, FH, and SH was reviewed in chart.      Past Medical History:   Diagnosis Date    RADIAL STYLOID TENOSYNOV - right 02/23/2006    lifts weights, doesn't think work-related; OK to add PT visits prn    Rotator cuff tear        Past Surgical History:   Procedure Laterality Date    COLONOSCOPY  11/26/2013    Dr. Cardenas [facility]    COLONOSCOPY  11/26/2013    Procedure: COMBINED COLONOSCOPY, SINGLE BIOPSY/POLYPECTOMY BY BIOPSY;;  Surgeon: Jovanni Cardenas MD;  Location:  GI    COLONOSCOPY N/A 8/20/2024    Procedure: Colonoscopy with polypectomies using exacto snare;  Surgeon: Sd Molina MD;  Location:  GI    HYSTERECTOMY, [place]  1986    ZZC ROTATOR CUFF STRAP      ZZC TOTAL HIP ARTHROPLASTY Right         Family History   Problem Relation Age of Onset    Gastrointestinal Disease Mother         colon polyps    Diabetes Mother     Arthritis Father         Rheumatoid    Eye Disorder Father         mac. degeneration femp op bypass    Melanoma Son     Cancer - colorectal Maternal Grandmother     C.A.D. No family hx of     Breast Cancer No family hx of        Social History     Socioeconomic History    Marital status:      Spouse name: Not on file    Number of children: Not on file    Years of education: Not on file    Highest education level: Not on file   Occupational History    Occupation:  at [employer]   Tobacco Use    Smoking status: Never     Passive exposure: Never    Smokeless tobacco: Never   Vaping Use    Vaping status: Never Used   Substance and Sexual Activity    Alcohol use: Yes     Comment: Beer with dinner    Drug use: No    Sexual activity: Yes     Partners: Male     Birth control/protection: None   Other Topics Concern    Parent/sibling w/ CABG, MI or angioplasty before 65F  55M? No   Social History Narrative    Not on file     Social Determinants of Health     Financial Resource Strain: Low Risk  (8/9/2024)    Financial Resource Strain     Within the past 12 months, have you or your family members you live with been unable to get utilities (heat, electricity) when it was really needed?: No   Food Insecurity: Low Risk  (8/9/2024)    Food Insecurity     Within the past 12 months, did you worry that your food would run out before you got money to buy more?: No     Within the past 12 months, did the food you bought just not last and you didn t have money to get more?: No   Transportation Needs: Low Risk  (8/9/2024)    Transportation Needs     Within the past 12 months, has lack of transportation kept you from medical appointments, getting your medicines, non-medical meetings or appointments, work, or from getting things that you need?: No   Physical Activity: Sufficiently Active (8/9/2024)    Exercise Vital Sign     Days of Exercise per Week: 7 days     Minutes of Exercise per Session: 60 min   Stress: No Stress Concern Present (8/9/2024)    [place] [employer] - Occupational Stress Questionnaire     Feeling of Stress : Only a little   Social Connections: Unknown (8/9/2024)    Social Connection and Isolation Panel [NHANES]     Frequency of Communication with Friends and Family: Patient declined     Frequency of Social Gatherings with Friends and Family: Three times a week     Attends Sabianist Services: Patient declined     Active Member of Clubs or Organizations: Patient declined     Attends Club or Organization Meetings: Patient declined     Marital Status: Patient declined   Interpersonal Safety: Low Risk  (9/20/2023)    Interpersonal Safety     Do you feel physically and emotionally safe where you currently live?: Yes     Within the past 12 months, have you been hit, slapped, kicked or otherwise physically hurt by someone?: No     Within the past 12 months, have  you been humiliated or emotionally abused in other ways by your partner or ex-partner?: No   Housing Stability: Low Risk  (8/9/2024)    Housing Stability     Do you have housing? : Yes     Are you worried about losing your housing?: No       Outpatient Encounter Medications as of 9/12/2024   Medication Sig Dispense Refill    gabapentin (NEURONTIN) 100 MG capsule TAKE ONE CAPSULE BY MOUTH AT LUNCH AND TWO CAPSULES BY MOUTH NIGHTLY AT BEDTIME ALONG WITH TWO 300MG CAPSULES FOR A TOTAL DAILY DOSE OF 900MG. 270 capsule 0    gabapentin (NEURONTIN) 300 MG capsule Take 2 capsules (600 mg) by mouth at bedtime 180 capsule 3    lisinopril (ZESTRIL) 5 MG tablet TAKE TWO TABLETS BY MOUTH ONCE DAILY 180 tablet 2    valACYclovir (VALTREX) 500 MG tablet TAKE ONE TABLET BY MOUTH ONCE DAILY 90 tablet 3     Facility-Administered Encounter Medications as of 9/12/2024   Medication Dose Route Frequency Provider Last Rate Last Admin    lidocaine 1% with EPINEPHrine 1:100,000 injection 3 mL  3 mL Intradermal Once Keisha Carrillo MD                 O:   NAD, WDWN, Alert & Oriented, Mood & Affect wnl, Vitals stable   General appearance normal   Vitals stable   Alert, oriented and in no acute distress   Inflamed seborrheic keratosis on trunk      Stuck on papules and brown macules on trunk and ext   Red papules on trunk  Flesh colored papules on trunk         Eyes: Conjunctivae/lids:Normal     ENT: Lips, mucosa: normal    MSK:Normal    Cardiovascular: peripheral edema none    Pulm: Breathing Normal    Neuro/Psych: Orientation:Alert and Orientedx3 ; Mood/Affect:normal       A/P:  1. Seborrheic keratosis, lentigo, angioma, dermal nevus  2. Inflamed seborrheic keratosis x12 trunk   LN2:  Treated with LN2 for 5s for 1-2 cycles. Warned risks of blistering, pain, pigment change, scarring, and incomplete resolution.  Advised patient to return if lesions do not completely resolve.  Wound care sheet given.    It was a pleasure speaking to Brionna  SERGIO Rollins today.  Previous clinic notes and pertinent laboratory tests were reviewed prior to Brionna Rollins's visit.  Nature and genetics of benign skin lesions dicussed with patient.  Signs and Symptoms of skin cancer discussed with patient.  Patient encouraged to perform monthly skin exams.  UV precautions reviewed with patient.  Return to clinic 12 months

## 2024-09-17 DIAGNOSIS — M79.2 NEUROPATHIC PAIN: ICD-10-CM

## 2024-09-18 RX ORDER — GABAPENTIN 100 MG/1
CAPSULE ORAL
Qty: 270 CAPSULE | Refills: 0 | Status: SHIPPED | OUTPATIENT
Start: 2024-09-18

## 2024-09-24 ENCOUNTER — TRANSFERRED RECORDS (OUTPATIENT)
Dept: HEALTH INFORMATION MANAGEMENT | Facility: CLINIC | Age: 79
End: 2024-09-24
Payer: MEDICARE

## 2024-10-21 ENCOUNTER — NURSE TRIAGE (OUTPATIENT)
Dept: PEDIATRICS | Facility: CLINIC | Age: 79
End: 2024-10-21
Payer: MEDICARE

## 2024-10-21 NOTE — TELEPHONE ENCOUNTER
Called the patient at [phone]   - Patient states that she stopped the gabapentin medication because it was making her feel unsteady and like she has a rapid heartbeat   - Patient states that she also wanted to confirm if the medication was effective in alleviating the nerve pain in her legs; patient states that she is not noticing many changes with or without the medication     Dr. Connolly, please review and advise.    Joanna HENSON RN   [facility]

## 2024-10-21 NOTE — TELEPHONE ENCOUNTER
Recommend patient take one tab of the gabapentin at night until we see each other in two days, her symptoms could be from medication withdrawal and we may need to do a slightly longer taper. if symptoms get worse or any new symptoms develop i'd want her seen sooner. thanks!

## 2024-10-21 NOTE — TELEPHONE ENCOUNTER
"Nurse Triage SBAR    Situation: Symptoms since stopping gabapentin last week ~ Monday.    Background:   \"A couple of months ago\" decreased to 100mg nightly of gabapentin as it was causing her to feel unsteady, then around last Monday she totally stopped it.   Last prescription for gabapentin was 100mg \"TAKE ONE CAPSULE BY MOUTH AT LUNCH AND TWO CAPSULES BY MOUTH NIGHTLY AT BEDTIME ALONG WITH TWO 300MG CAPSULES FOR A TOTAL DAILY DOSE OF 900MG.\" Prescribed Roe Connolly MD.    Assessment: Has been experiencing stomach upset and trouble sleeping. \"Feeling icky.\"    Recommendation: Scheduled for visit with PCP 10/23/24 to discuss, but wondering about plan until then? Do you recommend her continuing to take the gabapentin until seeing you vs. another plan for in the meantime?     Is this a 2nd Level Triage? YES, LICENSED PRACTITIONER REVIEW IS REQUIRED  Routed to provider  Does the patient meet one of the following criteria for ADS visit consideration? 16+ years old, with an [facility] PCP     TIP  Providers, please consider if this condition is appropriate for management at one of our Acute and Diagnostic Services sites.     If patient is a good candidate, please use dotphrase <dot>triageresponse and select Refer to ADS to document.  Reason for Disposition   Caller has NON-URGENT medicine question about med that PCP or specialist prescribed and triager unable to answer question    Additional Information   Negative: Intentional drug overdose and suicidal thoughts or ideas   Negative: Drug overdose and triager unable to answer question   Negative: Caller requesting a renewal or refill of a medicine patient is currently taking   Negative: Caller requesting information unrelated to medicine   Negative: Caller requesting information about COVID-19 Vaccine   Negative: Caller requesting information about Emergency Contraception   Negative: Caller requesting information about Combined Birth Control Pills   Negative: Caller " requesting information about Progestin Birth Control Pills   Negative: Caller requesting information about Post-Op pain or medicines   Negative: Caller requesting a prescription antibiotic (such as penicillin) for Strep throat and has a positive culture result   Negative: Caller requesting a prescription anti-viral med (such as Tamiflu) and has influenza (flu) symptoms   Negative: Immunization reaction suspected   Negative: Rash while taking a medicine or within 3 days of stopping it   Negative: Asthma and having symptoms of asthma (cough, wheezing, etc.)   Negative: Symptom of illness (e.g., headache, abdominal pain, earache, vomiting) that are more than mild   Negative: Breastfeeding questions about mother's medicines and diet   Negative: MORE THAN A DOUBLE DOSE of a prescription or over-the-counter (OTC) drug   Negative: DOUBLE DOSE (an extra dose or lesser amount) of prescription drug and any symptoms (e.g., dizziness, nausea, pain, sleepiness)   Negative: DOUBLE DOSE (an extra dose or lesser amount) of over-the-counter (OTC) drug and any symptoms (e.g., dizziness, nausea, pain, sleepiness)   Negative: Took another person's prescription drug   Negative: DOUBLE DOSE (an extra dose or lesser amount) of prescription drug and NO symptoms  (Exception: A double dose of antibiotics.)   Negative: Diabetes drug error or overdose (e.g., took wrong type of insulin or took extra dose)   Negative: Caller has medication question about med NOT prescribed by PCP and triager unable to answer question (e.g., compatibility with other med, storage)   Negative: Prescription not at pharmacy and was prescribed by PCP recently  (Exception: triager has access to EMR and prescription is recorded there. Go to Home Care and confirm for pharmacy.)   Negative: Pharmacy calling with prescription question and triager unable to answer question   Negative: Caller has URGENT medicine question about med that PCP or specialist prescribed and  triager unable to answer question    Protocols used: Medication Question Call-A-[place]

## 2024-10-21 NOTE — TELEPHONE ENCOUNTER
"Notified of Roe Connolly MD's message: \"Recommend patient take one tab of the gabapentin at night until we see each other in two days, her symptoms could be from medication withdrawal and we may need to do a slightly longer taper. if symptoms get worse or any new symptoms develop i'd want her seen sooner. thanks!\"    Person was given an opportunity to ask questions, verbalized understanding, and is agreeable.    Anai Damon RN on 10/21/2024 at 2:16 PM    "

## 2024-10-23 ENCOUNTER — OFFICE VISIT (OUTPATIENT)
Dept: PEDIATRICS | Facility: CLINIC | Age: 79
End: 2024-10-23
Payer: MEDICARE

## 2024-10-23 VITALS
DIASTOLIC BLOOD PRESSURE: 76 MMHG | BODY MASS INDEX: 21 KG/M2 | OXYGEN SATURATION: 98 % | WEIGHT: 123 LBS | HEART RATE: 59 BPM | HEIGHT: 64 IN | SYSTOLIC BLOOD PRESSURE: 122 MMHG | RESPIRATION RATE: 18 BRPM | TEMPERATURE: 98 F

## 2024-10-23 DIAGNOSIS — I10 HYPERTENSION GOAL BP (BLOOD PRESSURE) < 140/90: ICD-10-CM

## 2024-10-23 DIAGNOSIS — M79.2 NEUROPATHIC PAIN: Primary | ICD-10-CM

## 2024-10-23 DIAGNOSIS — Z13.6 CARDIOVASCULAR SCREENING; LDL GOAL LESS THAN 160: ICD-10-CM

## 2024-10-23 DIAGNOSIS — R09.81 HEAD CONGESTION: ICD-10-CM

## 2024-10-23 LAB
ALBUMIN SERPL BCG-MCNC: 4.1 G/DL (ref 3.5–5.2)
ALP SERPL-CCNC: 71 U/L (ref 40–150)
ALT SERPL W P-5'-P-CCNC: 7 U/L (ref 0–50)
ANION GAP SERPL CALCULATED.3IONS-SCNC: 10 MMOL/L (ref 7–15)
AST SERPL W P-5'-P-CCNC: 18 U/L (ref 0–45)
BILIRUB SERPL-MCNC: 0.8 MG/DL
BUN SERPL-MCNC: 14.4 MG/DL (ref 8–23)
CALCIUM SERPL-MCNC: 9 MG/DL (ref 8.8–10.4)
CHLORIDE SERPL-SCNC: 105 MMOL/L (ref 98–107)
CHOLEST SERPL-MCNC: 200 MG/DL
CREAT SERPL-MCNC: 0.62 MG/DL (ref 0.51–0.95)
EGFRCR SERPLBLD CKD-EPI 2021: >90 ML/MIN/1.73M2
FASTING STATUS PATIENT QL REPORTED: YES
FASTING STATUS PATIENT QL REPORTED: YES
GLUCOSE SERPL-MCNC: 87 MG/DL (ref 70–99)
HCO3 SERPL-SCNC: 26 MMOL/L (ref 22–29)
HDLC SERPL-MCNC: 104 MG/DL
LDLC SERPL CALC-MCNC: 81 MG/DL
NONHDLC SERPL-MCNC: 96 MG/DL
POTASSIUM SERPL-SCNC: 3.7 MMOL/L (ref 3.4–5.3)
PROT SERPL-MCNC: 6.5 G/DL (ref 6.4–8.3)
SODIUM SERPL-SCNC: 141 MMOL/L (ref 135–145)
TRIGL SERPL-MCNC: 73 MG/DL
TSH SERPL DL<=0.005 MIU/L-ACNC: 1.04 UIU/ML (ref 0.3–4.2)

## 2024-10-23 PROCEDURE — 84443 ASSAY THYROID STIM HORMONE: CPT | Performed by: INTERNAL MEDICINE

## 2024-10-23 PROCEDURE — 80053 COMPREHEN METABOLIC PANEL: CPT | Performed by: INTERNAL MEDICINE

## 2024-10-23 PROCEDURE — 99214 OFFICE O/P EST MOD 30 MIN: CPT | Performed by: INTERNAL MEDICINE

## 2024-10-23 PROCEDURE — 80061 LIPID PANEL: CPT | Performed by: INTERNAL MEDICINE

## 2024-10-23 PROCEDURE — 36415 COLL VENOUS BLD VENIPUNCTURE: CPT | Performed by: INTERNAL MEDICINE

## 2024-10-23 PROCEDURE — G2211 COMPLEX E/M VISIT ADD ON: HCPCS | Performed by: INTERNAL MEDICINE

## 2024-10-23 RX ORDER — GABAPENTIN 100 MG/1
100 CAPSULE ORAL DAILY
Status: SHIPPED
Start: 2024-10-23

## 2024-10-23 ASSESSMENT — PAIN SCALES - GENERAL: PAINLEVEL_OUTOF10: NO PAIN (0)

## 2024-10-23 NOTE — PROGRESS NOTES
Assessment & Plan     Neuropathic pain  discussed with patient (or patient's parents/caregiver) pathophysiology of condition and treatment options.  Reviewed history detail today with the patient.  She has been on Neurontin for several years and I did review the benefits and risk of this medication with her in detail.  Reviewed that 100 mg a day is a very low-dose of the Neurontin and may be helping her with her sleep.  The fact that her symptoms have improved dramatically since she restarted this I think does indicate that some mild withdrawal was partially to blame the patient continue 100 mg a day as has been working well until we see each other in 2 months for her neck scheduled follow-up visit.  Due to prefer things continue very well she could try to go to every other day and see how that goes and reviewed that unfortunately 100 mg is the lowest dose we can do as it is not come in tablet form there be no way to split.  Consider MTM visit if symptoms persist or if we do need to formulate a different type of taper however patient thinks she may just plan to stay on the 100 mg for the time being.  - gabapentin (NEURONTIN) 100 MG capsule; Take 1 capsule (100 mg) by mouth daily.    Head congestion  discussed with patient (or patient's parents/caregiver) pathophysiology of condition and treatment options.  Suspected a component of allergic rhinitis as well as seasonal allergies.  Recommend she restart her Flonase me know if symptoms do not improve.      Hypertension goal BP (blood pressure) < 140/90  due for labs, Well controlled on current medication(s).   - Comprehensive metabolic panel (BMP + Alb, Alk Phos, ALT, AST, Total. Bili, TP)    CARDIOVASCULAR SCREENING; LDL GOAL LESS THAN 160  - Comprehensive metabolic panel (BMP + Alb, Alk Phos, ALT, AST, Total. Bili, TP)  - Lipid panel reflex to direct LDL Fasting    The longitudinal plan of care for the diagnosis(es)/condition(s) as documented were addressed during  "this visit. Due to the added complexity in care, I will continue to support Brionna in the subsequent management and with ongoing continuity of care.      Return in about 2 months (around 12/23/2024) for Next scheduled follow-up visit, or sooner if symptoms persist or worsen.      Subjective   Brionna is a 79 year old, presenting for the following health issues:  Recheck Medication (Gabapentin weaning)      Via the Health Maintenance questionnaire, the patient has reported the following services have been completed -Mammogram: [facility] 2004-08-04, this information has not been sent to the abstraction team.  History of Present Illness       Reason for visit:  Wean off a med    She eats 4 or more servings of fruits and vegetables daily.She consumes 1 sweetened beverage(s) daily.She exercises with enough effort to increase her heart rate 30 to 60 minutes per day.  She exercises with enough effort to increase her heart rate 7 days per week.   She is taking medications regularly.       Patient presents today for follow-up visit regarding her Neurontin.  She stopped her Neurontin several weeks ago and was only taking a few tabs of this a day.  She was in a much larger dose when she was back in the rehab facility however she has been on the lower dose of this.  She was taking 2 tablets a day and then went down to 1 tablet felt fine and then went to 0.  She went a few days after that she noted some insomnia as well as \"just not feeling right\".  After our correspondence 2 days ago she did restart the Neurontin 100 mg a day and notes this is dramatically improved her sleep and a lot of her symptoms have resolved.  She notes some head congestion as well likely related to some allergies she does have Flonase at home and has been taking it.  She also notes ongoing occasional dyspepsia and when she burps this feels better.  She denies any exertional symptoms or anything else today.  Mood is good.  No other concerns or " "complaints today.                Objective    /76   Pulse 59   Temp 98  F (36.7  C) (Oral)   Resp 18   Ht 1.626 m (5' 4\")   Wt 55.8 kg (123 lb)   SpO2 98%   BMI 21.11 kg/m    Body mass index is 21.11 kg/m .  Physical Exam   GENERAL: healthy, alert and no distress  HENT: normocephalic and atrumatic, mucous membranes moist   ABDOMEN: soft, nontender bowel sounds normal  MS: no gross musculoskeletal defects noted, no edema  SKIN: no suspicious lesions or rashes  NEURO: Normal strength and tone, mentation intact and speech normal  PSYCH: mentation appears normal, affect normal/bright              Signed Electronically by: Roe Connolly MD    (*documentation in this chart was partially completed using Dragon PowerMic which could potentially lead to some misspellings or grammar/autocorrect issues in the narrative.)      "

## 2024-10-25 ENCOUNTER — TRANSFERRED RECORDS (OUTPATIENT)
Dept: HEALTH INFORMATION MANAGEMENT | Facility: CLINIC | Age: 79
End: 2024-10-25
Payer: MEDICARE

## 2024-11-06 ENCOUNTER — TRANSFERRED RECORDS (OUTPATIENT)
Dept: HEALTH INFORMATION MANAGEMENT | Facility: CLINIC | Age: 79
End: 2024-11-06
Payer: MEDICARE

## 2024-12-09 ENCOUNTER — TRANSFERRED RECORDS (OUTPATIENT)
Dept: HEALTH INFORMATION MANAGEMENT | Facility: CLINIC | Age: 79
End: 2024-12-09
Payer: MEDICARE

## 2024-12-10 ENCOUNTER — TRANSFERRED RECORDS (OUTPATIENT)
Dept: HEALTH INFORMATION MANAGEMENT | Facility: CLINIC | Age: 79
End: 2024-12-10
Payer: MEDICARE

## 2024-12-26 ENCOUNTER — TELEPHONE (OUTPATIENT)
Dept: PEDIATRICS | Facility: CLINIC | Age: 79
End: 2024-12-26
Payer: MEDICARE

## 2024-12-26 NOTE — TELEPHONE ENCOUNTER
Patient calling and states she was seen at [facility] Sunday.  Got Prednisone.  States diagnosis was sinus infection.  States did not get antibiotic as she is allergic to so many that the provider did not want to give antibiotic.  Is 40-50% better than time of visit.  States still having so much junk in nose and is just zapped.  Scheduled tomorrow in [place].  Patient agrees with plan.  Estella Hector RN

## 2025-01-02 ENCOUNTER — HOSPITAL ENCOUNTER (EMERGENCY)
Facility: HOSPITAL | Age: 80
Discharge: LEFT BEFORE DISCHARGE | End: 2025-01-02
Payer: MEDICARE

## 2025-01-02 VITALS
HEART RATE: 83 BPM | TEMPERATURE: 98.4 F | RESPIRATION RATE: 16 BRPM | SYSTOLIC BLOOD PRESSURE: 184 MMHG | DIASTOLIC BLOOD PRESSURE: 80 MMHG | OXYGEN SATURATION: 99 %

## 2025-01-02 ASSESSMENT — COLUMBIA-SUICIDE SEVERITY RATING SCALE - C-SSRS
6. HAVE YOU EVER DONE ANYTHING, STARTED TO DO ANYTHING, OR PREPARED TO DO ANYTHING TO END YOUR LIFE?: NO
1. IN THE PAST MONTH, HAVE YOU WISHED YOU WERE DEAD OR WISHED YOU COULD GO TO SLEEP AND NOT WAKE UP?: NO
2. HAVE YOU ACTUALLY HAD ANY THOUGHTS OF KILLING YOURSELF IN THE PAST MONTH?: NO

## 2025-01-03 NOTE — ED NOTES
Patient declined to be seen after triage due to wait times currently in the ED. Reviewed declination form with patient and , patient signed form. Patient advised she is welcome to come back at any time to be seen. Patient left ambulatory with .

## 2025-01-06 ENCOUNTER — E-VISIT (OUTPATIENT)
Dept: PEDIATRICS | Facility: CLINIC | Age: 80
End: 2025-01-06
Payer: MEDICARE

## 2025-01-06 ENCOUNTER — TELEPHONE (OUTPATIENT)
Dept: PEDIATRICS | Facility: CLINIC | Age: 80
End: 2025-01-06

## 2025-01-06 DIAGNOSIS — R05.9 COUGH, UNSPECIFIED TYPE: Primary | ICD-10-CM

## 2025-01-06 DIAGNOSIS — J06.9 VIRAL URI WITH COUGH: ICD-10-CM

## 2025-01-06 RX ORDER — GUAIFENESIN 600 MG/1
1200 TABLET, EXTENDED RELEASE ORAL 2 TIMES DAILY PRN
Qty: 30 TABLET | Refills: 1 | Status: SHIPPED | OUTPATIENT
Start: 2025-01-06

## 2025-01-06 NOTE — PATIENT INSTRUCTIONS
Viral Respiratory Infection: Care Instructions  Overview     A viral respiratory infection is an infection of the nose, sinuses, or throat caused by a virus. Colds and the flu are common types of viral respiratory infections.  The symptoms of a viral respiratory infection often start quickly. They include a fever, sore throat, and runny nose. You may also just not feel well. Or you may not want to eat much.  Most viral infections can be treated with home care. This may include drinking lots of fluids and taking over-the-counter pain medicine. You will probably feel better in 4 to 10 days.  Antibiotics are not used to treat a viral infection. Antibiotics don't kill viruses, so they won't help cure a viral illness.  In some cases, a doctor may prescribe antiviral medicine to help your body fight a serious viral infection.  Follow-up care is a key part of your treatment and safety. Be sure to make and go to all appointments, and call your doctor if you are having problems. It's also a good idea to know your test results and keep a list of the medicines you take.  How can you care for yourself at home?  To prevent dehydration, drink plenty of fluids. Choose water and other clear liquids until you feel better. If you have kidney, heart, or liver disease and have to limit fluids, talk with your doctor before you increase the amount of fluids you drink.  Ask your doctor if you can take an over-the-counter pain medicine, such as acetaminophen (Tylenol), ibuprofen (Advil, Motrin), or naproxen (Aleve). Be safe with medicines. Read and follow all instructions on the label. No one younger than 20 should take aspirin. It has been linked to Reye syndrome, a serious illness.  Be careful when taking over-the-counter cold or flu medicines and Tylenol at the same time. Many of these medicines have acetaminophen, which is Tylenol. Read the labels to make sure that you are not taking more than the recommended dose. Too much  "acetaminophen (Tylenol) can be harmful.  Get plenty of rest.  Use saline (saltwater) nasal washes to help keep your nasal passages open and wash out mucus and allergens. You can buy saline nose sprays at a grocery store or drugstore. Follow the instructions on the package. Or you can make your own at home. Add 1 teaspoon of non-iodized salt and 1 teaspoon of baking soda to 2 cups of distilled or boiled and cooled water. Fill a squeeze bottle or neti pot with the nasal wash. Then put the tip into your nostril, and lean over the sink. With your mouth open, gently squirt the liquid. Repeat on the other side.  Use a vaporizer or humidifier to add moisture to your bedroom. Follow the instructions for cleaning the machine.  Do not smoke or allow others to smoke around you. If you need help quitting, talk to your doctor about stop-smoking programs and medicines. These can increase your chances of quitting for good.  When should you call for help?   Call 911 anytime you think you may need emergency care. For example, call if:    You have severe trouble breathing.   Call your doctor now or seek immediate medical care if:    You have a new or higher fever.     Your fever lasts more than 48 hours.     You have trouble breathing.     You have a fever with a stiff neck or a severe headache.     You are sensitive to light.     You feel very sleepy or confused.   Watch closely for changes in your health, and be sure to contact your doctor if:    You do not get better as expected.   Where can you learn more?  Go to https://www.[employer].net/patiented  Enter Q795 in the search box to learn more about \"Viral Respiratory Infection: Care Instructions.\"  Current as of: April 30, 2024  Content Version: 14.3    2024 [employer].   Care instructions adapted under license by your healthcare professional. If you have questions about a medical condition or this instruction, always ask your healthcare professional. [employer] disclaims any warranty or liability for your use of this information.

## 2025-01-06 NOTE — TELEPHONE ENCOUNTER
"Incoming call from patient. Has had ongoing URI since 12/20/24. Wondering about next steps for treatment.    Was seen by Meron Johnson MD 12/27/24:  \"If symptoms were to persist could consider treatment for bacterial sinusitis however she has multiple antibiotics allergies which would make this more challenging. Also discussed that she might benefit from a referral to allergy to further clarify medication allergies and simplify antibiotics in the future. Return precautions given.\"  Given rx for atrovent. Had received 5 day course of prednisone from urgency room 12/21/24.    Symptoms have not changed since this visit, besides simultaneously developing a UTI that she received prescription for macrobid for on Saturday.     Anai Damon RN    "

## 2025-01-06 NOTE — TELEPHONE ENCOUNTER
RN called [phone] and spoke to patient. Relayed provider message below. Patient reports she did submit an e-visit today. Per chart review, Dr Connolly did respond back to her about 10 minutes prior, patient states she will respond back to him.    Maggie Simmons RN

## 2025-01-06 NOTE — TELEPHONE ENCOUNTER
Given antibiotics allergies she cannot take any of the recommended regimens for siunusitis.  Given this we will likely need to reach out to subspecialist to assist with management and therefore recommend either evisit or virtual visit to discuss further.    Thank you,  Meron Johnson MD

## 2025-01-07 ENCOUNTER — MYC MEDICAL ADVICE (OUTPATIENT)
Dept: PEDIATRICS | Facility: CLINIC | Age: 80
End: 2025-01-07
Payer: MEDICARE

## 2025-01-09 ENCOUNTER — OFFICE VISIT (OUTPATIENT)
Dept: PEDIATRICS | Facility: CLINIC | Age: 80
End: 2025-01-09
Payer: MEDICARE

## 2025-01-09 ENCOUNTER — ANCILLARY PROCEDURE (OUTPATIENT)
Dept: GENERAL RADIOLOGY | Facility: CLINIC | Age: 80
End: 2025-01-09
Attending: NURSE PRACTITIONER
Payer: MEDICARE

## 2025-01-09 VITALS
HEART RATE: 85 BPM | RESPIRATION RATE: 20 BRPM | DIASTOLIC BLOOD PRESSURE: 66 MMHG | OXYGEN SATURATION: 96 % | SYSTOLIC BLOOD PRESSURE: 130 MMHG | WEIGHT: 121 LBS | HEIGHT: 64 IN | BODY MASS INDEX: 20.66 KG/M2 | TEMPERATURE: 97.8 F

## 2025-01-09 DIAGNOSIS — J01.90 ACUTE SINUSITIS WITH SYMPTOMS > 10 DAYS: ICD-10-CM

## 2025-01-09 DIAGNOSIS — Z88.1 ALLERGY TO MULTIPLE ANTIBIOTICS: Primary | ICD-10-CM

## 2025-01-09 DIAGNOSIS — R05.1 ACUTE COUGH: ICD-10-CM

## 2025-01-09 RX ORDER — CEFDINIR 300 MG/1
300 CAPSULE ORAL 2 TIMES DAILY
Qty: 10 CAPSULE | Refills: 0 | Status: SHIPPED | OUTPATIENT
Start: 2025-01-09 | End: 2025-01-14

## 2025-01-09 ASSESSMENT — ENCOUNTER SYMPTOMS
SWEATS: 1
COUGH: 1
CHILLS: 1

## 2025-01-09 ASSESSMENT — PAIN SCALES - GENERAL: PAINLEVEL_OUTOF10: NO PAIN (0)

## 2025-01-09 ASSESSMENT — LIFESTYLE VARIABLES: SMOKING_STATUS: 0

## 2025-01-09 NOTE — Clinical Note
This pt is miserable with ongoing sinus pressure, pain, cough. Follow-up CXR is normal. She has so many allergies, so we put in referral to allergist. She is desperate for abx treatment, which is reasonable based on duration and intensity of symptoms. She is limited dt allergies... debating between doxycycline (listed as rash) vs cefdinir (rash with cefprozil-2nd gen cephalosporin). She has tolerated cephalexin in the past. Do you have any thoughts on best treatment?

## 2025-01-09 NOTE — PROGRESS NOTES
"  Assessment & Plan     Allergy to multiple antibiotics  Refer to allergist to help clarifyallergies.  - Adult Allergy/Asthma  Referral; Future    Acute sinusitis with symptoms > 10 days  Acute cough  Reasonable to treat based on severity and duration of symptoms. COVID and flu testing would not change plan of care at this point.  She has multiple allergies-has tolerated cephalexin in the past but has not tolerated another cephalosporin (rash). Recommend she monitor for reaction and keep benadryl on hand.   - XR Chest 2 Views; Future  - cefdinir (OMNICEF) 300 MG capsule; Take 1 capsule (300 mg) by mouth 2 times daily for 5 days.    The longitudinal plan of care for the diagnosis(es)/condition(s) as documented were addressed during this visit. Due to the added complexity in care, I will continue to support Brionna in the subsequent management and with ongoing continuity of care in partnership with PCP        There are no Patient Instructions on file for this visit.    Subjective   Brionna is a 79 year old, presenting for the following health issues:  Cough      1/9/2025    11:19 AM   Additional Questions   Roomed by Sadia SWIFT   Accompanied by N/A         1/9/2025    11:19 AM   Patient Reported Additional Medications   Patient reports taking the following new medications No     Via the Health Maintenance questionnaire, the patient has reported the following services have been completed -Mammogram: [facility] 2023-02-07, this information has been sent to the abstraction team.  History of Present Illness       Reason for visit:  Cough  Symptom intensity:  Mild  Symptom progression:  Improving   She is taking medications regularly.     \"Nasal congestion  Upper respiratory tract infection, unspecified type  Presents today for evaluation of ongoing nasal congestion.  Was seen at the urgency room on 12/21 and given prescription for prednisone.  Symptoms started on 12/20.  At this point notes ongoing nasal congestion " "and ear fullness.  No associated fever.  Symptoms most consistent with a URI and associated sinus congestion.  Discussed supportive cares as well as trial of atrovent and decongestant to help with nasal congestion.  If symptoms were to persist could consider treatment for bacterial sinusitis however she has multiple antibiotics allergies which would make this more challenging. Also discussed that she might benefit from a referral to allergy to further clarify medication allergies and simplify antibiotics in the future.  Return precautions given.   - ipratropium (ATROVENT) 0.06 % nasal spray; Spray 2 sprays into both nostrils 4 times daily. Use for up to 5 days with illness\"      Loose stool, onset yesterday  X2 , urgency, x1 today  Loose, not watery or bloody  Heaviness to epigastric region, burpy with eating-has been \"bad for awhile\" but feels worst when sick. Better with heartburn.   Nausea, no vomiting.    12/21-neg flu. Neg COVID. Started as sinus headache. Green mucus. Cough   Mucinex tabs made her cough more, \"tightened everything up\"    Denies shortnress breath  Ears plugged                    Objective    /66 (BP Location: Right arm, Patient Position: Sitting, Cuff Size: Adult Regular)   Pulse 85   Temp 97.8  F (36.6  C) (Temporal)   Resp 20   Ht 1.626 m (5' 4\")   Wt 54.9 kg (121 lb)   SpO2 96%   BMI 20.77 kg/m    Body mass index is 20.77 kg/m .  Physical Exam   GENERAL: alert and no distress  EYES: Eyes grossly normal to inspection  HENT: normal cephalic/atraumatic, both ears: clear effusion, nose and mouth without ulcers or lesions, oropharynx clear, and oral mucous membranes moist  NECK: no adenopathy, no asymmetry, masses, or scars  RESP: lungs clear to auscultation - no rales, rhonchi or wheezes  CV: regular rate and rhythm, normal S1 S2, no S3 or S4, no murmur, click or rub            Signed Electronically by: Sidra Stratton NP    "

## 2025-01-10 ENCOUNTER — TELEPHONE (OUTPATIENT)
Dept: ALLERGY | Facility: CLINIC | Age: 80
End: 2025-01-10
Payer: MEDICARE

## 2025-01-10 NOTE — TELEPHONE ENCOUNTER
[facility] Call Center    Phone Message    May a detailed message be left on voicemail: yes     Reason for Call: Appointment Intake      Referring Provider Name:   Sidra Stratton, NP in [facility]    Diagnosis and/or Symptoms:   Allergy to multiple antibiotics; Allergy - Drug    Pt said her drug allergy list should be in Epic - she did not have a copy of it available at the time     Per protocols please review and reach out to Pt to schedule or to let her know you cannot see her - Thank you!      Action Taken: Message routed to:  Other: MARY ALLERGY    Travel Screening: Not Applicable

## 2025-01-14 NOTE — TELEPHONE ENCOUNTER
Called and spoke with patient regarding her allergies to antibiotics. She does have several listed and RN did make her aware that we have limited availability for testing here in [place]. Patient was okay with that and wanted to scheduled. Scheduled her first available in May and wait listed her appointment.     Sandra Gonzales, MAXN, RN

## 2025-01-15 ENCOUNTER — TELEPHONE (OUTPATIENT)
Dept: PEDIATRICS | Facility: CLINIC | Age: 80
End: 2025-01-15

## 2025-01-15 NOTE — TELEPHONE ENCOUNTER
Outgoing call spoke to patient.  Advised to keep appointment with resident tomorrow.  Scheduled telephone visit for February 5th with Dr. Connolly to discuss possible surgery.    Thank you kindly,  Erick HERNANDES

## 2025-01-15 NOTE — TELEPHONE ENCOUNTER
I don't have any openings but I could put in a referral to ENT  Also may be helpful to get CT of sinuses  Ok to see resident nalini Stratton, APRN, CNP

## 2025-01-15 NOTE — TELEPHONE ENCOUNTER
Outgoing call spoke to patient. Reschedule her and spouse's appointment's from today with Dr. Connolly to March 5th.    Patient is wondering if Sidra Stratton could fit her in tomorrow? She saw her not long ago, and is still not feeling well. Ongoing since December. Completed antibiotics.    2.   Patient is wondering if Dr. Connolly could do a Telephone Visit with her sometime to talk      about a possible procedure. Doesn't want to wait until March 5th. Maybe she would be okay with talking to Sidra??    Thank you kindly,  Erick HERNANDES

## 2025-01-16 ENCOUNTER — OFFICE VISIT (OUTPATIENT)
Dept: PEDIATRICS | Facility: CLINIC | Age: 80
End: 2025-01-16
Payer: MEDICARE

## 2025-01-16 VITALS
SYSTOLIC BLOOD PRESSURE: 134 MMHG | RESPIRATION RATE: 18 BRPM | WEIGHT: 122 LBS | HEIGHT: 64 IN | BODY MASS INDEX: 20.83 KG/M2 | HEART RATE: 84 BPM | TEMPERATURE: 98.6 F | OXYGEN SATURATION: 98 % | DIASTOLIC BLOOD PRESSURE: 71 MMHG

## 2025-01-16 DIAGNOSIS — K21.9 GASTROESOPHAGEAL REFLUX DISEASE WITHOUT ESOPHAGITIS: ICD-10-CM

## 2025-01-16 DIAGNOSIS — J18.9 COMMUNITY ACQUIRED PNEUMONIA, UNSPECIFIED LATERALITY: Primary | ICD-10-CM

## 2025-01-16 RX ORDER — PREDNISONE 20 MG/1
40 TABLET ORAL DAILY
Qty: 5 TABLET | Refills: 0 | Status: SHIPPED | OUTPATIENT
Start: 2025-01-16 | End: 2025-01-21

## 2025-01-16 RX ORDER — CEFDINIR 300 MG/1
300 CAPSULE ORAL 2 TIMES DAILY
Qty: 10 CAPSULE | Refills: 0 | Status: SHIPPED | OUTPATIENT
Start: 2025-01-16

## 2025-01-16 NOTE — PATIENT INSTRUCTIONS
We will prescribe you another 5 days of antibiotics and 5 days of steroids (prednisone).  Continue flonase daily.  We will also prescribe you an acid blocker, omeprazole, that you should take twice daily.  Follow up in 1 week so we can check in on your symptoms.

## 2025-01-16 NOTE — PROGRESS NOTES
Assessment & Plan     Brionna is a 79 year old female with a pmh of hypertension and osteoporosis who presents with ongoing coughing with sputum production for weeks despite recent treatment for community acquired pneumonia.     Community acquired pneumonia  Patient was recently treated with 5 days of cefdinir for pneumonia, she reports some improvements in her symptoms, however continues to have fatigue, decreased appetite, coughing, and significant sputum production. She got 5 days of steroids mid-December for sinusitis (wasn't given abx at that time due to her multiple allergies). Sinusitis symptoms have now resolved. She also endorses throat pain, could be secondary to reflux exacerbated by coughing. Likely incompletely treated pneumonia, will treated with another 5 days of antibiotics and steroids due to her ongoing symptoms and throat pain.  - cefdinir (OMNICEF) 300 MG capsule; Take 1 capsule (300 mg) by mouth 2 times daily for 5 days. Patient has previous allergies to cephalosprins, tolerated her last course of cefdinir well.  - predniSONE (DELTASONE) 20 MG tablet; Take 2 tablets (40 mg) by mouth daily for 5 days.  - Follow up in 1 week    Gastroesophageal reflux disease without esophagitis  Throat pain.  Patient has epigastric pain on exam, reports increased burping and throat pain. She feels like things sometimes get stuck in her throat, but she is able to swallow and denies dysphagia. Likely secondary to reflux worsened by her coughing, will treat with a PPI as below. If her symptoms don't improve on a PPI, could consider referring to GI for EGD to further evaluate her esophagus for anatomical abnormalities.   - omeprazole (PRILOSEC) 20 MG DR capsule; Take 1 capsule (20 mg) by mouth 2 times daily.          {FOLLOW UP PLANS (Optional) Includes COVID19 Treatment Plan:617682}    Subjective   Brionna is a 79 year old, presenting for the following health issues:  URI        1/16/2025     2:53 PM   Additional  Questions   Roomed by Starr Wade   Accompanied by N/A     HPI     Patient reports her symptoms started started as a sinus infection on December 22nd, she was treated with steroids at the time due to her multiple allergies to antibiotics. She was also prescribed atrovent nasal spray a few days later which she has been using in addition to flonase.     She most recently saw Sidra Chaudhariy on 1/9 for ongoing coughing, sputum production, and fatigue. She was prescribed cefdinir for 5 days and overall did improve in terms of her fatigue, however she continues to have significant sputum production, cough, and decreased appetite. Her sputum is green. She denies fevers and her sinus symptoms have now resolved. She does feel that her right ear is plugged up.     She also endorses some throat pain that she thinks started a few weeks ago, she says she feels like water or medications get stuck in her throat and she has to focus on swallowing to get it down. No choking or dysphagia. No difficulty breathing or shortness of breath. She feels like she has been burping more.    Previous visits:  Saw Sidra Stratton 1/9/25 - prescribed cefdinir 300Mg x 5 days  Seen [facility] [place] 1/4/25  Saw Alex 12/27/25 - ipratropium (ATROVENT) 0.06 % nasal spray; Spray 2 sprays into both nostrils 4 times daily.   Seen in [facility] [place] 12/21/25 - high-dose Flonase nasal spray 2 puffs per nostril once a day. Start taking the prednisone and take for 5 days     {MA/LPN/RN Pre-Provider Visit Orders- hCG/UA/Strep (Optional):246895}  {SUPERLIST (Optional):163469}  {additonal problems for provider to add (Optional):844176}      Review of Systems  Constitutional, HEENT, cardiovascular, pulmonary, gi and gu systems are negative, except as otherwise noted.      Objective    /71 (BP Location: Right arm, Patient Position: Sitting, Cuff Size: Adult Regular)   Pulse 84   Temp 98.6  F (37  C) (Temporal)   Resp 18   Ht 1.626 m (5'  "4\")   Wt 55.3 kg (122 lb)   LMP  (LMP Unknown)   SpO2 98%   BMI 20.94 kg/m    Body mass index is 20.94 kg/m .  Physical Exam   GENERAL: alert and no distress  NECK: no adenopathy, no asymmetry, or masses  ENT: moist mucous membranes, normal Tms bilaterally, no congestion  RESP: lungs clear to auscultation - no rales, rhonchi or wheezes. No increased WOB.  CV: regular rate and rhythm, normal S1 S2, no S3 or S4, no murmur, click or rub, no peripheral edema  ABDOMEN: soft, no distention. Tenderness to palpation of epigastric area. Normal bowel sounds.  MS: no gross musculoskeletal defects noted, no edema  SKIN: no suspicious lesions or rashes    CXR - Reviewed prior CXR from 1/9, normal findings, no consolidation         Signed Electronically by: Shelia Campbell MD  Internal Medicine-Pediatrics PGY1  {Email feedback regarding this note to [email or web address]   :[id number]}  "

## 2025-01-23 ENCOUNTER — TELEPHONE (OUTPATIENT)
Dept: PEDIATRICS | Facility: CLINIC | Age: 80
End: 2025-01-23

## 2025-01-23 NOTE — TELEPHONE ENCOUNTER
Please call patient to help schedule pre-op for surgery on 2/12/25.  Patient currently has appointment with Dr. Connolly on 2/5 as a phone visit - can this be converted to in person? If not can she be scheduled with extender?      Deirdre Milligan, MD  Internal Medicine & Pediatrics  [facility]  She/her

## 2025-01-23 NOTE — TELEPHONE ENCOUNTER
I called pt, they really want to see Dr Connolly. I asked pt if he can not see them would it be ok to schedule with his PA at 12:40, pt said yes. I put that on hold for the pt.

## 2025-01-24 NOTE — TELEPHONE ENCOUNTER
Dr. Connolly are you okay with changing 2/5 visit to in person for a pre-op?    Thank you kindly,  Erick HERNANDES

## 2025-01-27 DIAGNOSIS — I10 HYPERTENSION GOAL BP (BLOOD PRESSURE) < 140/90: ICD-10-CM

## 2025-01-27 RX ORDER — LISINOPRIL 5 MG/1
10 TABLET ORAL DAILY
Qty: 180 TABLET | Refills: 2 | Status: SHIPPED | OUTPATIENT
Start: 2025-01-27

## 2025-02-03 ENCOUNTER — TRANSFERRED RECORDS (OUTPATIENT)
Dept: HEALTH INFORMATION MANAGEMENT | Facility: CLINIC | Age: 80
End: 2025-02-03
Payer: MEDICARE

## 2025-02-05 ENCOUNTER — OFFICE VISIT (OUTPATIENT)
Dept: PEDIATRICS | Facility: CLINIC | Age: 80
End: 2025-02-05
Payer: MEDICARE

## 2025-02-05 VITALS
BODY MASS INDEX: 20.66 KG/M2 | DIASTOLIC BLOOD PRESSURE: 86 MMHG | RESPIRATION RATE: 16 BRPM | HEART RATE: 70 BPM | TEMPERATURE: 97.5 F | OXYGEN SATURATION: 99 % | WEIGHT: 121 LBS | HEIGHT: 64 IN | SYSTOLIC BLOOD PRESSURE: 136 MMHG

## 2025-02-05 DIAGNOSIS — Z01.818 PREOP GENERAL PHYSICAL EXAM: Primary | ICD-10-CM

## 2025-02-05 DIAGNOSIS — M54.12 C6 RADICULOPATHY: ICD-10-CM

## 2025-02-05 DIAGNOSIS — I10 HYPERTENSION GOAL BP (BLOOD PRESSURE) < 140/90: ICD-10-CM

## 2025-02-05 DIAGNOSIS — M47.22 OSTEOARTHRITIS OF SPINE WITH RADICULOPATHY, CERVICAL REGION: ICD-10-CM

## 2025-02-05 PROCEDURE — 99214 OFFICE O/P EST MOD 30 MIN: CPT | Performed by: INTERNAL MEDICINE

## 2025-02-05 PROCEDURE — G2211 COMPLEX E/M VISIT ADD ON: HCPCS | Performed by: INTERNAL MEDICINE

## 2025-02-05 PROCEDURE — 93000 ELECTROCARDIOGRAM COMPLETE: CPT | Performed by: INTERNAL MEDICINE

## 2025-02-05 RX ORDER — LOSARTAN POTASSIUM 25 MG/1
25 TABLET ORAL DAILY
Qty: 90 TABLET | Refills: 3 | Status: SHIPPED | OUTPATIENT
Start: 2025-02-05

## 2025-02-05 ASSESSMENT — PAIN SCALES - GENERAL: PAINLEVEL_OUTOF10: NO PAIN (0)

## 2025-02-05 NOTE — PROGRESS NOTES
Preoperative Evaluation  [facility] [place]  [address]  [address]  [place] [address]  Phone: [phone]  Fax: [phone]  Primary Provider: Roe Connolly MD  Pre-op Performing Provider: Roe Connolly MD  Feb 5, 2025 1/31/2025   Surgical Information   What procedure is being done? C6 spinal fusion   Facility or Hospital where procedure/surgery will be performed: [facility] [place]   Who is doing the procedure / surgery? Dr Sd Jimenes   Date of surgery / procedure: 2/26/25   Time of surgery / procedure: 11 am   Where do you plan to recover after surgery? at home with family     Fax number for surgical facility: [phone]    Assessment & Plan     The proposed surgical procedure is considered INTERMEDIATE risk.    Preop general physical exam  - EKG 12-lead complete w/read - Clinics  Osteoarthritis of spine with radiculopathy, cervical region  C6 radiculopathy  - EKG 12-lead complete w/read - Clinics  Hypertension goal BP (blood pressure) < 140/90  - losartan (COZAAR) 25 MG tablet; Take 1 tablet (25 mg) by mouth daily.    Risks and Recommendations  The patient has the following additional risks and recommendations for perioperative complications:   - No identified additional risk factors other than previously addressed    Preoperative Medication Instructions  Antiplatelet or Anticoagulation Medication Instructions   - Patient is on no antiplatelet or anticoagulation medications.    Additional Medication Instructions  Take all scheduled medications on the day of surgery EXCEPT for modifications listed below:   - ACE/ARB/ARNI (lisinopril, enalapril, losartan, valsartan, olmesartan, sacubritril/valsartan) : Continue without modification (e.g., MAC anesthesia, neurosurgery, spine surgery, heart failure, or labile hypertension with risk of hypertension).   - pregabalin, gabapentin: Continue without modification.    Recommendation  Approval given  to proceed with proposed procedure, without further diagnostic evaluation.    The longitudinal plan of care for the diagnosis(es)/condition(s) as documented were addressed during this visit. Due to the added complexity in care, I will continue to support Brionna in the subsequent management and with ongoing continuity of care.    Soniya Staton is a 79 year old, presenting for the following:  Pre-Op Exam          2/5/2025    11:35 AM   Additional Questions   Roomed by Augustina LUU   Accompanied by cornell         2/5/2025    11:35 AM   Patient Reported Additional Medications   Patient reports taking the following new medications no     Via the Health Maintenance questionnaire, the patient has reported the following services have been completed -Mammogram: [place] 2023-01-01, this information has not been sent to the abstraction team.  HPI related to upcoming procedure: plans ot have c6 anerios deomressiona dn fusion for radiculaotphy will bet here for one day and planne procesure iis 90 min. feels well otherwis.e         1/31/2025   Pre-Op Questionnaire   Have you ever had a heart attack or stroke? No   Have you ever had surgery on your heart or blood vessels, such as a stent placement, a coronary artery bypass, or surgery on an artery in your head, neck, heart, or legs? No   Do you have chest pain with activity? No   Do you have a history of heart failure? No   Do you currently have a cold, bronchitis or symptoms of other infection? (!) UNKNOWN cold since  12/21/24   Do you have a cough, shortness of breath, or wheezing? No   Do you or anyone in your family have previous history of blood clots? No   Do you or does anyone in your family have a serious bleeding problem such as prolonged bleeding following surgeries or cuts? No   Have you ever had problems with anemia or been told to take iron pills? No   Have you had any abnormal blood loss such as black, tarry or bloody stools, or abnormal vaginal bleeding? No   Have  you ever had a blood transfusion? No   Are you willing to have a blood transfusion if it is medically needed before, during, or after your surgery? Yes   Have you or any of your relatives ever had problems with anesthesia? No   Do you have sleep apnea, excessive snoring or daytime drowsiness? No   Do you have any artifical heart valves or other implanted medical devices like a pacemaker, defibrillator, or continuous glucose monitor? No   Do you have artificial joints? (!) YES   Are you allergic to latex? No     Health Care Directive  Patient has a Health Care Directive on file      Preoperative Review of    reviewed - controlled substances reflected in medication list.      Status of Chronic Conditions:  See problem list for active medical problems.  Problems all longstanding and stable, except as noted/documented.  See ROS for pertinent symptoms related to these conditions.    HYPERTENSION - Patient has longstanding history of HTN , currently denies any symptoms referable to elevated blood pressure. Specifically denies chest pain, palpitations, dyspnea, orthopnea, PND or peripheral edema. Blood pressure readings have been in normal range. Current medication regimen is as listed below. Patient denies any side effects of medication.     Patient Active Problem List    Diagnosis Date Noted    Nontraumatic tear of left rotator cuff, unspecified tear extent 02/23/2022     Priority: Medium    Inflamed seborrheic keratosis 11/02/2017     Priority: Medium    SK (seborrheic keratosis) 11/02/2017     Priority: Medium    Cherry angioma 11/02/2017     Priority: Medium    Atopic rhinitis 02/05/2016     Priority: Medium    Hypertension goal BP (blood pressure) < 140/90 01/07/2016     Priority: Medium    Osteoarthritis of knee 12/18/2015     Priority: Medium    Fracture of distal end of radius 10/22/2015     Priority: Medium    Surgical follow-up care 06/01/2015     Priority: Medium     Overview:   inactive icd-9 diagnosis  "auto replaced with icd-10, display name retained//mporz      Acute posthemorrhagic anemia 04/29/2015     Priority: Medium    Osteoarthritis of hip 04/07/2015     Priority: Medium    Allergy to anti-infective agent 03/26/2014     Priority: Medium    Enteritis 03/26/2014     Priority: Medium    Diverticulitis of sigmoid colon 03/26/2014     Priority: Medium    ACP (advance care planning) 06/06/2011     Priority: Medium     Patient states has Advance Directive and will bring in a copy to clinic. 6/6/2011   Pt document is in epic, dated 1/12/05.  Discussed with pt and she may plan to update this and verbally gave the health care agent information listed. Leigh Naranjo RN          Mixed incontinence urge and stress 06/06/2011     Priority: Medium    Microscopic hematuria 11/21/2008     Priority: Medium    Colon Polyps      Priority: Medium    Estrogen replacement therapy 09/07/2005     Priority: Medium    Internal derangement of knee 04/30/2005     Priority: Medium     L knee x 4-5 wks, no known injury but \"runs through it\" regularly  Problem list name updated by automated process. Provider to review      Back pain 04/18/2003     Priority: Medium       Patient is followed by [facility] for ongoing prescription of narcotic pain medicine.  Med: vicodin.   Maximum use per month: 30  Expected duration: indefinite  Narcotic agreement on file: NO  Clinic visit recommended: Q 3 months        Osteoporosis 04/18/2003     Priority: Medium     Problem list name updated by automated process. Provider to review        Past Medical History:   Diagnosis Date    RADIAL STYLOID TENOSYNOV - right 02/23/2006    lifts weights, doesn't think work-related; OK to add PT visits prn    Rotator cuff tear      Past Surgical History:   Procedure Laterality Date    COLONOSCOPY  11/26/2013    Dr. Cardenas [facility]    COLONOSCOPY  11/26/2013    Procedure: COMBINED COLONOSCOPY, SINGLE BIOPSY/POLYPECTOMY BY BIOPSY;;  Surgeon: Jovanni Cardenas MD;  " "Location:  GI    COLONOSCOPY N/A 8/20/2024    Procedure: Colonoscopy with polypectomies using exacto snare;  Surgeon: Sd Molina MD;  Location:  GI    HYSTERECTOMY, [place]  1986    ZZC ROTATOR CUFF STRAP      ZZC TOTAL HIP ARTHROPLASTY Right      Current Outpatient Medications   Medication Sig Dispense Refill    acetaminophen (TYLENOL) 500 MG tablet Take 1,000 mg by mouth.      gabapentin (NEURONTIN) 100 MG capsule Take 1 capsule (100 mg) by mouth daily.      ipratropium (ATROVENT) 0.06 % nasal spray Spray 2 sprays into both nostrils 4 times daily. Use for up to 5 days with illness 15 mL 0    losartan (COZAAR) 25 MG tablet Take 1 tablet (25 mg) by mouth daily. 90 tablet 3    omeprazole (PRILOSEC) 20 MG DR capsule Take 1 capsule (20 mg) by mouth 2 times daily. 60 capsule 0    valACYclovir (VALTREX) 500 MG tablet TAKE ONE TABLET BY MOUTH ONCE DAILY 90 tablet 3    guaiFENesin (MUCINEX) 600 MG 12 hr tablet Take 2 tablets (1,200 mg) by mouth 2 times daily as needed for congestion or cough. (Patient not taking: Reported on 2/5/2025) 30 tablet 1       Allergies   Allergen Reactions    Scopolamine Anaphylaxis and Itching    Ciprofloxacin Swelling     Stiff and swollen neck    Amoxicillin-Pot Clavulanate [Amoxicillin-Pot Clavulanate]     Contrast Dye      gets cold and feels terrible  Pt was ok with Isovue-370 and no pre-meds    Doxycycline Hyclate Rash    Erythromycin Swelling    Flagyl [Metronidazole Hcl]     Meperidine Hcl      low blood pressure    Oxycodone     Prochlorperazine      agitated    Sulfa Antibiotics Rash    Cefprozil Rash    Tetracycline Rash        Social History     Tobacco Use    Smoking status: Never     Passive exposure: Never    Smokeless tobacco: Never   Substance Use Topics    Alcohol use: Yes     Comment: Beer with dinner       History   Drug Use No               Objective    /86   Pulse 70   Temp 97.5  F (36.4  C) (Tympanic)   Resp 16   Ht 1.626 m (5' 4\")   Wt 54.9 kg (121 lb)  " " LMP  (LMP Unknown)   SpO2 99%   BMI 20.77 kg/m     Estimated body mass index is 20.77 kg/m  as calculated from the following:    Height as of this encounter: 1.626 m (5' 4\").    Weight as of this encounter: 54.9 kg (121 lb).  Physical Exam  GENERAL: alert and no distress  NECK: no adenopathy, no asymmetry, masses, or scars  RESP: lungs clear to auscultation - no rales, rhonchi or wheezes  CV: regular rate and rhythm, normal S1 S2, no S3 or S4, no murmur, click or rub, no peripheral edema  ABDOMEN: soft, nontender, no hepatosplenomegaly, no masses and bowel sounds normal  MS: no gross musculoskeletal defects noted, no edema  PSYCH: mentation appears normal, affect normal/bright    Recent Labs   Lab Test 10/23/24  1108      POTASSIUM 3.7   CR 0.62        Diagnostics           EKG Interpretation:      Interpreted by Roe Connolly MD   Symptoms at time of EKG: None   Rhythm: Normal sinus   Rate: Normal  Axis: Normal  Ectopy: None  Conduction: Normal and Right bundle branch block (complete)  ST Segments/ T Waves: No ST-T wave changes and No acute ischemic changes  Q Waves: None  Comparison to prior: Unchanged    Clinical Impression: no acute changes     Revised Cardiac Risk Index (RCRI)  The patient has the following serious cardiovascular risks for perioperative complications:   - No serious cardiac risks = 0 points     RCRI Interpretation: 0 points: Class I (very low risk - 0.4% complication rate)         Signed Electronically by: Roe Connolly MD  A copy of this evaluation report is provided to the requesting physician.         "

## 2025-02-05 NOTE — PATIENT INSTRUCTIONS
How to Take Your Medication Before Surgery  Preoperative Medication Instructions   Antiplatelet or Anticoagulation Medication Instructions   - Patient is on no antiplatelet or anticoagulation medications.    Additional Medication Instructions  Take all scheduled medications on the day of surgery EXCEPT for modifications listed below:   - ACE/ARB/ARNI (lisinopril, enalapril, losartan, valsartan, olmesartan, sacubritril/valsartan) : Continue without modification (e.g., MAC anesthesia, neurosurgery, spine surgery, heart failure, or labile hypertension with risk of hypertension).   - pregabalin, gabapentin: Continue without modification.       Patient Education   Preparing for Your Surgery  For Adults  Getting started  In most cases, a nurse will call to review your health history and instructions. They will give you an arrival time based on your scheduled surgery time. Please be ready to share:  Your doctor's clinic name and phone number  Your medical, surgical, and anesthesia history  A list of allergies and sensitivities  A list of medicines, including herbal treatments and over-the-counter drugs  Whether the patient has a legal guardian (ask how to send us the papers in advance)  Note: You may not receive a call if you were seen at our PAC (Preoperative Assessment Center).  Please tell us if you're pregnant--or if there's any chance you might be pregnant. Some surgeries may injure a fetus (unborn baby), so they require a pregnancy test. Surgeries that are safe for a fetus don't always need a test, and you can choose whether to have one.   Preparing for surgery  Within 10 to 30 days of surgery: Have a pre-op exam (sometimes called an H&P, or History and Physical). This can be done at a clinic or pre-operative center.  If you're having a , you may not need this exam. Talk to your care team.  At your pre-op exam, talk to your care team about all medicines you take. (This includes CBD oil and any drugs, such as  THC, marijuana, and other forms of cannabis.) If you need to stop any medicine before surgery, ask when to start taking it again.  This is for your safety. Many medicines and drugs can make you bleed too much during surgery. Some change how well surgery (anesthesia) drugs work.  Call your insurance company to let them know you're having surgery. (If you don't have insurance, call [phone].)  Call your clinic if there's any change in your health. This includes a scrape or scratch near the surgery site, or any signs of a cold (sore throat, runny nose, cough, rash, fever).  Eating and drinking guidelines  For your safety: Unless your surgeon tells you otherwise, follow the guidelines below.  Eat and drink as normal until 8 hours before you arrive for surgery. After that, no food or milk. You can spit out gum when you arrive.  Drink clear liquids until 2 hours before you arrive. These are liquids you can see through, like water, Gatorade, and Propel Water. They also include plain black coffee and tea (no cream or milk).  No alcohol for 24 hours before you arrive. The night before surgery, stop any drinks that contain THC.  If your care team tells you to take medicine on the morning of surgery, it's okay to take it with a sip of water. No other medicines or drugs are allowed (including CBD oil)--follow your care team's instructions.  If you have questions the day of surgery, call your hospital or surgery center.   Preventing infection  Shower or bathe the night before and the morning of surgery. Follow the instructions your clinic gave you. (If no instructions, use regular soap.)  Don't shave or clip hair near your surgery site. We'll remove the hair if needed.  Don't smoke or vape the morning of surgery. No chewing tobacco for 6 hours before you arrive. A nicotine patch is okay. You may spit out nicotine gum when you arrive.  For some surgeries, the surgeon will tell you to fully quit smoking and nicotine.  We will  make every effort to keep you safe from infection. We will:  Clean our hands often with soap and water (or an alcohol-based hand rub).  Clean the skin at your surgery site with a special soap that kills germs.  Give you a special gown to keep you warm. (Cold raises the risk of infection.)  Wear hair covers, masks, gowns, and gloves during surgery.  Give antibiotic medicine, if prescribed. Not all surgeries need this medicine.  What to bring on the day of surgery  Photo ID and insurance card  Copy of your health care directive, if you have one  Glasses and hearing aids (bring cases)  You can't wear contacts during surgery  Inhaler and eye drops, if you use them (tell us about these when you arrive)  CPAP machine or breathing device, if you use them  A few personal items, if spending the night  If you have . . .  A pacemaker, ICD (cardiac defibrillator), or other implant: Bring the ID card.  An implanted stimulator: Bring the remote control.  A legal guardian: Bring a copy of the certified (court-stamped) guardianship papers.  Please remove any jewelry, including body piercings. Leave jewelry and other valuables at home.  If you're going home the day of surgery  You must have a responsible adult drive you home. They should stay with you overnight as well.  If you don't have someone to stay with you, and you aren't safe to go home alone, we may keep you overnight. Insurance often won't pay for this.  After surgery  If it's hard to control your pain or you need more pain medicine, please call your surgeon's office.  Questions?   If you have any questions for your care team, list them here:   ____________________________________________________________________________________________________________________________________________________________________________________________________________________________________________________________  For informational purposes only. Not to replace the advice of your health care  provider. Copyright   2003, 2019 [facility]. All rights reserved. Clinically reviewed by Kraig Arreguin MD. [employer] [id number] - REV 08/24.

## 2025-02-09 DIAGNOSIS — K21.9 GASTROESOPHAGEAL REFLUX DISEASE WITHOUT ESOPHAGITIS: ICD-10-CM

## 2025-02-10 RX ORDER — OMEPRAZOLE 20 MG/1
20 CAPSULE, DELAYED RELEASE ORAL 2 TIMES DAILY
Qty: 180 CAPSULE | Refills: 2 | Status: SHIPPED | OUTPATIENT
Start: 2025-02-10

## 2025-03-05 ENCOUNTER — OFFICE VISIT (OUTPATIENT)
Dept: PEDIATRICS | Facility: CLINIC | Age: 80
End: 2025-03-05
Payer: MEDICARE

## 2025-03-05 VITALS
OXYGEN SATURATION: 98 % | DIASTOLIC BLOOD PRESSURE: 78 MMHG | BODY MASS INDEX: 20.28 KG/M2 | WEIGHT: 118.8 LBS | HEIGHT: 64 IN | HEART RATE: 87 BPM | RESPIRATION RATE: 18 BRPM | SYSTOLIC BLOOD PRESSURE: 138 MMHG | TEMPERATURE: 98 F

## 2025-03-05 DIAGNOSIS — K21.9 GASTROESOPHAGEAL REFLUX DISEASE WITHOUT ESOPHAGITIS: ICD-10-CM

## 2025-03-05 DIAGNOSIS — M54.12 C6 RADICULOPATHY: ICD-10-CM

## 2025-03-05 DIAGNOSIS — N30.00 ACUTE CYSTITIS WITHOUT HEMATURIA: ICD-10-CM

## 2025-03-05 DIAGNOSIS — M47.22 OSTEOARTHRITIS OF SPINE WITH RADICULOPATHY, CERVICAL REGION: ICD-10-CM

## 2025-03-05 DIAGNOSIS — Z98.1 S/P CERVICAL SPINAL FUSION: Primary | ICD-10-CM

## 2025-03-05 PROCEDURE — 99214 OFFICE O/P EST MOD 30 MIN: CPT | Performed by: INTERNAL MEDICINE

## 2025-03-05 PROCEDURE — 1125F AMNT PAIN NOTED PAIN PRSNT: CPT | Performed by: INTERNAL MEDICINE

## 2025-03-05 PROCEDURE — G2211 COMPLEX E/M VISIT ADD ON: HCPCS | Performed by: INTERNAL MEDICINE

## 2025-03-05 PROCEDURE — 3075F SYST BP GE 130 - 139MM HG: CPT | Performed by: INTERNAL MEDICINE

## 2025-03-05 PROCEDURE — 3078F DIAST BP <80 MM HG: CPT | Performed by: INTERNAL MEDICINE

## 2025-03-05 RX ORDER — CEFDINIR 300 MG/1
300 CAPSULE ORAL 2 TIMES DAILY
Qty: 10 CAPSULE | Refills: 0 | Status: SHIPPED | OUTPATIENT
Start: 2025-03-05 | End: 2025-03-10

## 2025-03-05 RX ORDER — OMEPRAZOLE 20 MG/1
20 CAPSULE, DELAYED RELEASE ORAL 2 TIMES DAILY
Qty: 180 CAPSULE | Refills: 2 | Status: SHIPPED | OUTPATIENT
Start: 2025-03-05

## 2025-03-05 ASSESSMENT — PAIN SCALES - GENERAL: PAINLEVEL_OUTOF10: MILD PAIN (2)

## 2025-03-05 NOTE — PROGRESS NOTES
Assessment & Plan     S/P cervical spinal fusion  discussed with patient (or patient's parents/caregiver) pathophysiology of condition and treatment options.  reviwed hospital course and ortho's reccs. okay to take 1/2 the methocarbamol and continue apap, reviwed max dose, etc as well. she will ask the ortho team about if oral NSAISs are okay or not.      Acute cystitis without hematuria  discussed with patient (or patient's parents/caregiver) pathophysiology of condition and treatment options.  reviwed updated info from the urgency room, plan to stop the macrobid and start cefdinir (has taken in the past without issue). New medication(s) indication(s), adverse effects, risks and benefits discussed. follow-up in one week for phoen visit to be sure symptoms have resolved. Also d/w pt signs/symptoms of worsening of condition and need for urgent ED evaluation. Patient verbalized understanding and is agreeable to this plan.    - cefdinir (OMNICEF) 300 MG capsule; Take 1 capsule (300 mg) by mouth 2 times daily for 5 days.    Osteoarthritis of spine with radiculopathy, cervical region  discussed with patient (or patient's parents/caregiver) pathophysiology of condition and treatment options.  as above. follow-up with ortho as scheduled.     C6 radiculopathy  improved after the surgery, continue cares and plan    Gastroesophageal reflux disease without esophagitis  discussed with patient (or patient's parents/caregiver) pathophysiology of condition and treatment options.  Well controlled on current medication(s).   - omeprazole (PRILOSEC) 20 MG DR capsule; Take 1 capsule (20 mg) by mouth 2 times daily.    The longitudinal plan of care for the diagnosis(es)/condition(s) as documented were addressed during this visit. Due to the added complexity in care, I will continue to support Brionna in the subsequent management and with ongoing continuity of care.    Return in about 1 week (around 3/12/2025) for Next scheduled  "follow-up visit, or sooner if symptoms persist or worsen.      Subjective   Brionna is a 79 year old, presenting for the following health issues:  RECHECK        3/5/2025     1:46 PM   Additional Questions   Roomed by stacie   Accompanied by santiago         3/5/2025     1:46 PM   Patient Reported Additional Medications   Patient reports taking the following new medications santiago     patient here for scheduled follow-up, neck surgery went well but still some issues. APAP helping and the methocarbamol does too but makes her sleepy. She was in the urgency room and was told that she has a UTI, called with culture results and today in our visit we called over and they related she has serratia that is resistant to the macrobid she is on:  UCx > 100, 000 serratia, S to 3rd generation cephalosporins and cpri, resistant to macrobid and bactrim; notes she has lots of allergies but has taken cefdinir in the past without issue. notes her mood is good, has some weddings coming up this summer that she is looking forward to. No other concerns or complaints today.             Via the Health Maintenance questionnaire, the patient has reported the following services have been completed -Mammogram: [facility] 2023-02-11, this information has not been sent to the abstraction team.  History of Present Illness       Reason for visit:  Cough   She is taking medications regularly.                      Objective    /78   Pulse 87   Temp 98  F (36.7  C) (Tympanic)   Resp 18   Ht 1.626 m (5' 4\")   Wt 53.9 kg (118 lb 12.8 oz)   LMP  (LMP Unknown)   SpO2 98%   BMI 20.39 kg/m    Body mass index is 20.39 kg/m .  Physical Exam     GENERAL: healthy, alert and no distress  HENT: normocephalic and atrumatic, mucous membranes moist; 22cervial collar/brace in palce  SKIN: no suspicious lesions or rashes  NEURO: Normal strength and tone, mentation intact and speech normal  PSYCH: mentation appears normal, affect normal/bright            "     Signed Electronically by: Roe Connolly MD

## 2025-03-12 ENCOUNTER — VIRTUAL VISIT (OUTPATIENT)
Dept: PEDIATRICS | Facility: CLINIC | Age: 80
End: 2025-03-12
Payer: MEDICARE

## 2025-03-12 DIAGNOSIS — Z98.1 S/P CERVICAL SPINAL FUSION: Primary | ICD-10-CM

## 2025-03-12 DIAGNOSIS — N30.00 ACUTE CYSTITIS WITHOUT HEMATURIA: ICD-10-CM

## 2025-03-12 PROCEDURE — 1125F AMNT PAIN NOTED PAIN PRSNT: CPT | Performed by: INTERNAL MEDICINE

## 2025-03-12 PROCEDURE — 98012 SYNCH AUDIO-ONLY EST SF 10: CPT | Performed by: INTERNAL MEDICINE

## 2025-03-12 NOTE — PROGRESS NOTES
Brionna is a 79 year old who is being evaluated via a billable telephone visit.    What phone number would you like to be contacted at? [phone]  How would you like to obtain your AVS? Mike  Originating Location (pt. Location): Home    Distant Location (provider location):  On-site  Telephone visit completed due to the patient did not have access to video, while the distant provider did.    Assessment & Plan     S/P cervical spinal fusion  discussed with patient (or patient's parents/caregiver) pathophysiology of condition and treatment options.  doing well, has follow-up appointment with ortho scheduled, continue cares and plan    Acute cystitis without hematuria  discussed with patient (or patient's parents/caregiver) pathophysiology of condition and treatment options.  completed full course of the cefdinir without any issues.       Return in about 3 months (around 6/12/2025) for Next scheduled follow-up visit, or sooner if symptoms persist or worsen.      Subjective   Brionna is a 79 year old, presenting for the following health issues:  Follow Up        3/12/2025     3:07 PM   Additional Questions   Roomed by Augustina LUU   Accompanied by  in background         3/12/2025     3:07 PM   Patient Reported Additional Medications   Patient reports taking the following new medications NA     Via the Health Maintenance questionnaire, the patient has reported the following services have been completed -Mammogram: [facility] 2023-02-11, this information has not been sent to the abstraction team.  History of Present Illness       Reason for visit:  Follow up  : NA.  Symptoms include:  NA  : NA.  : NA.  : NA.  Prior treatment description:  NA  What makes it worse:  NA  What makes it better:  NA    She eats 2-3 servings of fruits and vegetables daily.She consumes 0 sweetened beverage(s) daily.She exercises with enough effort to increase her heart rate 10 to 19 minutes per day.  She exercises with enough  "effort to increase her heart rate 3 or less days per week.   She is taking medications regularly.      phone visit for follow-up, patient not able to eb at a computer. notes she is doing well, feels better. finished the antibiotics and she notes her symptoms were resolved after only one dose. otherwise things are going the \"way they are supposed to go\" and knows she needs to be patient. notes appetite isn't great but tries to treat food like food and notes the last two days her other symptoms have improved too. pain has resolved as well. the cbd helps too. has surgery f/unit(s) on 3/27. No other concerns or complaints today.               Objective    Vitals - Patient Reported  Weight (Patient Reported): 53.5 kg (118 lb)  Pain Score: Moderate Pain (6)  Pain Loc: Shoulder      Vitals:  No vitals were obtained today due to virtual visit.    Physical Exam   General: Alert and no distress //Respiratory: No audible wheeze, cough, or shortness of breath // Psychiatric:  Appropriate affect, tone, and pace of words            Phone call duration: 10 minutes  Signed Electronically by: Roe Connolly MD    "

## 2025-04-14 ENCOUNTER — PATIENT OUTREACH (OUTPATIENT)
Dept: CARE COORDINATION | Facility: CLINIC | Age: 80
End: 2025-04-14
Payer: MEDICARE

## 2025-04-24 ENCOUNTER — OFFICE VISIT (OUTPATIENT)
Dept: ALLERGY | Facility: CLINIC | Age: 80
End: 2025-04-24
Payer: MEDICARE

## 2025-04-24 VITALS
BODY MASS INDEX: 20.43 KG/M2 | SYSTOLIC BLOOD PRESSURE: 155 MMHG | DIASTOLIC BLOOD PRESSURE: 81 MMHG | HEART RATE: 58 BPM | OXYGEN SATURATION: 97 % | WEIGHT: 119 LBS

## 2025-04-24 DIAGNOSIS — Z88.1 ALLERGY TO ANTIBIOTIC: Primary | ICD-10-CM

## 2025-04-24 NOTE — PATIENT INSTRUCTIONS
Allergy Staff Appt Hours Shot Hours Location       Physician   Eduardo Gonzalez MD      Support Staff   CHIRAG West RN, MA Emily J., MA      Mondays Tuesdays Thursdays and Fridays:      Yulisa 7-5      Wednesdays         Close                Mondays, Tuesdays and Fridays:  7:20 - 3:40              [facility]  [address]  [place] [address]  Allergy appointment  line: [phone]    Pulmonary Function Scheduling:  [place]: [phone]           Questions about cost of your care  For questions about your cost of your visit, procedure, lab or imaging contact: [place] [phone] or visit:  [email or web address]    Prescription Assistance  If you need assistance with your prescriptions (cost, coverage, etc) please contact: [employer] Prescription Assistance Program [phone]    Important Scheduling Information  All visits for food challenges, medication/drug allergy testing, and drug challenges MUST be scheduled through the allergy clinic nurse. Please contact them via [employer] or by calling the clinic at [phone] and asking to speak with an allergy nurse. They will provide additional information and instructions for the appointment. Discontinue oral antihistamines 7 days prior to the appointment. Discontinue nasal and ocular antihistamines 1 day prior to the appointment.    Appointments for skin testing: Appointment will last approximately 45 minutes.  Please call the appointment line for your clinic to schedule.  Discontinue oral antihistamines 7 days prior to the appointment.  Discontinue nasal and ocular antihistamines 1 days prior to appointment.    Thank you for trusting us with your care. Please feel free to contact us with any questions or concerns you may have.

## 2025-04-24 NOTE — LETTER
4/24/2025      Brionna Rollins  [address]  [place] [address]      Dear Colleague,    Thank you for referring your patient, Brionna Rollins, to the [facility]. Please see a copy of my visit note below.    Brionna Rollins was seen in the Allergy Clinic at [facility].    Brionna Rollins is a 79 year old female being seen today at the request of Sidra Stratton NP, [facility] in consultation for Allergies to multiple antibiotics.    She has multiple allergies listed including to multiple antibiotics including ciprofloxacin which it lists neck swelling, Augmentin which does not list a reaction, doxycycline which was a rash, erythromycin - swelling, Flagyl with unknown reaction, sulfa was a rash, cefprozil was a rash, tetracycline was a rash.    She has tolerated Macrobid in January and February of this year.  She also treated cefdinir in January on 2 different occasions.     She cannot recall most of the reactions on her antibiotic list as it has been years since she has had any of the listed allergens.  She cannot recall the reaction to the Augmentin.  She has avoided all penicillins from what she recalls.      Past Medical History:   Diagnosis Date     RADIAL STYLOID TENOSYNOV - right 02/23/2006    lifts weights, doesn't think work-related; OK to add PT visits prn     Rotator cuff tear      Family History   Problem Relation Age of Onset     Gastrointestinal Disease Mother         colon polyps     Diabetes Mother      Arthritis Father         Rheumatoid     Eye Disorder Father         mac. degeneration femp op bypass     Melanoma Son      Cancer - colorectal Maternal Grandmother      C.A.D. No family hx of      Breast Cancer No family hx of      Past Surgical History:   Procedure Laterality Date     COLONOSCOPY  11/26/2013    Dr. Cadrenas [facility]     COLONOSCOPY  11/26/2013    Procedure: COMBINED COLONOSCOPY, SINGLE BIOPSY/POLYPECTOMY BY  BIOPSY;;  Surgeon: Jovanni Cardenas MD;  Location:  GI     COLONOSCOPY N/A 8/20/2024    Procedure: Colonoscopy with polypectomies using exacto snare;  Surgeon: Sd Molina MD;  Location:  GI     HYSTERECTOMY, [place]  1986     [facility] ROTATOR CUFF STRAP       [facility] TOTAL HIP ARTHROPLASTY Right        ENVIRONMENTAL HISTORY:   Pets inside the house include None.  Do you smoke cigarettes or other recreational drugs? No There is/are 0 smokers living in the house. The house does not have a damp basement.     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 She lives with her spouse.      Review of Systems      Current Outpatient Medications:      acetaminophen (TYLENOL) 500 MG tablet, Take 1,000 mg by mouth., Disp: , Rfl:      gabapentin (NEURONTIN) 100 MG capsule, Take 1 capsule (100 mg) by mouth daily. (Patient taking differently: Take 200 mg by mouth daily.), Disp: , Rfl:      losartan (COZAAR) 25 MG tablet, Take 1 tablet (25 mg) by mouth daily., Disp: 90 tablet, Rfl: 3     omeprazole (PRILOSEC) 20 MG DR capsule, Take 1 capsule (20 mg) by mouth 2 times daily., Disp: 180 capsule, Rfl: 2     valACYclovir (VALTREX) 500 MG tablet, TAKE ONE TABLET BY MOUTH ONCE DAILY, Disp: 90 tablet, Rfl: 3     guaiFENesin (MUCINEX) 600 MG 12 hr tablet, Take 2 tablets (1,200 mg) by mouth 2 times daily as needed for congestion or cough. (Patient not taking: Reported on 4/24/2025), Disp: 30 tablet, Rfl: 1     ipratropium (ATROVENT) 0.06 % nasal spray, Spray 2 sprays into both nostrils 4 times daily. Use for up to 5 days with illness (Patient not taking: Reported on 3/5/2025), Disp: 15 mL, Rfl: 0    Current Facility-Administered Medications:      lidocaine 1% with EPINEPHrine 1:100,000 injection 3 mL, 3 mL, Intradermal, Once, Keisha Carrillo MD  Allergies   Allergen Reactions     Scopolamine Anaphylaxis and Itching     Ciprofloxacin Swelling     Stiff and swollen neck     Amoxicillin-Pot  Clavulanate [Amoxicillin-Pot Clavulanate]      Contrast Dye      gets cold and feels terrible  Pt was ok with Isovue-370 and no pre-meds     Doxycycline Hyclate Rash     Erythromycin Swelling     Flagyl [Metronidazole Hcl]      Meperidine Hcl      low blood pressure     Oxycodone      Prochlorperazine      agitated     Sulfa Antibiotics Rash     Cefprozil Rash     Tetracycline Rash         EXAM:   BP (!) 155/81 (BP Location: Left arm, Patient Position: Sitting, Cuff Size: Adult Regular)   Pulse 58   Wt 54 kg (119 lb)   LMP  (LMP Unknown)   SpO2 97%   BMI 20.43 kg/m      Physical Exam    Constitutional:       General: She is not in acute distress.     Appearance: Normal appearance. She is not ill-appearing.   HENT:      Head: Normocephalic and atraumatic.      Nose: Nose normal. No congestion or rhinorrhea.      Mouth/Throat:      Mouth: Mucous membranes are moist.      Pharynx: Oropharynx is clear. No posterior oropharyngeal erythema.   Eyes:      General:         Right eye: No discharge.         Left eye: No discharge.   Cardiovascular:      Rate and Rhythm: Normal rate and regular rhythm.      Heart sounds: Normal heart sounds.   Pulmonary:      Effort: Pulmonary effort is normal.      Breath sounds: Normal breath sounds. No wheezing or rhonchi.   Skin:     General: Skin is warm.      Findings: No erythema or rash.   Neurological:      General: No focal deficit present.      Mental Status: She is alert. Mental status is at baseline.   Psychiatric:         Mood and Affect: Mood normal.         Behavior: Behavior normal.        ASSESSMENT/PLAN:  Brionna Rollins is a 79 year old female seen today for multiple antibiotic allergies.  We discussed options.  I will refer to Dr. Tellez to help address some of the antibiotic allergies.  Will specifically do testing for the amoxicillin component.  Will consider an oral challenge to amoxicillin if the skin testing is negative.    She has tolerated Macrobid and  cefdinir recently.    Skin testing to amoxicillin planned for the near future.  Oral challenge to take place if negative.    Follow-up in the near future      Thank you for allowing me to participate in the care of Brionna Rollins.      I spent 30 minutes on the date of the encounter doing chart review, history and exam, documentation and further coordination as noted above exclusive of separately reported interpretations    Eduardo Gonzalez MD  Allergy/Immunology  [facility]        Again, thank you for allowing me to participate in the care of your patient.        Sincerely,        Eduardo Gonzalez MD    Electronically signed 04-Oct-2023 16:42

## 2025-04-24 NOTE — PROGRESS NOTES
Brionna Rollins was seen in the Allergy Clinic at [facility].    Brionna Rollins is a 79 year old female being seen today at the request of Sidra Stratton NP, [facility] in consultation for Allergies to multiple antibiotics.    She has multiple allergies listed including to multiple antibiotics including ciprofloxacin which it lists neck swelling, Augmentin which does not list a reaction, doxycycline which was a rash, erythromycin - swelling, Flagyl with unknown reaction, sulfa was a rash, cefprozil was a rash, tetracycline was a rash.    She has tolerated Macrobid in January and February of this year.  She also treated cefdinir in January on 2 different occasions.     She cannot recall most of the reactions on her antibiotic list as it has been years since she has had any of the listed allergens.  She cannot recall the reaction to the Augmentin.  She has avoided all penicillins from what she recalls.      Past Medical History:   Diagnosis Date    RADIAL STYLOID TENOSYNOV - right 02/23/2006    lifts weights, doesn't think work-related; OK to add PT visits prn    Rotator cuff tear      Family History   Problem Relation Age of Onset    Gastrointestinal Disease Mother         colon polyps    Diabetes Mother     Arthritis Father         Rheumatoid    Eye Disorder Father         mac. degeneration femp op bypass    Melanoma Son     Cancer - colorectal Maternal Grandmother     C.A.D. No family hx of     Breast Cancer No family hx of      Past Surgical History:   Procedure Laterality Date    [facility]    COLONOSCOPY  11/26/2013    Procedure: COMBINED COLONOSCOPY, SINGLE BIOPSY/POLYPECTOMY BY BIOPSY;;  Surgeon: Jovanni Cardenas MD;  Location:  GI    COLONOSCOPY N/A 8/20/2024    Procedure: Colonoscopy with polypectomies using exacto snare;  Surgeon: Sd Molina MD;  Location:  GI    HYSTERECTOMY, [facility]  1986    [facility] ROTATOR CUFF STRAP       [facility] TOTAL HIP ARTHROPLASTY Right        ENVIRONMENTAL HISTORY:   Pets inside the house include None.  Do you smoke cigarettes or other recreational drugs? No There is/are 0 smokers living in the house. The house does not have a damp basement.     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      Review of Systems      Current Outpatient Medications:     acetaminophen (TYLENOL) 500 MG tablet, Take 1,000 mg by mouth., Disp: , Rfl:     gabapentin (NEURONTIN) 100 MG capsule, Take 1 capsule (100 mg) by mouth daily. (Patient taking differently: Take 200 mg by mouth daily.), Disp: , Rfl:     losartan (COZAAR) 25 MG tablet, Take 1 tablet (25 mg) by mouth daily., Disp: 90 tablet, Rfl: 3    omeprazole (PRILOSEC) 20 MG DR capsule, Take 1 capsule (20 mg) by mouth 2 times daily., Disp: 180 capsule, Rfl: 2    valACYclovir (VALTREX) 500 MG tablet, TAKE ONE TABLET BY MOUTH ONCE DAILY, Disp: 90 tablet, Rfl: 3    guaiFENesin (MUCINEX) 600 MG 12 hr tablet, Take 2 tablets (1,200 mg) by mouth 2 times daily as needed for congestion or cough. (Patient not taking: Reported on 4/24/2025), Disp: 30 tablet, Rfl: 1    ipratropium (ATROVENT) 0.06 % nasal spray, Spray 2 sprays into both nostrils 4 times daily. Use for up to 5 days with illness (Patient not taking: Reported on 3/5/2025), Disp: 15 mL, Rfl: 0    Current Facility-Administered Medications:     lidocaine 1% with EPINEPHrine 1:100,000 injection 3 mL, 3 mL, Intradermal, Once, Keisha Carrillo MD  Allergies   Allergen Reactions    Scopolamine Anaphylaxis and Itching    Ciprofloxacin Swelling     Stiff and swollen neck    Amoxicillin-Pot Clavulanate [Amoxicillin-Pot Clavulanate]     Contrast Dye      gets cold and feels terrible  Pt was ok with Isovue-370 and no pre-meds    Doxycycline Hyclate Rash    Erythromycin Swelling    Flagyl [Metronidazole Hcl]     Meperidine Hcl      low blood pressure    Oxycodone      Prochlorperazine      agitated    Sulfa Antibiotics Rash    Cefprozil Rash    Tetracycline Rash         EXAM:   BP (!) 155/81 (BP Location: Left arm, Patient Position: Sitting, Cuff Size: Adult Regular)   Pulse 58   Wt 54 kg (119 lb)   LMP  (LMP Unknown)   SpO2 97%   BMI 20.43 kg/m      Physical Exam    Constitutional:       General: She is not in acute distress.     Appearance: Normal appearance. She is not ill-appearing.   HENT:      Head: Normocephalic and atraumatic.      Nose: Nose normal. No congestion or rhinorrhea.      Mouth/Throat:      Mouth: Mucous membranes are moist.      Pharynx: Oropharynx is clear. No posterior oropharyngeal erythema.   Eyes:      General:         Right eye: No discharge.         Left eye: No discharge.   Cardiovascular:      Rate and Rhythm: Normal rate and regular rhythm.      Heart sounds: Normal heart sounds.   Pulmonary:      Effort: Pulmonary effort is normal.      Breath sounds: Normal breath sounds. No wheezing or rhonchi.   Skin:     General: Skin is warm.      Findings: No erythema or rash.   Neurological:      General: No focal deficit present.      Mental Status: She is alert. Mental status is at baseline.   Psychiatric:         Mood and Affect: Mood normal.         Behavior: Behavior normal.        ASSESSMENT/PLAN:  Brionna Rollins is a 79 year old female seen today for multiple antibiotic allergies.  We discussed options.  I will refer to Dr. Tellez to help address some of the antibiotic allergies.  Will specifically do testing for the amoxicillin component.  Will consider an oral challenge to amoxicillin if the skin testing is negative.    She has tolerated Macrobid and cefdinir recently.    Skin testing to amoxicillin planned for the near future.  Oral challenge to take place if negative.    Follow-up in the near future      Thank you for allowing me to participate in the care of Brionna Rollins.      I spent 30 minutes on the date of the encounter  doing chart review, history and exam, documentation and further coordination as noted above exclusive of separately reported interpretations    Eduardo Gonzalez MD  Allergy/Immunology  [facility]

## 2025-04-28 ENCOUNTER — PATIENT OUTREACH (OUTPATIENT)
Dept: CARE COORDINATION | Facility: CLINIC | Age: 80
End: 2025-04-28
Payer: MEDICARE

## 2025-06-02 ENCOUNTER — HOSPITAL ENCOUNTER (EMERGENCY)
Facility: CLINIC | Age: 80
Discharge: HOME | End: 2025-06-02
Attending: EMERGENCY MEDICINE | Admitting: EMERGENCY MEDICINE
Payer: MEDICARE

## 2025-06-02 ENCOUNTER — APPOINTMENT (OUTPATIENT)
Dept: CT IMAGING | Facility: CLINIC | Age: 80
End: 2025-06-02
Attending: EMERGENCY MEDICINE
Payer: MEDICARE

## 2025-06-02 ENCOUNTER — APPOINTMENT (OUTPATIENT)
Dept: MRI IMAGING | Facility: CLINIC | Age: 80
End: 2025-06-02
Payer: MEDICARE

## 2025-06-02 VITALS
DIASTOLIC BLOOD PRESSURE: 96 MMHG | HEIGHT: 64 IN | HEART RATE: 88 BPM | SYSTOLIC BLOOD PRESSURE: 191 MMHG | WEIGHT: 125 LBS | TEMPERATURE: 97.4 F | OXYGEN SATURATION: 97 % | RESPIRATION RATE: 16 BRPM | BODY MASS INDEX: 21.34 KG/M2

## 2025-06-02 DIAGNOSIS — R42 DIZZINESS: ICD-10-CM

## 2025-06-02 DIAGNOSIS — R03.0 ELEVATED BLOOD PRESSURE READING: ICD-10-CM

## 2025-06-02 LAB
ANION GAP SERPL CALCULATED.3IONS-SCNC: 12 MMOL/L (ref 7–15)
APTT PPP: 30 SECONDS (ref 22–38)
ATRIAL RATE - MUSE: 71 BPM
BASOPHILS # BLD AUTO: 0.1 10E3/UL (ref 0–0.2)
BASOPHILS NFR BLD AUTO: 1 %
BUN SERPL-MCNC: 10.5 MG/DL (ref 8–23)
CALCIUM SERPL-MCNC: 9.2 MG/DL (ref 8.8–10.4)
CHLORIDE SERPL-SCNC: 106 MMOL/L (ref 98–107)
CREAT SERPL-MCNC: 0.65 MG/DL (ref 0.51–0.95)
DIASTOLIC BLOOD PRESSURE - MUSE: NORMAL MMHG
EGFRCR SERPLBLD CKD-EPI 2021: 89 ML/MIN/1.73M2
EOSINOPHIL # BLD AUTO: 0 10E3/UL (ref 0–0.7)
EOSINOPHIL NFR BLD AUTO: 1 %
ERYTHROCYTE [DISTWIDTH] IN BLOOD BY AUTOMATED COUNT: 12.6 % (ref 10–15)
GLUCOSE BLDC GLUCOMTR-MCNC: 99 MG/DL (ref 70–99)
GLUCOSE SERPL-MCNC: 100 MG/DL (ref 70–99)
HCO3 SERPL-SCNC: 25 MMOL/L (ref 22–29)
HCT VFR BLD AUTO: 39.6 % (ref 35–47)
HGB BLD-MCNC: 13.5 G/DL (ref 11.7–15.7)
HOLD SPECIMEN: NORMAL
IMM GRANULOCYTES # BLD: 0 10E3/UL
IMM GRANULOCYTES NFR BLD: 0 %
INR PPP: 1 (ref 0.85–1.15)
INTERPRETATION ECG - MUSE: NORMAL
LYMPHOCYTES # BLD AUTO: 0.6 10E3/UL (ref 0.8–5.3)
LYMPHOCYTES NFR BLD AUTO: 13 %
MCH RBC QN AUTO: 31.3 PG (ref 26.5–33)
MCHC RBC AUTO-ENTMCNC: 34.1 G/DL (ref 31.5–36.5)
MCV RBC AUTO: 92 FL (ref 78–100)
MONOCYTES # BLD AUTO: 0.4 10E3/UL (ref 0–1.3)
MONOCYTES NFR BLD AUTO: 10 %
NEUTROPHILS # BLD AUTO: 3.3 10E3/UL (ref 1.6–8.3)
NEUTROPHILS NFR BLD AUTO: 75 %
NRBC # BLD AUTO: 0 10E3/UL
NRBC BLD AUTO-RTO: 0 /100
P AXIS - MUSE: 77 DEGREES
PLATELET # BLD AUTO: 237 10E3/UL (ref 150–450)
POTASSIUM SERPL-SCNC: 4 MMOL/L (ref 3.4–5.3)
PR INTERVAL - MUSE: 134 MS
PROTHROMBIN TIME: 13.3 SECONDS (ref 11.8–14.8)
QRS DURATION - MUSE: 128 MS
QT - MUSE: 446 MS
QTC - MUSE: 484 MS
R AXIS - MUSE: -64 DEGREES
RBC # BLD AUTO: 4.31 10E6/UL (ref 3.8–5.2)
SODIUM SERPL-SCNC: 143 MMOL/L (ref 135–145)
SYSTOLIC BLOOD PRESSURE - MUSE: NORMAL MMHG
T AXIS - MUSE: 51 DEGREES
TROPONIN T SERPL HS-MCNC: 12 NG/L
TROPONIN T SERPL HS-MCNC: 13 NG/L
VENTRICULAR RATE- MUSE: 71 BPM
WBC # BLD AUTO: 4.5 10E3/UL (ref 4–11)

## 2025-06-02 PROCEDURE — 85610 PROTHROMBIN TIME: CPT | Performed by: EMERGENCY MEDICINE

## 2025-06-02 PROCEDURE — 93005 ELECTROCARDIOGRAM TRACING: CPT

## 2025-06-02 PROCEDURE — 250N000009 HC RX 250: Performed by: EMERGENCY MEDICINE

## 2025-06-02 PROCEDURE — 85730 THROMBOPLASTIN TIME PARTIAL: CPT | Performed by: EMERGENCY MEDICINE

## 2025-06-02 PROCEDURE — 82962 GLUCOSE BLOOD TEST: CPT

## 2025-06-02 PROCEDURE — 250N000011 HC RX IP 250 OP 636: Performed by: EMERGENCY MEDICINE

## 2025-06-02 PROCEDURE — 36415 COLL VENOUS BLD VENIPUNCTURE: CPT | Performed by: EMERGENCY MEDICINE

## 2025-06-02 PROCEDURE — 99207 PR NO BILLABLE SERVICE THIS VISIT: CPT

## 2025-06-02 PROCEDURE — 255N000002 HC RX 255 OP 636

## 2025-06-02 PROCEDURE — A9585 GADOBUTROL INJECTION: HCPCS

## 2025-06-02 PROCEDURE — 99285 EMERGENCY DEPT VISIT HI MDM: CPT | Mod: 25

## 2025-06-02 PROCEDURE — 250N000013 HC RX MED GY IP 250 OP 250 PS 637: Performed by: EMERGENCY MEDICINE

## 2025-06-02 PROCEDURE — 70496 CT ANGIOGRAPHY HEAD: CPT

## 2025-06-02 PROCEDURE — 80048 BASIC METABOLIC PNL TOTAL CA: CPT | Performed by: EMERGENCY MEDICINE

## 2025-06-02 PROCEDURE — 0042T CT HEAD PERFUSION W CONTRAST: CPT

## 2025-06-02 PROCEDURE — 85048 AUTOMATED LEUKOCYTE COUNT: CPT | Performed by: EMERGENCY MEDICINE

## 2025-06-02 PROCEDURE — 70450 CT HEAD/BRAIN W/O DYE: CPT

## 2025-06-02 PROCEDURE — 70553 MRI BRAIN STEM W/O & W/DYE: CPT

## 2025-06-02 PROCEDURE — 84484 ASSAY OF TROPONIN QUANT: CPT | Performed by: EMERGENCY MEDICINE

## 2025-06-02 RX ORDER — IOPAMIDOL 755 MG/ML
117 INJECTION, SOLUTION INTRAVASCULAR ONCE
Status: COMPLETED | OUTPATIENT
Start: 2025-06-02 | End: 2025-06-02

## 2025-06-02 RX ORDER — LORAZEPAM 0.5 MG/1
0.5 TABLET ORAL ONCE
Status: COMPLETED | OUTPATIENT
Start: 2025-06-02 | End: 2025-06-02

## 2025-06-02 RX ORDER — GADOBUTROL 604.72 MG/ML
5.5 INJECTION INTRAVENOUS ONCE
Status: COMPLETED | OUTPATIENT
Start: 2025-06-02 | End: 2025-06-02

## 2025-06-02 RX ADMIN — GADOBUTROL 5.5 ML: 604.72 INJECTION INTRAVENOUS at 17:27

## 2025-06-02 RX ADMIN — LORAZEPAM 0.5 MG: 0.5 TABLET ORAL at 17:13

## 2025-06-02 RX ADMIN — SODIUM CHLORIDE 100 ML: 9 INJECTION, SOLUTION INTRAVENOUS at 14:42

## 2025-06-02 RX ADMIN — IOPAMIDOL 117 ML: 755 INJECTION, SOLUTION INTRAVENOUS at 14:42

## 2025-06-02 ASSESSMENT — ACTIVITIES OF DAILY LIVING (ADL)
ADLS_ACUITY_SCORE: 41

## 2025-06-02 ASSESSMENT — COLUMBIA-SUICIDE SEVERITY RATING SCALE - C-SSRS
2. HAVE YOU ACTUALLY HAD ANY THOUGHTS OF KILLING YOURSELF IN THE PAST MONTH?: NO
1. IN THE PAST MONTH, HAVE YOU WISHED YOU WERE DEAD OR WISHED YOU COULD GO TO SLEEP AND NOT WAKE UP?: NO
6. HAVE YOU EVER DONE ANYTHING, STARTED TO DO ANYTHING, OR PREPARED TO DO ANYTHING TO END YOUR LIFE?: NO

## 2025-06-02 NOTE — ED TRIAGE NOTES
"Patient reports multiple symptoms upon triage.  Reports being seen at [facility] today.  Concerns about pinched nerve in neck, recent neck surgery.  Reports new vision changes this morning, more in right eye, but reports both.  Reports this has happened in past.  She also reports feeling like ears are \"plugged\".  ABCs intact, A&Ox4     Triage Assessment (Adult)       Row Name 06/02/25 8341          Triage Assessment    Airway WDL WDL        Respiratory WDL    Respiratory WDL WDL        Skin Circulation/Temperature WDL    Skin Circulation/Temperature WDL WDL        Cardiac WDL    Cardiac WDL WDL        Peripheral/Neurovascular WDL    Peripheral Neurovascular WDL WDL        Cognitive/Neuro/Behavioral WDL    Cognitive/Neuro/Behavioral WDL all;WDL                     "

## 2025-06-02 NOTE — ED PROVIDER NOTES
Emergency Department Note      History of Present Illness     Chief Complaint   Visual Disturbance      HPI   Brionna Rollins is a 80 year old female with a history of hypertension presenting to the ED with dizziness and visual disturbance.  Patient reports that she awoke this morning, noting pain to her left shoulder, a feeling of a plugged right ear, as well as symptoms of dizziness.  She describes noticing the dizziness more in fact while at rest, and improved when she is up moving around.  She notes associated visual disturbances which she describes as feeling as though her eyes were not tracking correctly, but denies any vision loss or diplopia.  Patient reports that she has been experiencing these constellation of symptoms for quite some time, on the order of months, some of which even predated her cervical spine fusion surgery in February of this year.  She states her symptoms today are quite similar to what she has experienced previously, note had lasted longer than typical.  She does report going to bed yesterday evening in her otherwise normal state of health and getting up at 1 point during the night, time unclear, and did not feel particularly dizzy.  She denied associated chest pain, chest pressure nor shortness of breath. Her shoulder pain is a chronic symptom for her, and she has tried a TENS unit for this.  She reports this has been attributed to a pinched nerve in her neck.  She is not currently on anticoagulation.  She denies any prior history of stroke.  She was seen earlier today at the emergency room, though referred on to the ED for further evaluation.    Independent Historian   Spouse present at bedside, daughter later present at bedside.    Review of External Notes   I did review the patient's [facility] note from earlier today where patient describes symptoms of dizziness, left shoulder pain, plugged ear and she was subsequently referred to the ED for further evaluation.    Past  "Medical History     Medical History and Problem List   Closed fracture of fourth lumbar vertebra  Hypertension  Gastroesophageal reflux disease without esophagitis  Colon polyp  Palpitations  S/P lumbar spinal infusion  Sensorineural hearing loss of bilateral ears  Spinal stenosis  Cervical spondylosis  Lumbar spondylosis  Supraventricular tachycardia  Vitamin B deficiency  Diverticulitis  Osteoarthritis of knee  Arthritis of hip  Acute posthemorrhagic anemia  Atopic rhinitis  Cherry angioma  Enteritis  Inflamed seborrheic keratosis    Medications   Dilaudid  Macrobid  Senokot  Neurontin  Mucinex  Atrovent  Cozaar  Prilosec  Valtrex    Surgical History   Hx right foot fibular exostectomy  Colectomy  Rotator cuff repair  Right two-incision total hip arthroplasty  Total abdominal hysterectomy  Colonoscopy  Left unicompartmental knee arthroplasty  ND cataract surgery TRK post op MGT    Physical Exam     Patient Vitals for the past 24 hrs:   BP Temp Temp src Pulse Resp SpO2 Height Weight   06/02/25 2038 -- -- -- -- -- 97 % -- --   06/02/25 2037 (!) 191/96 -- -- 88 -- -- -- --   06/02/25 1710 -- -- -- 74 -- 97 % -- --   06/02/25 1357 (!) 177/100 97.4  F (36.3  C) Temporal 73 16 96 % 1.626 m (5' 4\") 56.7 kg (125 lb)     Physical Exam  General:   Well-nourished  Eyes:   Conjunctiva without injection or scleral icterus   See below for additional details  ENT:   Moist mucous membranes   Posterior oropharynx clear without erythema or exudate   TM translucent and gray without effusion nor air fluid level  Neck:   Supple with full ROM  Resp:   Lungs CTAB   No crackles, wheezing or audible rubs   Good air movement  CV:    Normal rate, regular rhythm   S1 and S2 present   No murmur, gallop or rub  GI:   BS present   Abdomen soft without distention   Non-tender to light and deep palpation   No guarding or rebound tenderness  Skin:   Warm, dry, well perfused   No rashes or open wounds on exposed skin  MSK:   Moves all " extremities   Mild discomfort about L shoulder girdle, no overlying warmth erythema or swelling  Neuro:   Mental Status:    Level of consciousness:  Alert    Orientation:  Oriented to person, place and time    Language:  Speech is spontaneous, fluent.  Follows multi-step command.    Cranial Nerves:    II/III:  PERRL.Visual fields intact.     III/IV/VI: EOMI, no diplopia with EOM and no nystagmus appreciated    V:  Intact sensation in V1, V2 and V3 distribution, equal jaw clench    VII: Upper and lower face symmetric, equal smile, no lid lag or ptosis    VIII:  Finger rub heard in both ears, patient conversant    IV/X: Swallow intact, symmetric palate elevation    XI:  Symmetric shoulder shrug bilaterally    XII:  Tongue protrudes midline   Motor:    5/5 strength bilaterally to hand , elbow flexion/extension    5/5 strength bilaterally to ankle dorsi/plantarflexion, hip flexion   Sensory:    Intact and symmetric sensation to light touch bilaterally in upper and lower extremities   Coordination:    Normal finger-to-nose    Normal rapid alternating movements    Negative Romberg    Negative pronator drift   Gait:    Normal, ambulatory independently without difficulty  Psych:   Normal affect, normal mood        Diagnostics     Lab Results   Labs Ordered and Resulted from Time of ED Arrival to Time of ED Departure   BASIC METABOLIC PANEL - Abnormal       Result Value    Sodium 143      Potassium 4.0      Chloride 106      Carbon Dioxide (CO2) 25      Anion Gap 12      Urea Nitrogen 10.5      Creatinine 0.65      GFR Estimate 89      Calcium 9.2      Glucose 100 (*)    CBC WITH PLATELETS AND DIFFERENTIAL - Abnormal    WBC Count 4.5      RBC Count 4.31      Hemoglobin 13.5      Hematocrit 39.6      MCV 92      MCH 31.3      MCHC 34.1      RDW 12.6      Platelet Count 237      % Neutrophils 75      % Lymphocytes 13      % Monocytes 10      % Eosinophils 1      % Basophils 1      % Immature Granulocytes 0      NRBCs per  100 WBC 0      Absolute Neutrophils 3.3      Absolute Lymphocytes 0.6 (*)     Absolute Monocytes 0.4      Absolute Eosinophils 0.0      Absolute Basophils 0.1      Absolute Immature Granulocytes 0.0      Absolute NRBCs 0.0     GLUCOSE BY METER - Normal    GLUCOSE BY METER POCT 99     INR - Normal    INR 1.00      PT 13.3     PARTIAL THROMBOPLASTIN TIME - Normal    aPTT 30     TROPONIN T, HIGH SENSITIVITY - Normal    Troponin T, High Sensitivity 13     TROPONIN T, HIGH SENSITIVITY - Normal    Troponin T, High Sensitivity 12     GLUCOSE MONITOR NURSING POCT       Imaging   MR Brain w/o & w Contrast   Final Result   IMPRESSION:   1.  No acute/subacute infarct or other acute intracranial abnormality.   2.  Nonspecific T2/FLAIR hyperintensities of the cerebral white matter most consistent with changes of chronic microvascular disease.      CT Head Perfusion w Contrast - For Tier 2 Stroke   Final Result   IMPRESSION:    CT PERFUSION:   1.  Normal cerebral perfusion.      CTA Head Neck with Contrast   Final Result   IMPRESSION:    HEAD CTA:    1.  No evidence of pathologic all vascular occlusion or saccular aneurysm within the visualized intracranial circulation by CT angiography.   2.  Moderate stenosis of the P2/P3 junction of the left posterior cerebral artery.      NECK CTA:   1.  No evidence of hemodynamically significant stenosis within either internal carotid artery within the neck.   2.  High-grade stenosis of the left vertebral artery origin from calcified plaque.      Dr. Gonzalez discussed the results with Dr. Uriostegui on 6/2/2025 2:52 PM CDT by telephone.      CT Head w/o Contrast   Final Result   IMPRESSION:     1.  No evidence of acute intracranial hemorrhage or mass effect.        ECG results from 06/02/25   EKG 12-lead, tracing only     Value    Systolic Blood Pressure     Diastolic Blood Pressure     Ventricular Rate 71    Atrial Rate 71    AR Interval 134    QRS Duration 128        QTc 484    P Axis 77     R AXIS -64    T Axis 51    Interpretation ECG      Sinus rhythm with sinus arrhythmia  Right bundle branch block  Left anterior fascicular block  ** Bifascicular block **  Minimal voltage criteria for LVH, may be normal variant ( R in aVL )  Abnormal ECG  No previous ECGs available  Unconfirmed report - interpretation of this ECG is computer generated - see medical record for final interpretation  Confirmed by - EMERGENCY ROOM, PHYSICIAN ([id number]),  Phillip Acuña ([id number]) on 6/2/2025 3:33:29 PM           Independent Interpretation   None    ED Course      Medications Administered   Medications   iopamidol (ISOVUE-370) solution 117 mL (117 mLs Intravenous $Given 6/2/25 1442)     And   sodium chloride 0.9 % bag for CT scan flush (100 mLs As instructed $Given 6/2/25 1442)   gadobutrol (GADAVIST) injection 5.5 mL (5.5 mLs Intravenous $Given 6/2/25 1727)   LORazepam (ATIVAN) tablet 0.5 mg (0.5 mg Oral $Given 6/2/25 1713)       Procedures   Procedures     Discussion of Management   See ED course.    ED Course   ED Course as of 06/03/25 0752   Mon Jun 02, 2025   1417 I obtained history and examined the patient as noted above.   1423 Tier 2 code stroke.   1454 I spoke to stroke neurology team.    1454 I spoke to radiology.   1502 I rechecked on the patient.   2024 Patient re-evaluated.  Results discussed.  Repeat neurologic exam is unremarkable   2059 Spoke with Dr. Menon who has reviewed imaging including vascular studies.  He did not feel patient needed to be started on anti-platelet medication at this time.       Additional Documentation  None    Medical Decision Making / Diagnosis     CMS Diagnoses: None    MIPS   None               [facility]   Brionna Rollins is a 80 year old female presenting to the ER for evaluation of dizziness and visual disturbance.  VS on presentation reveal elevated BP, though otherwise are unremarkable.  History obtained from patient and supplemented by chart review.  Given patient's  persistence in symptoms, tier 2 code stroke activated.  Care expedited to obtain advanced imaging.  Fortunately, CT head negative for intracranial hemorrhage.  CTA is without evidence of pathologic vascular occlusion or saccular aneurysm, though does reveal moderate stenosis of the P2-P3 junction of the left PCA as well as high-grade stenosis at the left vertebral artery origin from calcified plaque.  CT perfusion study unremarkable.  He has reviewed with stroke neurology, and given duration of symptoms, patient not deemed a candidate for thrombolysis, and no evidence of large vessel occlusion amenable to mechanical intervention.  Subsequent MRI was obtained which fortunately reveals no evidence of acute or subacute infarct or other acute intracranial abnormality.  I reviewed with patient the findings of chronic microvascular disease.  I also reviewed patient's ED course and imaging in detail again with stroke neurology, Dr. Menon, who did not feel that further stroke workup was indicated at this time, and that vascular findings are likely chronic and would not require initiation of antiplatelet medication at this time.  I did review these findings with family, and did suggest further review with PCP as an outpatient which they are in agreement with.  Ultimately, it appears as though many of the symptoms have been longstanding for the patient, though were more pronounced and of longer duration today than they typically are.  Of note, she denies any vision loss or double vision, and on ED observation, patient's symptoms have resolved.  For that reason I feel other etiologies such as giant cell arteritis or localized ocular pathology to be unlikely.  With regards to patient's shoulder discomfort, she attributes this to known history of cervical spine nerve irritation.  I see no signs of septic arthritis or acute inflammatory arthropathy on dedicated shoulder exam.  Referred cardiac ischemia considered though felt  unlikely.  EKG demonstrates sinus rhythm, bifascicular block, there are no significant changes compared with previous EKG available for comparison from 2/5/2025.  Her initial and repeat troponin are also within normal limits.  Results and clinical impression were discussed with patient and family members present at bedside.  On reassessment, she is ambulatory independently, and no new neurologic symptoms are noted on detailed neurologic exam.  She is quite eager for discharge home which atrial to be reasonable.  She will follow-up closely with her primary care provider, and return to the ER with any new or troubling symptoms.  Patient and family's questions have been answered prior to discharge.    Disposition   The patient was discharged.     Diagnosis     ICD-10-CM    1. Dizziness  R42       2. Elevated blood pressure reading  R03.0            Discharge Medications   Discharge Medication List as of 6/2/2025  9:04 PM        Scribe Disclosure:  I, WINTER TIWARI, am serving as a scribe at 2:31 PM on 6/2/2025 to document services personally performed by Reji Uriostegui MD based on my observations and the provider's statements to me.        Reji Uriostegui MD  06/03/25 0752

## 2025-06-03 ENCOUNTER — PATIENT OUTREACH (OUTPATIENT)
Dept: CARE COORDINATION | Facility: CLINIC | Age: 80
End: 2025-06-03
Payer: MEDICARE

## 2025-06-03 ENCOUNTER — TELEPHONE (OUTPATIENT)
Dept: PEDIATRICS | Facility: CLINIC | Age: 80
End: 2025-06-03
Payer: MEDICARE

## 2025-06-03 NOTE — TELEPHONE ENCOUNTER
Tried calling patient was unable to connect.  Sent Markr message to patient.    Thank you kindly,  Erick HERNANDES

## 2025-06-03 NOTE — PROGRESS NOTES
Clinic Care Coordination Contact  Community Health Worker Initial Outreach    CHW Initial Information Gathering:  Referral Source: ED Follow-Up  Current living arrangement:: Not Assessed  No PCP office visit in Past Year: No  CHW Additional Questions  If ED/Hospital discharge, follow-up appointment scheduled as recommended?: No  Patient agreeable to assistance with scheduling?: Yes  CHW assisted patient to schedule (specify): Patient was transfered via Back Door Line  Medication changes made following ED/Hospital discharge?: No  MyChart active?: Yes  Patient sent Social Drivers of Health questionnaire?: No    Patient accepts CC: No, Patient expressed no additional support is needed at this time. Patient will be sent Care Coordination introduction letter for future reference.     Radha Benoit  Community Health Worker    Connected Care Resources   [facility]     *Connected Care Resources Team does NOT   follow patient ongoing. Referrals are identified   based on internal discharge report and the outreach is to ensure   Patient has understanding of their discharge instructions.

## 2025-06-03 NOTE — DISCHARGE INSTRUCTIONS
Please monitor your symptoms very closely.    Follow-up with your primary care provider in 2 to 3 days for recheck.    I would recommend further discussion with your regular doctor regarding the areas of plaque buildup in the arteries.  At this point, the stroke neurology team does not feel you need to be on any antiplatelet medication.    Please return to the ER if he develop any new or troubling symptoms.  We are more than happy to reassess you.    Discharge Instructions  Dizziness (Lightheaded)  Today you were seen for dizziness.  Dizziness can be caused by many things and it can be very difficult to determine the cause of dizziness.  At this time, your provider has found no signs that your dizziness is due to a serious or life-threatening condition. However, sometimes there is a serious problem that does not show up right away, and it is important for you to follow up with your regular provider as instructed.  Generally, every Emergency Department visit should have a follow-up clinic visit with either a primary or a specialty clinic/provider. Please follow-up as instructed by your emergency provider today.      Return to the Emergency Department if:    You pass out (fainting or falling out), especially during exercise.    You develop chest pain, chest pressure or difficulty breathing.  Your feel an irregular heartbeat.  You have excessive vaginal bleeding, or blood in your stool or vomit (throw up).  You have a high fever.  Your symptoms get worse or more frequent.    If when you begin to feel dizzy or lightheaded, it is important to sit down or lay down immediately to prevent injury from falling.  If you were given a prescription for medicine here today, be sure to read all of the information (including the package insert) that comes with your prescription.  This will include important information about the medicine, its side effects, and any warnings that you need to know about.  The pharmacist who fills the  prescription can provide more information and answer questions you may have about the medicine.  If you have questions or concerns that the pharmacist cannot address, please call or return to the Emergency Department.   Remember that you can always come back to the Emergency Department if you are not able to see your regular provider in the amount of time listed above, if you get any new symptoms, or if there is anything that worries you.

## 2025-06-03 NOTE — LETTER
Brionna Rollins  [address] [place] [address]    Dear Brionna Rollins,      I am a team member within the Connected Care Resource Center with [facility]. I recently tried to reach you to ensure you were doing well following a recent visit within our health system. I also wanted to take this chance to introduce Clinic Care Coordination.     Below is a description of Clinic Care Coordination and how this team can further assist you:       The Clinic Care Coordination team is made up of a Registered Nurse, , Financial Resource Worker, and a Community Health Worker who understand and can help navigate the health care system. The goal of clinic care coordination is to help you manage your health, improve access to care, and achieve optimal health outcomes. They work alongside your provider to assist you in determining your health and social needs, obtain health care and community resources, and provide you with necessary information and education. Clinic Care Coordination can work with you through any barriers and develop a care plan that helps coordinate and strengthen the relationship between you and your care team.    If you wish to connect with the Clinic Care Coordination Team, please let your [facility] Primary Care Provider or Clinic Care Team know and they can place a referral. The Clinic Care Coordination team will then reach out by phone to further support you.    We are focused on providing you with the highest-quality healthcare experience possible.    Sincerely,   Your care team with [facility]'s [address] [place] [address] ([phone]).

## 2025-06-03 NOTE — TELEPHONE ENCOUNTER
Reason for Call:  Appointment Request    Patient requesting this type of appt:  Hospital/ED Follow-Up     Requested provider: Roe Connolly    Reason patient unable to be scheduled: No template listed to make appt with provider spoke with clinic directly stated I would have to send a TE because that provider would have to approve it first     When does patient want to be seen/preferred time: ASAP    Comments: N/A    [facility] or would you prefer to receive a phone call?:   Patient would prefer a phone call   Okay to leave a detailed message?: Yes at Cell number on file:    Telephone Information:   Mobile [phone]       Call taken on 6/3/2025 at 1:07 PM by Terri Gann

## 2025-06-04 NOTE — TELEPHONE ENCOUNTER
Outgoing call spoke to patient.  Scheduled for June 11th with PCP.    Thank you kindly,  Erick HERNANDES

## 2025-06-09 ENCOUNTER — TRANSFERRED RECORDS (OUTPATIENT)
Dept: HEALTH INFORMATION MANAGEMENT | Facility: CLINIC | Age: 80
End: 2025-06-09

## 2025-06-09 ENCOUNTER — TELEPHONE (OUTPATIENT)
Dept: ALLERGY | Facility: CLINIC | Age: 80
End: 2025-06-09

## 2025-06-09 NOTE — TELEPHONE ENCOUNTER
Called and spoke with patient before cancelling her procedure appointment for penicillin testing today. Patient was offered to reschedule while on the phone and RN assisted with this. No additional action is needed.     MAX CarnesN, RN

## 2025-06-11 ENCOUNTER — OFFICE VISIT (OUTPATIENT)
Dept: PEDIATRICS | Facility: CLINIC | Age: 80
End: 2025-06-11
Payer: MEDICARE

## 2025-06-11 VITALS
SYSTOLIC BLOOD PRESSURE: 132 MMHG | HEART RATE: 69 BPM | BODY MASS INDEX: 20.6 KG/M2 | RESPIRATION RATE: 16 BRPM | DIASTOLIC BLOOD PRESSURE: 66 MMHG | OXYGEN SATURATION: 99 % | TEMPERATURE: 97.3 F | WEIGHT: 120 LBS

## 2025-06-11 DIAGNOSIS — M47.22 OSTEOARTHRITIS OF SPINE WITH RADICULOPATHY, CERVICAL REGION: Primary | ICD-10-CM

## 2025-06-11 DIAGNOSIS — M79.2 NEUROPATHIC PAIN: ICD-10-CM

## 2025-06-11 DIAGNOSIS — I10 HYPERTENSION GOAL BP (BLOOD PRESSURE) < 140/90: ICD-10-CM

## 2025-06-11 DIAGNOSIS — Z98.1 S/P CERVICAL SPINAL FUSION: ICD-10-CM

## 2025-06-11 DIAGNOSIS — M54.12 C6 RADICULOPATHY: ICD-10-CM

## 2025-06-11 PROCEDURE — 3078F DIAST BP <80 MM HG: CPT | Performed by: INTERNAL MEDICINE

## 2025-06-11 PROCEDURE — G2211 COMPLEX E/M VISIT ADD ON: HCPCS | Performed by: INTERNAL MEDICINE

## 2025-06-11 PROCEDURE — 3075F SYST BP GE 130 - 139MM HG: CPT | Performed by: INTERNAL MEDICINE

## 2025-06-11 PROCEDURE — 1126F AMNT PAIN NOTED NONE PRSNT: CPT | Performed by: INTERNAL MEDICINE

## 2025-06-11 PROCEDURE — 99214 OFFICE O/P EST MOD 30 MIN: CPT | Performed by: INTERNAL MEDICINE

## 2025-06-11 RX ORDER — GABAPENTIN 100 MG/1
100-300 CAPSULE ORAL DAILY PRN
Status: SHIPPED
Start: 2025-06-11

## 2025-06-11 ASSESSMENT — PAIN SCALES - GENERAL: PAINLEVEL_OUTOF10: NO PAIN (0)

## 2025-06-11 NOTE — PROGRESS NOTES
Assessment & Plan     Osteoarthritis of spine with radiculopathy, cervical region  discussed with patient (or patient's parents/caregiver) pathophysiology of condition and treatment options.  better with acupuncture and supportive cares, blood pressure within acceptable limits tody, patient will check at home, watch diet/salt, continue symptoms management and follow-up with ortho as scheduled.     S/P cervical spinal fusion  as above, follow-up with spine as scheduled.     C6 radiculopathy  as above. continue supportive  suyapa, okay to increase gabapenin to 300mg and see if that helps symptoms. New medication(s) indication(s), adverse effects, risks and benefits discussed. Patient verbalized understanding and is agreeable to this plan.     Neuropathic pain  as above, continue follow-up with ortho/neuro , reviwed EMG and she will discuss carpel tunnel with them.     Hypertension goal BP (blood pressure) < 140/90  discussed with patient (or patient's parents/caregiver) pathophysiology of condition and treatment options.  blood pressure was today, continue ARB and watch diet, check BPs at home and mychart in with results. Patient verbalized understanding and is agreeable to this plan.     The longitudinal plan of care for the diagnosis(es)/condition(s) as documented were addressed during this visit. Due to the added complexity in care, I will continue to support Brionna in the subsequent management and with ongoing continuity of care.      MED REC REQUIRED  Post Medication Reconciliation Status: discharge medications reconciled and changed, per note/orders    Return in about 6 weeks (around 7/23/2025) for Next scheduled follow-up visit, or sooner if symptoms persist or worsen.        Subjective   Brionna is a 80 year old, presenting for the following health issues:  ER F/U        6/11/2025     9:32 AM   Additional Questions   Roomed by AY         6/11/2025     9:32 AM   Patient Reported Additional Medications   Patient  reports taking the following new medications none     HPI        ED/UC Followup:    [facility]  Date of visit: 6/2/2025  Reason for visit: Dizziness  Current Status: Better      here for above, getting acupuncture four tiems a week and this is taking the pressure off the left shoulder that seems to be contributing to the symptoms. has als been doing some massage, etc and this has been helpful. she did reach out to ortho and was told everything looks good so nothing else to do from their perspective. [facility] and will be follow-up with them about this as well. was told to go to ENT for the ear issues too and has this schedule. takes 200 mg of the gabapain in the middle of the day and this helps too. has contemplated increasing the dose. BPs at home has been a little high and taking the blood pressure medication without side effects noted. no changes to diet and no change to CBD/THC supplements she is taking. bought a heated pad that gets  around the shoulders and uses the TENS unit and these help too. no fevers or any other symptoms, no weakness. Patient is scheuled to get both shoulders injected this Friday.  No other concerns or complaints today.             Objective    /66   Pulse 69   Temp 97.3  F (36.3  C) (Temporal)   Resp 16   Wt 54.4 kg (120 lb)   LMP  (LMP Unknown)   SpO2 99%   BMI 20.60 kg/m    Body mass index is 20.6 kg/m .  Physical Exam     GENERAL: healthy, alert and no distress  HENT: normocephalic and atrumatic, mucous membranes moist   RESP: lungs clear to auscultation - no rales, rhonchi or wheezes  CV: regular rate and rhythm, normal S1 S2, no S3 or S4, no murmur, click or rub, no peripheral edema   ABDOMEN: soft, nontender bowel sounds normal  MS: + increase in tone consistant with mm strain/flexion in up upper traps B, worse on the left, better with light massage. full range of motion. normal strength.   SKIN: no  suspicious lesions or rashes  NEURO: Normal strength and tone, mentation intact and speech normal  PSYCH: mentation appears normal, affect normal/bright          Signed Electronically by: Roe Connolly MD

## 2025-06-13 ENCOUNTER — TRANSFERRED RECORDS (OUTPATIENT)
Dept: HEALTH INFORMATION MANAGEMENT | Facility: CLINIC | Age: 80
End: 2025-06-13
Payer: MEDICARE

## 2025-06-14 ENCOUNTER — HEALTH MAINTENANCE LETTER (OUTPATIENT)
Age: 80
End: 2025-06-14

## 2025-06-19 ENCOUNTER — TRANSFERRED RECORDS (OUTPATIENT)
Dept: HEALTH INFORMATION MANAGEMENT | Facility: CLINIC | Age: 80
End: 2025-06-19
Payer: MEDICARE

## 2025-06-26 ENCOUNTER — TRANSFERRED RECORDS (OUTPATIENT)
Dept: HEALTH INFORMATION MANAGEMENT | Facility: CLINIC | Age: 80
End: 2025-06-26
Payer: MEDICARE

## 2025-07-09 ENCOUNTER — TELEPHONE (OUTPATIENT)
Dept: ALLERGY | Facility: CLINIC | Age: 80
End: 2025-07-09
Payer: MEDICARE

## 2025-07-09 NOTE — TELEPHONE ENCOUNTER
[facility] Call Center    Phone Message    May a detailed message be left on voicemail: yes     Reason for Call: Other: Patient is needing to reschedule her allergy testing. Please call patient back to discuss. Thanks.     Action Taken: Message routed to:  Other: cs allergy     Travel Screening: Not Applicable     Date of Service:

## 2025-07-10 NOTE — TELEPHONE ENCOUNTER
RN called and spoke with patient. Patient was wanting to move penicillin testing to a fall appointment. RN rescheduled patient to October 16th, 2025 at 1300.    Regina Vásquez RN

## 2025-07-15 ENCOUNTER — PATIENT OUTREACH (OUTPATIENT)
Dept: CARE COORDINATION | Facility: CLINIC | Age: 80
End: 2025-07-15

## 2025-07-24 ENCOUNTER — TELEPHONE (OUTPATIENT)
Dept: ALLERGY | Facility: CLINIC | Age: 80
End: 2025-07-24
Payer: MEDICARE

## 2025-07-24 NOTE — TELEPHONE ENCOUNTER
RN attempted to call patient to reschedule challenge currently scheduled on 10/16, patient was driving and is going to return call. RN is hoping to reschedule this to October 30 at 1pm.    Awaiting call back to reschedule.    Regina Vásquez RN

## 2025-07-25 ENCOUNTER — TELEPHONE (OUTPATIENT)
Dept: ALLERGY | Facility: CLINIC | Age: 80
End: 2025-07-25
Payer: MEDICARE

## 2025-07-25 NOTE — TELEPHONE ENCOUNTER
LVM for patient to call back to reschedule appointment. Sac please reschedule for 10/30 at 1pm if still available.

## 2025-07-25 NOTE — TELEPHONE ENCOUNTER
10/31  does not work. Please call back with a different option for the procedure. Thank you. [facility] or [employer] message with options is great.

## 2025-07-28 NOTE — TELEPHONE ENCOUNTER
Writer spoke with patient, she will take 10/30 at 1pm. Patient stated if she's unable to make the appointment she will reschedule.     Kirti Sanchez MA

## 2025-07-29 ENCOUNTER — PATIENT OUTREACH (OUTPATIENT)
Dept: CARE COORDINATION | Facility: CLINIC | Age: 80
End: 2025-07-29
Payer: MEDICARE

## 2025-08-01 ENCOUNTER — TRANSFERRED RECORDS (OUTPATIENT)
Dept: HEALTH INFORMATION MANAGEMENT | Facility: CLINIC | Age: 80
End: 2025-08-01
Payer: MEDICARE

## 2025-08-27 ENCOUNTER — OFFICE VISIT (OUTPATIENT)
Dept: PEDIATRICS | Facility: CLINIC | Age: 80
End: 2025-08-27
Payer: MEDICARE

## 2025-08-27 VITALS
OXYGEN SATURATION: 98 % | RESPIRATION RATE: 16 BRPM | HEART RATE: 79 BPM | SYSTOLIC BLOOD PRESSURE: 122 MMHG | DIASTOLIC BLOOD PRESSURE: 62 MMHG | TEMPERATURE: 98.3 F

## 2025-08-27 DIAGNOSIS — R09.81 HEAD CONGESTION: ICD-10-CM

## 2025-08-27 DIAGNOSIS — M47.22 OSTEOARTHRITIS OF SPINE WITH RADICULOPATHY, CERVICAL REGION: Primary | ICD-10-CM

## 2025-08-27 DIAGNOSIS — T56.0X4A TOXIC EFFECT OF LEAD AND ITS COMPOUNDS, UNDETERMINED, INITIAL ENCOUNTER: ICD-10-CM

## 2025-08-27 PROCEDURE — 83655 ASSAY OF LEAD: CPT | Mod: 90 | Performed by: INTERNAL MEDICINE

## 2025-08-27 PROCEDURE — 3078F DIAST BP <80 MM HG: CPT | Performed by: INTERNAL MEDICINE

## 2025-08-27 PROCEDURE — 99214 OFFICE O/P EST MOD 30 MIN: CPT | Performed by: INTERNAL MEDICINE

## 2025-08-27 PROCEDURE — 3074F SYST BP LT 130 MM HG: CPT | Performed by: INTERNAL MEDICINE

## 2025-08-27 PROCEDURE — G2211 COMPLEX E/M VISIT ADD ON: HCPCS | Performed by: INTERNAL MEDICINE

## 2025-08-27 PROCEDURE — 99000 SPECIMEN HANDLING OFFICE-LAB: CPT | Performed by: INTERNAL MEDICINE

## 2025-08-27 PROCEDURE — 36415 COLL VENOUS BLD VENIPUNCTURE: CPT | Performed by: INTERNAL MEDICINE

## 2025-08-27 PROCEDURE — 1126F AMNT PAIN NOTED NONE PRSNT: CPT | Performed by: INTERNAL MEDICINE

## 2025-08-27 ASSESSMENT — PAIN SCALES - GENERAL: PAINLEVEL_OUTOF10: NO PAIN (0)

## 2025-08-29 ENCOUNTER — TRANSFERRED RECORDS (OUTPATIENT)
Dept: HEALTH INFORMATION MANAGEMENT | Facility: CLINIC | Age: 80
End: 2025-08-29
Payer: MEDICARE

## (undated) DEVICE — KIT ENDO TURNOVER/PROCEDURE W/CLEAN A SCOPE LINERS 103888

## (undated) DEVICE — ENDO TRAP POLYP QUICK CATCH 710201

## (undated) DEVICE — ENDO SNARE EXACTO COLD 9MM LOOP 2.4MMX230CM 00711115

## (undated) RX ORDER — FENTANYL CITRATE 50 UG/ML
INJECTION, SOLUTION INTRAMUSCULAR; INTRAVENOUS
Status: DISPENSED
Start: 2024-08-20